# Patient Record
Sex: MALE | Race: WHITE
[De-identification: names, ages, dates, MRNs, and addresses within clinical notes are randomized per-mention and may not be internally consistent; named-entity substitution may affect disease eponyms.]

---

## 2018-03-28 NOTE — XMS
Encounter Summary
  Created on: 04/10/2018
 
 Kayce Arboleda
 External Reference #: [id number]
 : 64
 Sex: Male
 
 Demographics
 
 
+-----------------------+-----------------------+
| Address               | [address] |
|                       | [place]  [address]  |
+-----------------------+-----------------------+
| Home Phone            | [phone]       |
+-----------------------+-----------------------+
| Preferred Language    | Unknown               |
+-----------------------+-----------------------+
| Marital Status        | Single                |
+-----------------------+-----------------------+
| Caodaism Affiliation | [id number]                  |
+-----------------------+-----------------------+
| Race                  | Unknown               |
+-----------------------+-----------------------+
| Ethnic Group          | Unknown               |
+-----------------------+-----------------------+
 
 
 Author
 
 
+--------------+-----------------------+
| Author       | [facility] |
+--------------+-----------------------+
| Organization | [facility] |
+--------------+-----------------------+
| Address      | Unknown               |
+--------------+-----------------------+
| Phone        | Unavailable           |
+--------------+-----------------------+
 
 
 
 Support
 
 
+-----------------+--------------+---------------------+-----------------+
| Name            | Relationship | Address             | Phone           |
+-----------------+--------------+---------------------+-----------------+
| Tejal Champagne | ECON         | [address]              | [phone] |
|                 |              | [address], [place]   |                 |
|                 |              | [address]               |                 |
+-----------------+--------------+---------------------+-----------------+
 
 
 
 Care Team Providers
 
 
 
+-----------------------+------+-----------------+
| Care Team Member Name | Role | Phone           |
+-----------------------+------+-----------------+
| Facundo Lees  | PCP  | [phone] |
+-----------------------+------+-----------------+
 
 
 
 Encounter Details
 
 
+--------+-------------+----------------------+----------------------+-------------+
| Date   | Type        | Department           | Care Team            | Description |
+--------+-------------+----------------------+----------------------+-------------+
| / | Documentati |   [facility]          |   [facility] |             |
| 2018   | on Only     | [facility]  |                      |             |
|        |             |  [address]    |                      |             |
|        |             | [place]         |                      |             |
|        |             | [address]           |                      |             |
|        |             | [phone]         |                      |             |
+--------+-------------+----------------------+----------------------+-------------+
 
 
 
 Social History
 
 
+---------------+-------+-----------+--------+------------------+
| Tobacco Use   | Types | Packs/Day | Years  | Date             |
|               |       |           | Used   |                  |
+---------------+-------+-----------+--------+------------------+
| Former Smoker |       | 3         | 37     | Quit: 10/05/2015 |
+---------------+-------+-----------+--------+------------------+
 
 
 
+---------------------+---+---+----------+
| Smokeless Tobacco:  |   |   | Quit:    |
| Former User         |   |   | 10/05/20 |
|                     |   |   | 17       |
+---------------------+---+---+----------+
 
 
 
+-------------+-----------+---------+---------------------------+
| Alcohol Use | Drinks/We | oz/Week | Comments                  |
|             | ek        |         |                           |
+-------------+-----------+---------+---------------------------+
| No          |   0       | 0.0     | heavy drinker in past hx. |
|             | Standard  |         |                           |
|             | drinks or |         |                           |
|             |           |         |                           |
|             | equivalen |         |                           |
|             | t         |         |                           |
+-------------+-----------+---------+---------------------------+
 
 
 
 
+------------------+---------------+
| Sex Assigned at  | Date Recorded |
| Birth            |               |
+------------------+---------------+
| Not on file      |               |
+------------------+---------------+
 as of this encounter
 
 Plan of Treatment
 
 
+--------+---------+-------------+----------------------+-------------+
| Date   | Type    | Specialty   | Care Team            | Description |
+--------+---------+-------------+----------------------+-------------+
| / | Office  | Pulmonology |   Jhonatan Louie MD  |             |
| 2018   | Visit   |             |  [address]    |             |
|        |         |             | [place] [address]   |             |
|        |         |             | [phone]         |             |
|        |         |             | [phone] (Fax)   |             |
+--------+---------+-------------+----------------------+-------------+
 as of this encounter
 
 Visit Diagnoses
 Not on filein this encounter

## 2018-03-28 NOTE — XMS
Clinical Summary
  Created on: 04/10/2018
 
 Jarod Arboleda
 External Reference #: [id number]
 : 64
 Sex: Male
 
 Demographics
 
 
+-----------------------+-----------------------+
| Address               | [address] |
|                       | [place]  [address]  |
+-----------------------+-----------------------+
| Home Phone            | [phone]       |
+-----------------------+-----------------------+
| Preferred Language    | Unknown               |
+-----------------------+-----------------------+
| Marital Status        | Single                |
+-----------------------+-----------------------+
| Buddhism Affiliation | 1013                  |
+-----------------------+-----------------------+
| Race                  | Unknown               |
+-----------------------+-----------------------+
| Ethnic Group          | Unknown               |
+-----------------------+-----------------------+
 
 
 Author
 
 
+--------------+-----------------------+
| Author       | [facility] |
+--------------+-----------------------+
| Organization | [facility] |
+--------------+-----------------------+
| Address      | Unknown               |
+--------------+-----------------------+
| Phone        | Unavailable           |
+--------------+-----------------------+
 
 
 
 Support
 
 
+-----------------+--------------+---------------------+-----------------+
| Name            | Relationship | Address             | Phone           |
+-----------------+--------------+---------------------+-----------------+
| Tejal Champagne | ECON         | [address]              | [phone] |
|                 |              | [address], [place]   |                 |
|                 |              | [address]               |                 |
+-----------------+--------------+---------------------+-----------------+
 
 
 
 Care Team Providers
 
 
 
+-----------------------+------+-----------------+
| Care Team Member Name | Role | Phone           |
+-----------------------+------+-----------------+
| Facundo Lees  | PP   | [phone] |
+-----------------------+------+-----------------+
 
 
 
 Allergies
 
 
+----------------+----------------------+----------+----------+---------------------+
| Active Allergy | Reactions            | Severity | Noted    | Comments            |
|                |                      |          | Date     |                     |
+----------------+----------------------+----------+----------+---------------------+
| Lisinopril     | Other (See Comments) | Medium   | 20 |   Dry hacking cough |
|                |                      |          | 16       |                     |
+----------------+----------------------+----------+----------+---------------------+
| Trazodone      | Agitation            | Low      | 20 |                     |
|                |                      |          | 16       |                     |
+----------------+----------------------+----------+----------+---------------------+
 
 
 
 Current Medications
 
 
+----------------------+----------------------+--------+---------+------+------+-------+
| Prescription         | Sig.                 | Disp.  | Refills | Star | End  | Statu |
|                      |                      |        |         | t    | Date | s     |
|                      |                      |        |         | Date |      |       |
+----------------------+----------------------+--------+---------+------+------+-------+
|   thiamine (VITAMIN  | Take 1 tablet by     |   30   | 0       |  |      | Activ |
| B-1) 100 MG tablet   | mouth daily.         | tablet |         |  |      | e     |
|                      |                      |        |         | 15   |      |       |
+----------------------+----------------------+--------+---------+------+------+-------+
|   pantoprazole       | Take 40 mg by mouth  |        |         |      |      | Activ |
| (PROTONIX) 40 MG     | daily.               |        |         |      |      | e     |
| tablet               |                      |        |         |      |      |       |
+----------------------+----------------------+--------+---------+------+------+-------+
|   ferrous sulfate,   | Take 65 mg of iron   |        |         |      |      | Activ |
| 65 FE, 324 (65 FE)   | by mouth daily with  |        |         |      |      | e     |
| MG EC tablet         | breakfast. With 250  |        |         |      |      |       |
|                      | mg Vit C             |        |         |      |      |       |
+----------------------+----------------------+--------+---------+------+------+-------+
|   ascorbic acid      | Take 250 mg by mouth |        |         |      |      | Activ |
| (VITAMIN C) 250 MG   |  daily.              |        |         |      |      | e     |
| tablet               |                      |        |         |      |      |       |
+----------------------+----------------------+--------+---------+------+------+-------+
|   cloNIDine          | Take 0.3 mg by mouth |        |         |      |      | Activ |
| (CATAPRES) 0.3 MG    |  nightly.            |        |         |      |      | e     |
| tablet               |                      |        |         |      |      |       |
+----------------------+----------------------+--------+---------+------+------+-------+
|   tamsulosin         | Take 0.4 mg by mouth |        |         |      |      | Activ |
| (FLOMAX) 0.4 MG      |   After dinner.      |        |         |      |      | e     |
| capsule              |                      |        |         |      |      |       |
+----------------------+----------------------+--------+---------+------+------+-------+
|   nitroGLYCERIN      | Place 1 tablet under |   90   | 0       | 05/0 |      | Activ |
 
| (NITROSTAT) 0.4 MG   |  the tongue every 5  | tablet |         | 7/20 |      | e     |
| SL tablet            | (five) minutes as    |        |         | 16   |      |       |
|                      | needed for Chest     |        |         |      |      |       |
|                      | pain.                |        |         |      |      |       |
+----------------------+----------------------+--------+---------+------+------+-------+
|   Multiple           | Take  by mouth       |        |         |      |      | Activ |
| Vitamins-Minerals    | daily.               |        |         |      |      | e     |
| (RA CENTRAL-BIBI PO) |                      |        |         |      |      |       |
+----------------------+----------------------+--------+---------+------+------+-------+
|                      | Inhale 1 puff into   |        |         |      |      | Activ |
| fluticasone-salmeter | the lungs 2 (two)    |        |         |      |      | e     |
| ol (ADVAIR) 500-50   | times daily.         |        |         |      |      |       |
| MCG/DOSE diskus      |                      |        |         |      |      |       |
| inhaler              |                      |        |         |      |      |       |
+----------------------+----------------------+--------+---------+------+------+-------+
|   gabapentin         | Take 300 mg by mouth |        |         |      |      | Activ |
| (NEURONTIN) 600 MG   |  3 (three) times     |        |         |      |      | e     |
| tablet               | daily.               |        |         |      |      |       |
+----------------------+----------------------+--------+---------+------+------+-------+
|   ibuprofen (MOTRIN) | Take 600 mg by mouth |        |         |      |      | Activ |
|  600 MG tablet       |  every 6 (six) hours |        |         |      |      | e     |
|                      |  as needed for Pain. |        |         |      |      |       |
|                      |  Trying to use       |        |         |      |      |       |
|                      | sparingly            |        |         |      |      |       |
+----------------------+----------------------+--------+---------+------+------+-------+
|   montelukast        | Take 10 mg by mouth  |        |         |      |      | Activ |
| (SINGULAIR) 10 MG    | nightly.             |        |         |      |      | e     |
| tablet               |                      |        |         |      |      |       |
+----------------------+----------------------+--------+---------+------+------+-------+
|                      | Take 12.5 mg by      |        |         |      |      | Activ |
| hydrochlorothiazide  | mouth daily.         |        |         |      |      | e     |
| (HYDRODIURIL) 12.5   |                      |        |         |      |      |       |
| MG tablet            |                      |        |         |      |      |       |
+----------------------+----------------------+--------+---------+------+------+-------+
|   amLODIPine         | Take 5 mg by mouth   |        |         |      |      | Activ |
| (NORVASC) 5 MG       | daily.               |        |         |      |      | e     |
| tablet               |                      |        |         |      |      |       |
+----------------------+----------------------+--------+---------+------+------+-------+
|   amitriptyline      | Take 100 mg by mouth |        |         |      |      | Activ |
| (ELAVIL) 100 MG      |  nightly.            |        |         |      |      | e     |
| tablet               |                      |        |         |      |      |       |
+----------------------+----------------------+--------+---------+------+------+-------+
|   albuterol          | Inhale 2 puffs into  |        |         |      |      | Activ |
| (PROVENTIL           | the lungs every 4    |        |         |      |      | e     |
| HFA;VENTOLIN HFA)    | (four) hours as      |        |         |      |      |       |
| 108 (90 BASE)        | needed for Wheezing. |        |         |      |      |       |
| MCG/ACT inhaler      |                      |        |         |      |      |       |
+----------------------+----------------------+--------+---------+------+------+-------+
|   umeclidinium       | Inhale 1 puff into   |        |         |      |      | Activ |
| bromide (INCRUSE     | the lungs daily.     |        |         |      |      | e     |
| ELLIPTA) 62.5        |                      |        |         |      |      |       |
| MCG/INH inhaler      |                      |        |         |      |      |       |
+----------------------+----------------------+--------+---------+------+------+-------+
|   diclofenac         | Take 50 mg by mouth  |        |         |      |      | Activ |
| (CATAFLAM) 50 MG     | 2 (two) times daily. |        |         |      |      | e     |
| tablet               |                      |        |         |      |      |       |
+----------------------+----------------------+--------+---------+------+------+-------+
|                      | Take 3 mLs by        |        |         |      |      | Activ |
| ipratropium-albutero | nebulization as      |        |         |      |      | e     |
| l (DUO-NEB) 0.5-2.5  | needed.              |        |         |      |      |       |
 
| mg/3mL               |                      |        |         |      |      |       |
+----------------------+----------------------+--------+---------+------+------+-------+
|   atorvastatin       | Take 1 tablet by     |   30   | 11      | 01/2 |      | Activ |
| (LIPITOR) 40 MG      | mouth nightly.       | tablet |         | 9/20 |      | e     |
| tablet               |                      |        |         | 18   |      |       |
+----------------------+----------------------+--------+---------+------+------+-------+
|   metoprolol         | Take 1 tablet by     |   60   | 11      | 01/2 |      | Activ |
| (LOPRESSOR) 25 MG    | mouth 2 (two) times  | tablet |         | 9/20 |      | e     |
| tablet               | daily.               |        |         | 18   |      |       |
+----------------------+----------------------+--------+---------+------+------+-------+
|   oxyCODONE          | Take 5 mg by mouth   |        |         |      |      | Activ |
| (ROXICODONE) 5 MG    | every 4 (four) hours |        |         |      |      | e     |
| immediate release    |  as needed for Pain. |        |         |      |      |       |
| tablet               |                      |        |         |      |      |       |
+----------------------+----------------------+--------+---------+------+------+-------+
|   cyanocobalamin     | Take 1,000 mcg by    |        |         |      |      | Activ |
| (VITAMIN B-12) 1000  | mouth daily.         |        |         |      |      | e     |
| MCG tablet           |                      |        |         |      |      |       |
+----------------------+----------------------+--------+---------+------+------+-------+
 
 
 
 Active Problems
 
 
+---------------------------------------------+------------+
| Problem                                     | Noted Date |
+---------------------------------------------+------------+
| Mild persistent asthma without complication | 2017 |
+---------------------------------------------+------------+
| Obstructive sleep apnea syndrome            | 2017 |
+---------------------------------------------+------------+
| H/O syncope                                 | 2016 |
+---------------------------------------------+------------+
 
 
 
+-------------------------------------------------------------------+
|   Last Assessment & Plan:   Syncope.         55yo WM, with Hx     |
| syncope.  Our workup to date is benign, including a 32 day        |
| cardiac event monitor.  He remains moderately active, no          |
| recurrent syncope, although he does have episodes of              |
| lightheadedness dizziness, but not to a point where he is near    |
| syncopal.  He denies any chest discomfort or shortness of breath. |
|   Tolerating medications.  No changes in therapy.  Will follow    |
| clinically.Hx Pacemaker/ICD: noLast Cath: naLast Echo, 2016  |
| ([facility]): LVEF 65-70%, trace MR, trace TR.  Ascending Aorta |
|  41mm, Aortic arch 37mm.Last Stress Test, 2016: Lexiscan, no  |
| ischemia detected, LVEF 63%.32-Day Cardiac Event Monitor,         |
| 2016: sinus rhythm, , averaging 80bpm, rare ectopy, no |
|  AVB/pauses, symptoms of "chest pain, SOB, dizziness, fluttering" |
|  all associated with NSR.ECG, 2016: NSR, 63bpm.              |
+-------------------------------------------------------------------+
 
 
 
+----------------------------------------------+------------+
| COPD (chronic obstructive pulmonary disease) | 2016 |
+----------------------------------------------+------------+
 
 
 
 
+-------------------------------------------------------------------+
|   Last Assessment & Plan:   COPD, ex-smoker (2015), managed by  |
| PCP.                                                              |
+-------------------------------------------------------------------+
 
 
 
+------------------------+------------+
| Precordial pain        | 2016 |
+------------------------+------------+
| Essential hypertension | 2016 |
+------------------------+------------+
 
 
 
+-----------------------------------------------------------------+
|   Last Assessment & Plan:   Hypertension, controlled, continue  |
| current meds at current doses (amlodipine, clonidine, HCT,      |
| metoprolol).                                                    |
+-----------------------------------------------------------------+
 
 
 
+----------------------+------------+
| HLD (hyperlipidemia) | 2016 |
+----------------------+------------+
 
 
 
+-------------------------------------------------------------------+
|   Last Assessment & Plan:   Hyperlipidemia, continue current meds |
|  at current dose (atorvastatin).                                  |
+-------------------------------------------------------------------+
 
 
 
+---------------------------------------------------------------+------------+
| Alcohol withdrawal                                            | 2015 |
+---------------------------------------------------------------+------------+
| Acute respiratory failure (HCC)                               | 2015 |
+---------------------------------------------------------------+------------+
| Personal history of tobacco use, presenting hazards to health | 2015 |
+---------------------------------------------------------------+------------+
| Obstructive sleep apnea (adult) (pediatric)                   | 2015 |
+---------------------------------------------------------------+------------+
| Morbid obesity (HCC)                                          | 2015 |
+---------------------------------------------------------------+------------+
| Accelerated hypertension                                      | 2015 |
+---------------------------------------------------------------+------------+
 
 
 
 Encounters
 
 
+--------+-------------+-----------+----------------------+----------------------+
| Date   | Type        | Specialty | Care Team            | Description          |
+--------+-------------+-----------+----------------------+----------------------+
 
| / | Office      |           |   PernelljhonyJanette    | Essential            |
| 2018   | Visit       |           | DIETER Johnson           | hypertension with    |
|        |             |           |                      | goal blood pressure  |
|        |             |           |                      | less than 130/80     |
|        |             |           |                      | (Primary Dx);        |
|        |             |           |                      | Hyperlipidemia,      |
|        |             |           |                      | unspecified          |
|        |             |           |                      | hyperlipidemia type; |
|        |             |           |                      |  Thoracoabdominal    |
|        |             |           |                      | aortic aneurysm,     |
|        |             |           |                      | without rupture      |
|        |             |           |                      | (HCC); H/O syncope;  |
|        |             |           |                      | Murmur, cardiac;     |
|        |             |           |                      | Obstructive sleep    |
|        |             |           |                      | apnea syndrome;      |
|        |             |           |                      | Chronic obstructive  |
|        |             |           |                      | pulmonary disease,   |
|        |             |           |                      | unspecified COPD     |
|        |             |           |                      | type (HCC); Former   |
|        |             |           |                      | heavy tobacco        |
|        |             |           |                      | smoker; History of   |
|        |             |           |                      | anemia               |
+--------+-------------+-----------+----------------------+----------------------+
| / | Documentati |           |   [facility] Galav[facility] |                      |
| 2018   | on Only     |           |                      |                      |
+--------+-------------+-----------+----------------------+----------------------+
| / | Documentati |           |   Sherron Almanzar,  | Piter Cervantes          |
| 2018   | on Only     |           | CMA                  | BLANCA Lees)        |
+--------+-------------+-----------+----------------------+----------------------+
| / | Documentati |           |   Sherron Almanzar,  | Other ([facility]    |
|    | on Only     |           | CMA                  | Clinic, Pulmonary    |
|        |             |           |                      | Function Test)       |
+--------+-------------+-----------+----------------------+----------------------+
| / | Ancillary   |           |   Janette Woodward    | H/O syncope;         |
|    | Procedure   |           | DIETER Johnson           | Essential            |
|        |             |           |                      | hypertension with    |
|        |             |           |                      | goal blood pressure  |
|        |             |           |                      | less than 130/80;    |
|        |             |           |                      | Hyperlipidemia,      |
|        |             |           |                      | unspecified          |
|        |             |           |                      | hyperlipidemia type; |
|        |             |           |                      |  Chronic obstructive |
|        |             |           |                      |  pulmonary disease,  |
|        |             |           |                      | unspecified COPD     |
|        |             |           |                      | type ([facility]);          |
|        |             |           |                      | Thoracoabdominal     |
|        |             |           |                      | aortic aneurysm,     |
|        |             |           |                      | without rupture      |
|        |             |           |                      | (HCC); Obstructive   |
|        |             |           |                      | sleep apnea syndrome |
+--------+-------------+-----------+----------------------+----------------------+
| / | Documentati |           |   Josseline Kwok,  | Other ([facility]     |
|    | on Only     |           | MA                   | Labs)                |
+--------+-------------+-----------+----------------------+----------------------+
| / | Documentati |           |   Stomry,     | Labs Only            |
| 2018   | on Only     |           | [facility]          |                      |
+--------+-------------+-----------+----------------------+----------------------+
| / | Office      |           |   Janette Woodward    | H/O syncope (Primary |
| 2018   | Visit       |           | DIETER Johnson           |  Dx); Essential      |
|        |             |           |                      | hypertension with    |
 
|        |             |           |                      | goal blood pressure  |
|        |             |           |                      | less than 130/80;    |
|        |             |           |                      | Hyperlipidemia,      |
|        |             |           |                      | unspecified          |
|        |             |           |                      | hyperlipidemia type; |
|        |             |           |                      |  Chronic obstructive |
|        |             |           |                      |  pulmonary disease,  |
|        |             |           |                      | unspecified COPD     |
|        |             |           |                      | type (HCC);          |
|        |             |           |                      | Thoracoabdominal     |
|        |             |           |                      | aortic aneurysm,     |
|        |             |           |                      | without rupture      |
|        |             |           |                      | (HCC); Obstructive   |
|        |             |           |                      | sleep apnea          |
|        |             |           |                      | syndrome; Former     |
|        |             |           |                      | heavy tobacco        |
|        |             |           |                      | smoker; History of   |
|        |             |           |                      | anemia; Murmur,      |
|        |             |           |                      | cardiac              |
+--------+-------------+-----------+----------------------+----------------------+
| / | Telephone   |           |   Lupe Nolan,    |                      |
|    |             |           | CMA                  |                      |
+--------+-------------+-----------+----------------------+----------------------+
 from Last 3 Months
 
 Immunizations
 
 
+-----------------+------------------------+----------+
| Name            | Dates Previously Given | Next Due |
+-----------------+------------------------+----------+
| Pneumococcal    | 2015             |          |
| Polysaccharide  |                        |          |
| 23-valent       |                        |          |
+-----------------+------------------------+----------+
 
 
 
 Family History
 
 
+--------------------+----------+------+----------+
| Medical History    | Relation | Name | Comments |
+--------------------+----------+------+----------+
| Sickle cell anemia | Father   |      |          |
+--------------------+----------+------+----------+
| Alcohol abuse      | Mother   |      |          |
+--------------------+----------+------+----------+
| Diabetes type I    | Mother   |      |          |
+--------------------+----------+------+----------+
| Diabetes type II   | Mother   |      |          |
+--------------------+----------+------+----------+
 
 
 
+----------+------+----------+--------------------+
| Relation | Name | Status   | Comments           |
+----------+------+----------+--------------------+
| Father   |      |  | sickle cell anemia |
|          |      |   (Age   |                    |
 
|          |      | 70)      |                    |
+----------+------+----------+--------------------+
| Mother   |      | Alive    |                    |
+----------+------+----------+--------------------+
 
 
 
 Social History
 
 
+---------------+-------+-----------+--------+------------------+
| Tobacco Use   | Types | Packs/Day | Years  | Date             |
|               |       |           | Used   |                  |
+---------------+-------+-----------+--------+------------------+
| Former Smoker |       | 3         | 37     | Quit: 10/05/2015 |
+---------------+-------+-----------+--------+------------------+
 
 
 
+---------------------+---+---+----------+
| Smokeless Tobacco:  |   |   | Quit:    |
| Former User         |   |   | 10/05/20 |
|                     |   |   | 15       |
+---------------------+---+---+----------+
 
 
 
+--------------------------------------+
| Tobacco Cessation: Ready to Quit: No |
+--------------------------------------+
 
 
 
+-------------+-----------+---------+---------------------------+
| Alcohol Use | Drinks/We | oz/Week | Comments                  |
|             | ek        |         |                           |
+-------------+-----------+---------+---------------------------+
| No          |   0       | 0.0     | heavy drinker in past hx. |
|             | Standard  |         |                           |
|             | drinks or |         |                           |
|             |           |         |                           |
|             | equivalen |         |                           |
|             | t         |         |                           |
+-------------+-----------+---------+---------------------------+
 
 
 
+------------------+---------------+
| Sex Assigned at  | Date Recorded |
| Birth            |               |
+------------------+---------------+
| Not on file      |               |
+------------------+---------------+
 
 
 
 Last Filed Vital Signs
 
 
+-------------------+----------------------+-------------------------+
 
| Vital Sign        | Reading              | Time Taken              |
+-------------------+----------------------+-------------------------+
| Blood Pressure    | 112/60               | 2018 10:53 AM PDT |
+-------------------+----------------------+-------------------------+
| Pulse             | 67                   | 2018 10:53 AM PDT |
+-------------------+----------------------+-------------------------+
| Temperature       | 36.4   C (97.6   F)  | 2017  9:51 AM PST |
+-------------------+----------------------+-------------------------+
| Respiratory Rate  | 20                   | 2018 10:53 AM PDT |
+-------------------+----------------------+-------------------------+
| Oxygen Saturation | 98%                  | 2018 10:53 AM PDT |
+-------------------+----------------------+-------------------------+
| Inhaled Oxygen    | -                    | -                       |
| Concentration     |                      |                         |
+-------------------+----------------------+-------------------------+
| Weight            | 106.6 kg (235 lb 1.6 | 2018 10:53 AM PDT |
|                   |  oz)                 |                         |
+-------------------+----------------------+-------------------------+
| Height            | 177.8 cm (5' 10")    | 2018 10:53 AM PDT |
+-------------------+----------------------+-------------------------+
| Body Mass Index   | 33.73                | 2018 10:53 AM PDT |
+-------------------+----------------------+-------------------------+
 
 
 
 Plan of Treatment
 
 
+--------+---------+-----------+----------------------+-------------+
| Date   | Type    | Specialty | Care Team            | Description |
+--------+---------+-----------+----------------------+-------------+
| / | Office  |           |   Jhonatan Louie MD  |             |
| 2018   | Visit   |           |  [address]    |             |
|        |         |           | [place] [address]   |             |
|        |         |           | [phone]         |             |
|        |         |           | [phone] (Fax)   |             |
+--------+---------+-----------+----------------------+-------------+
 
 
 
+----------------------+-----------+--------------------------+----------+
| Health Maintenance   | Due Date  | Last Done                | Comments |
+----------------------+-----------+--------------------------+----------+
| Vaccine:             |  |                          |          |
| Dtap/Tdap/Td (1 -    | 1         |                          |          |
| Tdap)                |           |                          |          |
+----------------------+-----------+--------------------------+----------+
| Colon Cancer         |  |                          |          |
| Screening            | 2         |                          |          |
| (Colonoscopy)        |           |                          |          |
+----------------------+-----------+--------------------------+----------+
| Vaccine: Influenza   |  | 10/27/2015, 10/21/2013,  |          |
| (Season Ended)       | 8         | 2013               |          |
+----------------------+-----------+--------------------------+----------+
| Vaccine:             | Completed | 2015, 2015   |          |
| Pneumococcal 19-64   |           |                          |          |
| (PPSV23 only) Medium |           |                          |          |
|  Risk                |           |                          |          |
+----------------------+-----------+--------------------------+----------+
 
 
 
 
 Procedures
 
 
+-----------------+--------+-------------+----------------------+----------------------+
| Procedure Name  | Priori | Date/Time   | Associated Diagnosis | Comments             |
|                 | ty     |             |                      |                      |
+-----------------+--------+-------------+----------------------+----------------------+
| ECHO OUTSIDE    | Routin | 2018  |   H/O syncope        |   Results for this   |
| INTERPRETATION  | e      |  3:43 PM    | Essential            | procedure are in the |
| STANDARD        |        | PST         | hypertension with    |  results section.    |
|                 |        |             | goal blood pressure  |                      |
|                 |        |             | less than 130/80     |                      |
|                 |        |             | Hyperlipidemia,      |                      |
|                 |        |             | unspecified          |                      |
|                 |        |             | hyperlipidemia type  |                      |
|                 |        |             |  Chronic obstructive |                      |
|                 |        |             |  pulmonary disease,  |                      |
|                 |        |             | unspecified COPD     |                      |
|                 |        |             | type (HCC)           |                      |
|                 |        |             | Thoracoabdominal     |                      |
|                 |        |             | aortic aneurysm,     |                      |
|                 |        |             | without rupture      |                      |
|                 |        |             | (HCC)  Obstructive   |                      |
|                 |        |             | sleep apnea syndrome |                      |
+-----------------+--------+-------------+----------------------+----------------------+
 from Last 3 Months
 
 Results
 ECHO outside interpretation standard (2018  3:43 PM)
 
+----------+--------------------------------------------------------+
| Specimen | Performing Laboratory                                  |
+----------+--------------------------------------------------------+
|          |   [facility] [address] [place] [address] |
+----------+--------------------------------------------------------+
 
 
 
+------------------------------------------------------------------------------------------+
| Impressions                                                                              |
+------------------------------------------------------------------------------------------+
|   1. Overall left ventricular systolic function is normal with, an EF between 60 - 65 %. |
|   2. The right ventricle is normal in size and function.  3. The aortic root, ascending  |
| aorta and aortic arch are normal. 4. No significant valvular abnormalities are noted.    |
+------------------------------------------------------------------------------------------+
 
 
 
+------------------------------------------------------------------------------------------+
| Narrative                                                                                |
+------------------------------------------------------------------------------------------+
|      Patient Name:    Jarod Arboleda  YOB: 1960                       |
| Accession:    [id number]     Performing Physician:    LOR DELGADO MD                      |
| _______________________________________________________________  INDICATIONS             |
| -----------  F/U thoracic/abdominal aneurysm     CONCLUSIONS  -----------  1. Overall    |
| left ventricular systolic function is normal with, an EF between 60 - 65 %.  2. The      |
| right ventricle is normal in size and function.  3. The aortic root, ascending aorta and |
|  aortic arch are normal. 4. No significant valvular abnormalities are noted.             |
 
| FINDINGS  --------  ECG rhythm: Sinus rhythm.   ECG rhythm: Resting bradycardia          |
| (HR<60bpm).  Study: A 2-dimensional transthoracic echocardiogram with m-mode, spectral   |
| and color flow Doppler was perfomed.   Study: This was a technically adequate study.     |
| Left Ventricle: Overall left ventricular systolic function is normal with, an EF between |
|  60 - 65 %.   Left Ventricle: The left ventricle cavity size is normal.   Left           |
| Ventricle: Left ventricular wall thickness is normal.   Left Ventricle: No regional wall |
|  motion abnormalities.   Left Ventricle: The diastolic filling pattern is normal for the |
|  age of the patient.  Right Ventricle: The right ventricle is normal in size and         |
| function.  Left Atrium: The left atrium is normal in size.  Right Atrium: The right      |
| atrium is normal in size.   Aortic Valve: Aortic valve is trileaflet and is mildly       |
| thickened.   Aortic Valve: There is no evidence of aortic regurgitation.   Aortic Valve: |
|  There is no evidence of aortic stenosis.  Mitral Valve: The mitral valve is normal.     |
| Mitral Valve: There is trace mitral regurgitation.   Mitral Valve: No evidence of MVP or |
|  mitral stenosis.  Tricuspid Valve: The tricuspid valve appears structurally normal.     |
| Tricuspid Valve: Pulmonary artery systolic pressure could not be assessed due to the     |
| absence of adequate TR jet.  Pulmonic Valve: The pulmonic valve was not well visualized. |
|   Pericardium: There is no pericardial effusion.  IVC/Hepatic Veins: The IVC is small    |
| (<1.5cm) and collapses with sniff, consistent with central venous pressures of 0-5mmHg.  |
|  Aorta: The aortic root, ascending aorta and aortic arch are normal.  Mass: No mass      |
| visualized  Thrombus: No clot visualized   Thrombus: No vegetation visualized.  Septum:  |
| No ASD observed.   Septum: No VSD observed.     MEASUREMENTS  -------------  Ao asc:     |
|  3.65 cm  Ao sinus:     3.46 cm  Ao st junct:     2.91 cm  IVC:     1.27 cm              |
| EDV(Teich):     105.93 ml  IVSd:     1.06 cm  LVIDd:     4.76 cm  LVPWd:     0.90 cm     |
| LVOT Diam:     2.31 cm  %FS:     22.34 %  EF(Teich):     44.99 %  ESV(Teich):     58.27  |
| ml  LVIDs:     3.70 cm  SV(Teich):     47.66 ml  RVIDd:     3.07 cm  Ao root:     32.72  |
| mm  LVEF MOD A2C:     56.41 %  SV MOD A2C:     46.46 ml  LVEF MOD A4C:     55.05 %  SV   |
| MOD A4C:     55.55 ml  EF Biplane:     55.87 %  LVEDV MOD BP:     92.29 ml  LVESV MOD    |
| BP:     40.72 ml  LVEDV MOD A2C:     82.36 ml  LVLd A2C:     9.15 cm  LVEDV MOD A4C:     |
|  100.89 ml  LVLd A4C:     9.46 cm  LVESV MOD A2C:     35.90 ml  LVLs A2C:     6.85 cm    |
| LVESV MOD A4C:     45.34 ml  LVLs A4C:     7.06 cm  LAESV(A-L):     54.75 ml  LAESV      |
| Index (A-L):     24.66 ml/m2  LAAs A2C:     15.85 cm2  LAESV A-L A2C:     42.05 ml  LALs |
|  A2C:     5.07 cm  LAAs A4C:     20.65 cm2  LAESV A-L A4C:     68.39 ml  LALs A4C:       |
| 5.29 cm  RAAs:     16.87 cm2  RAESV A-L:     44.78 ml  RAESV MOD:     45.69 ml           |
| RALs:     5.39 cm  TAPSE:     2.19 cm  AV maxP.05 mmHg  AV meanPG:     3.73 mmHg |
|   AV Vmax:     1.32 m/s  AV Vmean:     0.90 m/s  AV VTI:     29.89 cm  CHIRAG Vmax:         |
| 3.41 cm2  CHIRAG (VTI):     3.01 cm2  AVAI Vmax:     0.00 cm2/m2  AVAI (VTI):     0.00      |
| cm2/m2  LVOT maxP.63 mmHg  LVOT meanP.94 mmHg  LVSI Dopp:     40.60      |
| ml/m2  LVSV Dopp:     90.15 ml  LVOT Vmax:     1.07 m/s  LVOT Vmean:     0.63 m/s  LVOT  |
| VTI:     21.41 cm  MV A Librado:     0.37 m/s  MV DecT:     232.01 ms  MV E Librado:     0.58    |
| m/s  MV E/A Ratio:     1.53   Septal e':     0.08 m/s  Septal E/e':     7.16   Lateral   |
| e':     0.07 m/s  Lateral E/e':     7.35      Sonographer:   Authenticated               |
| by:    LOR DELGADO MD  Report Date/Time: 2018 17:52:14                            |
+------------------------------------------------------------------------------------------+
 
 
 
+-------------------------------------------------------------------------------------------
-------------------------+
| Procedure Note                                                                            
                         |
+-------------------------------------------------------------------------------------------
-------------------------+
|   Tank, Rad Results In - 2018  6:00 PM PST  Patient Name:  Jessica Arboleda of     
                         |
| Birth:  ccession:  [id number] Physician:  LOR DELGADO MD              
                         |
| _______________________________________________________________INDICATIONS-----------F/U  
                         |
|  thoracic/abdominal aneurysmCONCLUSIONS-----------1. Overall left ventricular systolic    
                         |
 
| function is normal with, an EF between 60 - 65 %.2. The right ventricle is normal in      
                         |
| size and function.3. The aortic root, ascending aorta and aortic arch are normal. 4. No   
                         |
| significant valvular abnormalities are noted.FINDINGS--------ECG rhythm: Sinus rhythm.    
                         |
| ECG rhythm: Resting bradycardia (HR<60bpm).Study: A 2-dimensional transthoracic           
                         |
| echocardiogram with m-mode, spectral and color flow Doppler was perfomed. Study: This     
                         |
| was a technically adequate study.Left Ventricle: Overall left ventricular systolic        
                         |
| function is normal with, an EF between 60 - 65 %. Left Ventricle: The left ventricle      
                         |
| cavity size is normal. Left Ventricle: Left ventricular wall thickness is normal. Left    
                         |
| Ventricle: No regional wall motion abnormalities. Left Ventricle: The diastolic filling   
                         |
| pattern is normal for the age of the patient.Right Ventricle: The right ventricle is      
                         |
| normal in size and function.Left Atrium: The left atrium is normal in size.Right Atrium:  
                         |
|  The right atrium is normal in size. Aortic Valve: Aortic valve is trileaflet and is      
                         |
| mildly thickened. Aortic Valve: There is no evidence of aortic regurgitation. Aortic      
                         |
| Valve: There is no evidence of aortic stenosis.Mitral Valve: The mitral valve is normal.  
                         |
|  Mitral Valve: There is trace mitral regurgitation. Mitral Valve: No evidence of MVP or   
                         |
| mitral stenosis.Tricuspid Valve: The tricuspid valve appears structurally normal.         
                         |
| Tricuspid Valve: Pulmonary artery systolic pressure could not be assessed due to the      
                         |
| absence of adequate TR jet.Pulmonic Valve: The pulmonic valve was not well                
                         |
| visualized.Pericardium: There is no pericardial effusion.IVC/Hepatic Veins: The IVC is    
                         |
| small (<1.5cm) and collapses with sniff, consistent with central venous pressures of      
                         |
| 0-5mmHg.Aorta: The aortic root, ascending aorta and aortic arch are normal.Mass: No mass  
                         |
|  visualizedThrombus: No clot visualized Thrombus: No vegetation visualized.Septum: No     
                         |
| ASD observed. Septum: No VSD observed.MEASUREMENTS-------------Ao asc:   3.65 cmAo        
                         |
| sinus:   3.46 cmAo st junct:   2.91 cmIVC:   1.27 cmEDV(Teich):   105.93 mlIVSd:   1.06   
                         |
| cmLVIDd:   4.76 cmLVPWd:   0.90 cmLVOT Diam:   2.31 cm%FS:   22.34 %EF(Teich):   44.99    
                         |
| %ESV(Teich):   58.27 mlLVIDs:   3.70 cmSV(Teich):   47.66 mlRVIDd:   3.07 cmAo root:      
                         |
| 32.72 mmLVEF MOD A2C:   56.41 %SV MOD A2C:   46.46 mlLVEF MOD A4C:   55.05 %SV MOD A4C:   
                         |
|   55.55 mlEF Biplane:   55.87 %LVEDV MOD BP:   92.29 mlLVESV MOD BP:   40.72 mlLVEDV MOD  
                         |
|  A2C:   82.36 mlLVLd A2C:   9.15 cmLVEDV MOD A4C:   100.89 mlLVLd A4C:   9.46 cmLVESV     
                         |
| MOD A2C:   35.90 mlLVLs A2C:   6.85 cmLVESV MOD A4C:   45.34 mlLVLs A4C:   7.06           
                         |
 
| cmLAESV(A-L):   54.75 mlLAESV Index (A-L):   24.66 ml/m2LAAs A2C:   15.85 [id number] A-L    
                         |
| A2C:   42.05 mlLALs A2C:   5.07 cmLAAs A4C:   20.65 [id number] A-L A4C:   68.39 mlLALs      
                         |
| A4C:   5.29 cmRAAs:   16.87 [id number] A-L:   44.78 mlRAESV MOD:   45.69 mlRALs:   5.39     
                         |
| cmTAPSE:   2.19 cmAV maxP.05 mmHgAV meanPG:   3.73 mmHgAV Vmax:   1.32 m/Hill        
                         |
| Vmean:   0.90 m/Hill VTI:   29.89 cmAVA Vmax:   3.41 cm2AVA (VTI):   3.01 [id number] Vmax:    
                         |
|  0.00 cm2/m2AVAI (VTI):   0.00 cm2/m2LVOT maxP.63 mmHgLVOT meanP.94 mmHgLVSI  
                         |
|  Dopp:   40.60 ml/m2LVSV Dopp:   90.15 mlLVOT Vmax:   1.07 m/sLVOT Vmean:   0.63 m/sLVOT  
                         |
|  VTI:   21.41 cmMV A Librado:   0.37 m/sMV DecT:   232.01 msMV E Librado:   0.58 m/sMV E/A        
                         |
| Ratio:   1.53 Septal e':   0.08 m/sSeptal E/e':   7.16 Lateral e':   0.07 m/sLateral      
                         |
| E/e':   7.35 Sonographer: Authenticated by:  Saurabh HERR Date/Time: 2018  
                         |
|  17:52:14IMPRESSION:1. Overall left ventricular systolic function is normal with, an EF   
                         |
| between 60 - 65 %.2. The right ventricle is normal in size and function.3. The aortic     
                         |
| root, ascending aorta and aortic arch are normal. 4. No significant valvular              
                         |
| abnormalities are noted.                                                                  
                         |
|Septum: No VSD observed.                                                                   
                         |
|MEASUREMENTS                                                                               
                        |
|-------------                                                                              
                          |
|Ao asc:   3.65 cm                                                                          
                         |
|Ao sinus:   3.46 cm                                                                        
                         |
|Ao st junct:   2.91 cm                                                                     
                         |
|IVC:   1.27 cm                                                                             
                         |
|EDV(Teich):   105.93 ml                                                                    
                         |
|IVSd:   1.06 cm                                                                            
                         |
|LVIDd:   4.76 cm                                                                           
                         |
|LVPWd:   0.90 cm                                                                           
                         |
|LVOT Diam:   2.31 cm                                                                       
                         |
|%FS:   22.34 %                                                                             
                         |
|EF(Teich):   44.99 %                                                                       
                         |
|ESV(Teich):   58.27 ml                                                                     
                         |
 
|LVIDs:   3.70 cm                                                                           
                         |
|SV(Teich):   47.66 ml                                                                      
                         |
|RVIDd:   3.07 cm                                                                           
                         |
|Ao root:   32.72 mm                                                                        
                         |
|LVEF MOD A2C:   56.41 %                                                                    
                         |
|SV MOD A2C:   46.46 ml                                                                     
                         |
|LVEF MOD A4C:   55.05 %                                                                    
                         |
|SV MOD A4C:   55.55 ml                                                                     
                         |
|EF Biplane:   55.87 %                                                                      
                         |
|LVEDV MOD BP:   92.29 ml                                                                   
                         |
|LVESV MOD BP:   40.72 ml                                                                   
                         |
|LVEDV MOD A2C:   82.36 ml                                                                  
                         |
|LVLd A2C:   9.15 cm                                                                        
                         |
|LVEDV MOD A4C:   100.89 ml                                                                 
                         |
|LVLd A4C:   9.46 cm                                                                        
                         |
|LVESV MOD A2C:   35.90 ml                                                                  
                         |
|LVLs A2C:   6.85 cm                                                                        
                         |
|LVESV MOD A4C:   45.34 ml                                                                  
                         |
|LVLs A4C:   7.06 cm                                                                        
                         |
|LAESV(A-L):   54.75 ml                                                                     
                         |
|LAESV Index (A-L):   24.66 ml/m2                                                           
                         |
|LAAs A2C:   15.85 cm2                                                                      
                         |
|LAESV A-L A2C:   42.05 ml                                                                  
                         |
|LALs A2C:   5.07 cm                                                                        
                         |
|LAAs A4C:   20.65 cm2                                                                      
                         |
|LAESV A-L A4C:   68.39 ml                                                                  
                         |
|LALs A4C:   5.29 cm                                                                        
                         |
|RAAs:   16.87 cm2                                                                          
                         |
|RAESV A-L:   44.78 ml                                                                      
                         |
|RAESV MOD:   45.69 ml                                                                      
                         |
 
|RALs:   5.39 cm                                                                            
                         |
|TAPSE:   2.19 cm                                                                           
                         |
|AV maxP.05 mmHg                                                                      
                         |
|AV meanPG:   3.73 mmHg                                                                     
                         |
|AV Vmax:   1.32 m/s                                                                        
                         |
|AV Vmean:   0.90 m/s                                                                       
                         |
|AV VTI:   29.89 cm                                                                         
                         |
|CHIRAG Vmax:   3.41 cm2                                                                       
                         |
|CHIRAG (VTI):   3.01 cm2                                                                      
                         |
|AVAI Vmax:   0.00 cm2/m2                                                                   
                         |
|AVAI (VTI):   0.00 cm2/m2                                                                  
                         |
|LVOT maxP.63 mmHg                                                                    
                         |
|LVOT meanP.94 mmHg                                                                   
                         |
|LVSI Dopp:   40.60 ml/m2                                                                   
                         |
|LVSV Dopp:   90.15 ml                                                                      
                         |
|LVOT Vmax:   1.07 m/s                                                                      
                         |
|LVOT Vmean:   0.63 m/s                                                                     
                         |
|LVOT VTI:   21.41 cm                                                                       
                         |
|MV A Librado:   0.37 m/s                                                                       
                         |
|MV DecT:   232.01 ms                                                                       
                         |
|MV E Librado:   0.58 m/s                                                                       
                         |
|MV E/A Ratio:   1.53                                                                       
                         |
|Septal e':   0.08 m/s                                                                      
                         |
|Septal E/e':   7.16                                                                        
                         |
|Lateral e':   0.07 m/s                                                                     
                         |
|Lateral E/e':   7.35                                                                       
                         |
|Sonographer:                                                                               
                         |
|Authenticated by:  LOR DELGADO MD                                                        
                         |
|Report Date/Time: 2018 17:52:14                                                       
                         |
 
|                                                                                           
                         |
|IMPRESSION:                                                                                
                        |
|1. Overall left ventricular systolic function is normal with, an EF between 60 - 65 %.     
                         |
|2. The right ventricle is normal in size and function.                                     
                         |
|3. The aortic root, ascending aorta and aortic arch are normal. 4. No significant valvular 
abnormalities are noted. |
+-------------------------------------------------------------------------------------------
-------------------------+
 Iron panel (2018  7:42 AM)
 
+------------+-------+-----------+
| Component  | Value | Ref Range |
+------------+-------+-----------+
| IRON       | 82.66 | 37 - 160  |
+------------+-------+-----------+
| IRON % SAT | 24.1  | 20 - 55   |
+------------+-------+-----------+
| TIBC       | 343   | 245 - 400 |
+------------+-------+-----------+
 
 
 
+----------+------------------------------------------------------------------+
| Specimen | Performing Laboratory                                            |
+----------+------------------------------------------------------------------+
| Blood    |   [facility]  [address]  |
|          | [id number]                                                            |
+----------+------------------------------------------------------------------+
 Transferrin (2018  7:42 AM)
 
+-------------+--------+-----------+
| Component   | Value  | Ref Range |
+-------------+--------+-----------+
| TRANSFERRIN | 245.11 | 180 - 329 |
+-------------+--------+-----------+
 
 
 
+----------+------------------------------------------------------------------+
| Specimen | Performing Laboratory                                            |
+----------+------------------------------------------------------------------+
| Blood    |   [facility]  [address]  |
|          | [id number]                                                            |
+----------+------------------------------------------------------------------+
 Ferritin (2018  7:42 AM)
 
+-----------+-------+----------------+
| Component | Value | Ref Range      |
+-----------+-------+----------------+
| FERRITIN  | 162.8 | 30 - 400 ng/mL |
+-----------+-------+----------------+
 
 
 
+----------+------------------------------------------------------------------+
| Specimen | Performing Laboratory                                            |
 
+----------+------------------------------------------------------------------+
| Blood    |   [facility]  [address] [facility] [address]  [place], [place]  |
|          | [id number]                                                            |
+----------+------------------------------------------------------------------+
 Lipid panel (2018  7:42 AM)
 
+---------------+-------+----------------+
| Component     | Value | Ref Range      |
+---------------+-------+----------------+
| CHOLESTEROL   | 131   | 200 mg/dL      |
+---------------+-------+----------------+
| TRIGLYCERIDES | 116   | 30 - 150 mg/dL |
+---------------+-------+----------------+
| HDL CHOL      | 40.3  | 40 mg/dl       |
+---------------+-------+----------------+
| LDL CALC      | 68    | 100 mg/dL      |
+---------------+-------+----------------+
| LDl/HDL Ratio |       |                |
+---------------+-------+----------------+
| CHOL/HDL      | 3.3   | 4.97           |
+---------------+-------+----------------+
| VLDL CHOL     | 23    | 4 - 40 mg/dL   |
+---------------+-------+----------------+
| NON HDL CHOL  | 91    | 130            |
+---------------+-------+----------------+
 
 
 
+----------+------------------------------------------------------------------+
| Specimen | Performing Laboratory                                            |
+----------+------------------------------------------------------------------+
| Blood    |   [facility]  [address] [place] [address] [facility] [address]  [place], [place]  |
|          | [id number]                                                            |
+----------+------------------------------------------------------------------+
 Comprehensive metabolic panel (2018  7:42 AM)
 
+----------------+-------+-------------------+
| Component      | Value | Ref Range         |
+----------------+-------+-------------------+
| GLUCOSE        | 99    | 70 - 100 mg/dL    |
+----------------+-------+-------------------+
| BUN            | 18    | 6 - 23 mg/dL      |
+----------------+-------+-------------------+
| CREATININE     | 0.88  | 0.70 - 1.33 mg/dL |
+----------------+-------+-------------------+
| BUN/CREAT      | 20.5  | 6.0 - 28.6        |
+----------------+-------+-------------------+
| CALCIUM        | 9.7   | 8.4 - 10.2 mg/dL  |
+----------------+-------+-------------------+
| TOTAL PROTEIN  | 6.6   | 6.0 - 8.0 g/dL    |
+----------------+-------+-------------------+
| Albumin        | 4.4   | 3.5 - 5.0         |
+----------------+-------+-------------------+
| GLOBULIN       | 2.2   | 1.8 - 3.5         |
+----------------+-------+-------------------+
| A/G            | 2.0   | 1.0 - 2.4         |
+----------------+-------+-------------------+
| TBIL           | 0.5   | 0.0 - 1.2 mg/dL   |
+----------------+-------+-------------------+
| ALK PHOS       | 59    | 32 - 120          |
 
+----------------+-------+-------------------+
| ALT            | 25    | 7 - 52 U/L        |
+----------------+-------+-------------------+
| AST            | 19    | 13 - 39 U/L       |
+----------------+-------+-------------------+
| SODIUM         | 142   | 132 - 143 mmol/L  |
+----------------+-------+-------------------+
| POTASSIUM      | 4.0   | 3.6 - 5.1 mmol/L  |
+----------------+-------+-------------------+
| CHLORIDE       | 103   | 95 - 112 mmol/L   |
+----------------+-------+-------------------+
| CO2            | 28    | 19 - 31 mmol/L    |
+----------------+-------+-------------------+
| ANION GAP AGAP | 15.0  | 7 - 21 mmol/L     |
+----------------+-------+-------------------+
| EGFR           | 90    | 60 mg/dL          |
+----------------+-------+-------------------+
 
 
 
+----------+------------------------------------------------------------------+
| Specimen | Performing Laboratory                                            |
+----------+------------------------------------------------------------------+
| Blood    |   [facility]  [address] [place]  |
|          | [id number]                                                            |
+----------+------------------------------------------------------------------+
 CBC W/Auto Diff (Reflex to Manual) (2018)
 
+-----------------+-------+--------------------+
| Component       | Value | Ref Range          |
+-----------------+-------+--------------------+
| WBC             | 9.0   | 4.5 - 11.0 10^3/mL |
+-----------------+-------+--------------------+
| RBC             | 4.79  | 4.3 - 5.7 10^6/  L |
+-----------------+-------+--------------------+
| HGB             | 14.7  | 13.5 - 18.0 g/dL   |
+-----------------+-------+--------------------+
| HCT             | 43.4  | 41 - 50 %          |
+-----------------+-------+--------------------+
| MCV             | 90.6  | 81 - 99 fL         |
+-----------------+-------+--------------------+
| MCH             | 31    | 27 - 33 pg         |
+-----------------+-------+--------------------+
| MCHC            | 34    | 30 - 36 g/dL       |
+-----------------+-------+--------------------+
| PLT             | 286   | 140 - 440 K/  L    |
+-----------------+-------+--------------------+
| RDW SD          | 13.5  | 10.5 - 15.0 %      |
+-----------------+-------+--------------------+
| MPV             |       | fL                 |
+-----------------+-------+--------------------+
| DIFF TYPE       |       |                    |
+-----------------+-------+--------------------+
| NEUTROPHILS     | 64.5  | 39 - 80 %          |
+-----------------+-------+--------------------+
| LYMPHOCYTES     | 26.4  | 24 - 44 %          |
+-----------------+-------+--------------------+
| MONOCYTES       | 7.2   | 0 - 12 %           |
+-----------------+-------+--------------------+
| EOSINOPHILS     | 0.9   | 0 - 6 %            |
 
+-----------------+-------+--------------------+
| BASOPHILS       | 1.0   | 0 - 2 %            |
+-----------------+-------+--------------------+
| NEUTROPHILS ABS |       | /  L               |
+-----------------+-------+--------------------+
| LYMPHOCYTES ABS |       | /  L               |
+-----------------+-------+--------------------+
| MONOCYTES ABS   |       | /  L               |
+-----------------+-------+--------------------+
| EOSINOPHILS ABS |       | /  L               |
+-----------------+-------+--------------------+
| BASOPHILS ABS   |       | /  L               |
+-----------------+-------+--------------------+
 
 
 
+----------+------------------------------------------------------------------+
| Specimen | Performing Laboratory                                            |
+----------+------------------------------------------------------------------+
| Blood    |   [facility]  [address] [place] [address], [place]  |
|          | [id number]                                                            |
+----------+------------------------------------------------------------------+
 EKG STANDARD 12 LEAD (2018 12:27 PM)
 
+-------------------+---------------------------------------------+-----------+
| Component         | Value                                       | Ref Range |
+-------------------+---------------------------------------------+-----------+
| Ventricular Rate  | 63                                          | BPM       |
+-------------------+---------------------------------------------+-----------+
| Atrial Rate       | 63                                          | BPM       |
+-------------------+---------------------------------------------+-----------+
| P-R Interval      | 146                                         | ms        |
+-------------------+---------------------------------------------+-----------+
| QRS Duration      | 96                                          | ms        |
+-------------------+---------------------------------------------+-----------+
| Q-T Interval      | 418                                         | ms        |
+-------------------+---------------------------------------------+-----------+
| QTC Calculation   | 427                                         | ms        |
| (Bezet)           |                                             |           |
+-------------------+---------------------------------------------+-----------+
| Calculated P Axis | 42                                          | degrees   |
+-------------------+---------------------------------------------+-----------+
| Calculated R Axis | 53                                          | degrees   |
+-------------------+---------------------------------------------+-----------+
| Calculated T Axis | 60                                          | degrees   |
+-------------------+---------------------------------------------+-----------+
| Diagnosis         | Please refer to Providers office visit note |           |
|                   |  for Providers Interpretation.Confirmed by  |           |
|                   | [facility] Read Only, PAULETTE Davis ([id number]),     |           |
|                   |  Az Galaviz ([id number]) on 2018    |           |
|                   | 4:56:19 PM                                  |           |
+-------------------+---------------------------------------------+-----------+
 
 
 
+----------+-------------------------------------------------+
| Specimen | Performing Laboratory                           |
+----------+-------------------------------------------------+
|          |   [facility]  [address] [address] |
+----------+-------------------------------------------------+
 
 from Last 3 Months
 
 Insurance
 
 
+----------+--------+-------------+------+-------+-----------------+
| Payer    | Benefi | Subscriber  | Type | Phone | Address         |
|          | t Plan | ID          |      |       |                 |
|          |  /     |             |      |       |                 |
|          | Group  |             |      |       |                 |
+----------+--------+-------------+------+-------+-----------------+
| MEDICAID | [employer] | xxxxxxxx    |      |       |   [address]   |
|          | [address]      |             |      |       | [place], [place]     |
|          | [place] |             |      |       | [address]      |
|          |  [employer]   |             |      |       |                 |
+----------+--------+-------------+------+-------+-----------------+
 
 
 
+-----------------+--------+-------------+--------+-------------+----------------------+
| Guarantor Name  | [employer] | Relation to | Date   | Phone       | Billing Address      |
|                 | t Type |  Patient    | of     |             |                      |
|                 |        |             | Birth  |             |                      |
+-----------------+--------+-------------+--------+-------------+----------------------+
| JAROD ARBOLEDA | Person | Self        | / |   Home:     |   [address]  |
|                 | [address] |             | 1960   | [phone] | [phone]  [place]   |
|                 | [place]    |             |        | [phone]        | [id number]                |
+-----------------+--------+-------------+--------+-------------+----------------------+

## 2018-03-28 NOTE — XMS
Clinical Summary
  Created on: 04/10/2018
 
 Jarod Arboleda
 External Reference #: [id number]
 : 64
 Sex: Male
 
 Demographics
 
 
+-----------------------+---------------------------+
| Address               | [address] |
|                       | [place]  [address]      |
+-----------------------+---------------------------+
| Home Phone            | [phone]           |
+-----------------------+---------------------------+
| Preferred Language    | Unknown                   |
+-----------------------+---------------------------+
| Marital Status        |                   |
+-----------------------+---------------------------+
| Anabaptist Affiliation | [id number]                      |
+-----------------------+---------------------------+
| Race                  | Unknown                   |
+-----------------------+---------------------------+
| Ethnic Group          | Unknown                   |
+-----------------------+---------------------------+
 
 
 Author
 
 
+--------------+--------------------------------------------+
| Author       | [facility]  |
|              | and [place]                                |
+--------------+--------------------------------------------+
| Organization | [facility]  |
|              | and [place]                                |
+--------------+--------------------------------------------+
| Address      | Unknown                                    |
+--------------+--------------------------------------------+
| Phone        | Unavailable                                |
+--------------+--------------------------------------------+
 
 
 
 Support
 
 
+----------------+--------------+---------------------+-----------------+
| Name           | Relationship | Address             | Phone           |
+----------------+--------------+---------------------+-----------------+
| Shane Champagne | ECON         | [address]              | [phone] |
|                |              | [address], [place]   |                 |
|                |              | [address]               |                 |
+----------------+--------------+---------------------+-----------------+
 
 
 
 
 Care Team Providers
 
 
+--------------------------+------+-------------+
| Care Team Member Name    | Role | Phone       |
+--------------------------+------+-------------+
| Facundo Lees NP | PP   | Unavailable |
+--------------------------+------+-------------+
 
 
 
 Allergies
 
 
+----------------+----------------------+----------+----------+----------------------+
| Active Allergy | Reactions            | Severity | Noted    | Comments             |
|                |                      |          | Date     |                      |
+----------------+----------------------+----------+----------+----------------------+
| Aspirin        | Other (See Comments) |          | 10/26/20 |   Stomach Ulcer:     |
|                |                      |          | 15       | side affect          |
+----------------+----------------------+----------+----------+----------------------+
| Trazodone      | Other (See Comments) |          | 10/26/20 |   Aggression : side  |
|                |                      |          | 15       | effects              |
+----------------+----------------------+----------+----------+----------------------+
 
 
 
 Current Medications
 
 
+----------------------+----------------------+----------+---------+------+------+-------+
| Prescription         | Sig.                 | Disp.    | Refills | Star | End  | Statu |
|                      |                      |          |         | t    | Date | s     |
|                      |                      |          |         | Date |      |       |
+----------------------+----------------------+----------+---------+------+------+-------+
|   montelukast        | Take 10 mg by mouth  |          |         |      |      | Activ |
| (SINGULAIR) 10 mg    | nightly.             |          |         |      |      | e     |
| tablet               |                      |          |         |      |      |       |
+----------------------+----------------------+----------+---------+------+------+-------+
|   amitriptyline      | Take 100 mg by mouth |          |         |      |      | Activ |
| (ELAVIL) 100 MG      |  nightly.            |          |         |      |      | e     |
| tablet               |                      |          |         |      |      |       |
+----------------------+----------------------+----------+---------+------+------+-------+
|   venlafaxine        | Take 100 mg by mouth |          |         |      |      | Activ |
| (EFFEXOR) 100 MG     |  2 times daily. 1    |          |         |      |      | e     |
| tablet               | tablet 2 with food   |          |         |      |      |       |
|                      | twice a day.         |          |         |      |      |       |
+----------------------+----------------------+----------+---------+------+------+-------+
|   amLODIPine         | Take 5 mg by mouth   |          |         |      |      | Activ |
| (NORVASC) 5 mg       | Daily.               |          |         |      |      | e     |
| tablet               |                      |          |         |      |      |       |
+----------------------+----------------------+----------+---------+------+------+-------+
|                      | Take 12.5 mg by      |          |         |      |      | Activ |
| hydrochlorothiazide  | mouth Daily.         |          |         |      |      | e     |
| (HYDRODIURIL) 12.5   |                      |          |         |      |      |       |
| MG tablet            |                      |          |         |      |      |       |
+----------------------+----------------------+----------+---------+------+------+-------+
|   thiamine (VITAMIN  | Take 100 mg by mouth |          |         |      |      | Activ |
| B-1) 100 mg tablet   |  Daily.              |          |         |      |      | e     |
 
+----------------------+----------------------+----------+---------+------+------+-------+
|   tamsulosin         | Take 0.4 mg by mouth |          |         |      |      | Activ |
| (FLOMAX) 0.4 mg CAPS |  daily (after        |          |         |      |      | e     |
|                      | breakfast).          |          |         |      |      |       |
+----------------------+----------------------+----------+---------+------+------+-------+
|   cloNIDine          | Take 0.3 mg by mouth |          |         |      |      | Activ |
| (CATAPRES) 0.3 MG    |  2 times daily.      |          |         |      |      | e     |
| tablet               |                      |          |         |      |      |       |
+----------------------+----------------------+----------+---------+------+------+-------+
|   meloxicam (MOBIC)  | Take 15 mg by mouth  |          |         |      |      | Activ |
| 15 mg tablet         | Daily.               |          |         |      |      | e     |
+----------------------+----------------------+----------+---------+------+------+-------+
|   albuterol 90       | Inhale 2 puffs into  |          |         |      |      | Activ |
| mcg/puff inhaler     | the lungs every 6    |          |         |      |      | e     |
|                      | hours as needed for  |          |         |      |      |       |
|                      | Wheezing.            |          |         |      |      |       |
+----------------------+----------------------+----------+---------+------+------+-------+
|   levalbuterol       | Take 3 mLs by        |   270 mL | 11      | 10/2 |      | Activ |
| (XOPENEX) 1.25 mg/3  | nebulization every 6 |          |         | 7/20 |      | e     |
| mL nebulizer         |  hours as needed for |          |         | 15   |      |       |
| solutionIndications: |  Wheezing or         |          |         |      |      |       |
|  COPD (chronic       | Shortness of Breath. |          |         |      |      |       |
| obstructive          |  JAGRUTI: 12 months      |          |         |      |      |       |
| pulmonary disease)   |                      |          |         |      |      |       |
| ([facility])                |                      |          |         |      |      |       |
+----------------------+----------------------+----------+---------+------+------+-------+
|   umeclidinium       | Inhale 1 puff into   |   1      | 11      | 10/3 |      | Activ |
| (INCRUSE ELLIPTA)    | the lungs Daily.     | Inhaler  |         | 0/20 |      | e     |
| 62.5 mcg/puff        |                      |          |         | 15   |      |       |
| inhaler              |                      |          |         |      |      |       |
+----------------------+----------------------+----------+---------+------+------+-------+
|   diphenhydrAMINE    | Take 25 mg by mouth  |          |         |      |      | Activ |
| (BENADRYL) 25 mg     | nightly as needed    |          |         |      |      | e     |
| tablet               | for Itching.         |          |         |      |      |       |
+----------------------+----------------------+----------+---------+------+------+-------+
|   losartan (COZAAR)  | Take 1 tablet by     |   30     | 2       | 02/2 |      | Activ |
| 25 mg tablet         | mouth Daily.         | tablet   |         | 3/20 |      | e     |
|                      |                      |          |         | 16   |      |       |
+----------------------+----------------------+----------+---------+------+------+-------+
|   Multiple           | Take one tablet      |          | 0       | 03/0 |      | Activ |
| Vitamins-Minerals    | daily                |          |         | 1/20 |      | e     |
| ([facility])    |                      |          |         | 16   |      |       |
| TABS                 |                      |          |         |      |      |       |
+----------------------+----------------------+----------+---------+------+------+-------+
|   rOPINIrole         | Take 1 tablet by     |   60     | 2       | 03/2 |      | Activ |
| (REQUIP) 0.25 mg     | mouth nightly 30     | tablet   |         | 4/20 |      | e     |
| tablet               | minutes before       |          |         | 16   |      |       |
|                      | bedtime for 1 week,  |          |         |      |      |       |
|                      | then increase to 2   |          |         |      |      |       |
|                      | tablets by mouth     |          |         |      |      |       |
|                      | nightly 30 minutes   |          |         |      |      |       |
|                      | before bedtime if    |          |         |      |      |       |
|                      | still having         |          |         |      |      |       |
|                      | symptoms.            |          |         |      |      |       |
+----------------------+----------------------+----------+---------+------+------+-------+
|   pantoprazole       | Take 1 tablet by     |   60     | 11      | 03/2 |      | Activ |
| (PROTONIX) 40 mg     | mouth 2 times daily  | tablet   |         | 4/20 |      | e     |
| tablet               | (before meals).      |          |         | 16   |      |       |
+----------------------+----------------------+----------+---------+------+------+-------+
|   diphenhydrAMINE    | take 1 capsule by    |   30     | 3       | 04/1 |      | Activ |
 
| (BENADRYL) 25 MG     | mouth NIGHTLY        | capsule  |         | 5/20 |      | e     |
| capsule              |                      |          |         | 16   |      |       |
+----------------------+----------------------+----------+---------+------+------+-------+
|                      | Inhale 1 puff into   |   1 each | 11      | 06/0 |      | Activ |
| fluticasone-salmeter | the lungs 2 times    |          |         | 1/20 |      | e     |
| ol                   | daily. Rinse mouth   |          |         | 16   |      |       |
| (FLUTICASONE-SALMETE | out with use.        |          |         |      |      |       |
| ROL) 500-50 mcg/puff |                      |          |         |      |      |       |
|  diskus inhaler      |                      |          |         |      |      |       |
+----------------------+----------------------+----------+---------+------+------+-------+
 
 
 
 Active Problems
 
 
+--------------------------------------+------------+
| Problem                              | Noted Date |
+--------------------------------------+------------+
| Pre-syncope                          | 2016 |
+--------------------------------------+------------+
| Obstructive chronic bronchitis (HCC) | 2015 |
+--------------------------------------+------------+
| HTN (hypertension)                   |            |
+--------------------------------------+------------+
| Elevated fasting blood sugar         |            |
+--------------------------------------+------------+
| CHELY (obstructive sleep apnea)        |            |
+--------------------------------------+------------+
 
 
 
+-----------------------------------+
|   Overview:   [facility] |
+-----------------------------------+
 
 
 
+-------------------------+---+
| Drug abuse in remission |   |
+-------------------------+---+
 
 
 
+------------------------------------------------------------------+
|   Overview:   past use of meth and cocaine and marijuana. Clean  |
| since                                                        |
+------------------------------------------------------------------+
 
 
 
+----------------------------------------+---+
| GERD (gastroesophageal reflux disease) |   |
+----------------------------------------+---+
| Anxiety                                |   |
+----------------------------------------+---+
| Depression                             |   |
+----------------------------------------+---+
| Insomnia                               |   |
+----------------------------------------+---+
 
 
 
 
+---------------------------------------+
|   Overview:   due to mental condition |
+---------------------------------------+
 
 
 
+--------------------------+---+
| Alcohol dependence (HCC) |   |
+--------------------------+---+
| Obesity                  |   |
+--------------------------+---+
 
 
 
 Immunizations
 
 
+----------------------+------------------------+----------+
| Name                 | Dates Previously Given | Next Due |
+----------------------+------------------------+----------+
| INFLUENZA PF         | 10/27/2015             |          |
| QUAD(PED/ADOL/ADULT) |                        |          |
| ,PSKT or VIAL        |                        |          |
+----------------------+------------------------+----------+
| INFLUENZA,           | 10/01/2014             |          |
| UNSPECIFIED          |                        |          |
| FORMULATION          |                        |          |
+----------------------+------------------------+----------+
| PNEUMOCOCCAL         | 2015             |          |
| POLYSACCHARIDE       |                        |          |
| 23-VALENT (PPSV23)   |                        |          |
+----------------------+------------------------+----------+
 
 
 
 Family History
 
 
+---------------------+----------+------+----------+
| Medical History     | Relation | Name | Comments |
+---------------------+----------+------+----------+
| Alcohol abuse       | Brother  |      |          |
+---------------------+----------+------+----------+
| Schizophrenia       | Brother  |      |          |
+---------------------+----------+------+----------+
| Psychiatric Illness | Father   |      |          |
+---------------------+----------+------+----------+
| Diabetes            | Mother   |      |          |
+---------------------+----------+------+----------+
| Migraines           | Sister   |      |          |
+---------------------+----------+------+----------+
 
 
 
+----------+------+----------+---------------------------+
| Relation | Name | Status   | Comments                  |
+----------+------+----------+---------------------------+
 
| Brother  |      | Alive    |                           |
+----------+------+----------+---------------------------+
| Father   |      |  |                           |
+----------+------+----------+---------------------------+
| Mother   |      |  | complications of diabetes |
+----------+------+----------+---------------------------+
| Sister   |      | Alive    |                           |
+----------+------+----------+---------------------------+
| Sister   |      |  | industrial accident       |
+----------+------+----------+---------------------------+
 
 
 
 Social History
 
 
+---------------+------------+-----------+--------+------------------+
| Tobacco Use   | Types      | Packs/Day | Years  | Date             |
|               |            |           | Used   |                  |
+---------------+------------+-----------+--------+------------------+
| Former Smoker | Cigarettes | 2         | 39     | Quit: 2015 |
+---------------+------------+-----------+--------+------------------+
 
 
 
+---------------------+---+---+---+
| Smokeless Tobacco:  |   |   |   |
| Never Used          |   |   |   |
+---------------------+---+---+---+
 
 
 
+-----------------------------------------+
| Tobacco Cessation: Counseling Given: No |
+-----------------------------------------+
 
 
 
+-------------+-----------+---------+----------+
| Alcohol Use | Drinks/We | oz/Week | Comments |
|             | ek        |         |          |
+-------------+-----------+---------+----------+
| No          |   0       | 0.0     |          |
|             | Standard  |         |          |
|             | drinks or |         |          |
|             |           |         |          |
|             | equivalen |         |          |
|             | t         |         |          |
+-------------+-----------+---------+----------+
 
 
 
+------------------+---------------+
| Sex Assigned at  | Date Recorded |
| Birth            |               |
+------------------+---------------+
| Not on file      |               |
+------------------+---------------+
 
 
 
 
 Last Filed Vital Signs
 
 
+-------------------+-------------------+---------------------+
| Vital Sign        | Reading           | Time Taken          |
+-------------------+-------------------+---------------------+
| Blood Pressure    | 140/70            | 2016 1312 PDT |
+-------------------+-------------------+---------------------+
| Pulse             | 84                | 20162 PDT |
+-------------------+-------------------+---------------------+
| Temperature       | -                 | -                   |
+-------------------+-------------------+---------------------+
| Respiratory Rate  | 16                | 10/27/2015 1501 PDT |
+-------------------+-------------------+---------------------+
| Oxygen Saturation | 96%               | 2016 PDT |
+-------------------+-------------------+---------------------+
| Inhaled Oxygen    | -                 | -                   |
| Concentration     |                   |                     |
+-------------------+-------------------+---------------------+
| Weight            | 122.5 kg (270 lb) | 2016 PDT |
+-------------------+-------------------+---------------------+
| Height            | 172.7 cm (5' 8")  | 2016 PDT |
+-------------------+-------------------+---------------------+
| Body Mass Index   | 41.05             | 2016 PDT |
+-------------------+-------------------+---------------------+
 
 
 
 Plan of Treatment
 
 
+----------------------+-----------+------------------------+----------+
| Health Maintenance   | Due Date  | Last Done              | Comments |
+----------------------+-----------+------------------------+----------+
| Hepatitis C          |  |                        |          |
| Screening            | 2         |                        |          |
+----------------------+-----------+------------------------+----------+
| Vaccine:             |  |                        |          |
| Dtap/Tdap/Td (1 -    | 1         |                        |          |
| Tdap)                |           |                        |          |
+----------------------+-----------+------------------------+----------+
| COLON CANCER         |  |                        |          |
| SCREENING            | 2         |                        |          |
| (COLONOSCOPY EVERY   |           |                        |          |
| 10 YEARS 50-75)      |           |                        |          |
+----------------------+-----------+------------------------+----------+
| Vaccine: Influenza   |  | 10/27/2015, 10/01/2014 |          |
| (Season Ended)       | 8         |                        |          |
+----------------------+-----------+------------------------+----------+
| Vaccine:             | Completed | 2015, 2015 |          |
| Pneumococcal 19-64   |           |                        |          |
| (PPSV23 only) Medium |           |                        |          |
|  Risk                |           |                        |          |
+----------------------+-----------+------------------------+----------+
 
 
 
 Results
 Not on filefrom Last 3 Months
 
 
 Insurance
 
 
+-------------------+--------+-------------+--------+-------------+---------+
| Payer             | Benefi | Subscriber  | Type   | Phone       | Address |
|                   | t Plan | ID          |        |             |         |
|                   |  /     |             |        |             |         |
|                   | Group  |             |        |             |         |
+-------------------+--------+-------------+--------+-------------+---------+
| [employer]  | [employer]   | [id number]    | [employer] | [phone]         |
| MEDICAID HMO      | HEALTH |             | id     | [id number]        |         |
|                   |  MDCD  |             |        |             |         |
|                   | [place] |             |        |             |         |
+-------------------+--------+-------------+--------+-------------+---------+
 
 
 
+------------------+--------+-------------+--------+-------------+----------------------+
| Guarantor Name   | [employer] | Relation to | Date   | Phone       | Billing Address      |
|                  | t Type |  Patient    | of     |             |                      |
|                  |        |             | Birth  |             |                      |
+------------------+--------+-------------+--------+-------------+----------------------+
| JAROD ARBOLEDA | Person | Self        | / |   Home:     |   [address]  |
|                  | al/Fam |             | 1960   | [phone] [address]  [place],  |
|                  | [place]    |             |        | [id number]        | [place] [phone]             |
+------------------+--------+-------------+--------+-------------+----------------------+

## 2018-03-28 NOTE — XMS
Encounter Summary
  Created on: 04/10/2018
 
 Kayce Arboleda
 External Reference #: [id number]
 : 64
 Sex: Male
 
 Demographics
 
 
+-----------------------+-----------------------+
| Address               | [address] |
|                       | [place]  [address]  |
+-----------------------+-----------------------+
| Home Phone            | [phone]       |
+-----------------------+-----------------------+
| Preferred Language    | Unknown               |
+-----------------------+-----------------------+
| Marital Status        | Single                |
+-----------------------+-----------------------+
| Voodoo Affiliation | 1013                  |
+-----------------------+-----------------------+
| Race                  | Unknown               |
+-----------------------+-----------------------+
| Ethnic Group          | Unknown               |
+-----------------------+-----------------------+
 
 
 Author
 
 
+--------------+-----------------------+
| Author       | [facility] [employer] [facility] |
+--------------+-----------------------+
| Organization | [facility] [employer] [facility] |
+--------------+-----------------------+
| Address      | Unknown               |
+--------------+-----------------------+
| Phone        | Unavailable           |
+--------------+-----------------------+
 
 
 
 Support
 
 
+-----------------+--------------+---------------------+-----------------+
| Name            | Relationship | Address             | Phone           |
+-----------------+--------------+---------------------+-----------------+
| Tejal Champagne | ECON         | [address]              | [phone] |
|                 |              | [address], [place]   |                 |
|                 |              | [address]               |                 |
+-----------------+--------------+---------------------+-----------------+
 
 
 
 Care Team Providers
 
 
 
+-----------------------+------+-----------------+
| Care Team Member Name | Role | Phone           |
+-----------------------+------+-----------------+
| Facundo Lees  | PCP  | [phone] |
+-----------------------+------+-----------------+
 
 
 
 Reason for Visit
 
 
+--------+--------------------------------------------+
| Reason | Comments                                   |
+--------+--------------------------------------------+
| Other  | [facility], Pulmonary Function Test |
+--------+--------------------------------------------+
 
 
 
 Encounter Details
 
 
+--------+-------------+----------------------+----------------------+--------------------+
| Date   | Type        | Department           | Care Team            | Description        |
+--------+-------------+----------------------+----------------------+--------------------+
| / | Documentati |   ANDRÉS Ortizand          |   Sherron Almanzar,  | Other ([facility] [place]  |
| 2018   | on Only     | [facility] [place]  | [facility]                  | Clinic, Pulmonary  |
|        |             |  [address]    |                      | Function Test)     |
|        |             | [place]         |                      |                    |
|        |             | [address]           |                      |                    |
|        |             | [phone]         |                      |                    |
+--------+-------------+----------------------+----------------------+--------------------+
 
 
 
 Social History
 
 
+---------------+-------+-----------+--------+------------------+
| Tobacco Use   | Types | Packs/Day | Years  | Date             |
|               |       |           | Used   |                  |
+---------------+-------+-----------+--------+------------------+
| Former Smoker |       | 3         | 37     | Quit: 10/05/2015 |
+---------------+-------+-----------+--------+------------------+
 
 
 
+---------------------+---+---+----------+
| Smokeless Tobacco:  |   |   | Quit:    |
| Former User         |   |   | 10/05/20 |
|                     |   |   | 15       |
+---------------------+---+---+----------+
 
 
 
+-------------+-----------+---------+---------------------------+
| Alcohol Use | Drinks/We | oz/Week | Comments                  |
|             | ek        |         |                           |
 
+-------------+-----------+---------+---------------------------+
| No          |   0       | 0.0     | heavy drinker in past hx. |
|             | Standard  |         |                           |
|             | drinks or |         |                           |
|             |           |         |                           |
|             | equivalen |         |                           |
|             | t         |         |                           |
+-------------+-----------+---------+---------------------------+
 
 
 
+------------------+---------------+
| Sex Assigned at  | Date Recorded |
| Birth            |               |
+------------------+---------------+
| Not on file      |               |
+------------------+---------------+
 as of this encounter
 
 Plan of Treatment
 
 
+--------+---------+-------------+----------------------+-------------+
| Date   | Type    | Specialty   | Care Team            | Description |
+--------+---------+-------------+----------------------+-------------+
| / | Office  | Pulmonology |   Jhonatan Louie MD  |             |
| 2018   | Visit   |             |  [address]    |             |
|        |         |             | [place] [address]   |             |
|        |         |             | [phone]         |             |
|        |         |             | [phone] (Fax)   |             |
+--------+---------+-------------+----------------------+-------------+
 as of this encounter
 
 Visit Diagnoses
 Not on filein this encounter

## 2018-03-28 NOTE — XMS
Encounter Summary
  Created on: 04/10/2018
 
 Kayce Arboleda
 External Reference #: [id number]
 : 64
 Sex: Male
 
 Demographics
 
 
+-----------------------+-----------------------+
| Address               | [address] |
|                       | [place]  [address]  |
+-----------------------+-----------------------+
| Home Phone            | [phone]       |
+-----------------------+-----------------------+
| Preferred Language    | Unknown               |
+-----------------------+-----------------------+
| Marital Status        | Single                |
+-----------------------+-----------------------+
| Mu-ism Affiliation | 1013                  |
+-----------------------+-----------------------+
| Race                  | Unknown               |
+-----------------------+-----------------------+
| Ethnic Group          | Unknown               |
+-----------------------+-----------------------+
 
 
 Author
 
 
+--------------+-----------------------+
| Author       | [facility] [employer] [facility] |
+--------------+-----------------------+
| Organization | [facility] [employer] [facility] |
+--------------+-----------------------+
| Address      | Unknown               |
+--------------+-----------------------+
| Phone        | Unavailable           |
+--------------+-----------------------+
 
 
 
 Support
 
 
+-----------------+--------------+---------------------+-----------------+
| Name            | Relationship | Address             | Phone           |
+-----------------+--------------+---------------------+-----------------+
| Tejal Champagne | ECON         | [address]              | [phone] |
|                 |              | [address], [place]   |                 |
|                 |              | [address]               |                 |
+-----------------+--------------+---------------------+-----------------+
 
 
 
 Care Team Providers
 
 
 
+-----------------------+------+-----------------+
| Care Team Member Name | Role | Phone           |
+-----------------------+------+-----------------+
| Facundo Lees  | PCP  | [phone] |
+-----------------------+------+-----------------+
 
 
 
 Encounter Details
 
 
+--------+-----------+---------------------+--------------------+-------------+
| Date   | Type      | Department          | Care Team          | Description |
+--------+-----------+---------------------+--------------------+-------------+
| / | Telephone |   [facility]     |   Lupe Nolan,  |             |
|    |           | Pulmonology  [address]   | [address]                |             |
|        |           | [address]   |                    |             |
|        |           | [place]        |                    |             |
|        |           | [address]          |                    |             |
|        |           | [phone]        |                    |             |
+--------+-----------+---------------------+--------------------+-------------+
 
 
 
 Social History
 
 
+---------------+-------+-----------+--------+------------------+
| Tobacco Use   | Types | Packs/Day | Years  | Date             |
|               |       |           | Used   |                  |
+---------------+-------+-----------+--------+------------------+
| Former Smoker |       | 3         | 37     | Quit: 10/05/2015 |
+---------------+-------+-----------+--------+------------------+
 
 
 
+---------------------+---+---+----------+
| Smokeless Tobacco:  |   |   | Quit:    |
| Former User         |   |   | 10/05/20 |
|                     |   |   | 17       |
+---------------------+---+---+----------+
 
 
 
+-------------+-----------+---------+---------------------------+
| Alcohol Use | Drinks/We | oz/Week | Comments                  |
|             | ek        |         |                           |
+-------------+-----------+---------+---------------------------+
| No          |   0       | 0.0     | heavy drinker in past hx. |
|             | Standard  |         |                           |
|             | drinks or |         |                           |
|             |           |         |                           |
|             | equivalen |         |                           |
|             | t         |         |                           |
+-------------+-----------+---------+---------------------------+
 
 
 
 
+------------------+---------------+
| Sex Assigned at  | Date Recorded |
| Birth            |               |
+------------------+---------------+
| Not on file      |               |
+------------------+---------------+
 as of this encounter
 
 Plan of Treatment
 
 
+--------+---------+-------------+----------------------+-------------+
| Date   | Type    | Specialty   | Care Team            | Description |
+--------+---------+-------------+----------------------+-------------+
| / | Office  | Pulmonology |   Jhonatan Louie MD  |             |
| 2018   | Visit   |             |  [address]    |             |
|        |         |             | [place] [address]   |             |
|        |         |             | [phone]         |             |
|        |         |             | [phone] (Fax)   |             |
+--------+---------+-------------+----------------------+-------------+
 as of this encounter
 
 Visit Diagnoses
 Not on filein this encounter

## 2018-03-28 NOTE — XMS
Encounter Summary
  Created on: 04/10/2018
 
 Kayce Arboleda
 External Reference #: [id number]
 : 64
 Sex: Male
 
 Demographics
 
 
+-----------------------+-----------------------+
| Address               | [address] |
|                       | [place]  [address]  |
+-----------------------+-----------------------+
| Home Phone            | [phone]       |
+-----------------------+-----------------------+
| Preferred Language    | Unknown               |
+-----------------------+-----------------------+
| Marital Status        | Single                |
+-----------------------+-----------------------+
| Cheondoism Affiliation | [id number]                  |
+-----------------------+-----------------------+
| Race                  | Unknown               |
+-----------------------+-----------------------+
| Ethnic Group          | Unknown               |
+-----------------------+-----------------------+
 
 
 Author
 
 
+--------------+-----------------------+
| Author       | [facility] |
+--------------+-----------------------+
| Organization | [facility] |
+--------------+-----------------------+
| Address      | Unknown               |
+--------------+-----------------------+
| Phone        | Unavailable           |
+--------------+-----------------------+
 
 
 
 Support
 
 
+-----------------+--------------+---------------------+-----------------+
| Name            | Relationship | Address             | Phone           |
+-----------------+--------------+---------------------+-----------------+
| Tejal Champagne | ECON         | [address]              | [phone] |
|                 |              | [address], [place]   |                 |
|                 |              | [address]               |                 |
+-----------------+--------------+---------------------+-----------------+
 
 
 
 Care Team Providers
 
 
 
+-----------------------+------+-----------------+
| Care Team Member Name | Role | Phone           |
+-----------------------+------+-----------------+
| Facundo LeesP  | PCP  | [phone] |
+-----------------------+------+-----------------+
 
 
 
 Encounter Details
 
 
+--------+------------+---------------------+----------------------+----------------------+
| Date   | Type       | Department          | Care Team            | Description          |
+--------+------------+---------------------+----------------------+----------------------+
| / | Ancillary  |   [facility]  |   PernelllyndonJanette tijerina    | H/O syncope;         |
| 2018   | Procedure  | ECHO                | [address]     | Essential            |
|        |            |                     | [address]    | hypertension with    |
|        |            |                     | [place] [address]   | goal blood pressure  |
|        |            |                     | [phone]         | less than 130/80;    |
|        |            |                     | [phone] (Fax)   | Hyperlipidemia,      |
|        |            |                     |                      | unspecified          |
|        |            |                     |                      | hyperlipidemia type; |
|        |            |                     |                      |  Chronic obstructive |
|        |            |                     |                      |  pulmonary disease,  |
|        |            |                     |                      | unspecified COPD     |
|        |            |                     |                      | type (HCC);          |
|        |            |                     |                      | Thoracoabdominal     |
|        |            |                     |                      | aortic aneurysm,     |
|        |            |                     |                      | without rupture      |
|        |            |                     |                      | (HCC); Obstructive   |
|        |            |                     |                      | sleep apnea syndrome |
+--------+------------+---------------------+----------------------+----------------------+
 
 
 
 Social History
 
 
+---------------+-------+-----------+--------+------------------+
| Tobacco Use   | Types | Packs/Day | Years  | Date             |
|               |       |           | Used   |                  |
+---------------+-------+-----------+--------+------------------+
| Former Smoker |       | 3         | 35     | Quit: 10/05/2015 |
+---------------+-------+-----------+--------+------------------+
 
 
 
+---------------------+---+---+----------+
| Smokeless Tobacco:  |   |   | Quit:    |
| Former User         |   |   | 10/05/20 |
|                     |   |   | 15       |
+---------------------+---+---+----------+
 
 
 
+-------------+-----------+---------+---------------------------+
| Alcohol Use | Drinks/We | oz/Week | Comments                  |
|             | ek        |         |                           |
 
+-------------+-----------+---------+---------------------------+
| No          |   0       | 0.0     | heavy drinker in past hx. |
|             | Standard  |         |                           |
|             | drinks or |         |                           |
|             |           |         |                           |
|             | equivalen |         |                           |
|             | t         |         |                           |
+-------------+-----------+---------+---------------------------+
 
 
 
+------------------+---------------+
| Sex Assigned at  | Date Recorded |
| Birth            |               |
+------------------+---------------+
| Not on file      |               |
+------------------+---------------+
 as of this encounter
 
 Plan of Treatment
 
 
+--------+---------+-------------+----------------------+-------------+
| Date   | Type    | Specialty   | Care Team            | Description |
+--------+---------+-------------+----------------------+-------------+
| / | Office  | Pulmonology |   Jhonatan Louie MD  |             |
| 2018   | Visit   |             |  [address]    |             |
|        |         |             | [place] [address]   |             |
|        |         |             | [phone]         |             |
|        |         |             | [phone] (Fax)   |             |
+--------+---------+-------------+----------------------+-------------+
 as of this encounter
 
 Procedures
 
 
+-----------------+--------+-------------+----------------------+----------------------+
| Procedure Name  | Priori | Date/Time   | Associated Diagnosis | Comments             |
|                 | ty     |             |                      |                      |
+-----------------+--------+-------------+----------------------+----------------------+
| ECHO OUTSIDE    | Routin | 2018  |   H/O syncope        |   Results for this   |
| INTERPRETATION  | e      |  3:43 PM    | Essential            | procedure are in the |
| STANDARD        |        | PST         | hypertension with    |  results section.    |
|                 |        |             | goal blood pressure  |                      |
|                 |        |             | less than 130/80     |                      |
|                 |        |             | Hyperlipidemia,      |                      |
|                 |        |             | unspecified          |                      |
|                 |        |             | hyperlipidemia type  |                      |
|                 |        |             |  Chronic obstructive |                      |
|                 |        |             |  pulmonary disease,  |                      |
|                 |        |             | unspecified COPD     |                      |
|                 |        |             | type (HCC)           |                      |
|                 |        |             | Thoracoabdominal     |                      |
|                 |        |             | aortic aneurysm,     |                      |
|                 |        |             | without rupture      |                      |
|                 |        |             | (HCC)  Obstructive   |                      |
|                 |        |             | sleep apnea syndrome |                      |
+-----------------+--------+-------------+----------------------+----------------------+
 in this encounter
 
 
 Results
 ECHO outside interpretation standard (2018  3:43 PM)
 
+----------+--------------------------------------------------------+
| Specimen | Performing Laboratory                                  |
+----------+--------------------------------------------------------+
|          |   [address] [address] |
+----------+--------------------------------------------------------+
 
 
 
+------------------------------------------------------------------------------------------+
| Impressions                                                                              |
+------------------------------------------------------------------------------------------+
|   1. Overall left ventricular systolic function is normal with, an EF between 60 - 65 %. |
|   2. The right ventricle is normal in size and function.  3. The aortic root, ascending  |
| aorta and aortic arch are normal. 4. No significant valvular abnormalities are noted.    |
+------------------------------------------------------------------------------------------+
 
 
 
+------------------------------------------------------------------------------------------+
| Narrative                                                                                |
+------------------------------------------------------------------------------------------+
|      Patient Name:    Kayce Arboleda  YOB: 1960                       |
| Accession:    [id number]     Performing Physician:    LOR DELGADO MD                      |
| _______________________________________________________________  INDICATIONS             |
| -----------  F/U thoracic/abdominal aneurysm     CONCLUSIONS  -----------  1. Overall    |
| left ventricular systolic function is normal with, an EF between 60 - 65 %.  2. The      |
| right ventricle is normal in size and function.  3. The aortic root, ascending aorta and |
|  aortic arch are normal. 4. No significant valvular abnormalities are noted.             |
| FINDINGS  --------  ECG rhythm: Sinus rhythm.   ECG rhythm: Resting bradycardia          |
| (HR<60bpm).  Study: A 2-dimensional transthoracic echocardiogram with m-mode, spectral   |
| and color flow Doppler was perfomed.   Study: This was a technically adequate study.     |
| Left Ventricle: Overall left ventricular systolic function is normal with, an EF between |
|  60 - 65 %.   Left Ventricle: The left ventricle cavity size is normal.   Left           |
| Ventricle: Left ventricular wall thickness is normal.   Left Ventricle: No regional wall |
|  motion abnormalities.   Left Ventricle: The diastolic filling pattern is normal for the |
|  age of the patient.  Right Ventricle: The right ventricle is normal in size and         |
| function.  Left Atrium: The left atrium is normal in size.  Right Atrium: The right      |
| atrium is normal in size.   Aortic Valve: Aortic valve is trileaflet and is mildly       |
| thickened.   Aortic Valve: There is no evidence of aortic regurgitation.   Aortic Valve: |
|  There is no evidence of aortic stenosis.  Mitral Valve: The mitral valve is normal.     |
| Mitral Valve: There is trace mitral regurgitation.   Mitral Valve: No evidence of MVP or |
|  mitral stenosis.  Tricuspid Valve: The tricuspid valve appears structurally normal.     |
| Tricuspid Valve: Pulmonary artery systolic pressure could not be assessed due to the     |
| absence of adequate TR jet.  Pulmonic Valve: The pulmonic valve was not well visualized. |
|   Pericardium: There is no pericardial effusion.  IVC/Hepatic Veins: The IVC is small    |
| (<1.5cm) and collapses with sniff, consistent with central venous pressures of 0-5mmHg.  |
|  Aorta: The aortic root, ascending aorta and aortic arch are normal.  Mass: No mass      |
| visualized  Thrombus: No clot visualized   Thrombus: No vegetation visualized.  Septum:  |
| No ASD observed.   Septum: No VSD observed.     MEASUREMENTS  -------------  Ao asc:     |
|  3.65 cm  Ao sinus:     3.46 cm  Ao st junct:     2.91 cm  IVC:     1.27 cm              |
| EDV(Teich):     105.93 ml  IVSd:     1.06 cm  LVIDd:     4.76 cm  LVPWd:     0.90 cm     |
| LVOT Diam:     2.31 cm  %FS:     22.34 %  EF(Teich):     44.99 %  ESV(Teich):     58.27  |
| ml  LVIDs:     3.70 cm  SV(Teich):     47.66 ml  RVIDd:     3.07 cm  Ao root:     32.72  |
| mm  LVEF MOD A2C:     56.41 %  SV MOD A2C:     46.46 ml  LVEF MOD A4C:     55.05 %  SV   |
| MOD A4C:     55.55 ml  EF Biplane:     55.87 %  LVEDV MOD BP:     92.29 ml  LVESV MOD    |
| BP:     40.72 ml  LVEDV MOD A2C:     82.36 ml  LVLd A2C:     9.15 cm  LVEDV MOD A4C:     |
|  100.89 ml  LVLd A4C:     9.46 cm  LVESV MOD A2C:     35.90 ml  LVLs A2C:     6.85 cm    |
 
| LVESV MOD A4C:     45.34 ml  LVLs A4C:     7.06 cm  LAESV(A-L):     54.75 ml  LAESV      |
| Index (A-L):     24.66 ml/m2  LAAs A2C:     15.85 cm2  LAESV A-L A2C:     42.05 ml  LALs |
|  A2C:     5.07 cm  LAAs A4C:     20.65 cm2  LAESV A-L A4C:     68.39 ml  LALs A4C:       |
| 5.29 cm  RAAs:     16.87 cm2  RAESV A-L:     44.78 ml  RAESV MOD:     45.69 ml           |
| RALs:     5.39 cm  TAPSE:     2.19 cm  AV maxP.05 mmHg  AV meanPG:     3.73 mmHg |
|   AV Vmax:     1.32 m/s  AV Vmean:     0.90 m/s  AV VTI:     29.89 cm  CHIRAG Vmax:         |
| 3.41 cm2  CHIRAG (VTI):     3.01 cm2  AVAI Vmax:     0.00 cm2/m2  AVAI (VTI):     0.00      |
| cm2/m2  LVOT maxP.63 mmHg  LVOT meanP.94 mmHg  LVSI Dopp:     40.60      |
| ml/m2  LVSV Dopp:     90.15 ml  LVOT Vmax:     1.07 m/s  LVOT Vmean:     0.63 m/s  LVOT  |
| VTI:     21.41 cm  MV A Librado:     0.37 m/s  MV DecT:     232.01 ms  MV E Librado:     0.58    |
| m/s  MV E/A Ratio:     1.53   Septal e':     0.08 m/s  Septal E/e':     7.16   Lateral   |
| e':     0.07 m/s  Lateral E/e':     7.35      Sonographer:   Authenticated               |
| by:    LOR DELGADO MD  Report Date/Time: 2018 17:52:14                            |
+------------------------------------------------------------------------------------------+
 
 
 
+-------------------------------------------------------------------------------------------
-------------------------+
| Procedure Note                                                                            
                         |
+-------------------------------------------------------------------------------------------
-------------------------+
|   Tank, Rad Results In - 2018  6:00 PM PST  Patient Name:  Jessica Arboleda of     
                         |
| Birth:  ccession:  [id number] Physician:  LOR DELGADO MD              
                         |
| _______________________________________________________________INDICATIONS-----------F/U  
                         |
|  thoracic/abdominal aneurysmCONCLUSIONS-----------1. Overall left ventricular systolic    
                         |
| function is normal with, an EF between 60 - 65 %.2. The right ventricle is normal in      
                         |
| size and function.3. The aortic root, ascending aorta and aortic arch are normal. 4. No   
                         |
| significant valvular abnormalities are noted.FINDINGS--------ECG rhythm: Sinus rhythm.    
                         |
| ECG rhythm: Resting bradycardia (HR<60bpm).Study: A 2-dimensional transthoracic           
                         |
| echocardiogram with m-mode, spectral and color flow Doppler was perfomed. Study: This     
                         |
| was a technically adequate study.Left Ventricle: Overall left ventricular systolic        
                         |
| function is normal with, an EF between 60 - 65 %. Left Ventricle: The left ventricle      
                         |
| cavity size is normal. Left Ventricle: Left ventricular wall thickness is normal. Left    
                         |
| Ventricle: No regional wall motion abnormalities. Left Ventricle: The diastolic filling   
                         |
| pattern is normal for the age of the patient.Right Ventricle: The right ventricle is      
                         |
| normal in size and function.Left Atrium: The left atrium is normal in size.Right Atrium:  
                         |
|  The right atrium is normal in size. Aortic Valve: Aortic valve is trileaflet and is      
                         |
| mildly thickened. Aortic Valve: There is no evidence of aortic regurgitation. Aortic      
                         |
| Valve: There is no evidence of aortic stenosis.Mitral Valve: The mitral valve is normal.  
                         |
|  Mitral Valve: There is trace mitral regurgitation. Mitral Valve: No evidence of MVP or   
 
                         |
| mitral stenosis.Tricuspid Valve: The tricuspid valve appears structurally normal.         
                         |
| Tricuspid Valve: Pulmonary artery systolic pressure could not be assessed due to the      
                         |
| absence of adequate TR jet.Pulmonic Valve: The pulmonic valve was not well                
                         |
| visualized.Pericardium: There is no pericardial effusion.IVC/Hepatic Veins: The IVC is    
                         |
| small (<1.5cm) and collapses with sniff, consistent with central venous pressures of      
                         |
| 0-5mmHg.Aorta: The aortic root, ascending aorta and aortic arch are normal.Mass: No mass  
                         |
|  visualizedThrombus: No clot visualized Thrombus: No vegetation visualized.Septum: No     
                         |
| ASD observed. Septum: No VSD observed.MEASUREMENTS-------------Ao asc:   3.65 cmAo        
                         |
| sinus:   3.46 cmAo st junct:   2.91 cmIVC:   1.27 cmEDV(Teich):   105.93 mlIVSd:   1.06   
                         |
| cmLVIDd:   4.76 cmLVPWd:   0.90 cmLVOT Diam:   2.31 cm%FS:   22.34 %EF(Teich):   44.99    
                         |
| %ESV(Teich):   58.27 mlLVIDs:   3.70 cmSV(Teich):   47.66 mlRVIDd:   3.07 cmAo root:      
                         |
| 32.72 mmLVEF MOD A2C:   56.41 %SV MOD A2C:   46.46 mlLVEF MOD A4C:   55.05 %SV MOD A4C:   
                         |
|   55.55 mlEF Biplane:   55.87 %LVEDV MOD BP:   92.29 mlLVESV MOD BP:   40.72 mlLVEDV MOD  
                         |
|  A2C:   82.36 mlLVLd A2C:   9.15 cmLVEDV MOD A4C:   100.89 mlLVLd A4C:   9.46 cmLVESV     
                         |
| MOD A2C:   35.90 mlLVLs A2C:   6.85 cmLVESV MOD A4C:   45.34 mlLVLs A4C:   7.06           
                         |
| cmLAESV(A-L):   54.75 mlLAESV Index (A-L):   24.66 ml/m2LAAs A2C:   15.85 [id number] A-L    
                         |
| A2C:   42.05 mlLALs A2C:   5.07 cmLAAs A4C:   20.65 [id number] A-L A4C:   68.39 mlLALs      
                         |
| A4C:   5.29 cmRAAs:   16.87 [id number] A-L:   44.78 mlRAESV MOD:   45.69 mlRALs:   5.39     
                         |
| cmTAPSE:   2.19 cmAV maxP.05 mmHgAV meanPG:   3.73 mmHgAV Vmax:   1.32 m/Hill        
                         |
| Vmean:   0.90 m/Hill VTI:   29.89 cmAVA Vmax:   3.41 cm2AVA (VTI):   3.01 [id number] Vmax:    
                         |
|  0.00 cm2/m2AVAI (VTI):   0.00 cm2/m2LVOT maxP.63 mmHgLVOT meanP.94 mmHgLVSI  
                         |
|  Dopp:   40.60 ml/m2LVSV Dopp:   90.15 mlLVOT Vmax:   1.07 m/sLVOT Vmean:   0.63 m/sLVOT  
                         |
|  VTI:   21.41 cmMV A Librado:   0.37 m/sMV DecT:   232.01 msMV E Librado:   0.58 m/sMV E/A        
                         |
| Ratio:   1.53 Septal e':   0.08 m/sSeptal E/e':   7.16 Lateral e':   0.07 m/sLateral      
                         |
| E/e':   7.35 Sonographer: Authenticated by:  Saurabh HERR Date/Time: 2018  
                         |
|  17:52:14IMPRESSION:1. Overall left ventricular systolic function is normal with, an EF   
                         |
| between 60 - 65 %.2. The right ventricle is normal in size and function.3. The aortic     
                         |
| root, ascending aorta and aortic arch are normal. 4. No significant valvular              
                         |
| abnormalities are noted.                                                                  
                         |
|Septum: No VSD observed.                                                                   
 
                         |
|MEASUREMENTS                                                                               
                        |
|-------------                                                                              
                          |
|Ao asc:   3.65 cm                                                                          
                         |
|Ao sinus:   3.46 cm                                                                        
                         |
|Ao st junct:   2.91 cm                                                                     
                         |
|IVC:   1.27 cm                                                                             
                         |
|EDV(Teich):   105.93 ml                                                                    
                         |
|IVSd:   1.06 cm                                                                            
                         |
|LVIDd:   4.76 cm                                                                           
                         |
|LVPWd:   0.90 cm                                                                           
                         |
|LVOT Diam:   2.31 cm                                                                       
                         |
|%FS:   22.34 %                                                                             
                         |
|EF(Teich):   44.99 %                                                                       
                         |
|ESV(Teich):   58.27 ml                                                                     
                         |
|LVIDs:   3.70 cm                                                                           
                         |
|SV(Teich):   47.66 ml                                                                      
                         |
|RVIDd:   3.07 cm                                                                           
                         |
|Ao root:   32.72 mm                                                                        
                         |
|LVEF MOD A2C:   56.41 %                                                                    
                         |
|SV MOD A2C:   46.46 ml                                                                     
                         |
|LVEF MOD A4C:   55.05 %                                                                    
                         |
|SV MOD A4C:   55.55 ml                                                                     
                         |
|EF Biplane:   55.87 %                                                                      
                         |
|LVEDV MOD BP:   92.29 ml                                                                   
                         |
|LVESV MOD BP:   40.72 ml                                                                   
                         |
|LVEDV MOD A2C:   82.36 ml                                                                  
                         |
|LVLd A2C:   9.15 cm                                                                        
                         |
|LVEDV MOD A4C:   100.89 ml                                                                 
                         |
|LVLd A4C:   9.46 cm                                                                        
 
                         |
|LVESV MOD A2C:   35.90 ml                                                                  
                         |
|LVLs A2C:   6.85 cm                                                                        
                         |
|LVESV MOD A4C:   45.34 ml                                                                  
                         |
|LVLs A4C:   7.06 cm                                                                        
                         |
|LAESV(A-L):   54.75 ml                                                                     
                         |
|LAESV Index (A-L):   24.66 ml/m2                                                           
                         |
|LAAs A2C:   15.85 cm2                                                                      
                         |
|LAESV A-L A2C:   42.05 ml                                                                  
                         |
|LALs A2C:   5.07 cm                                                                        
                         |
|LAAs A4C:   20.65 cm2                                                                      
                         |
|LAESV A-L A4C:   68.39 ml                                                                  
                         |
|LALs A4C:   5.29 cm                                                                        
                         |
|RAAs:   16.87 cm2                                                                          
                         |
|RAESV A-L:   44.78 ml                                                                      
                         |
|RAESV MOD:   45.69 ml                                                                      
                         |
|RALs:   5.39 cm                                                                            
                         |
|TAPSE:   2.19 cm                                                                           
                         |
|AV maxP.05 mmHg                                                                      
                         |
|AV meanPG:   3.73 mmHg                                                                     
                         |
|AV Vmax:   1.32 m/s                                                                        
                         |
|AV Vmean:   0.90 m/s                                                                       
                         |
|AV VTI:   29.89 cm                                                                         
                         |
|CHIRAG Vmax:   3.41 cm2                                                                       
                         |
|CHIRAG (VTI):   3.01 cm2                                                                      
                         |
|AVAI Vmax:   0.00 cm2/m2                                                                   
                         |
|AVAI (VTI):   0.00 cm2/m2                                                                  
                         |
|LVOT maxP.63 mmHg                                                                    
                         |
|LVOT meanP.94 mmHg                                                                   
                         |
|LVSI Dopp:   40.60 ml/m2                                                                   
                         |
|LVSV Dopp:   90.15 ml                                                                      
 
                         |
|LVOT Vmax:   1.07 m/s                                                                      
                         |
|LVOT Vmean:   0.63 m/s                                                                     
                         |
|LVOT VTI:   21.41 cm                                                                       
                         |
|MV A Librado:   0.37 m/s                                                                       
                         |
|MV DecT:   232.01 ms                                                                       
                         |
|MV E Librado:   0.58 m/s                                                                       
                         |
|MV E/A Ratio:   1.53                                                                       
                         |
|Septal e':   0.08 m/s                                                                      
                         |
|Septal E/e':   7.16                                                                        
                         |
|Lateral e':   0.07 m/s                                                                     
                         |
|Lateral E/e':   7.35                                                                       
                         |
|Sonographer:                                                                               
                         |
|Authenticated by:  LOR DELGADO MD                                                        
                         |
|Report Date/Time: 2018 17:52:14                                                       
                         |
|IMPRESSION:                                                                                
                        |
|1. Overall left ventricular systolic function is normal with, an EF between 60 - 65 %.     
                         |
|2. The right ventricle is normal in size and function.                                     
                         |
|3. The aortic root, ascending aorta and aortic arch are normal. 4. No significant valvular 
abnormalities are noted. |
+-------------------------------------------------------------------------------------------
-------------------------+
 in this encounter
 
 Visit Diagnoses
 
 
+--------------------------------------------------------------------+
| Diagnosis                                                          |
+--------------------------------------------------------------------+
| H/O syncope                                                        |
+--------------------------------------------------------------------+
|   Personal history of other specified diseases                     |
+--------------------------------------------------------------------+
| Essential hypertension with goal blood pressure less than 130/80   |
+--------------------------------------------------------------------+
| Hyperlipidemia, unspecified hyperlipidemia type                    |
+--------------------------------------------------------------------+
| Chronic obstructive pulmonary disease, unspecified COPD type (HCC) |
 
+--------------------------------------------------------------------+
| Thoracoabdominal aortic aneurysm, without rupture (HCC)            |
+--------------------------------------------------------------------+
|   Thoracoabdominal aneurysm without mention of rupture             |
+--------------------------------------------------------------------+
| Obstructive sleep apnea syndrome                                   |
+--------------------------------------------------------------------+
|   Obstructive sleep apnea (adult) (pediatric)                      |
+--------------------------------------------------------------------+

## 2018-03-28 NOTE — XMS
Encounter Summary
  Created on: 04/10/2018
 
 Kayce Arboleda
 External Reference #: [id number]
 : 64
 Sex: Male
 
 Demographics
 
 
+-----------------------+-----------------------+
| Address               | [address] |
|                       | [place]  [address]  |
+-----------------------+-----------------------+
| Home Phone            | [phone]       |
+-----------------------+-----------------------+
| Preferred Language    | Unknown               |
+-----------------------+-----------------------+
| Marital Status        | Single                |
+-----------------------+-----------------------+
| Jehovah's witness Affiliation | 1013                  |
+-----------------------+-----------------------+
| Race                  | Unknown               |
+-----------------------+-----------------------+
| Ethnic Group          | Unknown               |
+-----------------------+-----------------------+
 
 
 Author
 
 
+--------------+-----------------------+
| Author       | [facility] |
+--------------+-----------------------+
| Organization | [facility] |
+--------------+-----------------------+
| Address      | Unknown               |
+--------------+-----------------------+
| Phone        | Unavailable           |
+--------------+-----------------------+
 
 
 
 Support
 
 
+-----------------+--------------+---------------------+-----------------+
| Name            | Relationship | Address             | Phone           |
+-----------------+--------------+---------------------+-----------------+
| Tejal Champagne | ECON         | [address]              | [phone] |
|                 |              | [address], [place]   |                 |
|                 |              | [address]               |                 |
+-----------------+--------------+---------------------+-----------------+
 
 
 
 Care Team Providers
 
 
 
+-----------------------+------+-----------------+
| Care Team Member Name | Role | Phone           |
+-----------------------+------+-----------------+
| Facundo Lees  | PCP  | [phone] |
+-----------------------+------+-----------------+
 
 
 
 Reason for Visit
 
 
+-----------+----------+
| Reason    | Comments |
+-----------+----------+
| Labs Only |          |
+-----------+----------+
 
 
 
 Encounter Details
 
 
+--------+-------------+----------------------+-------------------+-------------+
| Date   | Type        | Department           | Care Team         | Description |
+--------+-------------+----------------------+-------------------+-------------+
| / | Documentati |   [facility]          |   [facility],  | Labs Only   |
| 2018   | on Only     | [facility]  | [facility]       |             |
|        |             |  [address]    |                   |             |
|        |             | [place]         |                   |             |
|        |             | [id number]           |                   |             |
|        |             | [phone]         |                   |             |
+--------+-------------+----------------------+-------------------+-------------+
 
 
 
 Social History
 
 
+---------------+-------+-----------+--------+------------------+
| Tobacco Use   | Types | Packs/Day | Years  | Date             |
|               |       |           | Used   |                  |
+---------------+-------+-----------+--------+------------------+
| Former Smoker |       | 3         | 37     | Quit: 10/05/2015 |
+---------------+-------+-----------+--------+------------------+
 
 
 
+---------------------+---+---+----------+
| Smokeless Tobacco:  |   |   | Quit:    |
| Former User         |   |   | 10/05/20 |
|                     |   |   | 17       |
+---------------------+---+---+----------+
 
 
 
+-------------+-----------+---------+---------------------------+
| Alcohol Use | Drinks/We | oz/Week | Comments                  |
|             | ek        |         |                           |
 
+-------------+-----------+---------+---------------------------+
| No          |   0       | 0.0     | heavy drinker in past hx. |
|             | Standard  |         |                           |
|             | drinks or |         |                           |
|             |           |         |                           |
|             | equivalen |         |                           |
|             | t         |         |                           |
+-------------+-----------+---------+---------------------------+
 
 
 
+------------------+---------------+
| Sex Assigned at  | Date Recorded |
| Birth            |               |
+------------------+---------------+
| Not on file      |               |
+------------------+---------------+
 as of this encounter
 
 Plan of Treatment
 
 
+--------+---------+-------------+----------------------+-------------+
| Date   | Type    | Specialty   | Care Team            | Description |
+--------+---------+-------------+----------------------+-------------+
| / | Office  | Pulmonology |   Jhonatan Louie MD  |             |
| 2018   | Visit   |             |  [address]    |             |
|        |         |             | [place] [address]   |             |
|        |         |             | [phone]         |             |
|        |         |             | [phone] (Fax)   |             |
+--------+---------+-------------+----------------------+-------------+
 as of this encounter
 
 Visit Diagnoses
 Not on filein this encounter

## 2018-03-28 NOTE — XMS
Encounter Summary
  Created on: 04/10/2018
 
 Kayce Arboleda
 External Reference #: [id number]
 : 64
 Sex: Male
 
 Demographics
 
 
+-----------------------+-----------------------+
| Address               | [address] |
|                       | [place]  [address]  |
+-----------------------+-----------------------+
| Home Phone            | [phone]       |
+-----------------------+-----------------------+
| Preferred Language    | Unknown               |
+-----------------------+-----------------------+
| Marital Status        | Single                |
+-----------------------+-----------------------+
| Quaker Affiliation | [id number]                  |
+-----------------------+-----------------------+
| Race                  | Unknown               |
+-----------------------+-----------------------+
| Ethnic Group          | Unknown               |
+-----------------------+-----------------------+
 
 
 Author
 
 
+--------------+-----------------------+
| Author       | [employer] |
+--------------+-----------------------+
| Organization | [employer] |
+--------------+-----------------------+
| Address      | Unknown               |
+--------------+-----------------------+
| Phone        | Unavailable           |
+--------------+-----------------------+
 
 
 
 Support
 
 
+-----------------+--------------+---------------------+-----------------+
| Name            | Relationship | Address             | Phone           |
+-----------------+--------------+---------------------+-----------------+
| Tejal Champagne | ECON         | [address]              | [phone] |
|                 |              | [address], [place]   |                 |
|                 |              | [address]               |                 |
+-----------------+--------------+---------------------+-----------------+
 
 
 
 Care Team Providers
 
 
 
+-----------------------+------+-----------------+
| Care Team Member Name | Role | Phone           |
+-----------------------+------+-----------------+
| Facundo Lees FNP  | PCP  | [phone] |
+-----------------------+------+-----------------+
 
 
 
 Reason for Referral
 Consultation (Routine)
 
+------------+--------------+------------+--------------+--------------+---------------+
| Status     | Reason       | Specialty  | Diagnoses /  | Referred By  | Referred To   |
|            |              |            | Procedures   | Contact      | Contact       |
+------------+--------------+------------+--------------+--------------+---------------+
| Authorized |   Specialty  | Pulmonary  |   Diagnoses  |   Trace, |   Francisco Javier, Chi    |
|            | Services     | Disease    |  H/O syncope |  Janette Johnson  | [facility]   |
|            | Required     |            |   Essential  | ARNP  [address]   | Sleep         |
|            |              |            | hypertension | [facility]  | Disorders     |
|            |              |            |  with goal   | [facility]ie [facility]        | [facility]   |
|            |              |            | blood        | [place], [place] | HOSPITAL      |
|            |              |            | pressure     |  [address]       | [address]       |
|            |              |            | less than    | Phone:       | [address]   |
|            |              |            | 130/80       | [phone] | [place], [place] |
|            |              |            | Chronic      |   Fax:       |  [phone]        |
|            |              |            | obstructive  | [phone] | Phone:        |
|            |              |            | pulmonary    |              | [phone]  |
|            |              |            | disease,     |              |  Fax:         |
|            |              |            | unspecified  |              | [phone]  |
|            |              |            | COPD type    |              |               |
|            |              |            | ([facility])        |              |               |
|            |              |            | Obstructive  |              |               |
|            |              |            | sleep apnea  |              |               |
|            |              |            | syndrome     |              |               |
|            |              |            | Former heavy |              |               |
|            |              |            |  tobacco     |              |               |
|            |              |            | smoker       |              |               |
+------------+--------------+------------+--------------+--------------+---------------+
 
 
 
 
 Reason for Visit
 
 
+-----------+----------+
| Reason    | Comments |
+-----------+----------+
| Follow-up |          |
+-----------+----------+
 Routine Exam (Routine)
 
+------------+--------+---------------+--------------+--------------+---------------+
| Status     | Reason | Specialty     | Diagnoses /  | Referred By  | Referred To   |
|            |        |               | Procedures   | Contact      | Contact       |
+------------+--------+---------------+--------------+--------------+---------------+
| Authorized |        | Nurse         |   Diagnoses  |   [place], |   Trace,  |
|            |        | Practitioner  |  Essential   |  Janette Johnson,  | Janette Johnson,    |
 
|            |        | / Cardiology  | (primary)    | ARNP  [address]   | ARNP  [address]    |
|            |        |               | hypertension | [facility]  | [facility]   |
|            |        |               |   f/u        | [address]ie [address]        | Louie [facility]         |
|            |        |               | annual-Peter  | [place], [place] | [place]  |
|            |        |               | transfer     |  [address]       | [address]  Phone: |
|            |        |               | Procedures   | Phone:       |  [phone] |
|            |        |               | [place] OFFICE    | [phone] |   Fax:        |
|            |        |               | OUTPATIENT   |   Fax:       | [phone]  |
|            |        |               | VISIT LEVEL  | [phone] |               |
|            |        |               | 1  ND OFFICE |              |               |
|            |        |               |  OUTPATIENT  |              |               |
|            |        |               | VISIT LEVEL  |              |               |
|            |        |               | 2  ND OFFICE |              |               |
|            |        |               |  OUTPATIENT  |              |               |
|            |        |               | VISIT LEVEL  |              |               |
|            |        |               | 3  ND OFFICE |              |               |
|            |        |               |  OUTPATIENT  |              |               |
|            |        |               | VISIT LEVEL  |              |               |
|            |        |               | 4  ND OFFICE |              |               |
|            |        |               |  OUTPATIENT  |              |               |
|            |        |               | VISIT 40     |              |               |
|            |        |               | MINUTES  CRD |              |               |
|            |        |               |  ANNUAL      |              |               |
|            |        |               | FOLLOW UP    |              |               |
+------------+--------+---------------+--------------+--------------+---------------+
 
 
 
 
 Encounter Details
 
 
+--------+---------+----------------------+----------------------+----------------------+
| Date   | Type    | Department           | Care Team            | Description          |
+--------+---------+----------------------+----------------------+----------------------+
| / | Office  |   [facility]          |   Janette Woodward    | H/O syncope (Primary |
| 2018   | Visit   | Cardiology [place] | DIETER Johnson  [address]     |  Dx); Essential      |
|        |         |   [address]    | [address]    | hypertension with    |
|        |         | Way [address]        | [place] [address]   | goal blood pressure  |
|        |         | [place] [address]  | [phone]         | less than 130/80;    |
|        |         |  [phone]        | [phone] (Fax)   | Hyperlipidemia,      |
|        |         |                      |                      | unspecified          |
|        |         |                      |                      | hyperlipidemia type; |
|        |         |                      |                      |  Chronic obstructive |
|        |         |                      |                      |  pulmonary disease,  |
|        |         |                      |                      | unspecified COPD     |
|        |         |                      |                      | type (HCC);          |
|        |         |                      |                      | Thoracoabdominal     |
|        |         |                      |                      | aortic aneurysm,     |
|        |         |                      |                      | without rupture      |
|        |         |                      |                      | (HCC); Obstructive   |
|        |         |                      |                      | sleep apnea          |
|        |         |                      |                      | syndrome; Former     |
|        |         |                      |                      | heavy tobacco        |
|        |         |                      |                      | smoker; History of   |
|        |         |                      |                      | anemia; Murmur,      |
|        |         |                      |                      | cardiac              |
+--------+---------+----------------------+----------------------+----------------------+
 
 
 
 
 Social History
 
 
+---------------+-------+-----------+--------+------------------+
| Tobacco Use   | Types | Packs/Day | Years  | Date             |
|               |       |           | Used   |                  |
+---------------+-------+-----------+--------+------------------+
| Former Smoker |       | 3         | 35     | Quit: 10/05/2015 |
+---------------+-------+-----------+--------+------------------+
 
 
 
+---------------------+---+---+----------+
| Smokeless Tobacco:  |   |   | Quit:    |
| Former User         |   |   | 10/05/20 |
|                     |   |   | 15       |
+---------------------+---+---+----------+
 
 
 
+-------------+-----------+---------+---------------------------+
| Alcohol Use | Drinks/We | oz/Week | Comments                  |
|             | ek        |         |                           |
+-------------+-----------+---------+---------------------------+
| No          |   0       | 0.0     | heavy drinker in past hx. |
|             | Standard  |         |                           |
|             | drinks or |         |                           |
|             |           |         |                           |
|             | equivalen |         |                           |
|             | t         |         |                           |
+-------------+-----------+---------+---------------------------+
 
 
 
+------------------+---------------+
| Sex Assigned at  | Date Recorded |
| Birth            |               |
+------------------+---------------+
| Not on file      |               |
+------------------+---------------+
 as of this encounter
 
 Last Filed Vital Signs
 
 
+-------------------+----------------------+-------------------------+
| Vital Sign        | Reading              | Time Taken              |
+-------------------+----------------------+-------------------------+
| Blood Pressure    | 116/62               | 2018 12:18 PM PST |
+-------------------+----------------------+-------------------------+
| Pulse             | 64                   | 2018 12:18 PM PST |
+-------------------+----------------------+-------------------------+
| Temperature       | -                    | -                       |
+-------------------+----------------------+-------------------------+
| Respiratory Rate  | -                    | -                       |
+-------------------+----------------------+-------------------------+
| Oxygen Saturation | 96%                  | 2018 12:18 PM PST |
+-------------------+----------------------+-------------------------+
| Inhaled Oxygen    | -                    | -                       |
 
| Concentration     |                      |                         |
+-------------------+----------------------+-------------------------+
| Weight            | 108.1 kg (238 lb 6.4 | 2018 12:18 PM PST |
|                   |  oz)                 |                         |
+-------------------+----------------------+-------------------------+
| Height            | 177.8 cm (5' 10")    | 2018 12:18 PM PST |
+-------------------+----------------------+-------------------------+
| Body Mass Index   | 34.21                | 2018 12:18 PM PST |
+-------------------+----------------------+-------------------------+
 in this encounter
 
 Instructions
 Patient Instructions - Janette Woodward ARNP - 2018 12:30 PM PSTI have ordered y
ou fasting labs to be done at  [facility] [address]   and also ordered you an Echo to be done at [facility]
 Take Vit b12 again
 I have ordered refill on Atorvastatin and metoprolol for one year
 Consider using light compression knee high socks, look like soccer socks to help with varic
ose veins
 I have referred you to Sleep lab at [facility]  for sleep apnea evaluation 
 See me back in 2 months 
 
 in this encounter
 
 Progress Notes
 Janette Woodward ARNP - 2018 12:30 PM PSTFormatting of this note may be differen
t from the original.
 Date of visit: 2018
 Primary Care Physician: FACUNDO LEES
 
 CHIEF COMPLAINT:  
 Chief Complaint 
 Patient presents with 
   Follow-up 
 
 HISTORY OF PRESENT ILLNESS:
   Mr. Kayce ArboledaKemal, is a 57-year-old man who is here today as overdue for annual foll
ow-up.
  He is a previous patient of Dr. Torrey Peter, now retired and last seen by him in 2016.  
  He has a previous history of syncope though workup was benign including a 32 day cardiac e
vent monitor and was last seen he had no further episodes of syncope, hypertension, hyperlip
idemia, COPD which is followed by Dr. Louie.
  I reviewed Dr. Peter's last note, as well as the last note of Dr. Louie on 2017 wh
[facility] documents that his COPD has been well-controlled on current medications, his previous PF
T done in 2017 had shown mild obstructive disease with bronchodilator response, severe
 restrictive disease with parenchymal disease, and mild diffusion defect, though  diffusion 
not corrected for hemoglobin. 
  EKG done in the office today, 2018 shows normal sinus rhythm at 63 bpm and very simila
r to EKG done in May 2016
  Last labs I have from 2017 shows lipids is well-controlled and a normal CMP except f
or mild elevation of glucose to 104 and good renal function with GFR of 99 and normal liver 
enzymes and CBC also normal.
   He denies any chest pain, palpitations, dyspnea, dizziness,or syncope. He also denies any
 signs or symptoms of stroke or TIA, or any illness, surgery, or hospitalization since last 
seen. 
   He reports most of his previous problems related to his heavy alcohol use, and his been g
reatly improved since he has been sober for 2 years.
  He also reports he stopped using his CPAP about a year ago, as he was always struggling wi
th it, and he thought his sleep apnea was mostly related to his heavy alcohol use, and now t
 
hat he had stopped drinking, he did not need it anymore, and thinks he has been sleeping wel
l without it.
   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 
to perform maintenance and repairs, and reports he tolerates even heavy physical activity wi
thout any chest pain or dyspnea. 
   He has been sober from alcohol or drugs for over 2 years, but does drink up to 12 cups of
 coffee , but drinks half decaffeinated, and also mixes with hot chocolate
 
 REVIEW OF SYSTEMS:
 Negative except for pertinent items noted in HPI.
 
 Constitutional: mild  fatigue or unexplained weight loss.  Appetite is good.  Weight is sta
ble.    Denies night sweats fevers or chills
 HENT: Denies nosebleeds.   Positive for hearing loss .  Denies dysphagia
 Eyes: History of eye surgery orbit blowout ? Denies visual disturbance or double vision.  
 Respiratory/Sleep:: Positive for COPD (Dr. Louie), sleep apnea on CPAP.  Denies cough and s
hortness of breath.  Denies hemoptysis or excessive sputum production. Denies snoring, ortho
pnea, PND. 
 Cardiovascular: Denies  chest pain, palpitations and leg swelling. Denies history of rheuma
tic fever. Denies claudication . 
 Gastrointestinal:history of gastroesophageal reflux disease, on PPI  .  Denies  nausea, vom
iting, abdominal pain and blood in stool. 
 Genitourinary: Denies  hematuria.  
 Musculoskeletal: Positive for joint pain and arthralgia severe right hip pain, back , shoul
ric, neck  , Denies myalgias, 
 Skin: Denies  color change.  Denies rash or lesions
 Neurological:  Numbness to legs, and arms .   Denies history of stroke/Transient ischemic a
ttack.Denies history of seizures. Denies dizziness, syncope  
 Hematological/Oncology Does not bruise/bleed easily.  Denies history of cancer
 Endocrine: Denies diabetes or thyroid disease.  Denies excessive thirst or hunger.  
 Psychiatric/Behavioral: The patient denies any history of depression or anxiety or other ps
ychiatric illness.
 Vaccines: Current on 2017 flu vaccine.  Current on pneumonia vaccine.
 Habits/Social :  history of smoking, quit in 2015.  Smoked 3 packs a day  35 years
.  Previously used smokeless tobacco quit in 2015..  Denies EtOH use for 2 years , p
revious heavy drinker. Drinks servings of  12 cups coffee/tea daily, uses 1/2 chocolate , ha
lf decaff  .  Denies recreational or illicit drug use, previously used methamphetamine  20 
years, cocaine, marijuana, crack, mushrooms.  Exercises with active job, walking,  and ronald
ates.   of [facility] , 
 
 Outpatient Medications Prior to Visit 
 Medication Sig Dispense Refill 
   albuterol (PROVENTIL HFA;VENTOLIN HFA) 108 (90 BASE) MCG/ACT inhaler Inhale 2 puffs int
o the lungs every 4 (four) hours as needed for Wheezing.   
   amitriptyline (ELAVIL) 100 MG tablet Take 100 mg by mouth nightly.   
   amLODIPine (NORVASC) 5 MG tablet Take 5 mg by mouth daily.   
   ascorbic acid (VITAMIN C) 250 MG tablet Take 250 mg by mouth daily.   
   cloNIDine (CATAPRES) 0.3 MG tablet Take 0.3 mg by mouth nightly.   
   diclofenac (CATAFLAM) 50 MG tablet Take 50 mg by mouth 2 (two) times daily.   
   fluticasone-salmeterol (ADVAIR) 500-50 MCG/DOSE diskus inhaler Inhale 1 puff into the l
ungs 2 (two) times daily.   
   gabapentin (NEURONTIN) 600 MG tablet Take 300 mg by mouth 3 (three) times daily.   
   hydrochlorothiazide (HYDRODIURIL) 12.5 MG tablet Take 12.5 mg by mouth daily.   
   ibuprofen (MOTRIN) 600 MG tablet Take 600 mg by mouth every 6 (six) hours as needed for
 Pain. Trying to use sparingly   
   montelukast (SINGULAIR) 10 MG tablet Take 10 mg by mouth nightly.   
   Multiple Vitamins-Minerals (RA CENTRAL-BIBI PO) Take  by mouth daily.   
   nitroGLYCERIN (NITROSTAT) 0.4 MG SL tablet Place 1 tablet under the tongue every 5 (fiv
e) minutes as needed for Chest pain. 90 tablet 0 
   pantoprazole (PROTONIX) 40 MG tablet Take 40 mg by mouth daily.   
 
   tamsulosin (FLOMAX) 0.4 MG capsule Take 0.4 mg by mouth  After dinner.   
   thiamine (VITAMIN B-1) 100 MG tablet Take 1 tablet by mouth daily. 30 tablet 0 
   umeclidinium bromide (INCRUSE ELLIPTA) 62.5 MCG/INH inhaler Inhale 1 puff into the lung
s daily.   
   atorvastatin (LIPITOR) 40 MG tablet take 1 tablet by mouth NIGHTLY 90 tablet 3 
   metoprolol (LOPRESSOR) 25 MG tablet take 1 tablet by mouth twice a day 180 tablet 3 
   ferrous sulfate, 65 FE, 324 (65 FE) MG EC tablet Take 65 mg of iron by mouth daily with
 breakfast. With 250 mg Vit C   
   cyanocobalamin (VITAMIN B-12) 100 MCG tablet Take 100 mcg by mouth daily.   
   cyclobenzaprine (FLEXERIL) 5 MG tablet Take 5 mg by mouth 3 (three) times daily as need
ed for Muscle spasms.   
   levalbuterol (XOPENEX) 1.25 MG/0.5ML nebulizer solution Take 1 ampule by nebulization e
very 4 (four) hours as needed for Wheezing.   
 
 No facility-administered medications prior to visit.  
 
 PHYSICAL EXAM:
 Wt Readings from Last 3 Encounters: 
 18 108.1 kg (238 lb 6.4 oz) 
 17 109.8 kg (242 lb) 
 17 115.7 kg (255 lb) 
 
 Temp Readings from Last 3 Encounters: 
 17 97.6 F (36.4 C) (Oral) 
 17 97.2 F (36.2 C) (Oral) 
 17 98.6 F (37 C) (Temporal) 
 
 BP Readings from Last 3 Encounters: 
 18 116/62 
 17 127/79 
 17 104/66 
 
 Pulse Readings from Last 3 Encounters: 
 18 64 
 17 54 
 17 55 
 
 GENERAL:  Well developed, well nourished, in no distress.  Appears older than stated age.
 HEENT:  Normocephalic, atraumatic.  
 EYES:     PERRL, EOM normal.
 MOUTH: Oral mucosae moist, dentition adequate, no lesions noted
 NECK:    No JVD, lymphadenopathy, thyromegaly, bruits.  Carotid pulses are 2+ bilaterally
 LUNGS/CHEST:  Clear bilaterally, with no rales, rhonchi or wheezing noted, respirations unl
abored
 HEART:  Nondisplaced PMI, regular rate and rhythm, S1, S2 normal.  2/6 murmur RUSB, rubs or
 gallops noted.
 ABDOMEN:  Soft, nontender, no organomegaly, masses or bruits.  Bowel sounds are normal in a
ll 4 quadrants.  The abdominal aortic pulsation is not palpable.
 EXTREMITIES:  No edema. Radial pulses 2+ bilaterally.  Femoral pulses are 2+ bilaterally wi
thout bruits.  DP and PT pulses are 2+ bilaterally.  No clubbing.varicosities 
 SKIN:  Warm and dry, capillary refill is normal, no lesions.
 NEUROLOGIC:  Awake, alert and oriented x 3. No focal motor or sensory deficits. 
 PSYCHIATRIC:  Appropriate, affect appears normal
 
 DATA:
 Blood tests:
 Lab Results 
 Component Value Date 
  WBC 8.39 2016 
  RBC 4.82 2016 
 
  HGB 13.8 2016 
  HCT 41.7 2016 
   2016 
 
 Lab Results 
 Component Value Date 
   2016 
  K 3.9 2016 
   2016 
  CO2 30 2016 
  ANIONGAP 9 2016 
  GLUF 99 2016 
  BUN 17 2016 
  CREATININE 0.89 2016 
  BCR 19 2016 
  CA 9.5 2016 
  EGFR >60 2016 
 
 Lab Results 
 Component Value Date 
  CHOL 204 (H) 2016 
  TRIG 294 (H) 2016 
   (H) 2016 
  GLUF 99 2016 
  HGBA1C 5.7 2016 
 
 Lab Results 
 Component Value Date 
  TSH 4.48 2016 
 
 No results found for: METF, NMETFX, TFNMFX, [id number], HQCNLJ01XVS, TOTEPI
 
 IMAGING/PROCEDURES
 Last Stress Test, 2016: Lexiscan, no ischemia detected, LVEF 63%.
 
 ECHO:
 Last Echo, 2016 ([facility]): LVEF 65-70%, trace MR, trace TR.  Ascending Aorta 41mm
, Aortic arch 37mm.
 
 OTHER: 
 PFT: 3/09/2017: Minimal obstructive airway disease, severe restriction-parenchymal, mild di
ffusion defect.  FVC 2.65 (65 percent) FEV1 2.07 (56 percent), ratio 0.78.  Significant bron
chodilator response.  TLC 4.53 L (66 percent) DLCO 21.3 (68 percent), data and tracings pers
onally reviewed by me, original interpretation Dr. Pieter CAMPO, effusion not corrected for Hg
b
 
 EKG/EVENT MONITORS
 32-Day Cardiac Event Monitor, 2016: sinus rhythm, , averaging 80bpm, rare ectopy
, no AVB/pauses, symptoms of "chest pain, SOB, dizziness, fluttering" all associated with
 NSR.
 EK2016: Normal sinus rhythm.  Rate 63 bpm,  ms, QRS 94 ms,  ms, perso
yoandy reviewed by me
 EK2018: Normal sinus rhythm.  Rate 63 bpm,  ms, QRS 96 ms, QTC 427ms, EKG tr
acing personally reviewed by me
 
 Labs:
 2017: Lipids: Cholesterol 110, triglycerides 81, HDL 35.8, LDL 58, VLDL 16, ratio 3.1
, non-HDL cholesterol 74.  CMP: Sodium 141, potassium 4, chloride 103, glucose 104, BUN 20, 
creatinine 0.81, GFR 99.  AST 21, ALT 25, alk phos 58, total bilirubin 0.6, albumin 4.4.  CB
C: WBC 9.2, hemoglobin 15, hematocrit 45.3, platelets 304, ESR 4
 
 
  ASSESSMENT & PLAN:
     He was here today as overdue for annual exam and overall seems to be very stable from a
 cardiovascular point of view with stable EKG and labs from 2017 showing lipids well c
ontrolled and fairly normal CMP with good renal function and liver enzymes and normal CBC.  
Overall he is asymptomatic for any ischemic heart disease and feels that he is doing very we
ll since he quit drinking 2 years ago.
   He is overdue for an echo to evaluate his enlarged ascending aorta and aortic arch so I h
ave ordered him one to be done at [facility].  He also has not had any lab work since  so I have ordered a CMP, lipid panel, CBC, iron panel with ferritin to evaluate anem
ia , copies to be sent to his PCP, Facundo Lees. 
   He also has stopped wearing his CPAP and uncorrected sleep apnea is a large contributor t
o cardiovascular disease and arrhythmia, so I have referred him to the [place] sleep lab
 for further evaluation of his sleep apnea.  I agree with him that it is likely his sleep ap
morris was much worse when he was drinking heavily, but he would still benefit from further natalie
luation.
  I have also suggested he try taking vitamin B12 again, ordered refills on atorvastatin and
 metoprolol for one year, and  suggested he consider using a knee-high light compression soc
ks to help with varicosities.  I also reinforced teaching on minimizing use of NSAIDs due to
 increased renal and cardiovascular risk, which he is aware of
  I will see him back in 2 months and for his cardiac meds, he should continue amlodipine 5 
mg daily, Lipitor 40 mg qhs, clonidine 0.3mg qhs hydrochlorothiazide 12.5 mg daily, and  met
oprolol tartrate 25 mg bid
   
 
 1. H/O syncope  
 2. Essential hypertension with goal blood pressure less than 130/80  
 3. Hyperlipidemia, unspecified hyperlipidemia type  
 4. Chronic obstructive pulmonary disease, unspecified COPD type (HCC)  
 5. Thoracoabdominal aortic aneurysm, without rupture (HCC)  
 6. Obstructive sleep apnea syndrome  
 7. Former heavy tobacco smoker  
 8. History of anemia  
 9. Murmur, cardiac  
 
 Orders Placed This Encounter 
 Procedures 
   Comprehensive metabolic panel 
   Lipid panel 
   CBC and differential 
   Iron panel 
   Ferritin 
   Ambulatory referral to Pulmonology 
   Electrocardiogram, 12-lead 
   Echo cardiac adult complete 
 
  
 
 The following portions of the patient's history were personally reviewed by me  and updated
 as appropriate:
 EKG tracings, other  specialty provider and PCP notes,any Hospital admission and discharge 
summaries, any ER records , current and previous cardiac testing and procedure reports and d
altaf, medication bottles brought to visit today personally reviewed by me. 
 Allergies, current medications.labs
 Family history, past medical history, past social history, past surgical history.
 Problem list.
 
 Janette WoodwardDIETER  [facility] 
 2018in this encounter
 
 
 Plan of Treatment
 
 
+--------+---------+-------------+----------------------+-------------+
| Date   | Type    | Specialty   | Care Team            | Description |
+--------+---------+-------------+----------------------+-------------+
| / | Office  | Pulmonology |   Jhonatan Louie MD  |             |
|    | Visit   |             |  [address]    |             |
|        |         |             | [place] [address]   |             |
|        |         |             | [phone]         |             |
|        |         |             | [phone] (Fax)   |             |
+--------+---------+-------------+----------------------+-------------+
 
 
 
+-------------------------------+--------+----------------------+----------------------+
| Name                          | Priori | Associated Diagnoses | Order Schedule       |
|                               | ty     |                      |                      |
+-------------------------------+--------+----------------------+----------------------+
| Comprehensive metabolic panel | Routin |   H/O syncope        | Expected:            |
|                               | e      | Hyperlipidemia,      | 2018, Expires: |
|                               |        | unspecified          |  2019          |
|                               |        | hyperlipidemia type  |                      |
|                               |        |  Chronic obstructive |                      |
|                               |        |  pulmonary disease,  |                      |
|                               |        | unspecified COPD     |                      |
|                               |        | type (HCC)           |                      |
|                               |        | Obstructive sleep    |                      |
|                               |        | apnea syndrome       |                      |
|                               |        | Former heavy tobacco |                      |
|                               |        |  smoker  History of  |                      |
|                               |        | anemia               |                      |
+-------------------------------+--------+----------------------+----------------------+
| Lipid panel                   | Routin |   Essential          | Expected:            |
|                               | e      | hypertension with    | 2018, Expires: |
|                               |        | goal blood pressure  |  2019          |
|                               |        | less than 130/80     |                      |
|                               |        | Hyperlipidemia,      |                      |
|                               |        | unspecified          |                      |
|                               |        | hyperlipidemia type  |                      |
+-------------------------------+--------+----------------------+----------------------+
| CBC and differential          | Routin |   Chronic            | Expected:            |
|                               | e      | obstructive          | 2018, Expires: |
|                               |        | pulmonary disease,   |  2019          |
|                               |        | unspecified COPD     |                      |
|                               |        | type (HCC)           |                      |
|                               |        | Obstructive sleep    |                      |
|                               |        | apnea syndrome       |                      |
|                               |        | History of anemia    |                      |
+-------------------------------+--------+----------------------+----------------------+
| Iron panel                    | Routin |   Obstructive sleep  | Expected:            |
|                               | e      | apnea syndrome       | 2018, Expires: |
|                               |        | History of anemia    |  2019          |
+-------------------------------+--------+----------------------+----------------------+
| Ferritin                      | Routin |   Obstructive sleep  | Expected:            |
|                               | e      | apnea syndrome       | 2018, Expires: |
|                               |        | History of anemia    |  2019          |
+-------------------------------+--------+----------------------+----------------------+
 
 
 
 
+-------------------------+--------+----------------------+---------------------+
| Name                    | Priori | Associated Diagnoses | Order Schedule      |
|                         | ty     |                      |                     |
+-------------------------+--------+----------------------+---------------------+
| Ambulatory referral to  | Routin |   H/O syncope        | Ordered: 2018 |
| Pulmonology             | e      | Essential            |                     |
|                         |        | hypertension with    |                     |
|                         |        | goal blood pressure  |                     |
|                         |        | less than 130/80     |                     |
|                         |        | Chronic obstructive  |                     |
|                         |        | pulmonary disease,   |                     |
|                         |        | unspecified COPD     |                     |
|                         |        | type (HCC)           |                     |
|                         |        | Obstructive sleep    |                     |
|                         |        | apnea syndrome       |                     |
|                         |        | Former heavy tobacco |                     |
|                         |        |  smoker              |                     |
+-------------------------+--------+----------------------+---------------------+
 as of this encounter
 
 Results
 ECHO outside interpretation standard (2018  3:43 PM)
 
+----------+--------------------------------------------------------+
| Specimen | Performing Laboratory                                  |
+----------+--------------------------------------------------------+
|          |   [address] [place] [address] |
+----------+--------------------------------------------------------+
 
 
 
+------------------------------------------------------------------------------------------+
| Impressions                                                                              |
+------------------------------------------------------------------------------------------+
|   1. Overall left ventricular systolic function is normal with, an EF between 60 - 65 %. |
|   2. The right ventricle is normal in size and function.  3. The aortic root, ascending  |
| aorta and aortic arch are normal. 4. No significant valvular abnormalities are noted.    |
+------------------------------------------------------------------------------------------+
 
 
 
+------------------------------------------------------------------------------------------+
| Narrative                                                                                |
+------------------------------------------------------------------------------------------+
|      Patient Name:    Kayce Arboleda  YOB: 1960                       |
| Accession:    [id number]     Performing Physician:    LOR DELGADO MD                      |
| _______________________________________________________________  INDICATIONS             |
| -----------  F/U thoracic/abdominal aneurysm     CONCLUSIONS  -----------  1. Overall    |
| left ventricular systolic function is normal with, an EF between 60 - 65 %.  2. The      |
| right ventricle is normal in size and function.  3. The aortic root, ascending aorta and |
|  aortic arch are normal. 4. No significant valvular abnormalities are noted.             |
| FINDINGS  --------  ECG rhythm: Sinus rhythm.   ECG rhythm: Resting bradycardia          |
| (HR<60bpm).  Study: A 2-dimensional transthoracic echocardiogram with m-mode, spectral   |
| and color flow Doppler was perfomed.   Study: This was a technically adequate study.     |
| Left Ventricle: Overall left ventricular systolic function is normal with, an EF between |
|  60 - 65 %.   Left Ventricle: The left ventricle cavity size is normal.   Left           |
| Ventricle: Left ventricular wall thickness is normal.   Left Ventricle: No regional wall |
|  motion abnormalities.   Left Ventricle: The diastolic filling pattern is normal for the |
|  age of the patient.  Right Ventricle: The right ventricle is normal in size and         |
 
| function.  Left Atrium: The left atrium is normal in size.  Right Atrium: The right      |
| atrium is normal in size.   Aortic Valve: Aortic valve is trileaflet and is mildly       |
| thickened.   Aortic Valve: There is no evidence of aortic regurgitation.   Aortic Valve: |
|  There is no evidence of aortic stenosis.  Mitral Valve: The mitral valve is normal.     |
| Mitral Valve: There is trace mitral regurgitation.   Mitral Valve: No evidence of MVP or |
|  mitral stenosis.  Tricuspid Valve: The tricuspid valve appears structurally normal.     |
| Tricuspid Valve: Pulmonary artery systolic pressure could not be assessed due to the     |
| absence of adequate TR jet.  Pulmonic Valve: The pulmonic valve was not well visualized. |
|   Pericardium: There is no pericardial effusion.  IVC/Hepatic Veins: The IVC is small    |
| (<1.5cm) and collapses with sniff, consistent with central venous pressures of 0-5mmHg.  |
|  Aorta: The aortic root, ascending aorta and aortic arch are normal.  Mass: No mass      |
| visualized  Thrombus: No clot visualized   Thrombus: No vegetation visualized.  Septum:  |
| No ASD observed.   Septum: No VSD observed.     MEASUREMENTS  -------------  Ao asc:     |
|  3.65 cm  Ao sinus:     3.46 cm  Ao st junct:     2.91 cm  IVC:     1.27 cm              |
| EDV(Teich):     105.93 ml  IVSd:     1.06 cm  LVIDd:     4.76 cm  LVPWd:     0.90 cm     |
| LVOT Diam:     2.31 cm  %FS:     22.34 %  EF(Teich):     44.99 %  ESV(Teich):     58.27  |
| ml  LVIDs:     3.70 cm  SV(Teich):     47.66 ml  RVIDd:     3.07 cm  Ao root:     32.72  |
| mm  LVEF MOD A2C:     56.41 %  SV MOD A2C:     46.46 ml  LVEF MOD A4C:     55.05 %  SV   |
| MOD A4C:     55.55 ml  EF Biplane:     55.87 %  LVEDV MOD BP:     92.29 ml  LVESV MOD    |
| BP:     40.72 ml  LVEDV MOD A2C:     82.36 ml  LVLd A2C:     9.15 cm  LVEDV MOD A4C:     |
|  100.89 ml  LVLd A4C:     9.46 cm  LVESV MOD A2C:     35.90 ml  LVLs A2C:     6.85 cm    |
| LVESV MOD A4C:     45.34 ml  LVLs A4C:     7.06 cm  LAESV(A-L):     54.75 ml  LAESV      |
| Index (A-L):     24.66 ml/m2  LAAs A2C:     15.85 cm2  LAESV A-L A2C:     42.05 ml  LALs |
|  A2C:     5.07 cm  LAAs A4C:     20.65 cm2  LAESV A-L A4C:     68.39 ml  LALs A4C:       |
| 5.29 cm  RAAs:     16.87 cm2  RAESV A-L:     44.78 ml  RAESV MOD:     45.69 ml           |
| RALs:     5.39 cm  TAPSE:     2.19 cm  AV maxP.05 mmHg  AV meanPG:     3.73 mmHg |
|   AV Vmax:     1.32 m/s  AV Vmean:     0.90 m/s  AV VTI:     29.89 cm  CHIRAG Vmax:         |
| 3.41 cm2  CHIRAG (VTI):     3.01 cm2  AVAI Vmax:     0.00 cm2/m2  AVAI (VTI):     0.00      |
| cm2/m2  LVOT maxP.63 mmHg  LVOT meanP.94 mmHg  LVSI Dopp:     40.60      |
| ml/m2  LVSV Dopp:     90.15 ml  LVOT Vmax:     1.07 m/s  LVOT Vmean:     0.63 m/s  LVOT  |
| VTI:     21.41 cm  MV A Librado:     0.37 m/s  MV DecT:     232.01 ms  MV E Librado:     0.58    |
| m/s  MV E/A Ratio:     1.53   Septal e':     0.08 m/s  Septal E/e':     7.16   Lateral   |
| e':     0.07 m/s  Lateral E/e':     7.35      Sonographer:   Authenticated               |
| by:    LOR DELGADO MD  Report Date/Time: 2018 17:52:14                            |
+------------------------------------------------------------------------------------------+
 
 
 
+-------------------------------------------------------------------------------------------
-------------------------+
| Procedure Note                                                                            
                         |
+-------------------------------------------------------------------------------------------
-------------------------+
|   Tank, Rad Results In - 2018  6:00 PM PST  Patient Name:  Jessica Arboleda of     
                         |
| Birth:  ccession:  [id number] Physician:  LOR DELGADO MD              
                         |
| _______________________________________________________________INDICATIONS-----------F/U  
                         |
|  thoracic/abdominal aneurysmCONCLUSIONS-----------1. Overall left ventricular systolic    
                         |
| function is normal with, an EF between 60 - 65 %.2. The right ventricle is normal in      
                         |
| size and function.3. The aortic root, ascending aorta and aortic arch are normal. 4. No   
                         |
| significant valvular abnormalities are noted.FINDINGS--------ECG rhythm: Sinus rhythm.    
                         |
| ECG rhythm: Resting bradycardia (HR<60bpm).Study: A 2-dimensional transthoracic           
                         |
 
| echocardiogram with m-mode, spectral and color flow Doppler was perfomed. Study: This     
                         |
| was a technically adequate study.Left Ventricle: Overall left ventricular systolic        
                         |
| function is normal with, an EF between 60 - 65 %. Left Ventricle: The left ventricle      
                         |
| cavity size is normal. Left Ventricle: Left ventricular wall thickness is normal. Left    
                         |
| Ventricle: No regional wall motion abnormalities. Left Ventricle: The diastolic filling   
                         |
| pattern is normal for the age of the patient.Right Ventricle: The right ventricle is      
                         |
| normal in size and function.Left Atrium: The left atrium is normal in size.Right Atrium:  
                         |
|  The right atrium is normal in size. Aortic Valve: Aortic valve is trileaflet and is      
                         |
| mildly thickened. Aortic Valve: There is no evidence of aortic regurgitation. Aortic      
                         |
| Valve: There is no evidence of aortic stenosis.Mitral Valve: The mitral valve is normal.  
                         |
|  Mitral Valve: There is trace mitral regurgitation. Mitral Valve: No evidence of MVP or   
                         |
| mitral stenosis.Tricuspid Valve: The tricuspid valve appears structurally normal.         
                         |
| Tricuspid Valve: Pulmonary artery systolic pressure could not be assessed due to the      
                         |
| absence of adequate TR jet.Pulmonic Valve: The pulmonic valve was not well                
                         |
| visualized.Pericardium: There is no pericardial effusion.IVC/Hepatic Veins: The IVC is    
                         |
| small (<1.5cm) and collapses with sniff, consistent with central venous pressures of      
                         |
| 0-5mmHg.Aorta: The aortic root, ascending aorta and aortic arch are normal.Mass: No mass  
                         |
|  visualizedThrombus: No clot visualized Thrombus: No vegetation visualized.Septum: No     
                         |
| ASD observed. Septum: No VSD observed.MEASUREMENTS-------------Ao asc:   3.65 cmAo        
                         |
| sinus:   3.46 cmAo st junct:   2.91 cmIVC:   1.27 cmEDV(Teich):   105.93 mlIVSd:   1.06   
                         |
| cmLVIDd:   4.76 cmLVPWd:   0.90 cmLVOT Diam:   2.31 cm%FS:   22.34 %EF(Teich):   44.99    
                         |
| %ESV(Teich):   58.27 mlLVIDs:   3.70 cmSV(Teich):   47.66 mlRVIDd:   3.07 cmAo root:      
                         |
| 32.72 mmLVEF MOD A2C:   56.41 %SV MOD A2C:   46.46 mlLVEF MOD A4C:   55.05 %SV MOD A4C:   
                         |
|   55.55 mlEF Biplane:   55.87 %LVEDV MOD BP:   92.29 mlLVESV MOD BP:   40.72 mlLVEDV MOD  
                         |
|  A2C:   82.36 mlLVLd A2C:   9.15 cmLVEDV MOD A4C:   100.89 mlLVLd A4C:   9.46 cmLVESV     
                         |
| MOD A2C:   35.90 mlLVLs A2C:   6.85 cmLVESV MOD A4C:   45.34 mlLVLs A4C:   7.06           
                         |
| cmLAESV(A-L):   54.75 mlLAESV Index (A-L):   24.66 ml/m2LAAs A2C:   15.85 [id number] A-L    
                         |
| A2C:   42.05 mlLALs A2C:   5.07 cmLAAs A4C:   20.65 [id number] A-L A4C:   68.39 mlLALs      
                         |
| A4C:   5.29 cmRAAs:   16.87 [id number] A-L:   44.78 mlRAESV MOD:   45.69 mlRALs:   5.39     
                         |
| cmTAPSE:   2.19 cmAV maxP.05 mmHgAV meanPG:   3.73 mmHgAV Vmax:   1.32 m/Hill        
                         |
 
| Vmean:   0.90 m/Hill VTI:   29.89 cmAVA Vmax:   3.41 cm2AVA (VTI):   3.01 [id number] Vmax:    
                         |
|  0.00 cm2/m2AVAI (VTI):   0.00 cm2/m2LVOT maxP.63 mmHgLVOT meanP.94 mmHgLVSI  
                         |
|  Dopp:   40.60 ml/m2LVSV Dopp:   90.15 mlLVOT Vmax:   1.07 m/sLVOT Vmean:   0.63 m/sLVOT  
                         |
|  VTI:   21.41 cmMV A Librado:   0.37 m/sMV DecT:   232.01 msMV E Librado:   0.58 m/sMV E/A        
                         |
| Ratio:   1.53 Septal e':   0.08 m/sSeptal E/e':   7.16 Lateral e':   0.07 m/sLateral      
                         |
| E/e':   7.35 Sonographer: Authenticated by:  Saurabh HERR Date/Time: 2018  
                         |
|  17:52:14IMPRESSION:1. Overall left ventricular systolic function is normal with, an EF   
                         |
| between 60 - 65 %.2. The right ventricle is normal in size and function.3. The aortic     
                         |
| root, ascending aorta and aortic arch are normal. 4. No significant valvular              
                         |
| abnormalities are noted.                                                                  
                         |
|Septum: No VSD observed.                                                                   
                         |
|MEASUREMENTS                                                                               
                        |
|-------------                                                                              
                          |
|Ao asc:   3.65 cm                                                                          
                         |
|Ao sinus:   3.46 cm                                                                        
                         |
|Ao st junct:   2.91 cm                                                                     
                         |
|IVC:   1.27 cm                                                                             
                         |
|EDV(Teich):   105.93 ml                                                                    
                         |
|IVSd:   1.06 cm                                                                            
                         |
|LVIDd:   4.76 cm                                                                           
                         |
|LVPWd:   0.90 cm                                                                           
                         |
|LVOT Diam:   2.31 cm                                                                       
                         |
|%FS:   22.34 %                                                                             
                         |
|EF(Teich):   44.99 %                                                                       
                         |
|ESV(Teich):   58.27 ml                                                                     
                         |
|LVIDs:   3.70 cm                                                                           
                         |
|SV(Teich):   47.66 ml                                                                      
                         |
|RVIDd:   3.07 cm                                                                           
                         |
|Ao root:   32.72 mm                                                                        
                         |
 
|LVEF MOD A2C:   56.41 %                                                                    
                         |
|SV MOD A2C:   46.46 ml                                                                     
                         |
|LVEF MOD A4C:   55.05 %                                                                    
                         |
|SV MOD A4C:   55.55 ml                                                                     
                         |
|EF Biplane:   55.87 %                                                                      
                         |
|LVEDV MOD BP:   92.29 ml                                                                   
                         |
|LVESV MOD BP:   40.72 ml                                                                   
                         |
|LVEDV MOD A2C:   82.36 ml                                                                  
                         |
|LVLd A2C:   9.15 cm                                                                        
                         |
|LVEDV MOD A4C:   100.89 ml                                                                 
                         |
|LVLd A4C:   9.46 cm                                                                        
                         |
|LVESV MOD A2C:   35.90 ml                                                                  
                         |
|LVLs A2C:   6.85 cm                                                                        
                         |
|LVESV MOD A4C:   45.34 ml                                                                  
                         |
|LVLs A4C:   7.06 cm                                                                        
                         |
|LAESV(A-L):   54.75 ml                                                                     
                         |
|LAESV Index (A-L):   24.66 ml/m2                                                           
                         |
|LAAs A2C:   15.85 cm2                                                                      
                         |
|LAESV A-L A2C:   42.05 ml                                                                  
                         |
|LALs A2C:   5.07 cm                                                                        
                         |
|LAAs A4C:   20.65 cm2                                                                      
                         |
|LAESV A-L A4C:   68.39 ml                                                                  
                         |
|LALs A4C:   5.29 cm                                                                        
                         |
|RAAs:   16.87 cm2                                                                          
                         |
|RAESV A-L:   44.78 ml                                                                      
                         |
|RAESV MOD:   45.69 ml                                                                      
                         |
|RALs:   5.39 cm                                                                            
                         |
|TAPSE:   2.19 cm                                                                           
                         |
|AV maxP.05 mmHg                                                                      
                         |
|AV meanPG:   3.73 mmHg                                                                     
                         |
 
|AV Vmax:   1.32 m/s                                                                        
                         |
|AV Vmean:   0.90 m/s                                                                       
                         |
|AV VTI:   29.89 cm                                                                         
                         |
|CHIRAG Vmax:   3.41 cm2                                                                       
                         |
|CHIRAG (VTI):   3.01 cm2                                                                      
                         |
|AVAI Vmax:   0.00 cm2/m2                                                                   
                         |
|AVAI (VTI):   0.00 cm2/m2                                                                  
                         |
|LVOT maxP.63 mmHg                                                                    
                         |
|LVOT meanP.94 mmHg                                                                   
                         |
|LVSI Dopp:   40.60 ml/m2                                                                   
                         |
|LVSV Dopp:   90.15 ml                                                                      
                         |
|LVOT Vmax:   1.07 m/s                                                                      
                         |
|LVOT Vmean:   0.63 m/s                                                                     
                         |
|LVOT VTI:   21.41 cm                                                                       
                         |
|MV A Librado:   0.37 m/s                                                                       
                         |
|MV DecT:   232.01 ms                                                                       
                         |
|MV E Librado:   0.58 m/s                                                                       
                         |
|MV E/A Ratio:   1.53                                                                       
                         |
|Septal e':   0.08 m/s                                                                      
                         |
|Septal E/e':   7.16                                                                        
                         |
|Lateral e':   0.07 m/s                                                                     
                         |
|Lateral E/e':   7.35                                                                       
                         |
|Sonographer:                                                                               
                         |
|Authenticated by:  LOR DELGADO MD                                                        
                         |
|Report Date/Time: 2018 17:52:14                                                       
                         |
|IMPRESSION:                                                                                
                        |
|1. Overall left ventricular systolic function is normal with, an EF between 60 - 65 %.     
                         |
|2. The right ventricle is normal in size and function.                                     
                         |
 
|3. The aortic root, ascending aorta and aortic arch are normal. 4. No significant valvular 
abnormalities are noted. |
+-------------------------------------------------------------------------------------------
-------------------------+
 EKG STANDARD 12 LEAD (2018 12:27 PM)
 
+-------------------+---------------------------------------------+-----------+
| Component         | Value                                       | Ref Range |
+-------------------+---------------------------------------------+-----------+
| Ventricular Rate  | 63                                          | BPM       |
+-------------------+---------------------------------------------+-----------+
| Atrial Rate       | 63                                          | BPM       |
+-------------------+---------------------------------------------+-----------+
| P-R Interval      | 146                                         | ms        |
+-------------------+---------------------------------------------+-----------+
| QRS Duration      | 96                                          | ms        |
+-------------------+---------------------------------------------+-----------+
| Q-T Interval      | 418                                         | ms        |
+-------------------+---------------------------------------------+-----------+
| QTC Calculation   | 427                                         | ms        |
| (Bezet)           |                                             |           |
+-------------------+---------------------------------------------+-----------+
| Calculated P Axis | 42                                          | degrees   |
+-------------------+---------------------------------------------+-----------+
| Calculated R Axis | 53                                          | degrees   |
+-------------------+---------------------------------------------+-----------+
| Calculated T Axis | 60                                          | degrees   |
+-------------------+---------------------------------------------+-----------+
| Diagnosis         | Please refer to Providers office visit note |           |
|                   |  for Providers Interpretation.Confirmed by  |           |
|                   | [facility] Read Only, PAULETTE Davis ([id number]),     |           |
|                   |  [address] ([id number]) on 2018    |           |
|                   | 4:56:19 PM                                  |           |
+-------------------+---------------------------------------------+-----------+
 
 
 
+----------+-------------------------------------------------+
| Specimen | Performing Laboratory                           |
+----------+-------------------------------------------------+
|          |   [facility]  [address] [place] [id number] |
+----------+-------------------------------------------------+
 in this encounter
 
 Visit Diagnoses
 
 
+--------------------------------------------------------------------+
| Diagnosis                                                          |
+--------------------------------------------------------------------+
| H/O syncope - Primary                                              |
+--------------------------------------------------------------------+
|   Personal history of other specified diseases                     |
+--------------------------------------------------------------------+
| Essential hypertension with goal blood pressure less than 130/80   |
+--------------------------------------------------------------------+
| Hyperlipidemia, unspecified hyperlipidemia type                    |
+--------------------------------------------------------------------+
| Chronic obstructive pulmonary disease, unspecified COPD type (HCC) |
+--------------------------------------------------------------------+
 
| Thoracoabdominal aortic aneurysm, without rupture (HCC)            |
+--------------------------------------------------------------------+
|   Thoracoabdominal aneurysm without mention of rupture             |
+--------------------------------------------------------------------+
| Obstructive sleep apnea syndrome                                   |
+--------------------------------------------------------------------+
|   Obstructive sleep apnea (adult) (pediatric)                      |
+--------------------------------------------------------------------+
| Former heavy tobacco smoker                                        |
+--------------------------------------------------------------------+
| History of anemia                                                  |
+--------------------------------------------------------------------+
|   Personal history of diseases of blood and blood-forming organs   |
+--------------------------------------------------------------------+
| Murmur, cardiac                                                    |
+--------------------------------------------------------------------+
|   Undiagnosed cardiac murmurs                                      |
+--------------------------------------------------------------------+

## 2018-03-28 NOTE — XMS
Encounter Summary
  Created on: 04/10/2018
 
 Kayce Arboleda
 External Reference #: [id number]
 : 64
 Sex: Male
 
 Demographics
 
 
+-----------------------+-----------------------+
| Address               | [address] |
|                       | [place]  [address]  |
+-----------------------+-----------------------+
| Home Phone            | [phone]       |
+-----------------------+-----------------------+
| Preferred Language    | Unknown               |
+-----------------------+-----------------------+
| Marital Status        | Single                |
+-----------------------+-----------------------+
| Congregation Affiliation | 1013                  |
+-----------------------+-----------------------+
| Race                  | Unknown               |
+-----------------------+-----------------------+
| Ethnic Group          | Unknown               |
+-----------------------+-----------------------+
 
 
 Author
 
 
+--------------+-----------------------+
| Author       | [facility] |
+--------------+-----------------------+
| Organization | [facility] |
+--------------+-----------------------+
| Address      | Unknown               |
+--------------+-----------------------+
| Phone        | Unavailable           |
+--------------+-----------------------+
 
 
 
 Support
 
 
+-----------------+--------------+---------------------+-----------------+
| Name            | Relationship | Address             | Phone           |
+-----------------+--------------+---------------------+-----------------+
| Tejal Champagne | ECON         | [address]              | [phone] |
|                 |              | [address], [place]   |                 |
|                 |              | [address]               |                 |
+-----------------+--------------+---------------------+-----------------+
 
 
 
 Care Team Providers
 
 
 
+-----------------------+------+-----------------+
| Care Team Member Name | Role | Phone           |
+-----------------------+------+-----------------+
| aFcundo Lees  | PCP  | [phone] |
+-----------------------+------+-----------------+
 
 
 
 Reason for Visit
 
 
+--------+----------------+
| Reason | Comments       |
+--------+----------------+
| Other  | [facility] |
+--------+----------------+
 
 
 
 Encounter Details
 
 
+--------+-------------+----------------------+----------------------+-------------------+
| Date   | Type        | Department           | Care Team            | Description       |
+--------+-------------+----------------------+----------------------+-------------------+
| / | Documentati |   ANDRÉS Florian          |   Josseline Kwok,  | Other ([facility]  |
| 2018   | on Only     | [facility]  | MA                   | Labs)             |
|        |             |  [address]    |                      |                   |
|        |             | [place]         |                      |                   |
|        |             | [address]           |                      |                   |
|        |             | [phone]         |                      |                   |
+--------+-------------+----------------------+----------------------+-------------------+
 
 
 
 Social History
 
 
+---------------+-------+-----------+--------+------------------+
| Tobacco Use   | Types | Packs/Day | Years  | Date             |
|               |       |           | Used   |                  |
+---------------+-------+-----------+--------+------------------+
| Former Smoker |       | 3         | 37     | Quit: 10/05/2015 |
+---------------+-------+-----------+--------+------------------+
 
 
 
+---------------------+---+---+----------+
| Smokeless Tobacco:  |   |   | Quit:    |
| Former User         |   |   | 10/05/20 |
|                     |   |   | 17       |
+---------------------+---+---+----------+
 
 
 
+-------------+-----------+---------+---------------------------+
| Alcohol Use | Drinks/We | oz/Week | Comments                  |
|             | ek        |         |                           |
 
+-------------+-----------+---------+---------------------------+
| No          |   0       | 0.0     | heavy drinker in past hx. |
|             | Standard  |         |                           |
|             | drinks or |         |                           |
|             |           |         |                           |
|             | equivalen |         |                           |
|             | t         |         |                           |
+-------------+-----------+---------+---------------------------+
 
 
 
+------------------+---------------+
| Sex Assigned at  | Date Recorded |
| Birth            |               |
+------------------+---------------+
| Not on file      |               |
+------------------+---------------+
 as of this encounter
 
 Plan of Treatment
 
 
+--------+---------+-------------+----------------------+-------------+
| Date   | Type    | Specialty   | Care Team            | Description |
+--------+---------+-------------+----------------------+-------------+
| / | Office  | Pulmonology |   Jhonatan Louie MD  |             |
| 2018   | Visit   |             |  [address]    |             |
|        |         |             | [place] [address]   |             |
|        |         |             | [phone]         |             |
|        |         |             | [phone] (Fax)   |             |
+--------+---------+-------------+----------------------+-------------+
 as of this encounter
 
 Results
 Lipid panel (2018  7:42 AM)
 
+---------------+-------+----------------+
| Component     | Value | Ref Range      |
+---------------+-------+----------------+
| CHOLESTEROL   | 131   | 200 mg/dL      |
+---------------+-------+----------------+
| TRIGLYCERIDES | 116   | 30 - 150 mg/dL |
+---------------+-------+----------------+
| HDL CHOL      | 40.3  | 40 mg/dl       |
+---------------+-------+----------------+
| LDL CALC      | 68    | 100 mg/dL      |
+---------------+-------+----------------+
| LDl/HDL Ratio |       |                |
+---------------+-------+----------------+
| CHOL/HDL      | 3.3   | 4.97           |
+---------------+-------+----------------+
| VLDL CHOL     | 23    | 4 - 40 mg/dL   |
+---------------+-------+----------------+
| NON HDL CHOL  | 91    | 130            |
+---------------+-------+----------------+
 
 
 
+----------+------------------------------------------------------------------+
| Specimen | Performing Laboratory                                            |
 
+----------+------------------------------------------------------------------+
| Blood    |   [facility]  [place]  |
|          | [address]                                                            |
+----------+------------------------------------------------------------------+
 Comprehensive metabolic panel (2018  7:42 AM)
 
+----------------+-------+-------------------+
| Component      | Value | Ref Range         |
+----------------+-------+-------------------+
| GLUCOSE        | 99    | 70 - 100 mg/dL    |
+----------------+-------+-------------------+
| BUN            | 18    | 6 - 23 mg/dL      |
+----------------+-------+-------------------+
| CREATININE     | 0.88  | 0.70 - 1.33 mg/dL |
+----------------+-------+-------------------+
| BUN/CREAT      | 20.5  | 6.0 - 28.6        |
+----------------+-------+-------------------+
| CALCIUM        | 9.7   | 8.4 - 10.2 mg/dL  |
+----------------+-------+-------------------+
| TOTAL PROTEIN  | 6.6   | 6.0 - 8.0 g/dL    |
+----------------+-------+-------------------+
| Albumin        | 4.4   | 3.5 - 5.0         |
+----------------+-------+-------------------+
| GLOBULIN       | 2.2   | 1.8 - 3.5         |
+----------------+-------+-------------------+
| A/G            | 2.0   | 1.0 - 2.4         |
+----------------+-------+-------------------+
| TBIL           | 0.5   | 0.0 - 1.2 mg/dL   |
+----------------+-------+-------------------+
| ALK PHOS       | 59    | 32 - 120          |
+----------------+-------+-------------------+
| ALT            | 25    | 7 - 52 U/L        |
+----------------+-------+-------------------+
| AST            | 19    | 13 - 39 U/L       |
+----------------+-------+-------------------+
| SODIUM         | 142   | 132 - 143 mmol/L  |
+----------------+-------+-------------------+
| POTASSIUM      | 4.0   | 3.6 - 5.1 mmol/L  |
+----------------+-------+-------------------+
| CHLORIDE       | 103   | 95 - 112 mmol/L   |
+----------------+-------+-------------------+
| CO2            | 28    | 19 - 31 mmol/L    |
+----------------+-------+-------------------+
| ANION GAP AGAP | 15.0  | 7 - 21 mmol/L     |
+----------------+-------+-------------------+
| EGFR           | 90    | 60 mg/dL          |
+----------------+-------+-------------------+
 
 
 
+----------+------------------------------------------------------------------+
| Specimen | Performing Laboratory                                            |
+----------+------------------------------------------------------------------+
| Blood    |   [facility]  [address] [place] [address], [facility] [address]  [place], [place]  |
|          | [id number]                                                            |
+----------+------------------------------------------------------------------+
 Transferrin (2018  7:42 AM)
 
+-------------+--------+-----------+
| Component   | Value  | Ref Range |
 
+-------------+--------+-----------+
| TRANSFERRIN | 245.11 | 180 - 329 |
+-------------+--------+-----------+
 
 
 
+----------+------------------------------------------------------------------+
| Specimen | Performing Laboratory                                            |
+----------+------------------------------------------------------------------+
| Blood    |   [facility]  [address] [place] [address] [place]  |
|          | [address]                                                            |
+----------+------------------------------------------------------------------+
 Ferritin (2018  7:42 AM)
 
+-----------+-------+----------------+
| Component | Value | Ref Range      |
+-----------+-------+----------------+
| FERRITIN  | 162.8 | 30 - 400 ng/mL |
+-----------+-------+----------------+
 
 
 
+----------+------------------------------------------------------------------+
| Specimen | Performing Laboratory                                            |
+----------+------------------------------------------------------------------+
| Blood    |   [facility]  [address]  |
|          | [id number]                                                            |
+----------+------------------------------------------------------------------+
 Iron panel (2018  7:42 AM)
 
+------------+-------+-----------+
| Component  | Value | Ref Range |
+------------+-------+-----------+
| IRON       | 82.66 | 37 - 160  |
+------------+-------+-----------+
| IRON % SAT | 24.1  | 20 - 55   |
+------------+-------+-----------+
| TIBC       | 343   | 245 - 400 |
+------------+-------+-----------+
 
 
 
+----------+------------------------------------------------------------------+
| Specimen | Performing Laboratory                                            |
+----------+------------------------------------------------------------------+
| Blood    |   [facility]  [address] [facility] [address]  [place], [place]  |
|          | [address]                                                            |
+----------+------------------------------------------------------------------+
 CBC W/Auto Diff (Reflex to Manual) (2018)
 
+-----------------+-------+--------------------+
| Component       | Value | Ref Range          |
+-----------------+-------+--------------------+
| WBC             | 9.0   | 4.5 - 11.0 10^3/mL |
+-----------------+-------+--------------------+
| RBC             | 4.79  | 4.3 - 5.7 10^6/  L |
+-----------------+-------+--------------------+
| HGB             | 14.7  | 13.5 - 18.0 g/dL   |
+-----------------+-------+--------------------+
| HCT             | 43.4  | 41 - 50 %          |
 
+-----------------+-------+--------------------+
| MCV             | 90.6  | 81 - 99 fL         |
+-----------------+-------+--------------------+
| MCH             | 31    | 27 - 33 pg         |
+-----------------+-------+--------------------+
| MCHC            | 34    | 30 - 36 g/dL       |
+-----------------+-------+--------------------+
| PLT             | 286   | 140 - 440 K/  L    |
+-----------------+-------+--------------------+
| RDW SD          | 13.5  | 10.5 - 15.0 %      |
+-----------------+-------+--------------------+
| MPV             |       | fL                 |
+-----------------+-------+--------------------+
| DIFF TYPE       |       |                    |
+-----------------+-------+--------------------+
| NEUTROPHILS     | 64.5  | 39 - 80 %          |
+-----------------+-------+--------------------+
| LYMPHOCYTES     | 26.4  | 24 - 44 %          |
+-----------------+-------+--------------------+
| MONOCYTES       | 7.2   | 0 - 12 %           |
+-----------------+-------+--------------------+
| EOSINOPHILS     | 0.9   | 0 - 6 %            |
+-----------------+-------+--------------------+
| BASOPHILS       | 1.0   | 0 - 2 %            |
+-----------------+-------+--------------------+
| NEUTROPHILS ABS |       | /  L               |
+-----------------+-------+--------------------+
| LYMPHOCYTES ABS |       | /  L               |
+-----------------+-------+--------------------+
| MONOCYTES ABS   |       | /  L               |
+-----------------+-------+--------------------+
| EOSINOPHILS ABS |       | /  L               |
+-----------------+-------+--------------------+
| BASOPHILS ABS   |       | /  L               |
+-----------------+-------+--------------------+
 
 
 
+----------+------------------------------------------------------------------+
| Specimen | Performing Laboratory                                            |
+----------+------------------------------------------------------------------+
| Blood    |   [facility]  [address] [place]  |
|          | [address]                                                            |
+----------+------------------------------------------------------------------+
 in this encounter
 
 Visit Diagnoses
 Not on filein this encounter

## 2018-03-28 NOTE — XMS
Encounter Summary
  Created on: 04/10/2018
 
 Kayce Arboleda
 External Reference #: [id number]
 : 64
 Sex: Male
 
 Demographics
 
 
+-----------------------+-----------------------+
| Address               | [address] |
|                       | [place]  [address]  |
+-----------------------+-----------------------+
| Home Phone            | [phone]       |
+-----------------------+-----------------------+
| Preferred Language    | Unknown               |
+-----------------------+-----------------------+
| Marital Status        | Single                |
+-----------------------+-----------------------+
| [employer] Affiliation | [id number]                  |
+-----------------------+-----------------------+
| Race                  | Unknown               |
+-----------------------+-----------------------+
| Ethnic Group          | Unknown               |
+-----------------------+-----------------------+
 
 
 Author
 
 
+--------------+-----------------------+
| Author       | [facility] [facility] |
+--------------+-----------------------+
| Organization | [facility] [employer] [facility] |
+--------------+-----------------------+
| Address      | Unknown               |
+--------------+-----------------------+
| Phone        | Unavailable           |
+--------------+-----------------------+
 
 
 
 Support
 
 
+-----------------+--------------+---------------------+-----------------+
| Name            | Relationship | Address             | Phone           |
+-----------------+--------------+---------------------+-----------------+
| Tejal Champagne | ECON         | [address]              | [phone] |
|                 |              | [address], [place]   |                 |
|                 |              | [address]               |                 |
+-----------------+--------------+---------------------+-----------------+
 
 
 
 Care Team Providers
 
 
 
+-----------------------+------+-----------------+
| Care Team Member Name | Role | Phone           |
+-----------------------+------+-----------------+
| Facundo Rodriguez FNP  | PCP  | [phone] |
+-----------------------+------+-----------------+
 
 
 
 Reason for Referral
 Echo (Routine)
 
+----------+--------+-----------+--------------+--------------+--------------+
| Status   | Reason | Specialty | Diagnoses /  | Referred By  | Referred To  |
|          |        |           | Procedures   | Contact      | Contact      |
+----------+--------+-----------+--------------+--------------+--------------+
| Pending  |        |           |   Diagnoses  |   Trace, |              |
| Review   |        |           |  Essential   |  Janette Johnson  |              |
|          |        |           | hypertension | ARNP  [address]   |              |
|          |        |           |  with goal   | [address]  |              |
|          |        |           | blood        | Louie F        |              |
|          |        |           | pressure     | [place] |              |
|          |        |           | less than    |  [address]       |              |
|          |        |           | 130/80       | Phone:       |              |
|          |        |           | Hyperlipidem | [phone] |              |
|          |        |           | ia,          |   Fax:       |              |
|          |        |           | unspecified  | [phone] |              |
|          |        |           | hyperlipidem |              |              |
|          |        |           | ia type      |              |              |
|          |        |           | Thoracoabdom |              |              |
|          |        |           | inal aortic  |              |              |
|          |        |           | aneurysm,    |              |              |
|          |        |           | without      |              |              |
|          |        |           | rupture      |              |              |
|          |        |           | (HCC)        |              |              |
|          |        |           | Murmur,      |              |              |
|          |        |           | cardiac      |              |              |
|          |        |           | Procedures   |              |              |
|          |        |           | Echo cardiac |              |              |
|          |        |           |  adult       |              |              |
|          |        |           | complete     |              |              |
+----------+--------+-----------+--------------+--------------+--------------+
 
 
 
 
 Reason for Visit
 
 
+-----------+-----------------------------+
| Reason    | Comments                    |
+-----------+-----------------------------+
| Follow-up | 2 month - echo STA 18 |
+-----------+-----------------------------+
 
 
 
 Encounter Details
 
 
 
+--------+---------+----------------------+----------------------+----------------------+
| Date   | Type    | Department           | Care Team            | Description          |
+--------+---------+----------------------+----------------------+----------------------+
| / | Office  |   [facility]          |   Janette Woodward    | Essential            |
| 2018   | Visit   | [facility] | DIETER Johnson  [address]     | hypertension with    |
|        |         |   [address]    | [address]    | goal blood pressure  |
|        |         | [address]        | [place] [address]   | less than 130/80     |
|        |         | [place] [address]  | [phone]         | (Primary Dx);        |
|        |         |  [phone]        | [phone] (Fax)   | Hyperlipidemia,      |
|        |         |                      |                      | unspecified          |
|        |         |                      |                      | hyperlipidemia type; |
|        |         |                      |                      |  Thoracoabdominal    |
|        |         |                      |                      | aortic aneurysm,     |
|        |         |                      |                      | without rupture      |
|        |         |                      |                      | (HCC); H/O syncope;  |
|        |         |                      |                      | Murmur, cardiac;     |
|        |         |                      |                      | Obstructive sleep    |
|        |         |                      |                      | apnea syndrome;      |
|        |         |                      |                      | Chronic obstructive  |
|        |         |                      |                      | pulmonary disease,   |
|        |         |                      |                      | unspecified COPD     |
|        |         |                      |                      | type (HCC); Former   |
|        |         |                      |                      | heavy tobacco        |
|        |         |                      |                      | smoker; History of   |
|        |         |                      |                      | anemia               |
+--------+---------+----------------------+----------------------+----------------------+
 
 
 
 Social History
 
 
+---------------+-------+-----------+--------+------------------+
| Tobacco Use   | Types | Packs/Day | Years  | Date             |
|               |       |           | Used   |                  |
+---------------+-------+-----------+--------+------------------+
| Former Smoker |       | 3         | 37     | Quit: 10/05/2015 |
+---------------+-------+-----------+--------+------------------+
 
 
 
+---------------------+---+---+----------+
| Smokeless Tobacco:  |   |   | Quit:    |
| Former User         |   |   | 10/05/20 |
|                     |   |   | 15       |
+---------------------+---+---+----------+
 
 
 
+-------------+-----------+---------+---------------------------+
| Alcohol Use | Drinks/We | oz/Week | Comments                  |
|             | ek        |         |                           |
+-------------+-----------+---------+---------------------------+
| No          |   0       | 0.0     | heavy drinker in past hx. |
|             | Standard  |         |                           |
|             | drinks or |         |                           |
|             |           |         |                           |
|             | equivalen |         |                           |
|             | t         |         |                           |
 
+-------------+-----------+---------+---------------------------+
 
 
 
+------------------+---------------+
| Sex Assigned at  | Date Recorded |
| Birth            |               |
+------------------+---------------+
| Not on file      |               |
+------------------+---------------+
 as of this encounter
 
 Last Filed Vital Signs
 
 
+-------------------+----------------------+-------------------------+
| Vital Sign        | Reading              | Time Taken              |
+-------------------+----------------------+-------------------------+
| Blood Pressure    | 112/60               | 2018 10:53 AM PDT |
+-------------------+----------------------+-------------------------+
| Pulse             | 67                   | 2018 10:53 AM PDT |
+-------------------+----------------------+-------------------------+
| Temperature       | -                    | -                       |
+-------------------+----------------------+-------------------------+
| Respiratory Rate  | 20                   | 2018 10:53 AM PDT |
+-------------------+----------------------+-------------------------+
| Oxygen Saturation | 98%                  | 2018 10:53 AM PDT |
+-------------------+----------------------+-------------------------+
| Inhaled Oxygen    | -                    | -                       |
| Concentration     |                      |                         |
+-------------------+----------------------+-------------------------+
| Weight            | 106.6 kg (235 lb 1.6 | 2018 10:53 AM PDT |
|                   |  oz)                 |                         |
+-------------------+----------------------+-------------------------+
| Height            | 177.8 cm (5' 10")    | 2018 10:53 AM PDT |
+-------------------+----------------------+-------------------------+
| Body Mass Index   | 33.73                | 2018 10:53 AM PDT |
+-------------------+----------------------+-------------------------+
 in this encounter
 
 Instructions
 Patient Instructions - Pernelljhony YesyDIETER Johnson - 2018 11:00 AM PDTYour labs looked
 good and not anemic , and cholesterol is well controlled
 Your Echo also looked good 
 
 I have ordered you an Echo to be done at [facility] in one year, see me back in one year
 in this encounter
 
 Progress Notes
 Pernelljhony Janette JohnsonDIETER - 2018 11:00 AM PDTFormatting of this note may be differen
t from the original.
 Date of visit: 3/12/2018
 Primary Care Physician: FACUNDO RODRIGUEZ
 
 CHIEF COMPLAINT:  
 Chief Complaint 
 Patient presents with 
   Follow-up 
   2 month - echo STA 18 
 
 
 HISTORY OF PRESENT ILLNESS:
   Mr. Kayce Arboleda, Bud, is a 57-year-old man who is here today for follow-up on his labs 
and his Echo.
    He has a previous history of syncope though workup was benign including a 32 day cardiac
 event monitor and  no further episodes of syncope, hypertension, hyperlipidemia, and COPD w
paolo is followed by Dr. Louie.
  I saw him last on 2018 when I had ordered him an echo, referred him for assess
ment of his uncorrected sleep apnea and ordered a CMP, lipid panel, CBC, iron panel with a f
erritin level .
   His labs  performed 18 showed normal current panel with ferritin of 162 though  iron
  low-end of normal at 82 and low sat of 24.1%, his lipid panel shows lipids well controlled
 with LDL of 68, triglycerides of 116, and total cholesterol of 131, and he had a normal CMP
 showing normal liver enzymes and normal renal function, and CBC also showed normal hemoglob
in and hematocrit and platelet count.
    His Echo performed at [facility] on 2018 reported a normal EF of 60-65 p
ercent, right ventricle normal in size and function, and the aortic root, ascending aorta, a
nd aortic arch were all normal dimensions, and there was no significant valvular abnormaliti
es.
  He was admitted to the emergency room 2018 for increasing lower back pain radiati
ng to his right buttocks and legs.  I reviewed ER  documentation today   Blood pressure mild
ly elevated with pain but improved when they treated pain ,and he was treated with Decadron 
and given a prescription for prednisone and oxycodone and sent home.
  His COPD continues to be well controlled with no recent exacerbation, but still suffers fr
om dyspnea with exertion, and activities of daily living have been difficult with increased 
hip pain.
   Today, he  denies any chest pain, palpitations, dyspnea, dizziness,or syncope. He also de
nies any signs or symptoms of stroke or TIA.
   He does have an appointment to be seen by Dr. Landry at the [facility] sleep lab in May
 to get evaluation and treatment of his sleep apnea    
 REVIEW OF SYSTEMS:
 Negative except for pertinent items noted in HPI.
 
 Constitutional: mild  fatigue or unexplained weight loss.  Appetite is good.  Weight is sta
ble.    Denies night sweats fevers or chills
 HENT: Denies nosebleeds.   Positive for hearing loss .  Denies dysphagia
 Eyes: History of eye surgery orbit blowout ? Denies visual disturbance or double vision.  
 Respiratory/Sleep:: Positive for COPD (Dr. Louie), sleep apnea ,not wearing CPAP.  Positive
 for dyspnea with more extreme exertion, reports occasional cough  Denies hemoptysis or exce
ssive sputum production. Denies  orthopnea, PND. 
 Cardiovascular: Denies  chest pain, palpitations and leg swelling. Denies history of rheuma
tic fever. Denies claudication . 
 Gastrointestinal:history of GERD , on PPI  .  Denies  nausea, vomiting, abdominal pain and 
blood in stool. 
 Genitourinary: Denies  hematuria.  
 Musculoskeletal: Positive for joint pain and arthralgia severe right hip pain, back , shoul
ric, neck  , Denies myalgias, 
 Skin: Denies  color change.  Denies rash or lesions
 Neurological:  Positive for Numbness to legs, and arms .   Denies history of stroke/Transie
nt ischemic attack.Denies history of seizures. Denies dizziness, syncope  
 Hematological/Oncology Does not bruise/bleed easily.  Denies history of cancer
 Endocrine: Denies diabetes or thyroid disease.  Denies excessive thirst or hunger.  
 Psychiatric/Behavioral: The patient denies any history of depression or anxiety or other ps
ychiatric illness.
 Vaccines: Current on 2017 flu vaccine.  Current on pneumonia vaccine.
 Habits/Social :  history of smoking, quit in 2015.  Smoked 3 packs a day  35 years
.  Previously used smokeless tobacco quit in 2015..  Denies EtOH use for 2 years , p
revious heavy drinker. Drinks servings of  12 cups coffee/tea daily, uses 1/2 chocolate , ha
lf dec aff  .  Denies recreational or illicit drug use, previously used methamphetamine  20
 years, cocaine, marijuana, crack, mushrooms.  Exercises with tasiks in  [employer], walking,  a
nd tolerates.  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 
 
 
 Outpatient Medications Prior to Visit 
 Medication Sig Dispense Refill 
   albuterol (PROVENTIL HFA;VENTOLIN HFA) 108 (90 BASE) MCG/ACT inhaler Inhale 2 puffs int
o the lungs every 4 (four) hours as needed for Wheezing.   
   amitriptyline (ELAVIL) 100 MG tablet Take 100 mg by mouth nightly.   
   amLODIPine (NORVASC) 5 MG tablet Take 5 mg by mouth daily.   
   ascorbic acid (VITAMIN C) 250 MG tablet Take 250 mg by mouth daily.   
   atorvastatin (LIPITOR) 40 MG tablet Take 1 tablet by mouth nightly. 30 tablet 11 
   cloNIDine (CATAPRES) 0.3 MG tablet Take 0.3 mg by mouth nightly.   
   diclofenac (CATAFLAM) 50 MG tablet Take 50 mg by mouth 2 (two) times daily.   
   ferrous sulfate, 65 FE, 324 (65 FE) MG EC tablet Take 65 mg of iron by mouth daily with
 breakfast. With 250 mg Vit C   
   fluticasone-salmeterol (ADVAIR) 500-50 MCG/DOSE diskus inhaler Inhale 1 puff into the l
ungs 2 (two) times daily.   
   gabapentin (NEURONTIN) 600 MG tablet Take 300 mg by mouth 3 (three) times daily.   
   hydrochlorothiazide (HYDRODIURIL) 12.5 MG tablet Take 12.5 mg by mouth daily.   
   ibuprofen (MOTRIN) 600 MG tablet Take 600 mg by mouth every 6 (six) hours as needed for
 Pain. Trying to use sparingly   
   ipratropium-albuterol (DUO-NEB) 0.5-2.5 mg/3mL Take 3 mLs by nebulization as needed.   
   metoprolol (LOPRESSOR) 25 MG tablet Take 1 tablet by mouth 2 (two) times daily. 60 tabl
et 11 
   montelukast (SINGULAIR) 10 MG tablet Take 10 mg by mouth nightly.   
   Multiple Vitamins-Minerals (RA CENTRAL-BIBI PO) Take  by mouth daily.   
   nitroGLYCERIN (NITROSTAT) 0.4 MG SL tablet Place 1 tablet under the tongue every 5 (fiv
e) minutes as needed for Chest pain. 90 tablet 0 
   pantoprazole (PROTONIX) 40 MG tablet Take 40 mg by mouth daily.   
   tamsulosin (FLOMAX) 0.4 MG capsule Take 0.4 mg by mouth  After dinner.   
   thiamine (VITAMIN B-1) 100 MG tablet Take 1 tablet by mouth daily. 30 tablet 0 
   umeclidinium bromide (INCRUSE ELLIPTA) 62.5 MCG/INH inhaler Inhale 1 puff into the lung
s daily.   
 
 No facility-administered medications prior to visit.  
 
 PHYSICAL EXAM:
 Wt Readings from Last 3 Encounters: 
 18 106.6 kg (235 lb 1.6 oz) 
 18 108.1 kg (238 lb 6.4 oz) 
 17 109.8 kg (242 lb) 
 
 Temp Readings from Last 3 Encounters: 
 17 97.6 F (36.4 C) (Oral) 
 17 97.2 F (36.2 C) (Oral) 
 17 98.6 F (37 C) (Temporal) 
 
 BP Readings from Last 3 Encounters: 
 18 112/60 
 18 116/62 
 17 127/79 
 
 Pulse Readings from Last 3 Encounters: 
 18 67 
 18 64 
 17 54 
 
 GENERAL:  Well developed, well nourished, in no distress.  Appears older than stated age.
 HEENT:  Normocephalic, atraumatic.  
 EYES:     PERRL, EOM normal.
 MOUTH: Oral mucosae moist, dentition adequate, no lesions noted
 NECK:    No JVD, lymphadenopathy, thyromegaly, bruits.  Carotid pulses are 2+ bilaterally
 
 LUNGS/CHEST:  Clear bilaterally, with no rales, rhonchi or wheezing noted, respirations unl
abored
 HEART:  Nondisplaced PMI, regular rate and rhythm, S1, S2 normal.  2/6 murmur RUSB, rubs or
 gallops noted.
 ABDOMEN:  Soft, nontender, no organomegaly, masses or bruits.  Bowel sounds are normal in a
ll 4 quadrants.  The abdominal aortic pulsation is not palpable.
 EXTREMITIES:  No edema. Radial pulses 2+ bilaterally.  Femoral pulses are 2+ bilaterally wi
thout bruits.  DP and PT pulses are 2+ bilaterally.  No clubbing.varicosities 
 SKIN:  Warm and dry, capillary refill is normal, no lesions.
 NEUROLOGIC:  Awake, alert and oriented x 3. No focal motor or sensory deficits. 
 PSYCHIATRIC:  Appropriate, affect appears normal
 
 DATA:
 Blood tests:
 Lab Results 
 Component Value Date 
  WBC 9.0 2018 
  RBC 4.79 2018 
  HGB 14.7 2018 
  HCT 43.4 2018 
   2018 
 
 Lab Results 
 Component Value Date 
   2018 
  K 4.0 2018 
   2018 
  CO2 28 2018 
  ANIONGAP 15.0 2018 
  GLUF 99 2018 
  BUN 18 2018 
  CREATININE 0.88 2018 
  BCR 20.5 2018 
  CA 9.7 2018 
  EGFR 90 2018 
 
 Lab Results 
 Component Value Date 
  CHOL 131 2018 
  TRIG 116 2018 
  LDL 68 2018 
  GLUF 99 2018 
  HGBA1C 5.7 2016 
 
 Lab Results 
 Component Value Date 
  TSH 4.48 2016 
 
 No results found for: METF, NMETFX, TFNMFX, DJYZYJG76CHM, IGCGIU78OVG, TOTEPI
 
 IMAGING/PROCEDURES
 Last Stress Test, 2016: Lexiscan, no ischemia detected, LVEF 63%.
 
 ECHO:
 Last Echo : 2018:  ([facility]): TAS. EF 60-65 percent.  LV normal size and wall th
ickness.  No regional wall motion abnormalities.  Diastolic pattern normal for patient age. 
 RV normal in size and function.  Normal size atrium.  Aortic valve trileaflet, mildly thick
ened, no aortic regurgitation or stenosis.  Mitral valve normal, trace MR, no mitral valve p
rolapse or stenosis.  Tricuspid valve normal.  TAS B not assessed due to absence of TR jet. 
 No pericardial effusion.  IVC <1.5 cm.  CVP estimate 0-5 mmHg.  Aortic root, ascending aort
 
a, and aortic arch are normal
  Echo, 2016 ([facility]): LVEF 65-70%, trace MR, trace TR.  Ascending Aorta 41 mm, A
ortic arch 37 mm.
 
 OTHER: 
 PFT: 3/09/2017: Minimal obstructive airway disease, severe restriction-parenchymal, mild di
ffusion defect.  FVC 2.65  percent) FEV1 2.07 (56 percent), ratio 0.78.  Significant broncho
dilator response.  TLC 4.53 CL (66 percent) DLCO 21.3 (68 percent), data and tracings person
ally reviewed by me, original interpretation Dr. Conklin for  TOCP, effusion not corrected for Hg
b
 
 EKG/EVENT MONITORS
 32-Day Cardiac Event Monitor, 2016: sinus rhythm, , averaging 80 bpm, rare ectop
y, no AB/pauses, symptoms of "chest pain, SOB, dizziness, fluttering" all associated with
 NSR.
 EK2016: Normal sinus rhythm.  Rate 63 bpm,  ms, QRS 94 ms,  ms, perso
yoandy reviewed by me
 EK2018: Normal sinus rhythm.  Rate 63 bpm,  ms, QRS 96 ms,  ms, EKG t
racing personally reviewed by me
 
 Labs:
 2017: Lipids: Cholesterol 110, triglycerides 81, HDL 35.8, LDL 58, VLDL 16, ratio 3.1
, non-HDL cholesterol 74.  CMP: Sodium 141, potassium 4, chloride 103, glucose 104, BUN 20, 
creatinine 0.81, GFR 99.  AST 21, ALT 25, alk phos 58, total bilirubin 0.6, albumin 4.4.  CB
C: WBC 9.2, hemoglobin 15, hematocrit 45.3, platelets 304, ESR 4
 Labs: : Lipids: Cholesterol 131, triglycerides 116, HDL 40.3, LDL 68, VLDL 23, ra
camelia 3.3, non-HDL cholesterol 91.(  Lipitor 40 mg )  CMP: Sodium 142, potassium 4, chloride 1
03, glucose 99, BUN 18, creatinine 0.88, AST 19, ALT 25, alk phos 59, total bilirubin 0.5, G
FR 90, albumin 4.4.  CBC: WBC 9, hemoglobin 14.7, hematocrit 43.4, platelets 286.  Iron pane
l: Iron 82.66, TIBC 2 343, percent sat 24, ferritin 162.8, U TIBC 260, transferrin 245
 
  ASSESSMENT & PLAN:
     He was here today to follow-up on his Echo and  labs which I  ordered when I saw him [facility]. 
   His labs  performed 18 showed normal current panel with ferritin of 162 though  iron
  low-end of normal at 82 and low sat of 24.1%, his lipid panel shows lipids well controlled
 with LDL of 68, triglycerides of 116, and total cholesterol of 131, and he had a normal CMP
 showing normal liver enzymes and normal renal function, and CBC also showed normal hemoglob
in and hematocrit and platelet count.
    His Echo performed at [facility] on 2018 reported a normal EF of 60-65 p
ercent, right ventricle normal in size and function, and the aortic root, ascending aorta, a
nd aortic arch were all normal dimensions, and there was no significant valvular abnormaliti
es.
   I reviewed the results with him in detail and answered his questions and any concerns
  He was pleased with the results, and feels overall he is doing well but troubled by extrem
e hip pain and dyspnea from COPD which affects his mobility.  He is asymptomatic for any isc
hemic heart disease symptoms today, and blood pressure and heart rate are well controlled on
 current medication.
  He will be following up with the [facility] sleep lab in May, and hopefully will get his 
sleep apnea corrected given the large contribution of sleep apnea to cardiovascular disease 
and arrhythmia,
   I agree with him that it is likely his sleep apnea was much worse when he was drinking he
avily, but he would still benefit from further evaluation, initially given his underlying pu
lmonary disease.
   I made no changes to medication today, and for his cardiac medications. he should continu
e amlodipine 5 mg daily, Lipitor 40 mg qhs, clonidine 0.3mg qhs ,hydrochlorothiazide 12.5 mg
 daily, and  metoprolol tartrate 25 mg bid and to do his best to use as little NSAIDs as pos
sible, which is difficult given his severe joint pain.
  He reports he is looking into getting knee-high light compression socks to help with his v
aricosities.
 
  I  will see him back in one year, but sooner if needed, and have ordered him an echo to be
 done prior to seeing me back, and I will order a repeat lipid panel if not already done whe
n I see him back
   
 
 1. Essential hypertension with goal blood pressure less than 130/80  
 2. Hyperlipidemia, unspecified hyperlipidemia type  
 3. Thoracoabdominal aortic aneurysm, without rupture (HCC)  
 4. H/O syncope  
 5. Murmur, cardiac  
 6. Obstructive sleep apnea syndrome  
 7. Chronic obstructive pulmonary disease, unspecified COPD type (HCC)  
 8. Former heavy tobacco smoker  
 9. History of anemia  
 
 Orders Placed This Encounter 
 Procedures 
   Echo cardiac adult complete 
 
  
 
 The following portions of the patient's history were personally reviewed by me  and updated
 as appropriate:
 EKG tracings, other  specialty provider and PCP notes,any Hospital admission and discharge 
summaries, any ER records , current and previous cardiac testing and procedure reports and d
altaf, medication bottles brought to visit today personally reviewed by me. 
 Allergies, current medications.labs
 Family history, past medical history, past social history, past surgical history.
 Problem list.
 
 DI[facility] 
 3/12/2018in this encounter
 
 Plan of Treatment
 
 
+--------+---------+-------------+----------------------+-------------+
| Date   | Type    | Specialty   | Care Team            | Description |
+--------+---------+-------------+----------------------+-------------+
| / | Office  | Pulmonology |   Jhonatan Louie MD  |             |
| 2018   | Visit   |             |  [address]    |             |
|        |         |             | [place] [address]   |             |
|        |         |             | [phone]         |             |
|        |         |             | [phone] (Fax)   |             |
+--------+---------+-------------+----------------------+-------------+
 
 
 
+-----------------------------+--------+----------------------+----------------------+
| Name                        | Priori | Associated Diagnoses | Order Schedule       |
|                             | ty     |                      |                      |
+-----------------------------+--------+----------------------+----------------------+
| Echo cardiac adult complete | Routin |   Essential          | Expected:            |
|                             | e      | hypertension with    | 2018, Expires: |
|                             |        | goal blood pressure  |  2019          |
|                             |        | less than 130/80     |                      |
|                             |        | Hyperlipidemia,      |                      |
|                             |        | unspecified          |                      |
|                             |        | hyperlipidemia type  |                      |
|                             |        |  Thoracoabdominal    |                      |
 
|                             |        | aortic aneurysm,     |                      |
|                             |        | without rupture      |                      |
|                             |        | (HCC)  Murmur,       |                      |
|                             |        | cardiac              |                      |
+-----------------------------+--------+----------------------+----------------------+
 as of this encounter
 
 Visit Diagnoses
 
 
+----------------------------------------------------------------------------+
| Diagnosis                                                                  |
+----------------------------------------------------------------------------+
| Essential hypertension with goal blood pressure less than 130/80 - Primary |
+----------------------------------------------------------------------------+
| Hyperlipidemia, unspecified hyperlipidemia type                            |
+----------------------------------------------------------------------------+
| Thoracoabdominal aortic aneurysm, without rupture (HCC)                    |
+----------------------------------------------------------------------------+
|   Thoracoabdominal aneurysm without mention of rupture                     |
+----------------------------------------------------------------------------+
| H/O syncope                                                                |
+----------------------------------------------------------------------------+
|   Personal history of other specified diseases                             |
+----------------------------------------------------------------------------+
| Murmur, cardiac                                                            |
+----------------------------------------------------------------------------+
|   Undiagnosed cardiac murmurs                                              |
+----------------------------------------------------------------------------+
| Obstructive sleep apnea syndrome                                           |
+----------------------------------------------------------------------------+
|   Obstructive sleep apnea (adult) (pediatric)                              |
+----------------------------------------------------------------------------+
| Chronic obstructive pulmonary disease, unspecified COPD type (HCC)         |
+----------------------------------------------------------------------------+
| Former heavy tobacco smoker                                                |
+----------------------------------------------------------------------------+
| History of anemia                                                          |
+----------------------------------------------------------------------------+
|   Personal history of diseases of blood and blood-forming organs           |
+----------------------------------------------------------------------------+

## 2018-04-03 NOTE — NUR
*****PREADMIT PT CARE NOTE*******
THIS IS A 57 YEAR OLD MALE THAT IS SCHEDULED TO HAVE A RIGHT TOTAL HIP
ARTHROPLASTY ON 4/10/18 BY DR RUFINO ZHU.  PT STATES THAT HE LIVES ALONE IN
A TRAVEL TRAILER WITH 3 STEPS INTO IT.  STATES HE HAS A VERY SMALL TUB/SHOWER
COMBINATION BUT IS GOING TO BE USING THE SHOWER PROVIDED BY THE  PARK, IT IS
A WALK IN SHOWER.  STATES HE DOES NEED A FRONT WHEELED WALKER AND WOULD LIKE
IT FROM IN HOME MED.  STATES HE IS SUPPOSED TO WEAR A CPAP BUT JUST CAN'T
SLEEP WITH IT ON, MAKES HIM WORSE TO WEAR IT.  SO HE HASN'T WORN IT IN A VERY
LONG TIME AND IS SCHEDULED FOR ANOTHER SLEEP STUDY IN MAY.  PT SEES [facility] FOR HIS PCP AND GOES TO [facility] FOR HIS MEDS.  COPY SENT TO DR VERAS OFFICE.

## 2018-04-10 NOTE — NUR
PATIENT SITTING UP IN BED VISITING WITH A VISITOR. FRESH ICE WATER. PATIENT
STATES PAIN LEVEL IS A 3 OUT OF 10. PATIENT STATES HE STILL DOESN'T HAVE A
NEED TO GO TO THE BATHROOM YET. CALL LIGHT WITHIN REACH. NO OTHER NEEDS AT
THIS TIME.

## 2018-04-10 NOTE — NUR
ANTIBIOTIC AND TORADOL GIVEN AT THIS TIME. PT RESTING IN BED AND DENIES PAIN.
PT IN GOOD SPIRITS AND DENIES FURTHER NEEDS. DENIES FURTHER NEEDS. CALL LIGHT
WITHIN REACH.

## 2018-04-10 NOTE — NUR
PATIENT SITTING UP IN BED AND EATING. RN IN ROOM. PATIENTS FRIEND IN ROOM
VISITING. CALL LIGHT WITHIN REACH. NO OTHER NEEDS AT THIS TIME.

## 2018-04-10 NOTE — NUR
PT ARRIVED TO ROOM 108 FROM PACU, BEDSIDE REPORT FROM PASCUAL RN PACU. PT
REPORTS NO NAUSEA OR PAIN AT THIS TIME PT ON ROOM, HE HAS INTERMITTEN
DE-SATURATIONS DOWN TO 85%, PT PLACED ON 2L FOR NOW WILL MONITOR.

## 2018-04-10 NOTE — NUR
THIS CNA ASSISTED PATIENT FROM THE CHAIR TO THE BED. 1 PERSON ASSIST WITH FWW.
PATIENT IS NOW SITTING UP IN BED RESTING AND WATCHING TV. FRESH ICE WATER. ICE
BAGS. PATIENT STATES PAIN IS 8 OUT OF 10 AFTER TRANSFER. RN NOTIFIED. CALL
LIGHT WITHIN REACH. NO OTHER NEEDS AT THIS TIME.

## 2018-04-10 NOTE — NUR
PT HAS BEEN UP WITH ONE PERSON ASSIST FWW. HE HAS REPORTED 2/10 PAIN WITH
ACTIVITY AND THAT IS TOLERABLE PER PT REPORT. HE HAS MOORE PLACED. HAS WORKED
WITH PHYSICAL THERAPY THIS AFTERNOON, AMBULATED IN HALLS AND WORKED WITH
RICHA IN PHYSICAL THERAPY ROOM. HE HAS HAD NO NAUSEA TOLERATING REGULAR
CARDIAC DIET WELL. DRESSING IS CDI. PT ON ROOM AIR AT THIS TIME OXYGEN
SATURATION 98%. PT WAS GIVEN NUBAIN FOR ITCHING ONCE THAT WAS EFFECTIVE.

## 2018-04-10 NOTE — NUR
[facility]    [address]    [facility] [address]    Phone:  [phone]       Thank You for choosing us for your health care visit. We are glad to serve you and happy to provide you with this summary of your visit. Please help us to ensure we have accurate records. If you find anything that needs to be changed, please let our staff know as soon as possible.          Your Demographic Information     Patient Name Sex     Chichi Faust Female 1964       Ethnic Group Patient Race    Not of  or  Origin Black/      Your Visit Details     Date & Time Provider Department    3/30/2017 4:30 PM Dante Vagn MD [facility]      Your Upcoming Appointment*(Max 10)     2017  9:00 AM CDT   MAMM SCREENING with [place] [facility] MAMMO 3 SCRN   [facility] ([facility])    [address]  [place] [id number] [phone]              Your To Do List     Future Orders Please Complete On or Around Expires    ELECTROCARDIOGRAM 12-LEAD  Mar 30, 2017 Mar 30, 2018    FERRITIN  Mar 30, 2017 2017    FOLATE LEVEL  Mar 30, 2017 2017    IRON AND TIBC  Mar 30, 2017 2017    THYROID STIMULATING HORMONE  Mar 30, 2017 2017    VITAMIN B12 LEVEL  Mar 30, 2017 2017    Follow-Up    Return in about 3 months (around 2017).      We Ordered or Performed the Following     ELECTROCARDIOGRAM 12-LEAD     OPEN ACCESS COLONOSCOPY       Conditions Discussed Today or Order-Related Diagnoses        Comments    PE (physical exam), routine    -  Primary     Anemia, unspecified type         Weight gain         Constipation, unspecified constipation type         Screening for colon cancer           Your Vitals Were     BP Pulse Temp Height Weight BMI    124/72 ([facility], Patient Position: Sitting, Cuff Size: Regular) 76 97.8 °F (36.6  PT UP TO BATHROOM WITH PHYSICAL THERAPY AND THIS RN. PT UNABLE TO VOID. PT
AMBULATING IN HALLS WITH PHYSICAL THERAPY AND THEN WILL TRY TO URINATE AGAIN,
IF UNABLE TO, WILL BLADDER SCAN AND IF NEEDED PLACE MOORE °C) (Oral) 5' 2\" (1.575 m) 145 lb (65.8 kg) 26.52 kg/m2    Smoking Status                   Never Smoker           Medications Prescribed or Re-Ordered Today     None      Your Current Medications Are        Disp Refills Start End    Multiple Vitamins-Calcium (ONE-A-DAY WOMENS PO)        Sig - Route: Take by mouth daily. - Oral    Class: Historical Med    ibuprofen (MOTRIN) 200 MG tablet        Sig - Route: Take 800 mg by mouth every 6 hours as needed. - Oral    Class: Historical Med      Allergies     No Known Allergies      Immunizations History as of 3/30/2017     Name Date    Influenza 10/26/2016, 11/18/2012, 11/17/2011, 11/2/2007, 12/28/2006, 12/10/2005    Td:Adult type tetanus/diphtheria 1/1/1998      Problem List as of 3/30/2017     Toenail fungus    Cataract    Constipation    Weight gain    Anemia      Results of Testing Done Today     ELECTROCARDIOGRAM 12-LEAD                   Patient Instructions     None

## 2018-04-10 NOTE — NUR
04/10/18 1204 Ibeth Rodriguez
1155 PATIENT ARRIVES TO PACU AWAKE, BUT DROWSY. RESP EVEN AND
UNLABORED, MASK AT 6 LITERS. ENCOURAGED TO DEEP BREATHE AND COUGH.
 
1200 PATIENT MORE AWAKE, ASKING QUESTIONS APPROPRIATELY. CONTINUES TO
WEAR MASK AT 6 LITERS. CONTINUE TO ENCOURAGE TO DEEP BREATHE AND
COUGH.

## 2018-04-10 NOTE — NUR
PT IS AWAKE IN BED, EVENING MEDICATIONS WERE GIVEN. PT REPORTS PAIN IS
TOLERABLE AT THIS TIME. DRESSING IS CDI AND ICE PACKS ARE IN PLACE. SCD'S AND
HEEL PROTECTORS ARE IN PLACE. PT IS ON RA AND DENIES NAUSEA. PT IS READY FOR
BED AND CALL LIGHT IS IN REACH.

## 2018-04-10 NOTE — NUR
IN ROOM FOR REPORT. PT IS AWAKE IN BED AND HAS PAIN, DAY SHIFT RN WILL GET PT
IV PAIN MEDS, PT DENIES FURTHER NEEDS AT THIS TIME. CALL LIGHT IS WITHIN
REACH.

## 2018-04-10 NOTE — NUR
IN ROOM GIVING MEDICATIONS, PT STATES PAIN IS A 4/10 AT THIS TIME AND IS
COMFORTABLE. PT DENIES FURTHER NEEDS AT THIS TIME.

## 2018-04-11 NOTE — NUR
PT SITTING UP IN RECLINER REPORTS PAIN AT REST 5-6/10. 4MG PO DILAUDID
ADMINISTERED AT THIS TIME, ICE TO SURGICAL SITE. PT ON ROOM AIR, GOOD
APPETITE, RIGHT HIP DRESSING CDI

## 2018-04-11 NOTE — NUR
PT SITTING IN CHAIR-ALERT, ORIENTED AND VISITING WITH A FRIEND. PT SEEMED TO
BE IN GOOD SPIRITS, SAID HE PLANS TO LEAP TALL BLDGS SOON! EXTENDED A BLESSING
WILL FOLLOW AS NEEDED

## 2018-04-11 NOTE — NUR
PATIENT GIVEN MEDICATIONS PER ORDER. ASSESSMENT COMPLETED. PATIENT RESTING IN
BED WATCHING TV. PATIENT DENIES ANY NEEDS. CALL LIGHT WITHIN REACH. PAIN
MEDICATION GIVEN SCHEDULED PAIN MEDICATION PER ORDER. PATIENT RATES PAIN 4/10
IN RIGHT HIP. ICE PACKS ON RIGHT HIP. HEEL PROTECTOR, WEDGE, AND SCDS IN
PLACE.

## 2018-04-11 NOTE — NUR
THIS CNA AND ERMA GARCIA ASSISTED PATIENT TO THE RESTROOM. 2 PERSON WITH FWW.
PATIENT VOIDED 500ML. PATIENT IS NOW SITTING UP IN BED WATCHING TV. FRESH ICE
WATER. FRESH ICE PACKS. CALL LIGHT WITHIN REACH. HIP WEDGE IN PLACE. NO OTHER
NEEDS AT THIS TIME.

## 2018-04-11 NOTE — NUR
PATIENT GOT BACK TO THE ROOM AFTER WALKING THE HALLS WITH PT AND A WALKER.
PATIENT VOIDED APPROXIMATELY 150 MLS IN THE BATHROOM. VERY CONCENTRATED URINE.
PATIENT BACK TO RECLINER AND GIVEN 5MG PO DILAUDID FOR 8/10 RT HIP PAIN.

## 2018-04-11 NOTE — NUR
WOKE PT TO GIVE MEDS, HE REPORTS PAIN IS TOLERABLE AT THIS TIME. REPOSITIONED
PT IN BED. CALL LIGHT IS WITHIN REACH.

## 2018-04-11 NOTE — NUR
ENTERED ROOM TO REASSES PATIENTS PAIN. IT WAS NOTED ON PULSE OX THAT PATIENTS
OXYGEN SATURATION WAS BELOW NORMAL LIMITS. PATIENT PLACED ON 1L VIA NC. CALL
LIGHT WITHIN REACH.

## 2018-04-11 NOTE — NUR
PATIENT SITTING UP IN CHAIR. PATIENT STATES THAT HE HAS NOT BEEN ABLE TO VOID
SINCE MOORE REMOVAL THIS MORNING. PATIENT SEEMED VERY DROWSY WHILE TAKING
VITALS. THE PATIENT SEEMED LIKE HE HAD A HARD TIME KEEPING HIS EYES OPEN. RN
NOTIFIED ABOUT NOT VOIDING. CALL LIGHT WITHIN REACH. NO OTHER NEEDS AT THIS
TIME.

## 2018-04-11 NOTE — NUR
TALKED TO PT AGAIN ABOUT A WALKER AND HE STATES HE IS GOING TO USE 2 CANES IN
HIS RV TRAILER AND WILL BE USING THE WALKER EVERY WHERE ELSE.  FAXED CHART
NOTES TO [facility]. THESE INCLUDED FACESHEET, ORDER, H AND P, PROG NOTES, PT
EVAL AND NOTES.  RECIEVED FAX CONFIRMATION.  ALSO CALLED AND SPOKE WITH RADHA
FROM [facility].

## 2018-04-11 NOTE — NUR
PATIENT JUST VOIDED 500MLS IN THE COMMODE.AMBULATED FROM RECLINER TO BATHROOM
TO BED WITH 1PSBA AND WALKER. PAIN IN THE RT HIP IS 4/10 AND GIVEN 8MG PO
DILUADID.

## 2018-04-11 NOTE — NUR
REPORT RECIEVED FROM DAY SHIFT RN. PATIENT RESTING IN BED WATCHING TV. PATIENT
DECLINES ANY NEEDS AT THIS TIME. CALL LIGHT WITHIN REACH.

## 2018-04-11 NOTE — NUR
PATIENT % OF LUNCH. PATIENT CALLED AND HAD SPILT COFFEE ALL OVER HIS
GOWN. PATIENT CLEANED UP AND NEW GOWN IN PLACE.

## 2018-04-11 NOTE — NUR
PATIENT SITTING UP IN CHAIR AND APPEARS TO BE SLEEPING. PATIENT WOKE UP WHEN
THIS CNA CAME IN. PATIENT STATES THAT HE HASN'T SLEPT WELL AT ALL LAST NIGHT
SO THAT IS WHY HE HAS BEEN SLEEPY TODAY. FRESH ICE PACK. CALL LIGHT WITHIN
REACH. NO OTHER NEEDS AT THIS TIME.

## 2018-04-11 NOTE — NUR
PATIENT 2100 MEDICATIONS GIVEN PER ORDER. PATIENT CONTINUES TO RATE PAIN 4/10.
PATIENT GIVEN PRN PAIN MEDICATION PER REQUEST. NO FURTHER NEEDS NOTED. CALL
LIGHT WITHIN REACH.

## 2018-04-11 NOTE — NUR
PATIENT WAS HAVING WAS UNABLE TO VOID THIS AM AFTER MOORE DC'D. I TOOK PATIENT
OVER FROM ERMA GARNER ABOUT 3PM. SHORTLY AFTER PATIENT VOIDED APPROXIMATELY
150MLS. AROUND 1730 PATIENT VOIDED 500MLS IN THE COMMODE. PATIENT'S PAIN IS A
7-8/10 WHEN AMBULATING AND 3-4/10 WHEN SITTING OR LAYING DOWN. PATIENT GIVEN 8
MG OF DILUADID PO AT ABOUT 1745. PATIENT'S PAIN IS AT 3/10 CURRENTLY WHICH IS
HIS PAIN GOAL. IV SL. PATIENT EATING AND TAKING FLUIDS WELL.

## 2018-04-11 NOTE — NUR
PATIENT OXYGEN SATURATION IS NOW WNL. PATIENT TITRATED TO .5L VIA NC. PATIENT
APPEARS RESTING, EYE MASK IN PLACE. PATIENT RR 16. CALL LIGHT WITHIN REACH. Nonverbal

## 2018-04-11 NOTE — NUR
PT DID WELL THROUGH THE NIGHT WITH SCHEDULED TORADOL AND TYLENOL. HE HAS PRN
DILAUDID IF NEEDED, WHICH HAS HAS NOT ASKED FOR THROUGH THE NIGHT. HE DOES
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 INCISION SITE IS CDI
WITH SOME BRUISING NOTED, HE HAS ALTERNATED ICE PACKS AND IS WEARING SCD'S,
HEEL PROTECTORS, ABDUCTOR PILLOW, AND PULSE OX. URINE OUTPUT IS QS, PT HAS NOT
HAD A BM YET BUT IS PASSING GAS.

## 2018-04-11 NOTE — NUR
PATIENT SITTING UP IN CHAIR RELAXING. PATIENTS EYES ARE CLOSED AND APPEARS TO
BE SLEEPING. CALL LIGHT WITHIN REACH. NO OTHER NEEDS AT THIS TIME.

## 2018-04-11 NOTE — NUR
PT BLADDER SCANNED AT THIS TIME FOR 368ML AT THIS TIME. PT GIVEN A CUP OF
COFFEE AND ENCOURAGED TO DRINK MORE WATER.

## 2018-04-12 NOTE — NUR
DR ZHU WAS IN ROOM TO SEE AND EVALUATE PATIENT. PLAN TO DISCHARGE PATIENT
HOME TODAY. PATIENT RESTING IN THE CHAIR. PATIENT WAS MEDICATED PRIOR TO
PHYSICAL THERAPY. REPORTS MINIMAL PAIN. CALL LIGHT IN REACH.

## 2018-04-12 NOTE — NUR
PATIENT ASSISTED TO RESTROOM BY SBA WITH FWW. PATIENT RELAXING IN RECLINER
WATCHING TV. PRN PAIN MEDICATION GIVEN PER PATIENT REQUEST. PATIENT STATED
PAIN 4/10. ICE PACK IN PLACE. PATIENT DENIES ANY OTHER NEEDS AT THIS TIME.
CALL LIGHT WITHIN REACH. PATIENT GIVEN PRUNE JUICE AND COFFEE PER REQUEST.

## 2018-04-12 NOTE — NUR
PATIENT RESTING IN THE CHAIR. DENIES NAUSEA AND SOB AT THIS TIME. SHIFT
ASSESSMENT DONE EARLIER AND CHARTED. WAS UP WALKING WITH PT. CALL LIGHT IN
REACH. FAMILY IN ROOM AT THIS TIME.

## 2018-04-12 NOTE — NUR
PATIENT APPEARS RESTING WITH EYE MASK IN PLACE. PATIENTS OXYGEN SATURATION 94
ON .5L VIA NC WHILE RESTING. CALL LIGHT WITHIN REACH.

## 2018-04-12 NOTE — NUR
PATIENT ASSISTED TO RESTROOM WITH STANDBY ASSIST AND FWW. PATIENT REQUESTED
PRN PAIN MEDICATION WITH A PAIN LEVEL OF 8/10 AFTER AMBULATION. PAIN
MEDICATION GIVEN PER PATIENT REQUEST. PATIENT 0200 MEDICATIONS GIVEN PER
ORDER. PATIENT BACK IN BED WITH WEDGE, HEEL PROTECTORS, AND SCDS IN PLACE.
ICE PACKS PLACED ON RIGHT HIP. PATIENT DENIES ANY OTHER NEEDS AT THIS TIME.
CALL LIGHT WITHIN REACH.

## 2018-04-12 NOTE — NUR
PT WALKING IN PACKER WITH WIFE, AND WALKED WITH HIM TO HIS RM. THIS WEEK HAS
TURNED HIS WORLD UPSIDE DOWN, AND HE IS STILL REELING FROM THE EFFECTS. NEW
ONSET DIABETES HAS BEEN DIAGNOSED, AND PT IS ON INFO OVERLOAD. WE HAD A VERY
POSITIVE TIME OF DEBRIEFING, AND WORKED TO ENCOURAGE HIM. HE SEEMS VERY
THANKFUL THAT HE NOW KNOWS WHAT IS THE PROBLEM, AND EXPRESSED HIS COMMITMENT
TO GET HEALTHY. PT REQUESTED PRAYER, WILL FOLLOW AS NEEDED

## 2018-04-12 NOTE — NUR
PT SITTING IN CHAIR-KNIFE IN ONE HAND, AND FORK IN THE OTHER WITH A BIG SMILE
ON HIS FACE. HE WAS FINISHING UP HIS LUNCH, WAITING FOR DC. HE SAID HE
FEELSCONFIDENT ABOUT WHAT NEE NEEDS TO DO ONCE HE IS DC'D. EXTENDED PT A
BLESSING, WILL FOLLOW AS NEEDED

## 2018-04-12 NOTE — NUR
BEDSIDE REPORT RECEIVED FROM ALDEN. PATIENT AWAKE SITTING IN THE CHAIR, A&O.
NO APPARENT DISTRESS NOTED. PATIENT REPORTS MINIMAL PAIN ON THE RIGHT HIP.
CALL LIGHT IN REACH. WILL CONTINUE TO MONITOR.

## 2018-04-12 NOTE — NUR
FAXED CHART NOTES TO [facility].  RECIEVED FAX CONFIRMATION.  CHART INCLUDED
FACESHEET, H AND P, PROG NOTE, PT EVAL AND NOTES SENT.

## 2018-04-12 NOTE — NUR
PATIENT RESTED WELL THROUGHOUT THE NIGHT. PATIENT AMBULATES WITH A SBA AND
FWW. PATIENTS IV IS SL. PATIENT GIVEN PRN PAIN MEDICATION THROUGH NIGHT FOR
RIGHT HIP PAIN WHEN AMBULATING. PATIENTS OXYGEN SATURATIONS DROPPED BELOW 92
DURING THE NIGHT, .5L OF OXYGEN VIA NC WAS ADMINISTERED PER ORDER. PATIENT
DRESSING ON RIGHT HIP IS C/D/I. ICE PACKS WERE PLACED. WEDGE, HEEL PROTECTORS,
AND SCDS IN PLACE.

## 2018-04-12 NOTE — NUR
PATIENT UP WALKING IN THE HALLWAY WITH PHYSICAL THERAPIST. TOLERATED. PATIENT
WAS MEDICATED PRIOR TO SUGERY.

## 2018-04-12 NOTE — OR
[facility]
                                    [address], [place]  [address]
_________________________________________________________________________________________
                                                                 Signed   
 
 
DATE OF OPERATION:
04/10/2018
 
SURGEON:
Trevor Luciano MD
 
PREOPERATIVE DIAGNOSIS:
End-stage osteoarthritis, right hip.
 
POSTOPERATIVE DIAGNOSIS:
End-stage osteoarthritis, right hip.
 
PROCEDURE:
Right total hip arthroplasty.
 
ANESTHESIA:
Spinal with sedation.
 
SPECIMENS AND COMPLICATIONS:
There were no specimens or complications.
 
BLOOD LOSS:
About 250 mL.
 
IMPLANTS:
A 52 mm [place] [employer] cup with 220 mm screws, a neutral 36 mm liner.  There was a size
7 high offset, a [place] stem with a +5 36 head. 
 
WHAT WAS DONE:
The patient was taken to the operating room and placed on the table in supine position.
After anesthesia was induced and the patient placed in the left lateral decubitus
position, he was prepped and draped in a routine sterile fashion.  A straight lateral
approach was made to the hip through the skin and subcutaneous tissue.  Hemostasis was
achieved with electrocautery and self-retaining retractors were placed.  The deep fascia
was incised in line with the skin incision.  The self-retaining retractors were then
deepened.  The anterior 3rd of the gluteus was elevated off the anterior aspect of the
greater trochanter and retracted medially.  An anterior capsulectomy was then performed
and the hip was dislocated.  The femoral neck was osteotomized with an oscillating saw
after aligning the cut with the cutting jig for the [place] stem.  The head and neck
fragments were then removed.  A self-retaining retractor was placed and the remaining
portion of the labrum was excised with electrocautery.  We then excavated the contents
of the pulvinar.  The femoral head measured about 54 mm.  We then began sequential
 
    Electronically Signed By: TREVOR LUCIANO MD  04/12/18 0714
_________________________________________________________________________________________
PATIENT NAME:     JAROD MEDINA                      
MEDICAL RECORD #: [id number]            OPERATIVE REPORT              
          ACCT #: [id number]  
DATE OF BIRTH:   [birth year]            REPORT #: [id number]      
PHYSICIAN:        TREVOR LUCIANO MD      
PCP:              DOT RODRIGUEZ           
REPORT IS CONFIDENTIAL AND NOT TO BE RELEASED WITHOUT AUTHORIZATION
 
 
                                  [facility]
                                    [address], [place]  [address]
_________________________________________________________________________________________
                                                                 Signed   
 
 
reaming with a 47 mm hemispherical reamer and reamed up to a size 51, at which point, we
actually had a good interference fit.  We therefore trialed the 51 trial.  We were happy
with the alignment, position, and fit, and therefore impacted the 52 mm [place] [employer]
cup.  Fixation was then augmented with two additional screws.  A neutral 36 liner was
then placed.  The proximal femur was then rotated up into the wound and prepared with a
box chisel via canal finder with the lateralizer and a series of reamers followed by
broaches.  We had a rather secure fit with a size 7 broach.  We trialed the hip with a
high offset +1.5 head and we were happy with alignment and position.  However, the leg
appeared to be just slightly short on an AP x-ray taken intraoperatively.  We therefore
dislocated the hip, removed the trials, and impacted the real size 7 high offset stem
and then placed a +5 mm head on the Davila taper and relocated the hip.  This gave us
excellent alignment, position, coverage, and instability.  The wound was copiously
irrigated and closed in a standard fashion.  A sterile dressing was applied.  He was
awakened and taken to recovery room where he arrived in stable condition.  Counts were
correct and antibiotic protocols were followed. 
 
 
 
            ________________________________________
            MD BOBBI Bazan/MODL
Job #:  [id number]
DD:  04/10/2018 12:04:22
DT:  04/10/2018 14:01:23
 
 
Copies:                                
~
 
 
 
 
 
 
 
 
 
 
 
 
 
    Electronically Signed By: TREVOR LUCIANO MD  04/12/18 0714
_________________________________________________________________________________________
PATIENT NAME:     JAROD MEDINA AL                      
MEDICAL RECORD #: [id number]            OPERATIVE REPORT              
          ACCT #: [id number]  
DATE OF BIRTH:   [birth year]            REPORT #: [id number]      
PHYSICIAN:        TREVOR LUCIANO MD      
PCP:              DOT RODRIGUEZ           
REPORT IS CONFIDENTIAL AND NOT TO BE RELEASED WITHOUT AUTHORIZATION

## 2018-04-14 NOTE — XMS
Encounter Summary
  Created on: 2018
 
 Kayce Arboleda
 External Reference #: [id number]
 : 64
 Sex: Male
 
 Demographics
 
 
+-----------------------+-----------------------+
| Address               | [address] |
|                       | [place]  [address]  |
+-----------------------+-----------------------+
| Home Phone            | [phone]       |
+-----------------------+-----------------------+
| Preferred Language    | Unknown               |
+-----------------------+-----------------------+
| Marital Status        | Single                |
+-----------------------+-----------------------+
| Faith Affiliation | [id number]                  |
+-----------------------+-----------------------+
| Race                  | Unknown               |
+-----------------------+-----------------------+
| Ethnic Group          | Unknown               |
+-----------------------+-----------------------+
 
 
 Author
 
 
+--------------+-----------------------+
| Author       | [facility] [employer] |
+--------------+-----------------------+
| Organization | [employer] |
+--------------+-----------------------+
| Address      | Unknown               |
+--------------+-----------------------+
| Phone        | Unavailable           |
+--------------+-----------------------+
 
 
 
 Support
 
 
+-----------------+--------------+---------------------+-----------------+
| Name            | Relationship | Address             | Phone           |
+-----------------+--------------+---------------------+-----------------+
| Tejal Champagne | ECON         | [address]              | [phone] |
|                 |              | [address], [place]   |                 |
|                 |              | [address]               |                 |
+-----------------+--------------+---------------------+-----------------+
 
 
 
 Care Team Providers
 
 
 
+-----------------------+------+-----------------+
| Care Team Member Name | Role | Phone           |
+-----------------------+------+-----------------+
| Facundo Rodriguez FNP  | PCP  | [phone] |
+-----------------------+------+-----------------+
 
 
 
 Reason for Referral
 Echo (Routine)
 
+----------+--------+-----------+--------------+--------------+--------------+
| Status   | Reason | Specialty | Diagnoses /  | Referred By  | Referred To  |
|          |        |           | Procedures   | Contact      | Contact      |
+----------+--------+-----------+--------------+--------------+--------------+
| Pending  |        |           |   Diagnoses  |   Trace, |              |
| Review   |        |           |  Essential   |  Janette Johnson  |              |
|          |        |           | hypertension | ARNP  [address]   |              |
|          |        |           |  with goal   | [address]  |              |
|          |        |           | blood        | Louie F        |              |
|          |        |           | pressure     | [place] |              |
|          |        |           | less than    |  [address]       |              |
|          |        |           | 130/80       | Phone:       |              |
|          |        |           | Hyperlipidem | [phone] |              |
|          |        |           | ia,          |   Fax:       |              |
|          |        |           | unspecified  | [phone] |              |
|          |        |           | hyperlipidem |              |              |
|          |        |           | ia type      |              |              |
|          |        |           | Thoracoabdom |              |              |
|          |        |           | inal aortic  |              |              |
|          |        |           | aneurysm,    |              |              |
|          |        |           | without      |              |              |
|          |        |           | rupture      |              |              |
|          |        |           | (HCC)        |              |              |
|          |        |           | Murmur,      |              |              |
|          |        |           | cardiac      |              |              |
|          |        |           | Procedures   |              |              |
|          |        |           | Echo cardiac |              |              |
|          |        |           |  adult       |              |              |
|          |        |           | complete     |              |              |
+----------+--------+-----------+--------------+--------------+--------------+
 
 
 
 
 Reason for Visit
 
 
+-----------+-----------------------------+
| Reason    | Comments                    |
+-----------+-----------------------------+
| Follow-up | 2 month - echo STA 18 |
+-----------+-----------------------------+
 
 
 
 Encounter Details
 
 
 
+--------+---------+----------------------+----------------------+----------------------+
| Date   | Type    | Department           | Care Team            | Description          |
+--------+---------+----------------------+----------------------+----------------------+
| / | Office  |   [facility]          |   Janette Woodward    | Essential            |
| 2018   | Visit   | [facility] | DIETER Johnson  [address]     | hypertension with    |
|        |         |   [address]    | [address]    | goal blood pressure  |
|        |         | [address]        | [place] [address]   | less than 130/80     |
|        |         | [place] [address]  | [phone]         | (Primary Dx);        |
|        |         |  [phone]        | [phone] (Fax)   | Hyperlipidemia,      |
|        |         |                      |                      | unspecified          |
|        |         |                      |                      | hyperlipidemia type; |
|        |         |                      |                      |  Thoracoabdominal    |
|        |         |                      |                      | aortic aneurysm,     |
|        |         |                      |                      | without rupture      |
|        |         |                      |                      | (HCC); H/O syncope;  |
|        |         |                      |                      | Murmur, cardiac;     |
|        |         |                      |                      | Obstructive sleep    |
|        |         |                      |                      | apnea syndrome;      |
|        |         |                      |                      | Chronic obstructive  |
|        |         |                      |                      | pulmonary disease,   |
|        |         |                      |                      | unspecified COPD     |
|        |         |                      |                      | type (HCC); Former   |
|        |         |                      |                      | heavy tobacco        |
|        |         |                      |                      | smoker; History of   |
|        |         |                      |                      | anemia               |
+--------+---------+----------------------+----------------------+----------------------+
 
 
 
 Social History
 
 
+---------------+-------+-----------+--------+------------------+
| Tobacco Use   | Types | Packs/Day | Years  | Date             |
|               |       |           | Used   |                  |
+---------------+-------+-----------+--------+------------------+
| Former Smoker |       | 3         | 37     | Quit: 10/05/2015 |
+---------------+-------+-----------+--------+------------------+
 
 
 
+---------------------+---+---+----------+
| Smokeless Tobacco:  |   |   | Quit:    |
| Former User         |   |   | 10/05/20 |
|                     |   |   | 15       |
+---------------------+---+---+----------+
 
 
 
+-------------+-----------+---------+---------------------------+
| Alcohol Use | Drinks/We | oz/Week | Comments                  |
|             | ek        |         |                           |
+-------------+-----------+---------+---------------------------+
| No          |   0       | 0.0     | heavy drinker in past hx. |
|             | Standard  |         |                           |
|             | drinks or |         |                           |
|             |           |         |                           |
|             | equivalen |         |                           |
|             | t         |         |                           |
 
+-------------+-----------+---------+---------------------------+
 
 
 
+------------------+---------------+
| Sex Assigned at  | Date Recorded |
| Birth            |               |
+------------------+---------------+
| Not on file      |               |
+------------------+---------------+
 as of this encounter
 
 Last Filed Vital Signs
 
 
+-------------------+----------------------+-------------------------+
| Vital Sign        | Reading              | Time Taken              |
+-------------------+----------------------+-------------------------+
| Blood Pressure    | 112/60               | 2018 10:53 AM PDT |
+-------------------+----------------------+-------------------------+
| Pulse             | 67                   | 2018 10:53 AM PDT |
+-------------------+----------------------+-------------------------+
| Temperature       | -                    | -                       |
+-------------------+----------------------+-------------------------+
| Respiratory Rate  | 20                   | 2018 10:53 AM PDT |
+-------------------+----------------------+-------------------------+
| Oxygen Saturation | 98%                  | 2018 10:53 AM PDT |
+-------------------+----------------------+-------------------------+
| Inhaled Oxygen    | -                    | -                       |
| Concentration     |                      |                         |
+-------------------+----------------------+-------------------------+
| Weight            | 106.6 kg (235 lb 1.6 | 2018 10:53 AM PDT |
|                   |  oz)                 |                         |
+-------------------+----------------------+-------------------------+
| Height            | 177.8 cm (5' 10")    | 2018 10:53 AM PDT |
+-------------------+----------------------+-------------------------+
| Body Mass Index   | 33.73                | 2018 10:53 AM PDT |
+-------------------+----------------------+-------------------------+
 in this encounter
 
 Instructions
 Patient Instructions - Pernelljhony YesyDIETER Johnson - 2018 11:00 AM PDTYour labs looked
 good and not anemic , and cholesterol is well controlled
 Your Echo also looked good 
 
 I have ordered you an Echo to be done at [facility] in one year, see me back in one year
 in this encounter
 
 Progress Notes
 Pernelljhony Janette JohnsonDIETER - 2018 11:00 AM PDTFormatting of this note may be differen
t from the original.
 Date of visit: 3/12/2018
 Primary Care Physician: FACUNDO RODRIGUEZ
 
 CHIEF COMPLAINT:  
 Chief Complaint 
 Patient presents with 
   Follow-up 
   2 month - echo STA 18 
 
 
 HISTORY OF PRESENT ILLNESS:
   Mr. Kayce Arboleda, Bud, is a 57-year-old man who is here today for follow-up on his labs 
and his Echo.
    He has a previous history of syncope though workup was benign including a 32 day cardiac
 event monitor and  no further episodes of syncope, hypertension, hyperlipidemia, and COPD w
paolo is followed by Dr. Louie.
  I saw him last on 2018 when I had ordered him an echo, referred him for assess
ment of his uncorrected sleep apnea and ordered a CMP, lipid panel, CBC, iron panel with a f
erritin level .
   His labs  performed 18 showed normal current panel with ferritin of 162 though  iron
  low-end of normal at 82 and low sat of 24.1%, his lipid panel shows lipids well controlled
 with LDL of 68, triglycerides of 116, and total cholesterol of 131, and he had a normal CMP
 showing normal liver enzymes and normal renal function, and CBC also showed normal hemoglob
in and hematocrit and platelet count.
    His Echo performed at [facility] on 2018 reported a normal EF of 60-65 p
ercent, right ventricle normal in size and function, and the aortic root, ascending aorta, a
nd aortic arch were all normal dimensions, and there was no significant valvular abnormaliti
es.
  He was admitted to the emergency room 2018 for increasing lower back pain radiati
ng to his right buttocks and legs.  I reviewed ER  documentation today   Blood pressure mild
ly elevated with pain but improved when they treated pain ,and he was treated with Decadron 
and given a prescription for prednisone and oxycodone and sent home.
  His COPD continues to be well controlled with no recent exacerbation, but still suffers fr
om dyspnea with exertion, and activities of daily living have been difficult with increased 
hip pain.
   Today, he  denies any chest pain, palpitations, dyspnea, dizziness,or syncope. He also de
nies any signs or symptoms of stroke or TIA.
   He does have an appointment to be seen by Dr. Landry at the [facility] sleep lab in May
 to get evaluation and treatment of his sleep apnea    
 REVIEW OF SYSTEMS:
 Negative except for pertinent items noted in HPI.
 
 Constitutional: mild  fatigue or unexplained weight loss.  Appetite is good.  Weight is sta
ble.    Denies night sweats fevers or chills
 HENT: Denies nosebleeds.   Positive for hearing loss .  Denies dysphagia
 Eyes: History of eye surgery orbit blowout ? Denies visual disturbance or double vision.  
 Respiratory/Sleep:: Positive for COPD (Dr. Louie), sleep apnea ,not wearing CPAP.  Positive
 for dyspnea with more extreme exertion, reports occasional cough  Denies hemoptysis or exce
ssive sputum production. Denies  orthopnea, PND. 
 Cardiovascular: Denies  chest pain, palpitations and leg swelling. Denies history of rheuma
tic fever. Denies claudication . 
 Gastrointestinal:history of GERD , on PPI  .  Denies  nausea, vomiting, abdominal pain and 
blood in stool. 
 Genitourinary: Denies  hematuria.  
 Musculoskeletal: Positive for joint pain and arthralgia severe right hip pain, back , shoul
ric, neck  , Denies myalgias, 
 Skin: Denies  color change.  Denies rash or lesions
 Neurological:  Positive for Numbness to legs, and arms .   Denies history of stroke/Transie
nt ischemic attack.Denies history of seizures. Denies dizziness, syncope  
 Hematological/Oncology Does not bruise/bleed easily.  Denies history of cancer
 Endocrine: Denies diabetes or thyroid disease.  Denies excessive thirst or hunger.  
 Psychiatric/Behavioral: The patient denies any history of depression or anxiety or other ps
ychiatric illness.
 Vaccines: Current on 2017 flu vaccine.  Current on pneumonia vaccine.
 Habits/Social :  history of smoking, quit in 2015.  Smoked 3 packs a day  35 years
.  Previously used smokeless tobacco quit in 2015..  Denies EtOH use for 2 years , p
revious heavy drinker. Drinks servings of  12 cups coffee/tea daily, uses 1/2 chocolate , ha
lf dec aff  .  Denies recreational or illicit drug use, previously used methamphetamine  20
 years, cocaine, marijuana, crack, mushrooms.  Exercises with tasiks in  [employer], walking,  a
nd tolerates.  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 
 
 
 Outpatient Medications Prior to Visit 
 Medication Sig Dispense Refill 
   albuterol (PROVENTIL HFA;VENTOLIN HFA) 108 (90 BASE) MCG/ACT inhaler Inhale 2 puffs int
o the lungs every 4 (four) hours as needed for Wheezing.   
   amitriptyline (ELAVIL) 100 MG tablet Take 100 mg by mouth nightly.   
   amLODIPine (NORVASC) 5 MG tablet Take 5 mg by mouth daily.   
   ascorbic acid (VITAMIN C) 250 MG tablet Take 250 mg by mouth daily.   
   atorvastatin (LIPITOR) 40 MG tablet Take 1 tablet by mouth nightly. 30 tablet 11 
   cloNIDine (CATAPRES) 0.3 MG tablet Take 0.3 mg by mouth nightly.   
   diclofenac (CATAFLAM) 50 MG tablet Take 50 mg by mouth 2 (two) times daily.   
   ferrous sulfate, 65 FE, 324 (65 FE) MG EC tablet Take 65 mg of iron by mouth daily with
 breakfast. With 250 mg Vit C   
   fluticasone-salmeterol (ADVAIR) 500-50 MCG/DOSE diskus inhaler Inhale 1 puff into the l
ungs 2 (two) times daily.   
   gabapentin (NEURONTIN) 600 MG tablet Take 300 mg by mouth 3 (three) times daily.   
   hydrochlorothiazide (HYDRODIURIL) 12.5 MG tablet Take 12.5 mg by mouth daily.   
   ibuprofen (MOTRIN) 600 MG tablet Take 600 mg by mouth every 6 (six) hours as needed for
 Pain. Trying to use sparingly   
   ipratropium-albuterol (DUO-NEB) 0.5-2.5 mg/3mL Take 3 mLs by nebulization as needed.   
   metoprolol (LOPRESSOR) 25 MG tablet Take 1 tablet by mouth 2 (two) times daily. 60 tabl
et 11 
   montelukast (SINGULAIR) 10 MG tablet Take 10 mg by mouth nightly.   
   Multiple Vitamins-Minerals (RA CENTRAL-BIBI PO) Take  by mouth daily.   
   nitroGLYCERIN (NITROSTAT) 0.4 MG SL tablet Place 1 tablet under the tongue every 5 (fiv
e) minutes as needed for Chest pain. 90 tablet 0 
   pantoprazole (PROTONIX) 40 MG tablet Take 40 mg by mouth daily.   
   tamsulosin (FLOMAX) 0.4 MG capsule Take 0.4 mg by mouth  After dinner.   
   thiamine (VITAMIN B-1) 100 MG tablet Take 1 tablet by mouth daily. 30 tablet 0 
   umeclidinium bromide (INCRUSE ELLIPTA) 62.5 MCG/INH inhaler Inhale 1 puff into the lung
s daily.   
 
 No facility-administered medications prior to visit.  
 
 PHYSICAL EXAM:
 Wt Readings from Last 3 Encounters: 
 18 106.6 kg (235 lb 1.6 oz) 
 18 108.1 kg (238 lb 6.4 oz) 
 17 109.8 kg (242 lb) 
 
 Temp Readings from Last 3 Encounters: 
 17 97.6 F (36.4 C) (Oral) 
 17 97.2 F (36.2 C) (Oral) 
 17 98.6 F (37 C) (Temporal) 
 
 BP Readings from Last 3 Encounters: 
 18 112/60 
 18 116/62 
 17 127/79 
 
 Pulse Readings from Last 3 Encounters: 
 18 67 
 18 64 
 17 54 
 
 GENERAL:  Well developed, well nourished, in no distress.  Appears older than stated age.
 HEENT:  Normocephalic, atraumatic.  
 EYES:     PERRL, EOM normal.
 MOUTH: Oral mucosae moist, dentition adequate, no lesions noted
 NECK:    No JVD, lymphadenopathy, thyromegaly, bruits.  Carotid pulses are 2+ bilaterally
 
 LUNGS/CHEST:  Clear bilaterally, with no rales, rhonchi or wheezing noted, respirations unl
abored
 HEART:  Nondisplaced PMI, regular rate and rhythm, S1, S2 normal.  2/6 murmur RUSB, rubs or
 gallops noted.
 ABDOMEN:  Soft, nontender, no organomegaly, masses or bruits.  Bowel sounds are normal in a
ll 4 quadrants.  The abdominal aortic pulsation is not palpable.
 EXTREMITIES:  No edema. Radial pulses 2+ bilaterally.  Femoral pulses are 2+ bilaterally wi
thout bruits.  DP and PT pulses are 2+ bilaterally.  No clubbing.varicosities 
 SKIN:  Warm and dry, capillary refill is normal, no lesions.
 NEUROLOGIC:  Awake, alert and oriented x 3. No focal motor or sensory deficits. 
 PSYCHIATRIC:  Appropriate, affect appears normal
 
 DATA:
 Blood tests:
 Lab Results 
 Component Value Date 
  WBC 9.0 2018 
  RBC 4.79 2018 
  HGB 14.7 2018 
  HCT 43.4 2018 
   2018 
 
 Lab Results 
 Component Value Date 
   2018 
  K 4.0 2018 
   2018 
  CO2 28 2018 
  ANIONGAP 15.0 2018 
  GLUF 99 2018 
  BUN 18 2018 
  CREATININE 0.88 2018 
  BCR 20.5 2018 
  CA 9.7 2018 
  EGFR 90 2018 
 
 Lab Results 
 Component Value Date 
  CHOL 131 2018 
  TRIG 116 2018 
  LDL 68 2018 
  GLUF 99 2018 
  HGBA1C 5.7 2016 
 
 Lab Results 
 Component Value Date 
  TSH 4.48 2016 
 
 No results found for: METF, NMETFX, TFNMFX, KXDGTGQ90NKI, FETOCX88ZIF, TOTEPI
 
 IMAGING/PROCEDURES
 Last Stress Test, 2016: Lexiscan, no ischemia detected, LVEF 63%.
 
 ECHO:
 Last Echo : 2018:  ([facility]): TAS. EF 60-65 percent.  LV normal size and wall th
ickness.  No regional wall motion abnormalities.  Diastolic pattern normal for patient age. 
 RV normal in size and function.  Normal size atrium.  Aortic valve trileaflet, mildly thick
ened, no aortic regurgitation or stenosis.  Mitral valve normal, trace MR, no mitral valve p
rolapse or stenosis.  Tricuspid valve normal.  TAS B not assessed due to absence of TR jet. 
 No pericardial effusion.  IVC <1.5 cm.  CVP estimate 0-5 mmHg.  Aortic root, ascending aort
 
a, and aortic arch are normal
  Echo, 2016 ([facility]): LVEF 65-70%, trace MR, trace TR.  Ascending Aorta 41 mm, A
ortic arch 37 mm.
 
 OTHER: 
 PFT: 3/09/2017: Minimal obstructive airway disease, severe restriction-parenchymal, mild di
ffusion defect.  FVC 2.65  percent) FEV1 2.07 (56 percent), ratio 0.78.  Significant broncho
dilator response.  TLC 4.53 CL (66 percent) DLCO 21.3 (68 percent), data and tracings person
ally reviewed by me, original interpretation Dr. Conklin for  TOCP, effusion not corrected for Hg
b
 
 EKG/EVENT MONITORS
 32-Day Cardiac Event Monitor, 2016: sinus rhythm, , averaging 80 bpm, rare ectop
y, no AB/pauses, symptoms of "chest pain, SOB, dizziness, fluttering" all associated with
 NSR.
 EK2016: Normal sinus rhythm.  Rate 63 bpm,  ms, QRS 94 ms,  ms, perso
yoandy reviewed by me
 EK2018: Normal sinus rhythm.  Rate 63 bpm,  ms, QRS 96 ms,  ms, EKG t
racing personally reviewed by me
 
 Labs:
 2017: Lipids: Cholesterol 110, triglycerides 81, HDL 35.8, LDL 58, VLDL 16, ratio 3.1
, non-HDL cholesterol 74.  CMP: Sodium 141, potassium 4, chloride 103, glucose 104, BUN 20, 
creatinine 0.81, GFR 99.  AST 21, ALT 25, alk phos 58, total bilirubin 0.6, albumin 4.4.  CB
C: WBC 9.2, hemoglobin 15, hematocrit 45.3, platelets 304, ESR 4
 Labs: : Lipids: Cholesterol 131, triglycerides 116, HDL 40.3, LDL 68, VLDL 23, ra
camelia 3.3, non-HDL cholesterol 91.(  Lipitor 40 mg )  CMP: Sodium 142, potassium 4, chloride 1
03, glucose 99, BUN 18, creatinine 0.88, AST 19, ALT 25, alk phos 59, total bilirubin 0.5, G
FR 90, albumin 4.4.  CBC: WBC 9, hemoglobin 14.7, hematocrit 43.4, platelets 286.  Iron pane
l: Iron 82.66, TIBC 2 343, percent sat 24, ferritin 162.8, U TIBC 260, transferrin 245
 
  ASSESSMENT & PLAN:
     He was here today to follow-up on his Echo and  labs which I  ordered when I saw him [facility]. 
   His labs  performed 18 showed normal current panel with ferritin of 162 though  iron
  low-end of normal at 82 and low sat of 24.1%, his lipid panel shows lipids well controlled
 with LDL of 68, triglycerides of 116, and total cholesterol of 131, and he had a normal CMP
 showing normal liver enzymes and normal renal function, and CBC also showed normal hemoglob
in and hematocrit and platelet count.
    His Echo performed at [facility] on 2018 reported a normal EF of 60-65 p
ercent, right ventricle normal in size and function, and the aortic root, ascending aorta, a
nd aortic arch were all normal dimensions, and there was no significant valvular abnormaliti
es.
   I reviewed the results with him in detail and answered his questions and any concerns
  He was pleased with the results, and feels overall he is doing well but troubled by extrem
e hip pain and dyspnea from COPD which affects his mobility.  He is asymptomatic for any isc
hemic heart disease symptoms today, and blood pressure and heart rate are well controlled on
 current medication.
  He will be following up with the [place] sleep lab in May, and hopefully will get his 
sleep apnea corrected given the large contribution of sleep apnea to cardiovascular disease 
and arrhythmia,
   I agree with him that it is likely his sleep apnea was much worse when he was drinking he
avily, but he would still benefit from further evaluation, initially given his underlying pu
lmonary disease.
   I made no changes to medication today, and for his cardiac medications. he should continu
e amlodipine 5 mg daily, Lipitor 40 mg qhs, clonidine 0.3mg qhs ,hydrochlorothiazide 12.5 mg
 daily, and  metoprolol tartrate 25 mg bid and to do his best to use as little NSAIDs as pos
sible, which is difficult given his severe joint pain.
  He reports he is looking into getting knee-high light compression socks to help with his v
aricosities.
 
  I  will see him back in one year, but sooner if needed, and have ordered him an echo to be
 done prior to seeing me back, and I will order a repeat lipid panel if not already done whe
n I see him back
   
 
 1. Essential hypertension with goal blood pressure less than 130/80  
 2. Hyperlipidemia, unspecified hyperlipidemia type  
 3. Thoracoabdominal aortic aneurysm, without rupture (HCC)  
 4. H/O syncope  
 5. Murmur, cardiac  
 6. Obstructive sleep apnea syndrome  
 7. Chronic obstructive pulmonary disease, unspecified COPD type (HCC)  
 8. Former heavy tobacco smoker  
 9. History of anemia  
 
 Orders Placed This Encounter 
 Procedures 
   Echo cardiac adult complete 
 
  
 
 The following portions of the patient's history were personally reviewed by me  and updated
 as appropriate:
 EKG tracings, other  specialty provider and PCP notes,any Hospital admission and discharge 
summaries, any ER records , current and previous cardiac testing and procedure reports and d
altaf, medication bottles brought to visit today personally reviewed by me. 
 Allergies, current medications.labs
 Family history, past medical history, past social history, past surgical history.
 Problem list.
 
 DIETER [facility]  [facility] 
 3/12/2018in this encounter
 
 Plan of Treatment
 
 
+--------+---------+-------------+----------------------+-------------+
| Date   | Type    | Specialty   | Care Team            | Description |
+--------+---------+-------------+----------------------+-------------+
| / | Office  | Pulmonology |   Jhonatan Louie MD  |             |
| 2018   | Visit   |             |  [address]    |             |
|        |         |             | [place] [address]   |             |
|        |         |             | [phone]         |             |
|        |         |             | [phone] (Fax)   |             |
+--------+---------+-------------+----------------------+-------------+
 
 
 
+-----------------------------+--------+----------------------+----------------------+
| Name                        | Priori | Associated Diagnoses | Order Schedule       |
|                             | ty     |                      |                      |
+-----------------------------+--------+----------------------+----------------------+
| Echo cardiac adult complete | Routin |   Essential          | Expected:            |
|                             | e      | hypertension with    | 2018, Expires: |
|                             |        | goal blood pressure  |  2019          |
|                             |        | less than 130/80     |                      |
|                             |        | Hyperlipidemia,      |                      |
|                             |        | unspecified          |                      |
|                             |        | hyperlipidemia type  |                      |
|                             |        |  Thoracoabdominal    |                      |
 
|                             |        | aortic aneurysm,     |                      |
|                             |        | without rupture      |                      |
|                             |        | (HCC)  Murmur,       |                      |
|                             |        | cardiac              |                      |
+-----------------------------+--------+----------------------+----------------------+
 as of this encounter
 
 Visit Diagnoses
 
 
+----------------------------------------------------------------------------+
| Diagnosis                                                                  |
+----------------------------------------------------------------------------+
| Essential hypertension with goal blood pressure less than 130/80 - Primary |
+----------------------------------------------------------------------------+
| Hyperlipidemia, unspecified hyperlipidemia type                            |
+----------------------------------------------------------------------------+
| Thoracoabdominal aortic aneurysm, without rupture (HCC)                    |
+----------------------------------------------------------------------------+
|   Thoracoabdominal aneurysm without mention of rupture                     |
+----------------------------------------------------------------------------+
| H/O syncope                                                                |
+----------------------------------------------------------------------------+
|   Personal history of other specified diseases                             |
+----------------------------------------------------------------------------+
| Murmur, cardiac                                                            |
+----------------------------------------------------------------------------+
|   Undiagnosed cardiac murmurs                                              |
+----------------------------------------------------------------------------+
| Obstructive sleep apnea syndrome                                           |
+----------------------------------------------------------------------------+
|   Obstructive sleep apnea (adult) (pediatric)                              |
+----------------------------------------------------------------------------+
| Chronic obstructive pulmonary disease, unspecified COPD type (HCC)         |
+----------------------------------------------------------------------------+
| Former heavy tobacco smoker                                                |
+----------------------------------------------------------------------------+
| History of anemia                                                          |
+----------------------------------------------------------------------------+
|   Personal history of diseases of blood and blood-forming organs           |
+----------------------------------------------------------------------------+

## 2018-04-14 NOTE — XMS
Encounter Summary
  Created on: 2018
 
 Kayce Arboleda
 External Reference #: [id number]
 : 64
 Sex: Male
 
 Demographics
 
 
+-----------------------+-----------------------+
| Address               | [address] |
|                       | [place]  [address]  |
+-----------------------+-----------------------+
| Home Phone            | [phone]       |
+-----------------------+-----------------------+
| Preferred Language    | Unknown               |
+-----------------------+-----------------------+
| Marital Status        | Single                |
+-----------------------+-----------------------+
| Yarsanism Affiliation | [id number]                  |
+-----------------------+-----------------------+
| Race                  | Unknown               |
+-----------------------+-----------------------+
| Ethnic Group          | Unknown               |
+-----------------------+-----------------------+
 
 
 Author
 
 
+--------------+-----------------------+
| Author       | [facility] |
+--------------+-----------------------+
| Organization | [facility] |
+--------------+-----------------------+
| Address      | Unknown               |
+--------------+-----------------------+
| Phone        | Unavailable           |
+--------------+-----------------------+
 
 
 
 Support
 
 
+-----------------+--------------+---------------------+-----------------+
| Name            | Relationship | Address             | Phone           |
+-----------------+--------------+---------------------+-----------------+
| Tejal Champagne | ECON         | [address]              | [phone] |
|                 |              | [address], [place]   |                 |
|                 |              | [address]               |                 |
+-----------------+--------------+---------------------+-----------------+
 
 
 
 Care Team Providers
 
 
 
+-----------------------+------+-----------------+
| Care Team Member Name | Role | Phone           |
+-----------------------+------+-----------------+
| Facundo LeesP  | PCP  | [phone] |
+-----------------------+------+-----------------+
 
 
 
 Encounter Details
 
 
+--------+------------+---------------------+----------------------+----------------------+
| Date   | Type       | Department          | Care Team            | Description          |
+--------+------------+---------------------+----------------------+----------------------+
| / | Ancillary  |   [facility]  |   PernelllyndonJanette tijerina    | H/O syncope;         |
| 2018   | Procedure  | ECHO                | [address]     | Essential            |
|        |            |                     | [address]    | hypertension with    |
|        |            |                     | [place] [address]   | goal blood pressure  |
|        |            |                     | [phone]         | less than 130/80;    |
|        |            |                     | [phone] (Fax)   | Hyperlipidemia,      |
|        |            |                     |                      | unspecified          |
|        |            |                     |                      | hyperlipidemia type; |
|        |            |                     |                      |  Chronic obstructive |
|        |            |                     |                      |  pulmonary disease,  |
|        |            |                     |                      | unspecified COPD     |
|        |            |                     |                      | type (HCC);          |
|        |            |                     |                      | Thoracoabdominal     |
|        |            |                     |                      | aortic aneurysm,     |
|        |            |                     |                      | without rupture      |
|        |            |                     |                      | (HCC); Obstructive   |
|        |            |                     |                      | sleep apnea syndrome |
+--------+------------+---------------------+----------------------+----------------------+
 
 
 
 Social History
 
 
+---------------+-------+-----------+--------+------------------+
| Tobacco Use   | Types | Packs/Day | Years  | Date             |
|               |       |           | Used   |                  |
+---------------+-------+-----------+--------+------------------+
| Former Smoker |       | 3         | 35     | Quit: 10/05/2015 |
+---------------+-------+-----------+--------+------------------+
 
 
 
+---------------------+---+---+----------+
| Smokeless Tobacco:  |   |   | Quit:    |
| Former User         |   |   | 10/05/20 |
|                     |   |   | 15       |
+---------------------+---+---+----------+
 
 
 
+-------------+-----------+---------+---------------------------+
| Alcohol Use | Drinks/We | oz/Week | Comments                  |
|             | ek        |         |                           |
 
+-------------+-----------+---------+---------------------------+
| No          |   0       | 0.0     | heavy drinker in past hx. |
|             | Standard  |         |                           |
|             | drinks or |         |                           |
|             |           |         |                           |
|             | equivalen |         |                           |
|             | t         |         |                           |
+-------------+-----------+---------+---------------------------+
 
 
 
+------------------+---------------+
| Sex Assigned at  | Date Recorded |
| Birth            |               |
+------------------+---------------+
| Not on file      |               |
+------------------+---------------+
 as of this encounter
 
 Plan of Treatment
 
 
+--------+---------+-------------+----------------------+-------------+
| Date   | Type    | Specialty   | Care Team            | Description |
+--------+---------+-------------+----------------------+-------------+
| / | Office  | Pulmonology |   Jhonatan Louie MD  |             |
| 2018   | Visit   |             |  [address]    |             |
|        |         |             | [place] [address]   |             |
|        |         |             | [phone]         |             |
|        |         |             | [phone] (Fax)   |             |
+--------+---------+-------------+----------------------+-------------+
 as of this encounter
 
 Procedures
 
 
+-----------------+--------+-------------+----------------------+----------------------+
| Procedure Name  | Priori | Date/Time   | Associated Diagnosis | Comments             |
|                 | ty     |             |                      |                      |
+-----------------+--------+-------------+----------------------+----------------------+
| ECHO OUTSIDE    | Routin | 2018  |   H/O syncope        |   Results for this   |
| INTERPRETATION  | e      |  3:43 PM    | Essential            | procedure are in the |
| STANDARD        |        | PST         | hypertension with    |  results section.    |
|                 |        |             | goal blood pressure  |                      |
|                 |        |             | less than 130/80     |                      |
|                 |        |             | Hyperlipidemia,      |                      |
|                 |        |             | unspecified          |                      |
|                 |        |             | hyperlipidemia type  |                      |
|                 |        |             |  Chronic obstructive |                      |
|                 |        |             |  pulmonary disease,  |                      |
|                 |        |             | unspecified COPD     |                      |
|                 |        |             | type (HCC)           |                      |
|                 |        |             | Thoracoabdominal     |                      |
|                 |        |             | aortic aneurysm,     |                      |
|                 |        |             | without rupture      |                      |
|                 |        |             | (HCC)  Obstructive   |                      |
|                 |        |             | sleep apnea syndrome |                      |
+-----------------+--------+-------------+----------------------+----------------------+
 in this encounter
 
 
 Results
 ECHO outside interpretation standard (2018  3:43 PM)
 
+----------+--------------------------------------------------------+
| Specimen | Performing Laboratory                                  |
+----------+--------------------------------------------------------+
|          |   [address] [address] |
+----------+--------------------------------------------------------+
 
 
 
+------------------------------------------------------------------------------------------+
| Impressions                                                                              |
+------------------------------------------------------------------------------------------+
|   1. Overall left ventricular systolic function is normal with, an EF between 60 - 65 %. |
|   2. The right ventricle is normal in size and function.  3. The aortic root, ascending  |
| aorta and aortic arch are normal. 4. No significant valvular abnormalities are noted.    |
+------------------------------------------------------------------------------------------+
 
 
 
+------------------------------------------------------------------------------------------+
| Narrative                                                                                |
+------------------------------------------------------------------------------------------+
|      Patient Name:    Kayce Arboleda  YOB: 1960                       |
| Accession:    [id number]     Performing Physician:    LOR DELGADO MD                      |
| _______________________________________________________________  INDICATIONS             |
| -----------  F/U thoracic/abdominal aneurysm     CONCLUSIONS  -----------  1. Overall    |
| left ventricular systolic function is normal with, an EF between 60 - 65 %.  2. The      |
| right ventricle is normal in size and function.  3. The aortic root, ascending aorta and |
|  aortic arch are normal. 4. No significant valvular abnormalities are noted.             |
| FINDINGS  --------  ECG rhythm: Sinus rhythm.   ECG rhythm: Resting bradycardia          |
| (HR<60bpm).  Study: A 2-dimensional transthoracic echocardiogram with m-mode, spectral   |
| and color flow Doppler was perfomed.   Study: This was a technically adequate study.     |
| Left Ventricle: Overall left ventricular systolic function is normal with, an EF between |
|  60 - 65 %.   Left Ventricle: The left ventricle cavity size is normal.   Left           |
| Ventricle: Left ventricular wall thickness is normal.   Left Ventricle: No regional wall |
|  motion abnormalities.   Left Ventricle: The diastolic filling pattern is normal for the |
|  age of the patient.  Right Ventricle: The right ventricle is normal in size and         |
| function.  Left Atrium: The left atrium is normal in size.  Right Atrium: The right      |
| atrium is normal in size.   Aortic Valve: Aortic valve is trileaflet and is mildly       |
| thickened.   Aortic Valve: There is no evidence of aortic regurgitation.   Aortic Valve: |
|  There is no evidence of aortic stenosis.  Mitral Valve: The mitral valve is normal.     |
| Mitral Valve: There is trace mitral regurgitation.   Mitral Valve: No evidence of MVP or |
|  mitral stenosis.  Tricuspid Valve: The tricuspid valve appears structurally normal.     |
| Tricuspid Valve: Pulmonary artery systolic pressure could not be assessed due to the     |
| absence of adequate TR jet.  Pulmonic Valve: The pulmonic valve was not well visualized. |
|   Pericardium: There is no pericardial effusion.  IVC/Hepatic Veins: The IVC is small    |
| (<1.5cm) and collapses with sniff, consistent with central venous pressures of 0-5mmHg.  |
|  Aorta: The aortic root, ascending aorta and aortic arch are normal.  Mass: No mass      |
| visualized  Thrombus: No clot visualized   Thrombus: No vegetation visualized.  Septum:  |
| No ASD observed.   Septum: No VSD observed.     MEASUREMENTS  -------------  Ao asc:     |
|  3.65 cm  Ao sinus:     3.46 cm  Ao st junct:     2.91 cm  IVC:     1.27 cm              |
| EDV(Teich):     105.93 ml  IVSd:     1.06 cm  LVIDd:     4.76 cm  LVPWd:     0.90 cm     |
| LVOT Diam:     2.31 cm  %FS:     22.34 %  EF(Teich):     44.99 %  ESV(Teich):     58.27  |
| ml  LVIDs:     3.70 cm  SV(Teich):     47.66 ml  RVIDd:     3.07 cm  Ao root:     32.72  |
| mm  LVEF MOD A2C:     56.41 %  SV MOD A2C:     46.46 ml  LVEF MOD A4C:     55.05 %  SV   |
| MOD A4C:     55.55 ml  EF Biplane:     55.87 %  LVEDV MOD BP:     92.29 ml  LVESV MOD    |
| BP:     40.72 ml  LVEDV MOD A2C:     82.36 ml  LVLd A2C:     9.15 cm  LVEDV MOD A4C:     |
|  100.89 ml  LVLd A4C:     9.46 cm  LVESV MOD A2C:     35.90 ml  LVLs A2C:     6.85 cm    |
 
| LVESV MOD A4C:     45.34 ml  LVLs A4C:     7.06 cm  LAESV(A-L):     54.75 ml  LAESV      |
| Index (A-L):     24.66 ml/m2  LAAs A2C:     15.85 cm2  LAESV A-L A2C:     42.05 ml  LALs |
|  A2C:     5.07 cm  LAAs A4C:     20.65 cm2  LAESV A-L A4C:     68.39 ml  LALs A4C:       |
| 5.29 cm  RAAs:     16.87 cm2  RAESV A-L:     44.78 ml  RAESV MOD:     45.69 ml           |
| RALs:     5.39 cm  TAPSE:     2.19 cm  AV maxP.05 mmHg  AV meanPG:     3.73 mmHg |
|   AV Vmax:     1.32 m/s  AV Vmean:     0.90 m/s  AV VTI:     29.89 cm  CHIRAG Vmax:         |
| 3.41 cm2  CHIRAG (VTI):     3.01 cm2  AVAI Vmax:     0.00 cm2/m2  AVAI (VTI):     0.00      |
| cm2/m2  LVOT maxP.63 mmHg  LVOT meanP.94 mmHg  LVSI Dopp:     40.60      |
| ml/m2  LVSV Dopp:     90.15 ml  LVOT Vmax:     1.07 m/s  LVOT Vmean:     0.63 m/s  LVOT  |
| VTI:     21.41 cm  MV A Librado:     0.37 m/s  MV DecT:     232.01 ms  MV E Librado:     0.58    |
| m/s  MV E/A Ratio:     1.53   Septal e':     0.08 m/s  Septal E/e':     7.16   Lateral   |
| e':     0.07 m/s  Lateral E/e':     7.35      Sonographer:   Authenticated               |
| by:    LOR DELGADO MD  Report Date/Time: 2018 17:52:14                            |
+------------------------------------------------------------------------------------------+
 
 
 
+-------------------------------------------------------------------------------------------
-------------------------+
| Procedure Note                                                                            
                         |
+-------------------------------------------------------------------------------------------
-------------------------+
|   Tank, Rad Results In - 2018  6:00 PM PST  Patient Name:  Jessica Arboleda of     
                         |
| Birth:  ccession:  [id number] Physician:  LOR DELGADO MD              
                         |
| _______________________________________________________________INDICATIONS-----------F/U  
                         |
|  thoracic/abdominal aneurysmCONCLUSIONS-----------1. Overall left ventricular systolic    
                         |
| function is normal with, an EF between 60 - 65 %.2. The right ventricle is normal in      
                         |
| size and function.3. The aortic root, ascending aorta and aortic arch are normal. 4. No   
                         |
| significant valvular abnormalities are noted.FINDINGS--------ECG rhythm: Sinus rhythm.    
                         |
| ECG rhythm: Resting bradycardia (HR<60bpm).Study: A 2-dimensional transthoracic           
                         |
| echocardiogram with m-mode, spectral and color flow Doppler was perfomed. Study: This     
                         |
| was a technically adequate study.Left Ventricle: Overall left ventricular systolic        
                         |
| function is normal with, an EF between 60 - 65 %. Left Ventricle: The left ventricle      
                         |
| cavity size is normal. Left Ventricle: Left ventricular wall thickness is normal. Left    
                         |
| Ventricle: No regional wall motion abnormalities. Left Ventricle: The diastolic filling   
                         |
| pattern is normal for the age of the patient.Right Ventricle: The right ventricle is      
                         |
| normal in size and function.Left Atrium: The left atrium is normal in size.Right Atrium:  
                         |
|  The right atrium is normal in size. Aortic Valve: Aortic valve is trileaflet and is      
                         |
| mildly thickened. Aortic Valve: There is no evidence of aortic regurgitation. Aortic      
                         |
| Valve: There is no evidence of aortic stenosis.Mitral Valve: The mitral valve is normal.  
                         |
|  Mitral Valve: There is trace mitral regurgitation. Mitral Valve: No evidence of MVP or   
 
                         |
| mitral stenosis.Tricuspid Valve: The tricuspid valve appears structurally normal.         
                         |
| Tricuspid Valve: Pulmonary artery systolic pressure could not be assessed due to the      
                         |
| absence of adequate TR jet.Pulmonic Valve: The pulmonic valve was not well                
                         |
| visualized.Pericardium: There is no pericardial effusion.IVC/Hepatic Veins: The IVC is    
                         |
| small (<1.5cm) and collapses with sniff, consistent with central venous pressures of      
                         |
| 0-5mmHg.Aorta: The aortic root, ascending aorta and aortic arch are normal.Mass: No mass  
                         |
|  visualizedThrombus: No clot visualized Thrombus: No vegetation visualized.Septum: No     
                         |
| ASD observed. Septum: No VSD observed.MEASUREMENTS-------------Ao asc:   3.65 cmAo        
                         |
| sinus:   3.46 cmAo st junct:   2.91 cmIVC:   1.27 cmEDV(Teich):   105.93 mlIVSd:   1.06   
                         |
| cmLVIDd:   4.76 cmLVPWd:   0.90 cmLVOT Diam:   2.31 cm%FS:   22.34 %EF(Teich):   44.99    
                         |
| %ESV(Teich):   58.27 mlLVIDs:   3.70 cmSV(Teich):   47.66 mlRVIDd:   3.07 cmAo root:      
                         |
| 32.72 mmLVEF MOD A2C:   56.41 %SV MOD A2C:   46.46 mlLVEF MOD A4C:   55.05 %SV MOD A4C:   
                         |
|   55.55 mlEF Biplane:   55.87 %LVEDV MOD BP:   92.29 mlLVESV MOD BP:   40.72 mlLVEDV MOD  
                         |
|  A2C:   82.36 mlLVLd A2C:   9.15 cmLVEDV MOD A4C:   100.89 mlLVLd A4C:   9.46 cmLVESV     
                         |
| MOD A2C:   35.90 mlLVLs A2C:   6.85 cmLVESV MOD A4C:   45.34 mlLVLs A4C:   7.06           
                         |
| cmLAESV(A-L):   54.75 mlLAESV Index (A-L):   24.66 ml/m2LAAs A2C:   15.85 [id number] A-L    
                         |
| A2C:   42.05 mlLALs A2C:   5.07 cmLAAs A4C:   20.65 [id number] A-L A4C:   68.39 mlLALs      
                         |
| A4C:   5.29 cmRAAs:   16.87 [id number] A-L:   44.78 mlRAESV MOD:   45.69 mlRALs:   5.39     
                         |
| cmTAPSE:   2.19 cmAV maxP.05 mmHgAV meanPG:   3.73 mmHgAV Vmax:   1.32 m/Hill        
                         |
| Vmean:   0.90 m/Hill VTI:   29.89 cmAVA Vmax:   3.41 cm2AVA (VTI):   3.01 [id number] Vmax:    
                         |
|  0.00 cm2/m2AVAI (VTI):   0.00 cm2/m2LVOT maxP.63 mmHgLVOT meanP.94 mmHgLVSI  
                         |
|  Dopp:   40.60 ml/m2LVSV Dopp:   90.15 mlLVOT Vmax:   1.07 m/sLVOT Vmean:   0.63 m/sLVOT  
                         |
|  VTI:   21.41 cmMV A Librado:   0.37 m/sMV DecT:   232.01 msMV E Librado:   0.58 m/sMV E/A        
                         |
| Ratio:   1.53 Septal e':   0.08 m/sSeptal E/e':   7.16 Lateral e':   0.07 m/sLateral      
                         |
| E/e':   7.35 Sonographer: Authenticated by:  Saurabh HERR Date/Time: 2018  
                         |
|  17:52:14IMPRESSION:1. Overall left ventricular systolic function is normal with, an EF   
                         |
| between 60 - 65 %.2. The right ventricle is normal in size and function.3. The aortic     
                         |
| root, ascending aorta and aortic arch are normal. 4. No significant valvular              
                         |
| abnormalities are noted.                                                                  
                         |
|Septum: No VSD observed.                                                                   
 
                         |
|MEASUREMENTS                                                                               
                        |
|-------------                                                                              
                          |
|Ao asc:   3.65 cm                                                                          
                         |
|Ao sinus:   3.46 cm                                                                        
                         |
|Ao st junct:   2.91 cm                                                                     
                         |
|IVC:   1.27 cm                                                                             
                         |
|EDV(Teich):   105.93 ml                                                                    
                         |
|IVSd:   1.06 cm                                                                            
                         |
|LVIDd:   4.76 cm                                                                           
                         |
|LVPWd:   0.90 cm                                                                           
                         |
|LVOT Diam:   2.31 cm                                                                       
                         |
|%FS:   22.34 %                                                                             
                         |
|EF(Teich):   44.99 %                                                                       
                         |
|ESV(Teich):   58.27 ml                                                                     
                         |
|LVIDs:   3.70 cm                                                                           
                         |
|SV(Teich):   47.66 ml                                                                      
                         |
|RVIDd:   3.07 cm                                                                           
                         |
|Ao root:   32.72 mm                                                                        
                         |
|LVEF MOD A2C:   56.41 %                                                                    
                         |
|SV MOD A2C:   46.46 ml                                                                     
                         |
|LVEF MOD A4C:   55.05 %                                                                    
                         |
|SV MOD A4C:   55.55 ml                                                                     
                         |
|EF Biplane:   55.87 %                                                                      
                         |
|LVEDV MOD BP:   92.29 ml                                                                   
                         |
|LVESV MOD BP:   40.72 ml                                                                   
                         |
|LVEDV MOD A2C:   82.36 ml                                                                  
                         |
|LVLd A2C:   9.15 cm                                                                        
                         |
|LVEDV MOD A4C:   100.89 ml                                                                 
                         |
|LVLd A4C:   9.46 cm                                                                        
 
                         |
|LVESV MOD A2C:   35.90 ml                                                                  
                         |
|LVLs A2C:   6.85 cm                                                                        
                         |
|LVESV MOD A4C:   45.34 ml                                                                  
                         |
|LVLs A4C:   7.06 cm                                                                        
                         |
|LAESV(A-L):   54.75 ml                                                                     
                         |
|LAESV Index (A-L):   24.66 ml/m2                                                           
                         |
|LAAs A2C:   15.85 cm2                                                                      
                         |
|LAESV A-L A2C:   42.05 ml                                                                  
                         |
|LALs A2C:   5.07 cm                                                                        
                         |
|LAAs A4C:   20.65 cm2                                                                      
                         |
|LAESV A-L A4C:   68.39 ml                                                                  
                         |
|LALs A4C:   5.29 cm                                                                        
                         |
|RAAs:   16.87 cm2                                                                          
                         |
|RAESV A-L:   44.78 ml                                                                      
                         |
|RAESV MOD:   45.69 ml                                                                      
                         |
|RALs:   5.39 cm                                                                            
                         |
|TAPSE:   2.19 cm                                                                           
                         |
|AV maxP.05 mmHg                                                                      
                         |
|AV meanPG:   3.73 mmHg                                                                     
                         |
|AV Vmax:   1.32 m/s                                                                        
                         |
|AV Vmean:   0.90 m/s                                                                       
                         |
|AV VTI:   29.89 cm                                                                         
                         |
|CHIRAG Vmax:   3.41 cm2                                                                       
                         |
|CHIRAG (VTI):   3.01 cm2                                                                      
                         |
|AVAI Vmax:   0.00 cm2/m2                                                                   
                         |
|AVAI (VTI):   0.00 cm2/m2                                                                  
                         |
|LVOT maxP.63 mmHg                                                                    
                         |
|LVOT meanP.94 mmHg                                                                   
                         |
|LVSI Dopp:   40.60 ml/m2                                                                   
                         |
|LVSV Dopp:   90.15 ml                                                                      
 
                         |
|LVOT Vmax:   1.07 m/s                                                                      
                         |
|LVOT Vmean:   0.63 m/s                                                                     
                         |
|LVOT VTI:   21.41 cm                                                                       
                         |
|MV A Librado:   0.37 m/s                                                                       
                         |
|MV DecT:   232.01 ms                                                                       
                         |
|MV E Librado:   0.58 m/s                                                                       
                         |
|MV E/A Ratio:   1.53                                                                       
                         |
|Septal e':   0.08 m/s                                                                      
                         |
|Septal E/e':   7.16                                                                        
                         |
|Lateral e':   0.07 m/s                                                                     
                         |
|Lateral E/e':   7.35                                                                       
                         |
|Sonographer:                                                                               
                         |
|Authenticated by:  LOR DELGADO MD                                                        
                         |
|Report Date/Time: 2018 17:52:14                                                       
                         |
|IMPRESSION:                                                                                
                        |
|1. Overall left ventricular systolic function is normal with, an EF between 60 - 65 %.     
                         |
|2. The right ventricle is normal in size and function.                                     
                         |
|3. The aortic root, ascending aorta and aortic arch are normal. 4. No significant valvular 
abnormalities are noted. |
+-------------------------------------------------------------------------------------------
-------------------------+
 in this encounter
 
 Visit Diagnoses
 
 
+--------------------------------------------------------------------+
| Diagnosis                                                          |
+--------------------------------------------------------------------+
| H/O syncope                                                        |
+--------------------------------------------------------------------+
|   Personal history of other specified diseases                     |
+--------------------------------------------------------------------+
| Essential hypertension with goal blood pressure less than 130/80   |
+--------------------------------------------------------------------+
| Hyperlipidemia, unspecified hyperlipidemia type                    |
+--------------------------------------------------------------------+
| Chronic obstructive pulmonary disease, unspecified COPD type (HCC) |
 
+--------------------------------------------------------------------+
| Thoracoabdominal aortic aneurysm, without rupture (HCC)            |
+--------------------------------------------------------------------+
|   Thoracoabdominal aneurysm without mention of rupture             |
+--------------------------------------------------------------------+
| Obstructive sleep apnea syndrome                                   |
+--------------------------------------------------------------------+
|   Obstructive sleep apnea (adult) (pediatric)                      |
+--------------------------------------------------------------------+

## 2018-04-14 NOTE — XMS
Encounter Summary
  Created on: 2018
 
 Kayce Arboleda
 External Reference #: [id number]
 : 64
 Sex: Male
 
 Demographics
 
 
+-----------------------+-----------------------+
| Address               | [address] |
|                       | [place]  [address]  |
+-----------------------+-----------------------+
| Home Phone            | [phone]       |
+-----------------------+-----------------------+
| Preferred Language    | Unknown               |
+-----------------------+-----------------------+
| Marital Status        | Single                |
+-----------------------+-----------------------+
| Bahai Affiliation | [id number]                  |
+-----------------------+-----------------------+
| Race                  | Unknown               |
+-----------------------+-----------------------+
| Ethnic Group          | Unknown               |
+-----------------------+-----------------------+
 
 
 Author
 
 
+--------------+-----------------------+
| Author       | [facility] |
+--------------+-----------------------+
| Organization | [facility] |
+--------------+-----------------------+
| Address      | Unknown               |
+--------------+-----------------------+
| Phone        | Unavailable           |
+--------------+-----------------------+
 
 
 
 Support
 
 
+-----------------+--------------+---------------------+-----------------+
| Name            | Relationship | Address             | Phone           |
+-----------------+--------------+---------------------+-----------------+
| Tejal Champagne | ECON         | [address]              | [phone] |
|                 |              | [address], [place]   |                 |
|                 |              | [address]               |                 |
+-----------------+--------------+---------------------+-----------------+
 
 
 
 Care Team Providers
 
 
 
+-----------------------+------+-----------------+
| Care Team Member Name | Role | Phone           |
+-----------------------+------+-----------------+
| Facundo LeesP  | PCP  | [phone] |
+-----------------------+------+-----------------+
 
 
 
 Encounter Details
 
 
+--------+------------+---------------------+----------------------+----------------------+
| Date   | Type       | Department          | Care Team            | Description          |
+--------+------------+---------------------+----------------------+----------------------+
| / | Ancillary  |   [facility]  |   PernelllyndonJanette tijerina    | H/O syncope;         |
| 2018   | Procedure  | ECHO                | [address]     | Essential            |
|        |            |                     | [address]    | hypertension with    |
|        |            |                     | [place] [address]   | goal blood pressure  |
|        |            |                     | [phone]         | less than 130/80;    |
|        |            |                     | [phone] (Fax)   | Hyperlipidemia,      |
|        |            |                     |                      | unspecified          |
|        |            |                     |                      | hyperlipidemia type; |
|        |            |                     |                      |  Chronic obstructive |
|        |            |                     |                      |  pulmonary disease,  |
|        |            |                     |                      | unspecified COPD     |
|        |            |                     |                      | type (HCC);          |
|        |            |                     |                      | Thoracoabdominal     |
|        |            |                     |                      | aortic aneurysm,     |
|        |            |                     |                      | without rupture      |
|        |            |                     |                      | (HCC); Obstructive   |
|        |            |                     |                      | sleep apnea syndrome |
+--------+------------+---------------------+----------------------+----------------------+
 
 
 
 Social History
 
 
+---------------+-------+-----------+--------+------------------+
| Tobacco Use   | Types | Packs/Day | Years  | Date             |
|               |       |           | Used   |                  |
+---------------+-------+-----------+--------+------------------+
| Former Smoker |       | 3         | 37     | Quit: 10/05/2015 |
+---------------+-------+-----------+--------+------------------+
 
 
 
+---------------------+---+---+----------+
| Smokeless Tobacco:  |   |   | Quit:    |
| Former User         |   |   | 10/05/20 |
|                     |   |   | 15       |
+---------------------+---+---+----------+
 
 
 
+-------------+-----------+---------+---------------------------+
| Alcohol Use | Drinks/We | oz/Week | Comments                  |
|             | ek        |         |                           |
 
+-------------+-----------+---------+---------------------------+
| No          |   0       | 0.0     | heavy drinker in past hx. |
|             | Standard  |         |                           |
|             | drinks or |         |                           |
|             |           |         |                           |
|             | equivalen |         |                           |
|             | t         |         |                           |
+-------------+-----------+---------+---------------------------+
 
 
 
+------------------+---------------+
| Sex Assigned at  | Date Recorded |
| Birth            |               |
+------------------+---------------+
| Not on file      |               |
+------------------+---------------+
 as of this encounter
 
 Plan of Treatment
 
 
+--------+---------+-------------+----------------------+-------------+
| Date   | Type    | Specialty   | Care Team            | Description |
+--------+---------+-------------+----------------------+-------------+
| / | Office  | Pulmonology |   Jhonatan Louie MD  |             |
| 2018   | Visit   |             |  [address]    |             |
|        |         |             | [place] [address]   |             |
|        |         |             | [phone]         |             |
|        |         |             | [phone] (Fax)   |             |
+--------+---------+-------------+----------------------+-------------+
 as of this encounter
 
 Procedures
 
 
+-----------------+--------+-------------+----------------------+----------------------+
| Procedure Name  | Priori | Date/Time   | Associated Diagnosis | Comments             |
|                 | ty     |             |                      |                      |
+-----------------+--------+-------------+----------------------+----------------------+
| ECHO OUTSIDE    | Routin | 2018  |   H/O syncope        |   Results for this   |
| INTERPRETATION  | e      |  3:43 PM    | Essential            | procedure are in the |
| STANDARD        |        | PST         | hypertension with    |  results section.    |
|                 |        |             | goal blood pressure  |                      |
|                 |        |             | less than 130/80     |                      |
|                 |        |             | Hyperlipidemia,      |                      |
|                 |        |             | unspecified          |                      |
|                 |        |             | hyperlipidemia type  |                      |
|                 |        |             |  Chronic obstructive |                      |
|                 |        |             |  pulmonary disease,  |                      |
|                 |        |             | unspecified COPD     |                      |
|                 |        |             | type (HCC)           |                      |
|                 |        |             | Thoracoabdominal     |                      |
|                 |        |             | aortic aneurysm,     |                      |
|                 |        |             | without rupture      |                      |
|                 |        |             | (HCC)  Obstructive   |                      |
|                 |        |             | sleep apnea syndrome |                      |
+-----------------+--------+-------------+----------------------+----------------------+
 in this encounter
 
 
 Results
 ECHO outside interpretation standard (2018  3:43 PM)
 
+----------+--------------------------------------------------------+
| Specimen | Performing Laboratory                                  |
+----------+--------------------------------------------------------+
|          |   [address] [address] |
+----------+--------------------------------------------------------+
 
 
 
+------------------------------------------------------------------------------------------+
| Impressions                                                                              |
+------------------------------------------------------------------------------------------+
|   1. Overall left ventricular systolic function is normal with, an EF between 60 - 65 %. |
|   2. The right ventricle is normal in size and function.  3. The aortic root, ascending  |
| aorta and aortic arch are normal. 4. No significant valvular abnormalities are noted.    |
+------------------------------------------------------------------------------------------+
 
 
 
+------------------------------------------------------------------------------------------+
| Narrative                                                                                |
+------------------------------------------------------------------------------------------+
|      Patient Name:    Kayce Arboleda  YOB: 1960                       |
| Accession:    [id number]     Performing Physician:    LOR DELGADO MD                      |
| _______________________________________________________________  INDICATIONS             |
| -----------  F/U thoracic/abdominal aneurysm     CONCLUSIONS  -----------  1. Overall    |
| left ventricular systolic function is normal with, an EF between 60 - 65 %.  2. The      |
| right ventricle is normal in size and function.  3. The aortic root, ascending aorta and |
|  aortic arch are normal. 4. No significant valvular abnormalities are noted.             |
| FINDINGS  --------  ECG rhythm: Sinus rhythm.   ECG rhythm: Resting bradycardia          |
| (HR<60bpm).  Study: A 2-dimensional transthoracic echocardiogram with m-mode, spectral   |
| and color flow Doppler was perfomed.   Study: This was a technically adequate study.     |
| Left Ventricle: Overall left ventricular systolic function is normal with, an EF between |
|  60 - 65 %.   Left Ventricle: The left ventricle cavity size is normal.   Left           |
| Ventricle: Left ventricular wall thickness is normal.   Left Ventricle: No regional wall |
|  motion abnormalities.   Left Ventricle: The diastolic filling pattern is normal for the |
|  age of the patient.  Right Ventricle: The right ventricle is normal in size and         |
| function.  Left Atrium: The left atrium is normal in size.  Right Atrium: The right      |
| atrium is normal in size.   Aortic Valve: Aortic valve is trileaflet and is mildly       |
| thickened.   Aortic Valve: There is no evidence of aortic regurgitation.   Aortic Valve: |
|  There is no evidence of aortic stenosis.  Mitral Valve: The mitral valve is normal.     |
| Mitral Valve: There is trace mitral regurgitation.   Mitral Valve: No evidence of MVP or |
|  mitral stenosis.  Tricuspid Valve: The tricuspid valve appears structurally normal.     |
| Tricuspid Valve: Pulmonary artery systolic pressure could not be assessed due to the     |
| absence of adequate TR jet.  Pulmonic Valve: The pulmonic valve was not well visualized. |
|   Pericardium: There is no pericardial effusion.  IVC/Hepatic Veins: The IVC is small    |
| (<1.5cm) and collapses with sniff, consistent with central venous pressures of 0-5mmHg.  |
|  Aorta: The aortic root, ascending aorta and aortic arch are normal.  Mass: No mass      |
| visualized  Thrombus: No clot visualized   Thrombus: No vegetation visualized.  Septum:  |
| No ASD observed.   Septum: No VSD observed.     MEASUREMENTS  -------------  Ao asc:     |
|  3.65 cm  Ao sinus:     3.46 cm  Ao st junct:     2.91 cm  IVC:     1.27 cm              |
| EDV(Teich):     105.93 ml  IVSd:     1.06 cm  LVIDd:     4.76 cm  LVPWd:     0.90 cm     |
| LVOT Diam:     2.31 cm  %FS:     22.34 %  EF(Teich):     44.99 %  ESV(Teich):     58.27  |
| ml  LVIDs:     3.70 cm  SV(Teich):     47.66 ml  RVIDd:     3.07 cm  Ao root:     32.72  |
| mm  LVEF MOD A2C:     56.41 %  SV MOD A2C:     46.46 ml  LVEF MOD A4C:     55.05 %  SV   |
| MOD A4C:     55.55 ml  EF Biplane:     55.87 %  LVEDV MOD BP:     92.29 ml  LVESV MOD    |
| BP:     40.72 ml  LVEDV MOD A2C:     82.36 ml  LVLd A2C:     9.15 cm  LVEDV MOD A4C:     |
|  100.89 ml  LVLd A4C:     9.46 cm  LVESV MOD A2C:     35.90 ml  LVLs A2C:     6.85 cm    |
 
| LVESV MOD A4C:     45.34 ml  LVLs A4C:     7.06 cm  LAESV(A-L):     54.75 ml  LAESV      |
| Index (A-L):     24.66 ml/m2  LAAs A2C:     15.85 cm2  LAESV A-L A2C:     42.05 ml  LALs |
|  A2C:     5.07 cm  LAAs A4C:     20.65 cm2  LAESV A-L A4C:     68.39 ml  LALs A4C:       |
| 5.29 cm  RAAs:     16.87 cm2  RAESV A-L:     44.78 ml  RAESV MOD:     45.69 ml           |
| RALs:     5.39 cm  TAPSE:     2.19 cm  AV maxP.05 mmHg  AV meanPG:     3.73 mmHg |
|   AV Vmax:     1.32 m/s  AV Vmean:     0.90 m/s  AV VTI:     29.89 cm  CHIRAG Vmax:         |
| 3.41 cm2  CHIRAG (VTI):     3.01 cm2  AVAI Vmax:     0.00 cm2/m2  AVAI (VTI):     0.00      |
| cm2/m2  LVOT maxP.63 mmHg  LVOT meanP.94 mmHg  LVSI Dopp:     40.60      |
| ml/m2  LVSV Dopp:     90.15 ml  LVOT Vmax:     1.07 m/s  LVOT Vmean:     0.63 m/s  LVOT  |
| VTI:     21.41 cm  MV A Librado:     0.37 m/s  MV DecT:     232.01 ms  MV E Librado:     0.58    |
| m/s  MV E/A Ratio:     1.53   Septal e':     0.08 m/s  Septal E/e':     7.16   Lateral   |
| e':     0.07 m/s  Lateral E/e':     7.35      Sonographer:   Authenticated               |
| by:    LOR DELGADO MD  Report Date/Time: 2018 17:52:14                            |
+------------------------------------------------------------------------------------------+
 
 
 
+-------------------------------------------------------------------------------------------
-------------------------+
| Procedure Note                                                                            
                         |
+-------------------------------------------------------------------------------------------
-------------------------+
|   Tank, Rad Results In - 2018  6:00 PM PST  Patient Name:  Jessica Arboleda of     
                         |
| Birth:  ccession:  [id number] Physician:  LOR DELGADO MD              
                         |
| _______________________________________________________________INDICATIONS-----------F/U  
                         |
|  thoracic/abdominal aneurysmCONCLUSIONS-----------1. Overall left ventricular systolic    
                         |
| function is normal with, an EF between 60 - 65 %.2. The right ventricle is normal in      
                         |
| size and function.3. The aortic root, ascending aorta and aortic arch are normal. 4. No   
                         |
| significant valvular abnormalities are noted.FINDINGS--------ECG rhythm: Sinus rhythm.    
                         |
| ECG rhythm: Resting bradycardia (HR<60bpm).Study: A 2-dimensional transthoracic           
                         |
| echocardiogram with m-mode, spectral and color flow Doppler was perfomed. Study: This     
                         |
| was a technically adequate study.Left Ventricle: Overall left ventricular systolic        
                         |
| function is normal with, an EF between 60 - 65 %. Left Ventricle: The left ventricle      
                         |
| cavity size is normal. Left Ventricle: Left ventricular wall thickness is normal. Left    
                         |
| Ventricle: No regional wall motion abnormalities. Left Ventricle: The diastolic filling   
                         |
| pattern is normal for the age of the patient.Right Ventricle: The right ventricle is      
                         |
| normal in size and function.Left Atrium: The left atrium is normal in size.Right Atrium:  
                         |
|  The right atrium is normal in size. Aortic Valve: Aortic valve is trileaflet and is      
                         |
| mildly thickened. Aortic Valve: There is no evidence of aortic regurgitation. Aortic      
                         |
| Valve: There is no evidence of aortic stenosis.Mitral Valve: The mitral valve is normal.  
                         |
|  Mitral Valve: There is trace mitral regurgitation. Mitral Valve: No evidence of MVP or   
 
                         |
| mitral stenosis.Tricuspid Valve: The tricuspid valve appears structurally normal.         
                         |
| Tricuspid Valve: Pulmonary artery systolic pressure could not be assessed due to the      
                         |
| absence of adequate TR jet.Pulmonic Valve: The pulmonic valve was not well                
                         |
| visualized.Pericardium: There is no pericardial effusion.IVC/Hepatic Veins: The IVC is    
                         |
| small (<1.5cm) and collapses with sniff, consistent with central venous pressures of      
                         |
| 0-5mmHg.Aorta: The aortic root, ascending aorta and aortic arch are normal.Mass: No mass  
                         |
|  visualizedThrombus: No clot visualized Thrombus: No vegetation visualized.Septum: No     
                         |
| ASD observed. Septum: No VSD observed.MEASUREMENTS-------------Ao asc:   3.65 cmAo        
                         |
| sinus:   3.46 cmAo st junct:   2.91 cmIVC:   1.27 cmEDV(Teich):   105.93 mlIVSd:   1.06   
                         |
| cmLVIDd:   4.76 cmLVPWd:   0.90 cmLVOT Diam:   2.31 cm%FS:   22.34 %EF(Teich):   44.99    
                         |
| %ESV(Teich):   58.27 mlLVIDs:   3.70 cmSV(Teich):   47.66 mlRVIDd:   3.07 cmAo root:      
                         |
| 32.72 mmLVEF MOD A2C:   56.41 %SV MOD A2C:   46.46 mlLVEF MOD A4C:   55.05 %SV MOD A4C:   
                         |
|   55.55 mlEF Biplane:   55.87 %LVEDV MOD BP:   92.29 mlLVESV MOD BP:   40.72 mlLVEDV MOD  
                         |
|  A2C:   82.36 mlLVLd A2C:   9.15 cmLVEDV MOD A4C:   100.89 mlLVLd A4C:   9.46 cmLVESV     
                         |
| MOD A2C:   35.90 mlLVLs A2C:   6.85 cmLVESV MOD A4C:   45.34 mlLVLs A4C:   7.06           
                         |
| cmLAESV(A-L):   54.75 mlLAESV Index (A-L):   24.66 ml/m2LAAs A2C:   15.85 [id number] A-L    
                         |
| A2C:   42.05 mlLALs A2C:   5.07 cmLAAs A4C:   20.65 [id number] A-L A4C:   68.39 mlLALs      
                         |
| A4C:   5.29 cmRAAs:   16.87 [id number] A-L:   44.78 mlRAESV MOD:   45.69 mlRALs:   5.39     
                         |
| cmTAPSE:   2.19 cmAV maxP.05 mmHgAV meanPG:   3.73 mmHgAV Vmax:   1.32 m/Hill        
                         |
| Vmean:   0.90 m/Hill VTI:   29.89 cmAVA Vmax:   3.41 cm2AVA (VTI):   3.01 [id number] Vmax:    
                         |
|  0.00 cm2/m2AVAI (VTI):   0.00 cm2/m2LVOT maxP.63 mmHgLVOT meanP.94 mmHgLVSI  
                         |
|  Dopp:   40.60 ml/m2LVSV Dopp:   90.15 mlLVOT Vmax:   1.07 m/sLVOT Vmean:   0.63 m/sLVOT  
                         |
|  VTI:   21.41 cmMV A Librado:   0.37 m/sMV DecT:   232.01 msMV E Librado:   0.58 m/sMV E/A        
                         |
| Ratio:   1.53 Septal e':   0.08 m/sSeptal E/e':   7.16 Lateral e':   0.07 m/sLateral      
                         |
| E/e':   7.35 Sonographer: Authenticated by:  Saurabh HERR Date/Time: 2018  
                         |
|  17:52:14IMPRESSION:1. Overall left ventricular systolic function is normal with, an EF   
                         |
| between 60 - 65 %.2. The right ventricle is normal in size and function.3. The aortic     
                         |
| root, ascending aorta and aortic arch are normal. 4. No significant valvular              
                         |
| abnormalities are noted.                                                                  
                         |
|Septum: No VSD observed.                                                                   
 
                         |
|MEASUREMENTS                                                                               
                        |
|-------------                                                                              
                          |
|Ao asc:   3.65 cm                                                                          
                         |
|Ao sinus:   3.46 cm                                                                        
                         |
|Ao st junct:   2.91 cm                                                                     
                         |
|IVC:   1.27 cm                                                                             
                         |
|EDV(Teich):   105.93 ml                                                                    
                         |
|IVSd:   1.06 cm                                                                            
                         |
|LVIDd:   4.76 cm                                                                           
                         |
|LVPWd:   0.90 cm                                                                           
                         |
|LVOT Diam:   2.31 cm                                                                       
                         |
|%FS:   22.34 %                                                                             
                         |
|EF(Teich):   44.99 %                                                                       
                         |
|ESV(Teich):   58.27 ml                                                                     
                         |
|LVIDs:   3.70 cm                                                                           
                         |
|SV(Teich):   47.66 ml                                                                      
                         |
|RVIDd:   3.07 cm                                                                           
                         |
|Ao root:   32.72 mm                                                                        
                         |
|LVEF MOD A2C:   56.41 %                                                                    
                         |
|SV MOD A2C:   46.46 ml                                                                     
                         |
|LVEF MOD A4C:   55.05 %                                                                    
                         |
|SV MOD A4C:   55.55 ml                                                                     
                         |
|EF Biplane:   55.87 %                                                                      
                         |
|LVEDV MOD BP:   92.29 ml                                                                   
                         |
|LVESV MOD BP:   40.72 ml                                                                   
                         |
|LVEDV MOD A2C:   82.36 ml                                                                  
                         |
|LVLd A2C:   9.15 cm                                                                        
                         |
|LVEDV MOD A4C:   100.89 ml                                                                 
                         |
|LVLd A4C:   9.46 cm                                                                        
 
                         |
|LVESV MOD A2C:   35.90 ml                                                                  
                         |
|LVLs A2C:   6.85 cm                                                                        
                         |
|LVESV MOD A4C:   45.34 ml                                                                  
                         |
|LVLs A4C:   7.06 cm                                                                        
                         |
|LAESV(A-L):   54.75 ml                                                                     
                         |
|LAESV Index (A-L):   24.66 ml/m2                                                           
                         |
|LAAs A2C:   15.85 cm2                                                                      
                         |
|LAESV A-L A2C:   42.05 ml                                                                  
                         |
|LALs A2C:   5.07 cm                                                                        
                         |
|LAAs A4C:   20.65 cm2                                                                      
                         |
|LAESV A-L A4C:   68.39 ml                                                                  
                         |
|LALs A4C:   5.29 cm                                                                        
                         |
|RAAs:   16.87 cm2                                                                          
                         |
|RAESV A-L:   44.78 ml                                                                      
                         |
|RAESV MOD:   45.69 ml                                                                      
                         |
|RALs:   5.39 cm                                                                            
                         |
|TAPSE:   2.19 cm                                                                           
                         |
|AV maxP.05 mmHg                                                                      
                         |
|AV meanPG:   3.73 mmHg                                                                     
                         |
|AV Vmax:   1.32 m/s                                                                        
                         |
|AV Vmean:   0.90 m/s                                                                       
                         |
|AV VTI:   29.89 cm                                                                         
                         |
|CHIRAG Vmax:   3.41 cm2                                                                       
                         |
|CHIRAG (VTI):   3.01 cm2                                                                      
                         |
|AVAI Vmax:   0.00 cm2/m2                                                                   
                         |
|AVAI (VTI):   0.00 cm2/m2                                                                  
                         |
|LVOT maxP.63 mmHg                                                                    
                         |
|LVOT meanP.94 mmHg                                                                   
                         |
|LVSI Dopp:   40.60 ml/m2                                                                   
                         |
|LVSV Dopp:   90.15 ml                                                                      
 
                         |
|LVOT Vmax:   1.07 m/s                                                                      
                         |
|LVOT Vmean:   0.63 m/s                                                                     
                         |
|LVOT VTI:   21.41 cm                                                                       
                         |
|MV A Librado:   0.37 m/s                                                                       
                         |
|MV DecT:   232.01 ms                                                                       
                         |
|MV E Librado:   0.58 m/s                                                                       
                         |
|MV E/A Ratio:   1.53                                                                       
                         |
|Septal e':   0.08 m/s                                                                      
                         |
|Septal E/e':   7.16                                                                        
                         |
|Lateral e':   0.07 m/s                                                                     
                         |
|Lateral E/e':   7.35                                                                       
                         |
|Sonographer:                                                                               
                         |
|Authenticated by:  LOR DELGADO MD                                                        
                         |
|Report Date/Time: 2018 17:52:14                                                       
                         |
|IMPRESSION:                                                                                
                        |
|1. Overall left ventricular systolic function is normal with, an EF between 60 - 65 %.     
                         |
|2. The right ventricle is normal in size and function.                                     
                         |
|3. The aortic root, ascending aorta and aortic arch are normal. 4. No significant valvular 
abnormalities are noted. |
+-------------------------------------------------------------------------------------------
-------------------------+
 in this encounter
 
 Visit Diagnoses
 
 
+--------------------------------------------------------------------+
| Diagnosis                                                          |
+--------------------------------------------------------------------+
| H/O syncope                                                        |
+--------------------------------------------------------------------+
|   Personal history of other specified diseases                     |
+--------------------------------------------------------------------+
| Essential hypertension with goal blood pressure less than 130/80   |
+--------------------------------------------------------------------+
| Hyperlipidemia, unspecified hyperlipidemia type                    |
+--------------------------------------------------------------------+
| Chronic obstructive pulmonary disease, unspecified COPD type (HCC) |
 
+--------------------------------------------------------------------+
| Thoracoabdominal aortic aneurysm, without rupture (HCC)            |
+--------------------------------------------------------------------+
|   Thoracoabdominal aneurysm without mention of rupture             |
+--------------------------------------------------------------------+
| Obstructive sleep apnea syndrome                                   |
+--------------------------------------------------------------------+
|   Obstructive sleep apnea (adult) (pediatric)                      |
+--------------------------------------------------------------------+

## 2018-04-14 NOTE — XMS
Encounter Summary
  Created on: 2018
 
 Kayce Arboleda
 External Reference #: [id number]
 : 64
 Sex: Male
 
 Demographics
 
 
+-----------------------+-----------------------+
| Address               | [address] |
|                       | [place]  [address]  |
+-----------------------+-----------------------+
| Home Phone            | [phone]       |
+-----------------------+-----------------------+
| Preferred Language    | Unknown               |
+-----------------------+-----------------------+
| Marital Status        | Single                |
+-----------------------+-----------------------+
| [employer] Affiliation | [id number]                  |
+-----------------------+-----------------------+
| Race                  | Unknown               |
+-----------------------+-----------------------+
| Ethnic Group          | Unknown               |
+-----------------------+-----------------------+
 
 
 Author
 
 
+--------------+-----------------------+
| Author       | [facility] [facility] |
+--------------+-----------------------+
| Organization | [facility] [facility] |
+--------------+-----------------------+
| Address      | Unknown               |
+--------------+-----------------------+
| Phone        | Unavailable           |
+--------------+-----------------------+
 
 
 
 Support
 
 
+-----------------+--------------+---------------------+-----------------+
| Name            | Relationship | Address             | Phone           |
+-----------------+--------------+---------------------+-----------------+
| Tejal Champagne | ECON         | [address]              | [phone] |
|                 |              | [address], [place]   |                 |
|                 |              | [address]               |                 |
+-----------------+--------------+---------------------+-----------------+
 
 
 
 Care Team Providers
 
 
 
+-----------------------+------+-----------------+
| Care Team Member Name | Role | Phone           |
+-----------------------+------+-----------------+
| Facundo Rodriguez FNP  | PCP  | [phone] |
+-----------------------+------+-----------------+
 
 
 
 Reason for Referral
 Echo (Routine)
 
+----------+--------+-----------+--------------+--------------+--------------+
| Status   | Reason | Specialty | Diagnoses /  | Referred By  | Referred To  |
|          |        |           | Procedures   | Contact      | Contact      |
+----------+--------+-----------+--------------+--------------+--------------+
| Pending  |        |           |   Diagnoses  |   Trace, |              |
| Review   |        |           |  Essential   |  Janette Johnson  |              |
|          |        |           | hypertension | ARNP  [address]   |              |
|          |        |           |  with goal   | [address]  |              |
|          |        |           | blood        | Louie F        |              |
|          |        |           | pressure     | [place] |              |
|          |        |           | less than    |  [address]       |              |
|          |        |           | 130/80       | Phone:       |              |
|          |        |           | Hyperlipidem | [phone] |              |
|          |        |           | ia,          |   Fax:       |              |
|          |        |           | unspecified  | [phone] |              |
|          |        |           | hyperlipidem |              |              |
|          |        |           | ia type      |              |              |
|          |        |           | Thoracoabdom |              |              |
|          |        |           | inal aortic  |              |              |
|          |        |           | aneurysm,    |              |              |
|          |        |           | without      |              |              |
|          |        |           | rupture      |              |              |
|          |        |           | (HCC)        |              |              |
|          |        |           | Murmur,      |              |              |
|          |        |           | cardiac      |              |              |
|          |        |           | Procedures   |              |              |
|          |        |           | Echo cardiac |              |              |
|          |        |           |  adult       |              |              |
|          |        |           | complete     |              |              |
+----------+--------+-----------+--------------+--------------+--------------+
 
 
 
 
 Reason for Visit
 
 
+-----------+-----------------------------+
| Reason    | Comments                    |
+-----------+-----------------------------+
| Follow-up | 2 month - echo STA 18 |
+-----------+-----------------------------+
 
 
 
 Encounter Details
 
 
 
+--------+---------+----------------------+----------------------+----------------------+
| Date   | Type    | Department           | Care Team            | Description          |
+--------+---------+----------------------+----------------------+----------------------+
| / | Office  |   [facility]          |   Janette Woodward    | Essential            |
| 2018   | Visit   | [facility] | DIETER Johnson  [address]     | hypertension with    |
|        |         |   [address]    | [address]    | goal blood pressure  |
|        |         | [address]        | [place] [address]   | less than 130/80     |
|        |         | [place] [address]  | [phone]         | (Primary Dx);        |
|        |         |  [phone]        | [phone] (Fax)   | Hyperlipidemia,      |
|        |         |                      |                      | unspecified          |
|        |         |                      |                      | hyperlipidemia type; |
|        |         |                      |                      |  Thoracoabdominal    |
|        |         |                      |                      | aortic aneurysm,     |
|        |         |                      |                      | without rupture      |
|        |         |                      |                      | (HCC); H/O syncope;  |
|        |         |                      |                      | Murmur, cardiac;     |
|        |         |                      |                      | Obstructive sleep    |
|        |         |                      |                      | apnea syndrome;      |
|        |         |                      |                      | Chronic obstructive  |
|        |         |                      |                      | pulmonary disease,   |
|        |         |                      |                      | unspecified COPD     |
|        |         |                      |                      | type (HCC); Former   |
|        |         |                      |                      | heavy tobacco        |
|        |         |                      |                      | smoker; History of   |
|        |         |                      |                      | anemia               |
+--------+---------+----------------------+----------------------+----------------------+
 
 
 
 Social History
 
 
+---------------+-------+-----------+--------+------------------+
| Tobacco Use   | Types | Packs/Day | Years  | Date             |
|               |       |           | Used   |                  |
+---------------+-------+-----------+--------+------------------+
| Former Smoker |       | 3         | 37     | Quit: 10/05/2015 |
+---------------+-------+-----------+--------+------------------+
 
 
 
+---------------------+---+---+----------+
| Smokeless Tobacco:  |   |   | Quit:    |
| Former User         |   |   | 10/05/20 |
|                     |   |   | 15       |
+---------------------+---+---+----------+
 
 
 
+-------------+-----------+---------+---------------------------+
| Alcohol Use | Drinks/We | oz/Week | Comments                  |
|             | ek        |         |                           |
+-------------+-----------+---------+---------------------------+
| No          |   0       | 0.0     | heavy drinker in past hx. |
|             | Standard  |         |                           |
|             | drinks or |         |                           |
|             |           |         |                           |
|             | equivalen |         |                           |
|             | t         |         |                           |
 
+-------------+-----------+---------+---------------------------+
 
 
 
+------------------+---------------+
| Sex Assigned at  | Date Recorded |
| Birth            |               |
+------------------+---------------+
| Not on file      |               |
+------------------+---------------+
 as of this encounter
 
 Last Filed Vital Signs
 
 
+-------------------+----------------------+-------------------------+
| Vital Sign        | Reading              | Time Taken              |
+-------------------+----------------------+-------------------------+
| Blood Pressure    | 112/60               | 2018 10:53 AM PDT |
+-------------------+----------------------+-------------------------+
| Pulse             | 67                   | 2018 10:53 AM PDT |
+-------------------+----------------------+-------------------------+
| Temperature       | -                    | -                       |
+-------------------+----------------------+-------------------------+
| Respiratory Rate  | 20                   | 2018 10:53 AM PDT |
+-------------------+----------------------+-------------------------+
| Oxygen Saturation | 98%                  | 2018 10:53 AM PDT |
+-------------------+----------------------+-------------------------+
| Inhaled Oxygen    | -                    | -                       |
| Concentration     |                      |                         |
+-------------------+----------------------+-------------------------+
| Weight            | 106.6 kg (235 lb 1.6 | 2018 10:53 AM PDT |
|                   |  oz)                 |                         |
+-------------------+----------------------+-------------------------+
| Height            | 177.8 cm (5' 10")    | 2018 10:53 AM PDT |
+-------------------+----------------------+-------------------------+
| Body Mass Index   | 33.73                | 2018 10:53 AM PDT |
+-------------------+----------------------+-------------------------+
 in this encounter
 
 Instructions
 Patient Instructions - Pernelljhony YesyDIETER Johnson - 2018 11:00 AM PDTYour labs looked
 good and not anemic , and cholesterol is well controlled
 Your Echo also looked good 
 
 I have ordered you an Echo to be done at [facility] in one year, see me back in one year
 in this encounter
 
 Progress Notes
 Pernelljhony Janette JohnsonDIETER - 2018 11:00 AM PDTFormatting of this note may be differen
t from the original.
 Date of visit: 3/12/2018
 Primary Care Physician: FACUNDO RODRIGUEZ
 
 CHIEF COMPLAINT:  
 Chief Complaint 
 Patient presents with 
   Follow-up 
   2 month - echo STA 18 
 
 
 HISTORY OF PRESENT ILLNESS:
   Mr. Kayce Arboleda, Bud, is a 57-year-old man who is here today for follow-up on his labs 
and his Echo.
    He has a previous history of syncope though workup was benign including a 32 day cardiac
 event monitor and  no further episodes of syncope, hypertension, hyperlipidemia, and COPD w
paolo is followed by Dr. Louie.
  I saw him last on 2018 when I had ordered him an echo, referred him for assess
ment of his uncorrected sleep apnea and ordered a CMP, lipid panel, CBC, iron panel with a f
erritin level .
   His labs  performed 18 showed normal current panel with ferritin of 162 though  iron
  low-end of normal at 82 and low sat of 24.1%, his lipid panel shows lipids well controlled
 with LDL of 68, triglycerides of 116, and total cholesterol of 131, and he had a normal CMP
 showing normal liver enzymes and normal renal function, and CBC also showed normal hemoglob
in and hematocrit and platelet count.
    His Echo performed at [facility] on 2018 reported a normal EF of 60-65 p
ercent, right ventricle normal in size and function, and the aortic root, ascending aorta, a
nd aortic arch were all normal dimensions, and there was no significant valvular abnormaliti
es.
  He was admitted to the emergency room 2018 for increasing lower back pain radiati
ng to his right buttocks and legs.  I reviewed ER  documentation today   Blood pressure mild
ly elevated with pain but improved when they treated pain ,and he was treated with Decadron 
and given a prescription for prednisone and oxycodone and sent home.
  His COPD continues to be well controlled with no recent exacerbation, but still suffers fr
om dyspnea with exertion, and activities of daily living have been difficult with increased 
hip pain.
   Today, he  denies any chest pain, palpitations, dyspnea, dizziness,or syncope. He also de
nies any signs or symptoms of stroke or TIA.
   He does have an appointment to be seen by Dr. Landry at the [facility] sleep lab in May
 to get evaluation and treatment of his sleep apnea    
 REVIEW OF SYSTEMS:
 Negative except for pertinent items noted in HPI.
 
 Constitutional: mild  fatigue or unexplained weight loss.  Appetite is good.  Weight is sta
ble.    Denies night sweats fevers or chills
 HENT: Denies nosebleeds.   Positive for hearing loss .  Denies dysphagia
 Eyes: History of eye surgery orbit blowout ? Denies visual disturbance or double vision.  
 Respiratory/Sleep:: Positive for COPD (Dr. Louie), sleep apnea ,not wearing CPAP.  Positive
 for dyspnea with more extreme exertion, reports occasional cough  Denies hemoptysis or exce
ssive sputum production. Denies  orthopnea, PND. 
 Cardiovascular: Denies  chest pain, palpitations and leg swelling. Denies history of rheuma
tic fever. Denies claudication . 
 Gastrointestinal:history of GERD , on PPI  .  Denies  nausea, vomiting, abdominal pain and 
blood in stool. 
 Genitourinary: Denies  hematuria.  
 Musculoskeletal: Positive for joint pain and arthralgia severe right hip pain, back , shoul
ric, neck  , Denies myalgias, 
 Skin: Denies  color change.  Denies rash or lesions
 Neurological:  Positive for Numbness to legs, and arms .   Denies history of stroke/Transie
nt ischemic attack.Denies history of seizures. Denies dizziness, syncope  
 Hematological/Oncology Does not bruise/bleed easily.  Denies history of cancer
 Endocrine: Denies diabetes or thyroid disease.  Denies excessive thirst or hunger.  
 Psychiatric/Behavioral: The patient denies any history of depression or anxiety or other ps
ychiatric illness.
 Vaccines: Current on 2017 flu vaccine.  Current on pneumonia vaccine.
 Habits/Social :  history of smoking, quit in 2015.  Smoked 3 packs a day  35 years
.  Previously used smokeless tobacco quit in 2015..  Denies EtOH use for 2 years , p
revious heavy drinker. Drinks servings of  12 cups coffee/tea daily, uses 1/2 chocolate , ha
lf dec aff  .  Denies recreational or illicit drug use, previously used methamphetamine  20
 years, cocaine, marijuana, crack, mushrooms.  Exercises with tasiks in  [employer], walking,  a
nd tolerates.  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 
 
 
 Outpatient Medications Prior to Visit 
 Medication Sig Dispense Refill 
   albuterol (PROVENTIL HFA;VENTOLIN HFA) 108 (90 BASE) MCG/ACT inhaler Inhale 2 puffs int
o the lungs every 4 (four) hours as needed for Wheezing.   
   amitriptyline (ELAVIL) 100 MG tablet Take 100 mg by mouth nightly.   
   amLODIPine (NORVASC) 5 MG tablet Take 5 mg by mouth daily.   
   ascorbic acid (VITAMIN C) 250 MG tablet Take 250 mg by mouth daily.   
   atorvastatin (LIPITOR) 40 MG tablet Take 1 tablet by mouth nightly. 30 tablet 11 
   cloNIDine (CATAPRES) 0.3 MG tablet Take 0.3 mg by mouth nightly.   
   diclofenac (CATAFLAM) 50 MG tablet Take 50 mg by mouth 2 (two) times daily.   
   ferrous sulfate, 65 FE, 324 (65 FE) MG EC tablet Take 65 mg of iron by mouth daily with
 breakfast. With 250 mg Vit C   
   fluticasone-salmeterol (ADVAIR) 500-50 MCG/DOSE diskus inhaler Inhale 1 puff into the l
ungs 2 (two) times daily.   
   gabapentin (NEURONTIN) 600 MG tablet Take 300 mg by mouth 3 (three) times daily.   
   hydrochlorothiazide (HYDRODIURIL) 12.5 MG tablet Take 12.5 mg by mouth daily.   
   ibuprofen (MOTRIN) 600 MG tablet Take 600 mg by mouth every 6 (six) hours as needed for
 Pain. Trying to use sparingly   
   ipratropium-albuterol (DUO-NEB) 0.5-2.5 mg/3mL Take 3 mLs by nebulization as needed.   
   metoprolol (LOPRESSOR) 25 MG tablet Take 1 tablet by mouth 2 (two) times daily. 60 tabl
et 11 
   montelukast (SINGULAIR) 10 MG tablet Take 10 mg by mouth nightly.   
   Multiple Vitamins-Minerals (RA CENTRAL-BIBI PO) Take  by mouth daily.   
   nitroGLYCERIN (NITROSTAT) 0.4 MG SL tablet Place 1 tablet under the tongue every 5 (fiv
e) minutes as needed for Chest pain. 90 tablet 0 
   pantoprazole (PROTONIX) 40 MG tablet Take 40 mg by mouth daily.   
   tamsulosin (FLOMAX) 0.4 MG capsule Take 0.4 mg by mouth  After dinner.   
   thiamine (VITAMIN B-1) 100 MG tablet Take 1 tablet by mouth daily. 30 tablet 0 
   umeclidinium bromide (INCRUSE ELLIPTA) 62.5 MCG/INH inhaler Inhale 1 puff into the lung
s daily.   
 
 No facility-administered medications prior to visit.  
 
 PHYSICAL EXAM:
 Wt Readings from Last 3 Encounters: 
 18 106.6 kg (235 lb 1.6 oz) 
 18 108.1 kg (238 lb 6.4 oz) 
 17 109.8 kg (242 lb) 
 
 Temp Readings from Last 3 Encounters: 
 17 97.6 F (36.4 C) (Oral) 
 17 97.2 F (36.2 C) (Oral) 
 17 98.6 F (37 C) (Temporal) 
 
 BP Readings from Last 3 Encounters: 
 18 112/60 
 18 116/62 
 17 127/79 
 
 Pulse Readings from Last 3 Encounters: 
 18 67 
 18 64 
 17 54 
 
 GENERAL:  Well developed, well nourished, in no distress.  Appears older than stated age.
 HEENT:  Normocephalic, atraumatic.  
 EYES:     PERRL, EOM normal.
 MOUTH: Oral mucosae moist, dentition adequate, no lesions noted
 NECK:    No JVD, lymphadenopathy, thyromegaly, bruits.  Carotid pulses are 2+ bilaterally
 
 LUNGS/CHEST:  Clear bilaterally, with no rales, rhonchi or wheezing noted, respirations unl
abored
 HEART:  Nondisplaced PMI, regular rate and rhythm, S1, S2 normal.  2/6 murmur RUSB, rubs or
 gallops noted.
 ABDOMEN:  Soft, nontender, no organomegaly, masses or bruits.  Bowel sounds are normal in a
ll 4 quadrants.  The abdominal aortic pulsation is not palpable.
 EXTREMITIES:  No edema. Radial pulses 2+ bilaterally.  Femoral pulses are 2+ bilaterally wi
thout bruits.  DP and PT pulses are 2+ bilaterally.  No clubbing.varicosities 
 SKIN:  Warm and dry, capillary refill is normal, no lesions.
 NEUROLOGIC:  Awake, alert and oriented x 3. No focal motor or sensory deficits. 
 PSYCHIATRIC:  Appropriate, affect appears normal
 
 DATA:
 Blood tests:
 Lab Results 
 Component Value Date 
  WBC 9.0 2018 
  RBC 4.79 2018 
  HGB 14.7 2018 
  HCT 43.4 2018 
   2018 
 
 Lab Results 
 Component Value Date 
   2018 
  K 4.0 2018 
   2018 
  CO2 28 2018 
  ANIONGAP 15.0 2018 
  GLUF 99 2018 
  BUN 18 2018 
  CREATININE 0.88 2018 
  BCR 20.5 2018 
  CA 9.7 2018 
  EGFR 90 2018 
 
 Lab Results 
 Component Value Date 
  CHOL 131 2018 
  TRIG 116 2018 
  LDL 68 2018 
  GLUF 99 2018 
  HGBA1C 5.7 2016 
 
 Lab Results 
 Component Value Date 
  TSH 4.48 2016 
 
 No results found for: METF, NMETFX, TFNMFX, PJSEUAR23IHC, EYEMGA73TBQ, TOTEPI
 
 IMAGING/PROCEDURES
 Last Stress Test, 2016: Lexiscan, no ischemia detected, LVEF 63%.
 
 ECHO:
 Last Echo : 2018:  ([facility]): TAS. EF 60-65 percent.  LV normal size and wall th
ickness.  No regional wall motion abnormalities.  Diastolic pattern normal for patient age. 
 RV normal in size and function.  Normal size atrium.  Aortic valve trileaflet, mildly thick
ened, no aortic regurgitation or stenosis.  Mitral valve normal, trace MR, no mitral valve p
rolapse or stenosis.  Tricuspid valve normal.  TAS B not assessed due to absence of TR jet. 
 No pericardial effusion.  IVC <1.5 cm.  CVP estimate 0-5 mmHg.  Aortic root, ascending aort
 
a, and aortic arch are normal
  Echo, 2016 ([facility]): LVEF 65-70%, trace MR, trace TR.  Ascending Aorta 41 mm, A
ortic arch 37 mm.
 
 OTHER: 
 PFT: 3/09/2017: Minimal obstructive airway disease, severe restriction-parenchymal, mild di
ffusion defect.  FVC 2.65  percent) FEV1 2.07 (56 percent), ratio 0.78.  Significant broncho
dilator response.  TLC 4.53 CL (66 percent) DLCO 21.3 (68 percent), data and tracings person
ally reviewed by me, original interpretation Dr. Conklin for  TOCP, effusion not corrected for Hg
b
 
 EKG/EVENT MONITORS
 32-Day Cardiac Event Monitor, 2016: sinus rhythm, , averaging 80 bpm, rare ectop
y, no AB/pauses, symptoms of "chest pain, SOB, dizziness, fluttering" all associated with
 NSR.
 EK2016: Normal sinus rhythm.  Rate 63 bpm,  ms, QRS 94 ms,  ms, perso
yoandy reviewed by me
 EK2018: Normal sinus rhythm.  Rate 63 bpm,  ms, QRS 96 ms,  ms, EKG t
racing personally reviewed by me
 
 Labs:
 2017: Lipids: Cholesterol 110, triglycerides 81, HDL 35.8, LDL 58, VLDL 16, ratio 3.1
, non-HDL cholesterol 74.  CMP: Sodium 141, potassium 4, chloride 103, glucose 104, BUN 20, 
creatinine 0.81, GFR 99.  AST 21, ALT 25, alk phos 58, total bilirubin 0.6, albumin 4.4.  CB
C: WBC 9.2, hemoglobin 15, hematocrit 45.3, platelets 304, ESR 4
 Labs: : Lipids: Cholesterol 131, triglycerides 116, HDL 40.3, LDL 68, VLDL 23, ra
camelia 3.3, non-HDL cholesterol 91.(  Lipitor 40 mg )  CMP: Sodium 142, potassium 4, chloride 1
03, glucose 99, BUN 18, creatinine 0.88, AST 19, ALT 25, alk phos 59, total bilirubin 0.5, G
FR 90, albumin 4.4.  CBC: WBC 9, hemoglobin 14.7, hematocrit 43.4, platelets 286.  Iron pane
l: Iron 82.66, TIBC 2 343, percent sat 24, ferritin 162.8, U TIBC 260, transferrin 245
 
  ASSESSMENT & PLAN:
     He was here today to follow-up on his Echo and  labs which I  ordered when I saw him [facility]. 
   His labs  performed 18 showed normal current panel with ferritin of 162 though  iron
  low-end of normal at 82 and low sat of 24.1%, his lipid panel shows lipids well controlled
 with LDL of 68, triglycerides of 116, and total cholesterol of 131, and he had a normal CMP
 showing normal liver enzymes and normal renal function, and CBC also showed normal hemoglob
in and hematocrit and platelet count.
    His Echo performed at [facility] on 2018 reported a normal EF of 60-65 p
ercent, right ventricle normal in size and function, and the aortic root, ascending aorta, a
nd aortic arch were all normal dimensions, and there was no significant valvular abnormaliti
es.
   I reviewed the results with him in detail and answered his questions and any concerns
  He was pleased with the results, and feels overall he is doing well but troubled by extrem
e hip pain and dyspnea from COPD which affects his mobility.  He is asymptomatic for any isc
hemic heart disease symptoms today, and blood pressure and heart rate are well controlled on
 current medication.
  He will be following up with the [place] sleep lab in May, and hopefully will get his 
sleep apnea corrected given the large contribution of sleep apnea to cardiovascular disease 
and arrhythmia,
   I agree with him that it is likely his sleep apnea was much worse when he was drinking he
avily, but he would still benefit from further evaluation, initially given his underlying pu
lmonary disease.
   I made no changes to medication today, and for his cardiac medications. he should continu
e amlodipine 5 mg daily, Lipitor 40 mg qhs, clonidine 0.3mg qhs ,hydrochlorothiazide 12.5 mg
 daily, and  metoprolol tartrate 25 mg bid and to do his best to use as little NSAIDs as pos
sible, which is difficult given his severe joint pain.
  He reports he is looking into getting knee-high light compression socks to help with his v
aricosities.
 
  I  will see him back in one year, but sooner if needed, and have ordered him an echo to be
 done prior to seeing me back, and I will order a repeat lipid panel if not already done whe
n I see him back
   
 
 1. Essential hypertension with goal blood pressure less than 130/80  
 2. Hyperlipidemia, unspecified hyperlipidemia type  
 3. Thoracoabdominal aortic aneurysm, without rupture (HCC)  
 4. H/O syncope  
 5. Murmur, cardiac  
 6. Obstructive sleep apnea syndrome  
 7. Chronic obstructive pulmonary disease, unspecified COPD type (HCC)  
 8. Former heavy tobacco smoker  
 9. History of anemia  
 
 Orders Placed This Encounter 
 Procedures 
   Echo cardiac adult complete 
 
  
 
 The following portions of the patient's history were personally reviewed by me  and updated
 as appropriate:
 EKG tracings, other  specialty provider and PCP notes,any Hospital admission and discharge 
summaries, any ER records , current and previous cardiac testing and procedure reports and d
altaf, medication bottles brought to visit today personally reviewed by me. 
 Allergies, current medications.labs
 Family history, past medical history, past social history, past surgical history.
 Problem list.
 
 DI[facility]  [facility] 
 3/12/2018in this encounter
 
 Plan of Treatment
 
 
+--------+---------+-------------+----------------------+-------------+
| Date   | Type    | Specialty   | Care Team            | Description |
+--------+---------+-------------+----------------------+-------------+
| / | Office  | Pulmonology |   Jhoantan Louie MD  |             |
| 2018   | Visit   |             |  [address]    |             |
|        |         |             | [place] [address]   |             |
|        |         |             | [phone]         |             |
|        |         |             | [phone] (Fax)   |             |
+--------+---------+-------------+----------------------+-------------+
 
 
 
+-----------------------------+--------+----------------------+----------------------+
| Name                        | Priori | Associated Diagnoses | Order Schedule       |
|                             | ty     |                      |                      |
+-----------------------------+--------+----------------------+----------------------+
| Echo cardiac adult complete | Routin |   Essential          | Expected:            |
|                             | e      | hypertension with    | 2018, Expires: |
|                             |        | goal blood pressure  |  2019          |
|                             |        | less than 130/80     |                      |
|                             |        | Hyperlipidemia,      |                      |
|                             |        | unspecified          |                      |
|                             |        | hyperlipidemia type  |                      |
|                             |        |  Thoracoabdominal    |                      |
 
|                             |        | aortic aneurysm,     |                      |
|                             |        | without rupture      |                      |
|                             |        | (HCC)  Murmur,       |                      |
|                             |        | cardiac              |                      |
+-----------------------------+--------+----------------------+----------------------+
 as of this encounter
 
 Visit Diagnoses
 
 
+----------------------------------------------------------------------------+
| Diagnosis                                                                  |
+----------------------------------------------------------------------------+
| Essential hypertension with goal blood pressure less than 130/80 - Primary |
+----------------------------------------------------------------------------+
| Hyperlipidemia, unspecified hyperlipidemia type                            |
+----------------------------------------------------------------------------+
| Thoracoabdominal aortic aneurysm, without rupture (HCC)                    |
+----------------------------------------------------------------------------+
|   Thoracoabdominal aneurysm without mention of rupture                     |
+----------------------------------------------------------------------------+
| H/O syncope                                                                |
+----------------------------------------------------------------------------+
|   Personal history of other specified diseases                             |
+----------------------------------------------------------------------------+
| Murmur, cardiac                                                            |
+----------------------------------------------------------------------------+
|   Undiagnosed cardiac murmurs                                              |
+----------------------------------------------------------------------------+
| Obstructive sleep apnea syndrome                                           |
+----------------------------------------------------------------------------+
|   Obstructive sleep apnea (adult) (pediatric)                              |
+----------------------------------------------------------------------------+
| Chronic obstructive pulmonary disease, unspecified COPD type (HCC)         |
+----------------------------------------------------------------------------+
| Former heavy tobacco smoker                                                |
+----------------------------------------------------------------------------+
| History of anemia                                                          |
+----------------------------------------------------------------------------+
|   Personal history of diseases of blood and blood-forming organs           |
+----------------------------------------------------------------------------+

## 2018-04-14 NOTE — XMS
Encounter Summary
  Created on: 2018
 
 Kayce Arboleda
 External Reference #: [id number]
 : 64
 Sex: Male
 
 Demographics
 
 
+-----------------------+-----------------------+
| Address               | [address] |
|                       | [place]  [address]  |
+-----------------------+-----------------------+
| Home Phone            | [phone]       |
+-----------------------+-----------------------+
| Preferred Language    | Unknown               |
+-----------------------+-----------------------+
| Marital Status        | Single                |
+-----------------------+-----------------------+
| Christianity Affiliation | 1013                  |
+-----------------------+-----------------------+
| Race                  | Unknown               |
+-----------------------+-----------------------+
| Ethnic Group          | Unknown               |
+-----------------------+-----------------------+
 
 
 Author
 
 
+--------------+-----------------------+
| Author       | [facility] |
+--------------+-----------------------+
| Organization | [facility] |
+--------------+-----------------------+
| Address      | Unknown               |
+--------------+-----------------------+
| Phone        | Unavailable           |
+--------------+-----------------------+
 
 
 
 Support
 
 
+-----------------+--------------+---------------------+-----------------+
| Name            | Relationship | Address             | Phone           |
+-----------------+--------------+---------------------+-----------------+
| Tejal Champagne | ECON         | [address]              | [phone] |
|                 |              | [address], [place]   |                 |
|                 |              | [address]               |                 |
+-----------------+--------------+---------------------+-----------------+
 
 
 
 Care Team Providers
 
 
 
+-----------------------+------+-----------------+
| Care Team Member Name | Role | Phone           |
+-----------------------+------+-----------------+
| Facundo Lees  | PCP  | [phone] |
+-----------------------+------+-----------------+
 
 
 
 Encounter Details
 
 
+--------+-------------+----------------------+----------------------+-------------+
| Date   | Type        | Department           | Care Team            | Description |
+--------+-------------+----------------------+----------------------+-------------+
| / | Documentati |   [facility]          |   [facility] |             |
| 2018   | on Only     | Cardiology [facility]  |                      |             |
|        |             |  [address]    |                      |             |
|        |             | [place]         |                      |             |
|        |             | [address]           |                      |             |
|        |             | [phone]         |                      |             |
+--------+-------------+----------------------+----------------------+-------------+
 
 
 
 Social History
 
 
+---------------+-------+-----------+--------+------------------+
| Tobacco Use   | Types | Packs/Day | Years  | Date             |
|               |       |           | Used   |                  |
+---------------+-------+-----------+--------+------------------+
| Former Smoker |       | 3         | 37     | Quit: 10/05/2015 |
+---------------+-------+-----------+--------+------------------+
 
 
 
+---------------------+---+---+----------+
| Smokeless Tobacco:  |   |   | Quit:    |
| Former User         |   |   | 10/05/20 |
|                     |   |   | 15       |
+---------------------+---+---+----------+
 
 
 
+-------------+-----------+---------+---------------------------+
| Alcohol Use | Drinks/We | oz/Week | Comments                  |
|             | ek        |         |                           |
+-------------+-----------+---------+---------------------------+
| No          |   0       | 0.0     | heavy drinker in past hx. |
|             | Standard  |         |                           |
|             | drinks or |         |                           |
|             |           |         |                           |
|             | equivalen |         |                           |
|             | t         |         |                           |
+-------------+-----------+---------+---------------------------+
 
 
 
 
+------------------+---------------+
| Sex Assigned at  | Date Recorded |
| Birth            |               |
+------------------+---------------+
| Not on file      |               |
+------------------+---------------+
 as of this encounter
 
 Plan of Treatment
 
 
+--------+---------+-------------+----------------------+-------------+
| Date   | Type    | Specialty   | Care Team            | Description |
+--------+---------+-------------+----------------------+-------------+
| / | Office  | Pulmonology |   Jhonatan Louie MD  |             |
| 2018   | Visit   |             |  [address]    |             |
|        |         |             | [place] [address]   |             |
|        |         |             | [phone]         |             |
|        |         |             | [phone] (Fax)   |             |
+--------+---------+-------------+----------------------+-------------+
 as of this encounter
 
 Visit Diagnoses
 Not on filein this encounter

## 2018-04-14 NOTE — XMS
Encounter Summary
  Created on: 2018
 
 Kayce Arboleda
 External Reference #: [id number]
 : 64
 Sex: Male
 
 Demographics
 
 
+-----------------------+-----------------------+
| Address               | [address] |
|                       | [place]  [address]  |
+-----------------------+-----------------------+
| Home Phone            | [phone]       |
+-----------------------+-----------------------+
| Preferred Language    | Unknown               |
+-----------------------+-----------------------+
| Marital Status        | Single                |
+-----------------------+-----------------------+
| Episcopal Affiliation | 1013                  |
+-----------------------+-----------------------+
| Race                  | Unknown               |
+-----------------------+-----------------------+
| Ethnic Group          | Unknown               |
+-----------------------+-----------------------+
 
 
 Author
 
 
+--------------+-----------------------+
| Author       | [facility] |
+--------------+-----------------------+
| Organization | [facility] |
+--------------+-----------------------+
| Address      | Unknown               |
+--------------+-----------------------+
| Phone        | Unavailable           |
+--------------+-----------------------+
 
 
 
 Support
 
 
+-----------------+--------------+---------------------+-----------------+
| Name            | Relationship | Address             | Phone           |
+-----------------+--------------+---------------------+-----------------+
| Tejal Champagne | ECON         | [address]              | [phone] |
|                 |              | [address], [place]   |                 |
|                 |              | [address]               |                 |
+-----------------+--------------+---------------------+-----------------+
 
 
 
 Care Team Providers
 
 
 
+-----------------------+------+-----------------+
| Care Team Member Name | Role | Phone           |
+-----------------------+------+-----------------+
| Facundo Lees  | PCP  | [phone] |
+-----------------------+------+-----------------+
 
 
 
 Reason for Visit
 
 
+--------+--------------------------------------------+
| Reason | Comments                                   |
+--------+--------------------------------------------+
| Other  | [facility], Pulmonary Function Test |
+--------+--------------------------------------------+
 
 
 
 Encounter Details
 
 
+--------+-------------+----------------------+----------------------+--------------------+
| Date   | Type        | Department           | Care Team            | Description        |
+--------+-------------+----------------------+----------------------+--------------------+
| / | Documentati |   [facility]          |   Sherron Almanzar,  | Other ([facility]  |
| 2018   | on Only     | Card[facility] [place]  | [facility]                  | Clinic, Pulmonary  |
|        |             |  [address]    |                      | Function Test)     |
|        |             | [place]         |                      |                    |
|        |             | [address]           |                      |                    |
|        |             | [phone]         |                      |                    |
+--------+-------------+----------------------+----------------------+--------------------+
 
 
 
 Social History
 
 
+---------------+-------+-----------+--------+------------------+
| Tobacco Use   | Types | Packs/Day | Years  | Date             |
|               |       |           | Used   |                  |
+---------------+-------+-----------+--------+------------------+
| Former Smoker |       | 3         | 37     | Quit: 10/05/2015 |
+---------------+-------+-----------+--------+------------------+
 
 
 
+---------------------+---+---+----------+
| Smokeless Tobacco:  |   |   | Quit:    |
| Former User         |   |   | 10/05/20 |
|                     |   |   | 15       |
+---------------------+---+---+----------+
 
 
 
+-------------+-----------+---------+---------------------------+
| Alcohol Use | Drinks/We | oz/Week | Comments                  |
|             | ek        |         |                           |
 
+-------------+-----------+---------+---------------------------+
| No          |   0       | 0.0     | heavy drinker in past hx. |
|             | Standard  |         |                           |
|             | drinks or |         |                           |
|             |           |         |                           |
|             | equivalen |         |                           |
|             | t         |         |                           |
+-------------+-----------+---------+---------------------------+
 
 
 
+------------------+---------------+
| Sex Assigned at  | Date Recorded |
| Birth            |               |
+------------------+---------------+
| Not on file      |               |
+------------------+---------------+
 as of this encounter
 
 Plan of Treatment
 
 
+--------+---------+-------------+----------------------+-------------+
| Date   | Type    | Specialty   | Care Team            | Description |
+--------+---------+-------------+----------------------+-------------+
| / | Office  | Pulmonology |   Jhonatan Louie MD  |             |
| 2018   | Visit   |             |  [address]    |             |
|        |         |             | [place] [address]   |             |
|        |         |             | [phone]         |             |
|        |         |             | [phone] (Fax)   |             |
+--------+---------+-------------+----------------------+-------------+
 as of this encounter
 
 Visit Diagnoses
 Not on filein this encounter

## 2018-04-14 NOTE — XMS
Encounter Summary
  Created on: 2018
 
 Kayce Arboleda
 External Reference #: [id number]
 : 64
 Sex: Male
 
 Demographics
 
 
+-----------------------+-----------------------+
| Address               | [address] |
|                       | [place]  [address]  |
+-----------------------+-----------------------+
| Home Phone            | [phone]       |
+-----------------------+-----------------------+
| Preferred Language    | Unknown               |
+-----------------------+-----------------------+
| Marital Status        | Single                |
+-----------------------+-----------------------+
| Jain Affiliation | 1013                  |
+-----------------------+-----------------------+
| Race                  | Unknown               |
+-----------------------+-----------------------+
| Ethnic Group          | Unknown               |
+-----------------------+-----------------------+
 
 
 Author
 
 
+--------------+-----------------------+
| Author       | [facility] |
+--------------+-----------------------+
| Organization | [facility] |
+--------------+-----------------------+
| Address      | Unknown               |
+--------------+-----------------------+
| Phone        | Unavailable           |
+--------------+-----------------------+
 
 
 
 Support
 
 
+-----------------+--------------+---------------------+-----------------+
| Name            | Relationship | Address             | Phone           |
+-----------------+--------------+---------------------+-----------------+
| Tejal Champagne | ECON         | [address]              | [phone] |
|                 |              | [address], [place]   |                 |
|                 |              | [address]               |                 |
+-----------------+--------------+---------------------+-----------------+
 
 
 
 Care Team Providers
 
 
 
+-----------------------+------+-----------------+
| Care Team Member Name | Role | Phone           |
+-----------------------+------+-----------------+
| Facundo Lees  | PCP  | [phone] |
+-----------------------+------+-----------------+
 
 
 
 Encounter Details
 
 
+--------+-----------+---------------------+--------------------+-------------+
| Date   | Type      | Department          | Care Team          | Description |
+--------+-----------+---------------------+--------------------+-------------+
| / | Telephone |   [facility]     |   Lupe Nolan,  |             |
|    |           | Pulmonology  [address]   | [address]                |             |
|        |           | [address]   |                    |             |
|        |           | [place]        |                    |             |
|        |           | [address]          |                    |             |
|        |           | [phone]        |                    |             |
+--------+-----------+---------------------+--------------------+-------------+
 
 
 
 Social History
 
 
+---------------+-------+-----------+--------+------------------+
| Tobacco Use   | Types | Packs/Day | Years  | Date             |
|               |       |           | Used   |                  |
+---------------+-------+-----------+--------+------------------+
| Former Smoker |       | 3         | 37     | Quit: 10/05/2015 |
+---------------+-------+-----------+--------+------------------+
 
 
 
+---------------------+---+---+----------+
| Smokeless Tobacco:  |   |   | Quit:    |
| Former User         |   |   | 10/05/20 |
|                     |   |   | 15       |
+---------------------+---+---+----------+
 
 
 
+-------------+-----------+---------+---------------------------+
| Alcohol Use | Drinks/We | oz/Week | Comments                  |
|             | ek        |         |                           |
+-------------+-----------+---------+---------------------------+
| No          |   0       | 0.0     | heavy drinker in past hx. |
|             | Standard  |         |                           |
|             | drinks or |         |                           |
|             |           |         |                           |
|             | equivalen |         |                           |
|             | t         |         |                           |
+-------------+-----------+---------+---------------------------+
 
 
 
 
+------------------+---------------+
| Sex Assigned at  | Date Recorded |
| Birth            |               |
+------------------+---------------+
| Not on file      |               |
+------------------+---------------+
 as of this encounter
 
 Plan of Treatment
 
 
+--------+---------+-------------+----------------------+-------------+
| Date   | Type    | Specialty   | Care Team            | Description |
+--------+---------+-------------+----------------------+-------------+
| / | Office  | Pulmonology |   Jhonatan Louie MD  |             |
| 2018   | Visit   |             |  [address]    |             |
|        |         |             | [place] [address]   |             |
|        |         |             | [phone]         |             |
|        |         |             | [phone] (Fax)   |             |
+--------+---------+-------------+----------------------+-------------+
 as of this encounter
 
 Visit Diagnoses
 Not on filein this encounter

## 2018-04-14 NOTE — XMS
Encounter Summary
  Created on: 2018
 
 Kayce Arboleda
 External Reference #: [id number]
 : 64
 Sex: Male
 
 Demographics
 
 
+-----------------------+-----------------------+
| Address               | [address] |
|                       | [place]  [address]  |
+-----------------------+-----------------------+
| Home Phone            | [phone]       |
+-----------------------+-----------------------+
| Preferred Language    | Unknown               |
+-----------------------+-----------------------+
| Marital Status        | Single                |
+-----------------------+-----------------------+
| Congregation Affiliation | [id number]                  |
+-----------------------+-----------------------+
| Race                  | Unknown               |
+-----------------------+-----------------------+
| Ethnic Group          | Unknown               |
+-----------------------+-----------------------+
 
 
 Author
 
 
+--------------+-----------------------+
| Author       | [employer] |
+--------------+-----------------------+
| Organization | [employer] |
+--------------+-----------------------+
| Address      | Unknown               |
+--------------+-----------------------+
| Phone        | Unavailable           |
+--------------+-----------------------+
 
 
 
 Support
 
 
+-----------------+--------------+---------------------+-----------------+
| Name            | Relationship | Address             | Phone           |
+-----------------+--------------+---------------------+-----------------+
| Tejal Champagne | ECON         | [address]              | [phone] |
|                 |              | [address], [place]   |                 |
|                 |              | [address]               |                 |
+-----------------+--------------+---------------------+-----------------+
 
 
 
 Care Team Providers
 
 
 
+-----------------------+------+-----------------+
| Care Team Member Name | Role | Phone           |
+-----------------------+------+-----------------+
| Facundo Lees FNP  | PCP  | [phone] |
+-----------------------+------+-----------------+
 
 
 
 Reason for Referral
 Consultation (Routine)
 
+------------+--------------+------------+--------------+--------------+---------------+
| Status     | Reason       | Specialty  | Diagnoses /  | Referred By  | Referred To   |
|            |              |            | Procedures   | Contact      | Contact       |
+------------+--------------+------------+--------------+--------------+---------------+
| Authorized |   Specialty  | Pulmonary  |   Diagnoses  |   Trace, |   [facility], [facility]    |
|            | Services     | Disease    |  H/O syncope |  Janette [facility]  | [facility]   |
|            | Required     |            |   Essential  | ARNP  [address]   | Sleep         |
|            |              |            | hypertension | [facility]  | Disorders     |
|            |              |            |  with goal   | Louie [facility]        | [facility]   |
|            |              |            | blood        | [place], [place] | HOSPITAL      |
|            |              |            | pressure     |  [address]       | [address]       |
|            |              |            | less than    | Phone:       | [address]   |
|            |              |            | 130/80       | [phone] | [place], [place] |
|            |              |            | Chronic      |   Fax:       |  [phone]        |
|            |              |            | obstructive  | [phone] | Phone:        |
|            |              |            | pulmonary    |              | [phone]  |
|            |              |            | disease,     |              |  Fax:         |
|            |              |            | unspecified  |              | [phone]  |
|            |              |            | COPD type    |              |               |
|            |              |            | ([facility])        |              |               |
|            |              |            | Obstructive  |              |               |
|            |              |            | sleep apnea  |              |               |
|            |              |            | syndrome     |              |               |
|            |              |            | Former heavy |              |               |
|            |              |            |  tobacco     |              |               |
|            |              |            | smoker       |              |               |
+------------+--------------+------------+--------------+--------------+---------------+
 
 
 
 
 Reason for Visit
 
 
+-----------+----------+
| Reason    | Comments |
+-----------+----------+
| Follow-up |          |
+-----------+----------+
 Routine Exam (Routine)
 
+------------+--------+---------------+--------------+--------------+---------------+
| Status     | Reason | Specialty     | Diagnoses /  | Referred By  | Referred To   |
|            |        |               | Procedures   | Contact      | Contact       |
+------------+--------+---------------+--------------+--------------+---------------+
| Authorized |        | Nurse         |   Diagnoses  |   [place], |   Trace,  |
|            |        | Practitioner  |  Essential   |  Janette Johnson,  | Janette Johnson,    |
 
|            |        | / Cardiology  | (primary)    | ARNP  [address]   | ARNP  [address]    |
|            |        |               | hypertension | [facility]  | [facility]   |
|            |        |               |   f/u        | [address]ie [address]        | Louie [facility]         |
|            |        |               | annual-Peter  | [place], [place] | [place]  |
|            |        |               | transfer     |  [address]       | [address]  Phone: |
|            |        |               | Procedures   | Phone:       |  [phone] |
|            |        |               | [place] OFFICE    | [phone] |   Fax:        |
|            |        |               | OUTPATIENT   |   Fax:       | [phone]  |
|            |        |               | VISIT LEVEL  | [phone] |               |
|            |        |               | 1  [place] OFFICE |              |               |
|            |        |               |  OUTPATIENT  |              |               |
|            |        |               | VISIT LEVEL  |              |               |
|            |        |               | 2  [facility] OFFICE |              |               |
|            |        |               |  OUTPATIENT  |              |               |
|            |        |               | VISIT LEVEL  |              |               |
|            |        |               | 3  [place] OFFICE |              |               |
|            |        |               |  OUTPATIENT  |              |               |
|            |        |               | VISIT LEVEL  |              |               |
|            |        |               | 4  [facility] OFFICE |              |               |
|            |        |               |  OUTPATIENT  |              |               |
|            |        |               | VISIT 40     |              |               |
|            |        |               | MINUTES  CRD |              |               |
|            |        |               |  ANNUAL      |              |               |
|            |        |               | FOLLOW UP    |              |               |
+------------+--------+---------------+--------------+--------------+---------------+
 
 
 
 
 Encounter Details
 
 
+--------+---------+----------------------+----------------------+----------------------+
| Date   | Type    | Department           | Care Team            | Description          |
+--------+---------+----------------------+----------------------+----------------------+
| / | Office  |   [facility]          |   Janette Woodward    | H/O syncope (Primary |
| 2018   | Visit   | [facility] | [facility]  [address]     |  Dx); Essential      |
|        |         |   [address]    | [address]    | hypertension with    |
|        |         | Way [address]        | [place] [address]   | goal blood pressure  |
|        |         | [place] [address]  | [phone]         | less than 130/80;    |
|        |         |  [phone]        | [phone] (Fax)   | Hyperlipidemia,      |
|        |         |                      |                      | unspecified          |
|        |         |                      |                      | hyperlipidemia type; |
|        |         |                      |                      |  Chronic obstructive |
|        |         |                      |                      |  pulmonary disease,  |
|        |         |                      |                      | unspecified COPD     |
|        |         |                      |                      | type (HCC);          |
|        |         |                      |                      | Thoracoabdominal     |
|        |         |                      |                      | aortic aneurysm,     |
|        |         |                      |                      | without rupture      |
|        |         |                      |                      | (HCC); Obstructive   |
|        |         |                      |                      | sleep apnea          |
|        |         |                      |                      | syndrome; Former     |
|        |         |                      |                      | heavy tobacco        |
|        |         |                      |                      | smoker; History of   |
|        |         |                      |                      | anemia; Murmur,      |
|        |         |                      |                      | cardiac              |
+--------+---------+----------------------+----------------------+----------------------+
 
 
 
 
 Social History
 
 
+---------------+-------+-----------+--------+------------------+
| Tobacco Use   | Types | Packs/Day | Years  | Date             |
|               |       |           | Used   |                  |
+---------------+-------+-----------+--------+------------------+
| Former Smoker |       | 3         | 35     | Quit: 10/05/2015 |
+---------------+-------+-----------+--------+------------------+
 
 
 
+---------------------+---+---+----------+
| Smokeless Tobacco:  |   |   | Quit:    |
| Former User         |   |   | 10/05/20 |
|                     |   |   | 15       |
+---------------------+---+---+----------+
 
 
 
+-------------+-----------+---------+---------------------------+
| Alcohol Use | Drinks/We | oz/Week | Comments                  |
|             | ek        |         |                           |
+-------------+-----------+---------+---------------------------+
| No          |   0       | 0.0     | heavy drinker in past hx. |
|             | Standard  |         |                           |
|             | drinks or |         |                           |
|             |           |         |                           |
|             | equivalen |         |                           |
|             | t         |         |                           |
+-------------+-----------+---------+---------------------------+
 
 
 
+------------------+---------------+
| Sex Assigned at  | Date Recorded |
| Birth            |               |
+------------------+---------------+
| Not on file      |               |
+------------------+---------------+
 as of this encounter
 
 Last Filed Vital Signs
 
 
+-------------------+----------------------+-------------------------+
| Vital Sign        | Reading              | Time Taken              |
+-------------------+----------------------+-------------------------+
| Blood Pressure    | 116/62               | 2018 12:18 PM PST |
+-------------------+----------------------+-------------------------+
| Pulse             | 64                   | 2018 12:18 PM PST |
+-------------------+----------------------+-------------------------+
| Temperature       | -                    | -                       |
+-------------------+----------------------+-------------------------+
| Respiratory Rate  | -                    | -                       |
+-------------------+----------------------+-------------------------+
| Oxygen Saturation | 96%                  | 2018 12:18 PM PST |
+-------------------+----------------------+-------------------------+
| Inhaled Oxygen    | -                    | -                       |
 
| Concentration     |                      |                         |
+-------------------+----------------------+-------------------------+
| Weight            | 108.1 kg (238 lb 6.4 | 2018 12:18 PM PST |
|                   |  oz)                 |                         |
+-------------------+----------------------+-------------------------+
| Height            | 177.8 cm (5' 10")    | 2018 12:18 PM PST |
+-------------------+----------------------+-------------------------+
| Body Mass Index   | 34.21                | 2018 12:18 PM PST |
+-------------------+----------------------+-------------------------+
 in this encounter
 
 Instructions
 Patient Instructions - Janette Woodward ARNP - 2018 12:30 PM PSTI have ordered y
ou fasting labs to be done at  [facility]   and also ordered you an Echo to be done at [facility]
 Take Vit b12 again
 I have ordered refill on Atorvastatin and metoprolol for one year
 Consider using light compression knee high socks, look like soccer socks to help with varic
ose veins
 I have referred you to Sleep lab at [facility]  for sleep apnea evaluation 
 See me back in 2 months 
 
 in this encounter
 
 Progress Notes
 Janette Woodward ARNP - 2018 12:30 PM PSTFormatting of this note may be differen
t from the original.
 Date of visit: 2018
 Primary Care Physician: FACUNDO LEES
 
 CHIEF COMPLAINT:  
 Chief Complaint 
 Patient presents with 
   Follow-up 
 
 HISTORY OF PRESENT ILLNESS:
   Mr. Kayce ArboledaKemal, is a 57-year-old man who is here today as overdue for annual foll
ow-up.
  He is a previous patient of Dr. Torrey Peter, now retired and last seen by him in 2016.  
  He has a previous history of syncope though workup was benign including a 32 day cardiac e
vent monitor and was last seen he had no further episodes of syncope, hypertension, hyperlip
idemia, COPD which is followed by Dr. Louie.
  I reviewed Dr. Peter's last note, as well as the last note of Dr. Louie on 2017 wh
[facility] documents that his COPD has been well-controlled on current medications, his previous PF
T done in 2017 had shown mild obstructive disease with bronchodilator response, severe
 restrictive disease with parenchymal disease, and mild diffusion defect, though  diffusion 
not corrected for hemoglobin. 
  EKG done in the office today, 2018 shows normal sinus rhythm at 63 bpm and very simila
r to EKG done in May 2016
  Last labs I have from 2017 shows lipids is well-controlled and a normal CMP except f
or mild elevation of glucose to 104 and good renal function with GFR of 99 and normal liver 
enzymes and CBC also normal.
   He denies any chest pain, palpitations, dyspnea, dizziness,or syncope. He also denies any
 signs or symptoms of stroke or TIA, or any illness, surgery, or hospitalization since last 
seen. 
   He reports most of his previous problems related to his heavy alcohol use, and his been g
reatly improved since he has been sober for 2 years.
  He also reports he stopped using his CPAP about a year ago, as he was always struggling wi
th it, and he thought his sleep apnea was mostly related to his heavy alcohol use, and now t
 
hat he had stopped drinking, he did not need it anymore, and thinks he has been sleeping wel
l without it.
   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 
to perform maintenance and repairs, and reports he tolerates even heavy physical activity wi
thout any chest pain or dyspnea. 
   He has been sober from alcohol or drugs for over 2 years, but does drink up to 12 cups of
 coffee , but drinks half decaffeinated, and also mixes with hot chocolate
 
 REVIEW OF SYSTEMS:
 Negative except for pertinent items noted in HPI.
 
 Constitutional: mild  fatigue or unexplained weight loss.  Appetite is good.  Weight is sta
ble.    Denies night sweats fevers or chills
 HENT: Denies nosebleeds.   Positive for hearing loss .  Denies dysphagia
 Eyes: History of eye surgery orbit blowout ? Denies visual disturbance or double vision.  
 Respiratory/Sleep:: Positive for COPD (Dr. Louie), sleep apnea on CPAP.  Denies cough and s
hortness of breath.  Denies hemoptysis or excessive sputum production. Denies snoring, ortho
pnea, PND. 
 Cardiovascular: Denies  chest pain, palpitations and leg swelling. Denies history of rheuma
tic fever. Denies claudication . 
 Gastrointestinal:history of gastroesophageal reflux disease, on PPI  .  Denies  nausea, vom
iting, abdominal pain and blood in stool. 
 Genitourinary: Denies  hematuria.  
 Musculoskeletal: Positive for joint pain and arthralgia severe right hip pain, back , shoul
ric, neck  , Denies myalgias, 
 Skin: Denies  color change.  Denies rash or lesions
 Neurological:  Numbness to legs, and arms .   Denies history of stroke/Transient ischemic a
ttack.Denies history of seizures. Denies dizziness, syncope  
 Hematological/Oncology Does not bruise/bleed easily.  Denies history of cancer
 Endocrine: Denies diabetes or thyroid disease.  Denies excessive thirst or hunger.  
 Psychiatric/Behavioral: The patient denies any history of depression or anxiety or other ps
ychiatric illness.
 Vaccines: Current on 2017 flu vaccine.  Current on pneumonia vaccine.
 Habits/Social :  history of smoking, quit in 2015.  Smoked 3 packs a day  35 years
.  Previously used smokeless tobacco quit in 2015..  Denies EtOH use for 2 years , p
revious heavy drinker. Drinks servings of  12 cups coffee/tea daily, uses 1/2 chocolate , ha
lf decaff  .  Denies recreational or illicit drug use, previously used methamphetamine  20 
years, cocaine, marijuana, crack, mushrooms.  Exercises with active job, walking,  and ronald
ates.   of [facility] , 
 
 Outpatient Medications Prior to Visit 
 Medication Sig Dispense Refill 
   albuterol (PROVENTIL HFA;VENTOLIN HFA) 108 (90 BASE) MCG/ACT inhaler Inhale 2 puffs int
o the lungs every 4 (four) hours as needed for Wheezing.   
   amitriptyline (ELAVIL) 100 MG tablet Take 100 mg by mouth nightly.   
   amLODIPine (NORVASC) 5 MG tablet Take 5 mg by mouth daily.   
   ascorbic acid (VITAMIN C) 250 MG tablet Take 250 mg by mouth daily.   
   cloNIDine (CATAPRES) 0.3 MG tablet Take 0.3 mg by mouth nightly.   
   diclofenac (CATAFLAM) 50 MG tablet Take 50 mg by mouth 2 (two) times daily.   
   fluticasone-salmeterol (ADVAIR) 500-50 MCG/DOSE diskus inhaler Inhale 1 puff into the l
ungs 2 (two) times daily.   
   gabapentin (NEURONTIN) 600 MG tablet Take 300 mg by mouth 3 (three) times daily.   
   hydrochlorothiazide (HYDRODIURIL) 12.5 MG tablet Take 12.5 mg by mouth daily.   
   ibuprofen (MOTRIN) 600 MG tablet Take 600 mg by mouth every 6 (six) hours as needed for
 Pain. Trying to use sparingly   
   montelukast (SINGULAIR) 10 MG tablet Take 10 mg by mouth nightly.   
   Multiple Vitamins-Minerals (RA CENTRAL-BIBI PO) Take  by mouth daily.   
   nitroGLYCERIN (NITROSTAT) 0.4 MG SL tablet Place 1 tablet under the tongue every 5 (fiv
e) minutes as needed for Chest pain. 90 tablet 0 
   pantoprazole (PROTONIX) 40 MG tablet Take 40 mg by mouth daily.   
 
   tamsulosin (FLOMAX) 0.4 MG capsule Take 0.4 mg by mouth  After dinner.   
   thiamine (VITAMIN B-1) 100 MG tablet Take 1 tablet by mouth daily. 30 tablet 0 
   umeclidinium bromide (INCRUSE ELLIPTA) 62.5 MCG/INH inhaler Inhale 1 puff into the lung
s daily.   
   atorvastatin (LIPITOR) 40 MG tablet take 1 tablet by mouth NIGHTLY 90 tablet 3 
   metoprolol (LOPRESSOR) 25 MG tablet take 1 tablet by mouth twice a day 180 tablet 3 
   ferrous sulfate, 65 FE, 324 (65 FE) MG EC tablet Take 65 mg of iron by mouth daily with
 breakfast. With 250 mg Vit C   
   cyanocobalamin (VITAMIN B-12) 100 MCG tablet Take 100 mcg by mouth daily.   
   cyclobenzaprine (FLEXERIL) 5 MG tablet Take 5 mg by mouth 3 (three) times daily as need
ed for Muscle spasms.   
   levalbuterol (XOPENEX) 1.25 MG/0.5ML nebulizer solution Take 1 ampule by nebulization e
very 4 (four) hours as needed for Wheezing.   
 
 No facility-administered medications prior to visit.  
 
 PHYSICAL EXAM:
 Wt Readings from Last 3 Encounters: 
 18 108.1 kg (238 lb 6.4 oz) 
 17 109.8 kg (242 lb) 
 17 115.7 kg (255 lb) 
 
 Temp Readings from Last 3 Encounters: 
 17 97.6 F (36.4 C) (Oral) 
 17 97.2 F (36.2 C) (Oral) 
 17 98.6 F (37 C) (Temporal) 
 
 BP Readings from Last 3 Encounters: 
 18 116/62 
 17 127/79 
 17 104/66 
 
 Pulse Readings from Last 3 Encounters: 
 18 64 
 17 54 
 17 55 
 
 GENERAL:  Well developed, well nourished, in no distress.  Appears older than stated age.
 HEENT:  Normocephalic, atraumatic.  
 EYES:     PERRL, EOM normal.
 MOUTH: Oral mucosae moist, dentition adequate, no lesions noted
 NECK:    No JVD, lymphadenopathy, thyromegaly, bruits.  Carotid pulses are 2+ bilaterally
 LUNGS/CHEST:  Clear bilaterally, with no rales, rhonchi or wheezing noted, respirations unl
abored
 HEART:  Nondisplaced PMI, regular rate and rhythm, S1, S2 normal.  2/6 murmur RUSB, rubs or
 gallops noted.
 ABDOMEN:  Soft, nontender, no organomegaly, masses or bruits.  Bowel sounds are normal in a
ll 4 quadrants.  The abdominal aortic pulsation is not palpable.
 EXTREMITIES:  No edema. Radial pulses 2+ bilaterally.  Femoral pulses are 2+ bilaterally wi
thout bruits.  DP and PT pulses are 2+ bilaterally.  No clubbing.varicosities 
 SKIN:  Warm and dry, capillary refill is normal, no lesions.
 NEUROLOGIC:  Awake, alert and oriented x 3. No focal motor or sensory deficits. 
 PSYCHIATRIC:  Appropriate, affect appears normal
 
 DATA:
 Blood tests:
 Lab Results 
 Component Value Date 
  WBC 8.39 2016 
  RBC 4.82 2016 
 
  HGB 13.8 2016 
  HCT 41.7 2016 
   2016 
 
 Lab Results 
 Component Value Date 
   2016 
  K 3.9 2016 
   2016 
  CO2 30 2016 
  ANIONGAP 9 2016 
  GLUF 99 2016 
  BUN 17 2016 
  CREATININE 0.89 2016 
  BCR 19 2016 
  CA 9.5 2016 
  EGFR >60 2016 
 
 Lab Results 
 Component Value Date 
  CHOL 204 (H) 2016 
  TRIG 294 (H) 2016 
   (H) 2016 
  GLUF 99 2016 
  HGBA1C 5.7 2016 
 
 Lab Results 
 Component Value Date 
  TSH 4.48 2016 
 
 No results found for: METF, NMETFX, TFNMFX, AZHIDOO19PXO, VJTAPO36OMO, TOTEPI
 
 IMAGING/PROCEDURES
 Last Stress Test, 2016: Lexiscan, no ischemia detected, LVEF 63%.
 
 ECHO:
 Last Echo, 2016 ([facility]): LVEF 65-70%, trace MR, trace TR.  Ascending Aorta 41mm
, Aortic arch 37mm.
 
 OTHER: 
 PFT: 3/09/2017: Minimal obstructive airway disease, severe restriction-parenchymal, mild di
ffusion defect.  FVC 2.65 (65 percent) FEV1 2.07 (56 percent), ratio 0.78.  Significant bron
chodilator response.  TLC 4.53 L (66 percent) DLCO 21.3 (68 percent), data and tracings pers
onally reviewed by me, original interpretation Dr. Pieter CAMPO, effusion not corrected for Hg
b
 
 EKG/EVENT MONITORS
 32-Day Cardiac Event Monitor, 2016: sinus rhythm, , averaging 80bpm, rare ectopy
, no AVB/pauses, symptoms of "chest pain, SOB, dizziness, fluttering" all associated with
 NSR.
 EK2016: Normal sinus rhythm.  Rate 63 bpm,  ms, QRS 94 ms,  ms, perso
yoandy reviewed by me
 EK2018: Normal sinus rhythm.  Rate 63 bpm,  ms, QRS 96 ms, QTC 427ms, EKG tr
acing personally reviewed by me
 
 Labs:
 2017: Lipids: Cholesterol 110, triglycerides 81, HDL 35.8, LDL 58, VLDL 16, ratio 3.1
, non-HDL cholesterol 74.  CMP: Sodium 141, potassium 4, chloride 103, glucose 104, BUN 20, 
creatinine 0.81, GFR 99.  AST 21, ALT 25, alk phos 58, total bilirubin 0.6, albumin 4.4.  CB
C: WBC 9.2, hemoglobin 15, hematocrit 45.3, platelets 304, ESR 4
 
 
  ASSESSMENT & PLAN:
     He was here today as overdue for annual exam and overall seems to be very stable from a
 cardiovascular point of view with stable EKG and labs from 2017 showing lipids well c
ontrolled and fairly normal CMP with good renal function and liver enzymes and normal CBC.  
Overall he is asymptomatic for any ischemic heart disease and feels that he is doing very we
ll since he quit drinking 2 years ago.
   He is overdue for an echo to evaluate his enlarged ascending aorta and aortic arch so I h
ave ordered him one to be done at [facility].  He also has not had any lab work since  so I have ordered a CMP, lipid panel, CBC, iron panel with ferritin to evaluate anem
ia , copies to be sent to his PCP, Facundo Lees. 
   He also has stopped wearing his CPAP and uncorrected sleep apnea is a large contributor t
o cardiovascular disease and arrhythmia, so I have referred him to the [place] sleep lab
 for further evaluation of his sleep apnea.  I agree with him that it is likely his sleep ap
morris was much worse when he was drinking heavily, but he would still benefit from further natalie
luation.
  I have also suggested he try taking vitamin B12 again, ordered refills on atorvastatin and
 metoprolol for one year, and  suggested he consider using a knee-high light compression soc
ks to help with varicosities.  I also reinforced teaching on minimizing use of NSAIDs due to
 increased renal and cardiovascular risk, which he is aware of
  I will see him back in 2 months and for his cardiac meds, he should continue amlodipine 5 
mg daily, Lipitor 40 mg qhs, clonidine 0.3mg qhs hydrochlorothiazide 12.5 mg daily, and  met
oprolol tartrate 25 mg bid
   
 
 1. H/O syncope  
 2. Essential hypertension with goal blood pressure less than 130/80  
 3. Hyperlipidemia, unspecified hyperlipidemia type  
 4. Chronic obstructive pulmonary disease, unspecified COPD type (HCC)  
 5. Thoracoabdominal aortic aneurysm, without rupture (HCC)  
 6. Obstructive sleep apnea syndrome  
 7. Former heavy tobacco smoker  
 8. History of anemia  
 9. Murmur, cardiac  
 
 Orders Placed This Encounter 
 Procedures 
   Comprehensive metabolic panel 
   Lipid panel 
   CBC and differential 
   Iron panel 
   Ferritin 
   Ambulatory referral to Pulmonology 
   Electrocardiogram, 12-lead 
   Echo cardiac adult complete 
 
  
 
 The following portions of the patient's history were personally reviewed by me  and updated
 as appropriate:
 EKG tracings, other  specialty provider and PCP notes,any Hospital admission and discharge 
summaries, any ER records , current and previous cardiac testing and procedure reports and d
altaf, medication bottles brought to visit today personally reviewed by me. 
 Allergies, current medications.labs
 Family history, past medical history, past social history, past surgical history.
 Problem list.
 
 Janette WoodwardDIETER  [facility] 
 2018in this encounter
 
 
 Plan of Treatment
 
 
+--------+---------+-------------+----------------------+-------------+
| Date   | Type    | Specialty   | Care Team            | Description |
+--------+---------+-------------+----------------------+-------------+
| / | Office  | Pulmonology |   Jhonatan Louie MD  |             |
|    | Visit   |             |  [address]    |             |
|        |         |             | [place] [address]   |             |
|        |         |             | [phone]         |             |
|        |         |             | [phone] (Fax)   |             |
+--------+---------+-------------+----------------------+-------------+
 
 
 
+-------------------------------+--------+----------------------+----------------------+
| Name                          | Priori | Associated Diagnoses | Order Schedule       |
|                               | ty     |                      |                      |
+-------------------------------+--------+----------------------+----------------------+
| Comprehensive metabolic panel | Routin |   H/O syncope        | Expected:            |
|                               | e      | Hyperlipidemia,      | 2018, Expires: |
|                               |        | unspecified          |  2019          |
|                               |        | hyperlipidemia type  |                      |
|                               |        |  Chronic obstructive |                      |
|                               |        |  pulmonary disease,  |                      |
|                               |        | unspecified COPD     |                      |
|                               |        | type (HCC)           |                      |
|                               |        | Obstructive sleep    |                      |
|                               |        | apnea syndrome       |                      |
|                               |        | Former heavy tobacco |                      |
|                               |        |  smoker  History of  |                      |
|                               |        | anemia               |                      |
+-------------------------------+--------+----------------------+----------------------+
| Lipid panel                   | Routin |   Essential          | Expected:            |
|                               | e      | hypertension with    | 2018, Expires: |
|                               |        | goal blood pressure  |  2019          |
|                               |        | less than 130/80     |                      |
|                               |        | Hyperlipidemia,      |                      |
|                               |        | unspecified          |                      |
|                               |        | hyperlipidemia type  |                      |
+-------------------------------+--------+----------------------+----------------------+
| CBC and differential          | Routin |   Chronic            | Expected:            |
|                               | e      | obstructive          | 2018, Expires: |
|                               |        | pulmonary disease,   |  2019          |
|                               |        | unspecified COPD     |                      |
|                               |        | type (HCC)           |                      |
|                               |        | Obstructive sleep    |                      |
|                               |        | apnea syndrome       |                      |
|                               |        | History of anemia    |                      |
+-------------------------------+--------+----------------------+----------------------+
| Iron panel                    | Routin |   Obstructive sleep  | Expected:            |
|                               | e      | apnea syndrome       | 2018, Expires: |
|                               |        | History of anemia    |  2019          |
+-------------------------------+--------+----------------------+----------------------+
| Ferritin                      | Routin |   Obstructive sleep  | Expected:            |
|                               | e      | apnea syndrome       | 2018, Expires: |
|                               |        | History of anemia    |  2019          |
+-------------------------------+--------+----------------------+----------------------+
 
 
 
 
+-------------------------+--------+----------------------+---------------------+
| Name                    | Priori | Associated Diagnoses | Order Schedule      |
|                         | ty     |                      |                     |
+-------------------------+--------+----------------------+---------------------+
| Ambulatory referral to  | Routin |   H/O syncope        | Ordered: 2018 |
| Pulmonology             | e      | Essential            |                     |
|                         |        | hypertension with    |                     |
|                         |        | goal blood pressure  |                     |
|                         |        | less than 130/80     |                     |
|                         |        | Chronic obstructive  |                     |
|                         |        | pulmonary disease,   |                     |
|                         |        | unspecified COPD     |                     |
|                         |        | type (HCC)           |                     |
|                         |        | Obstructive sleep    |                     |
|                         |        | apnea syndrome       |                     |
|                         |        | Former heavy tobacco |                     |
|                         |        |  smoker              |                     |
+-------------------------+--------+----------------------+---------------------+
 as of this encounter
 
 Results
 ECHO outside interpretation standard (2018  3:43 PM)
 
+----------+--------------------------------------------------------+
| Specimen | Performing Laboratory                                  |
+----------+--------------------------------------------------------+
|          |   [address] [place] [address] |
+----------+--------------------------------------------------------+
 
 
 
+------------------------------------------------------------------------------------------+
| Impressions                                                                              |
+------------------------------------------------------------------------------------------+
|   1. Overall left ventricular systolic function is normal with, an EF between 60 - 65 %. |
|   2. The right ventricle is normal in size and function.  3. The aortic root, ascending  |
| aorta and aortic arch are normal. 4. No significant valvular abnormalities are noted.    |
+------------------------------------------------------------------------------------------+
 
 
 
+------------------------------------------------------------------------------------------+
| Narrative                                                                                |
+------------------------------------------------------------------------------------------+
|      Patient Name:    Kayce Arboleda  YOB: 1960                       |
| Accession:    [id number]     Performing Physician:    LOR DELGADO MD                      |
| _______________________________________________________________  INDICATIONS             |
| -----------  F/U thoracic/abdominal aneurysm     CONCLUSIONS  -----------  1. Overall    |
| left ventricular systolic function is normal with, an EF between 60 - 65 %.  2. The      |
| right ventricle is normal in size and function.  3. The aortic root, ascending aorta and |
|  aortic arch are normal. 4. No significant valvular abnormalities are noted.             |
| FINDINGS  --------  ECG rhythm: Sinus rhythm.   ECG rhythm: Resting bradycardia          |
| (HR<60bpm).  Study: A 2-dimensional transthoracic echocardiogram with m-mode, spectral   |
| and color flow Doppler was perfomed.   Study: This was a technically adequate study.     |
| Left Ventricle: Overall left ventricular systolic function is normal with, an EF between |
|  60 - 65 %.   Left Ventricle: The left ventricle cavity size is normal.   Left           |
| Ventricle: Left ventricular wall thickness is normal.   Left Ventricle: No regional wall |
|  motion abnormalities.   Left Ventricle: The diastolic filling pattern is normal for the |
|  age of the patient.  Right Ventricle: The right ventricle is normal in size and         |
 
| function.  Left Atrium: The left atrium is normal in size.  Right Atrium: The right      |
| atrium is normal in size.   Aortic Valve: Aortic valve is trileaflet and is mildly       |
| thickened.   Aortic Valve: There is no evidence of aortic regurgitation.   Aortic Valve: |
|  There is no evidence of aortic stenosis.  Mitral Valve: The mitral valve is normal.     |
| Mitral Valve: There is trace mitral regurgitation.   Mitral Valve: No evidence of MVP or |
|  mitral stenosis.  Tricuspid Valve: The tricuspid valve appears structurally normal.     |
| Tricuspid Valve: Pulmonary artery systolic pressure could not be assessed due to the     |
| absence of adequate TR jet.  Pulmonic Valve: The pulmonic valve was not well visualized. |
|   Pericardium: There is no pericardial effusion.  IVC/Hepatic Veins: The IVC is small    |
| (<1.5cm) and collapses with sniff, consistent with central venous pressures of 0-5mmHg.  |
|  Aorta: The aortic root, ascending aorta and aortic arch are normal.  Mass: No mass      |
| visualized  Thrombus: No clot visualized   Thrombus: No vegetation visualized.  Septum:  |
| No ASD observed.   Septum: No VSD observed.     MEASUREMENTS  -------------  Ao asc:     |
|  3.65 cm  Ao sinus:     3.46 cm  Ao st junct:     2.91 cm  IVC:     1.27 cm              |
| EDV(Teich):     105.93 ml  IVSd:     1.06 cm  LVIDd:     4.76 cm  LVPWd:     0.90 cm     |
| LVOT Diam:     2.31 cm  %FS:     22.34 %  EF(Teich):     44.99 %  ESV(Teich):     58.27  |
| ml  LVIDs:     3.70 cm  SV(Teich):     47.66 ml  RVIDd:     3.07 cm  Ao root:     32.72  |
| mm  LVEF MOD A2C:     56.41 %  SV MOD A2C:     46.46 ml  LVEF MOD A4C:     55.05 %  SV   |
| MOD A4C:     55.55 ml  EF Biplane:     55.87 %  LVEDV MOD BP:     92.29 ml  LVESV MOD    |
| BP:     40.72 ml  LVEDV MOD A2C:     82.36 ml  LVLd A2C:     9.15 cm  LVEDV MOD A4C:     |
|  100.89 ml  LVLd A4C:     9.46 cm  LVESV MOD A2C:     35.90 ml  LVLs A2C:     6.85 cm    |
| LVESV MOD A4C:     45.34 ml  LVLs A4C:     7.06 cm  LAESV(A-L):     54.75 ml  LAESV      |
| Index (A-L):     24.66 ml/m2  LAAs A2C:     15.85 cm2  LAESV A-L A2C:     42.05 ml  LALs |
|  A2C:     5.07 cm  LAAs A4C:     20.65 cm2  LAESV A-L A4C:     68.39 ml  LALs A4C:       |
| 5.29 cm  RAAs:     16.87 cm2  RAESV A-L:     44.78 ml  RAESV MOD:     45.69 ml           |
| RALs:     5.39 cm  TAPSE:     2.19 cm  AV maxP.05 mmHg  AV meanPG:     3.73 mmHg |
|   AV Vmax:     1.32 m/s  AV Vmean:     0.90 m/s  AV VTI:     29.89 cm  CHIRAG Vmax:         |
| 3.41 cm2  CHIRAG (VTI):     3.01 cm2  AVAI Vmax:     0.00 cm2/m2  AVAI (VTI):     0.00      |
| cm2/m2  LVOT maxP.63 mmHg  LVOT meanP.94 mmHg  LVSI Dopp:     40.60      |
| ml/m2  LVSV Dopp:     90.15 ml  LVOT Vmax:     1.07 m/s  LVOT Vmean:     0.63 m/s  LVOT  |
| VTI:     21.41 cm  MV A Librado:     0.37 m/s  MV DecT:     232.01 ms  MV E Librado:     0.58    |
| m/s  MV E/A Ratio:     1.53   Septal e':     0.08 m/s  Septal E/e':     7.16   Lateral   |
| e':     0.07 m/s  Lateral E/e':     7.35      Sonographer:   Authenticated               |
| by:    LOR DELGADO MD  Report Date/Time: 2018 17:52:14                            |
+------------------------------------------------------------------------------------------+
 
 
 
+-------------------------------------------------------------------------------------------
-------------------------+
| Procedure Note                                                                            
                         |
+-------------------------------------------------------------------------------------------
-------------------------+
|   Tank, Rad Results In - 2018  6:00 PM PST  Patient Name:  Jessica Arboleda of     
                         |
| Birth:  ccession:  [id number] Physician:  LOR DELGADO MD              
                         |
| _______________________________________________________________INDICATIONS-----------F/U  
                         |
|  thoracic/abdominal aneurysmCONCLUSIONS-----------1. Overall left ventricular systolic    
                         |
| function is normal with, an EF between 60 - 65 %.2. The right ventricle is normal in      
                         |
| size and function.3. The aortic root, ascending aorta and aortic arch are normal. 4. No   
                         |
| significant valvular abnormalities are noted.FINDINGS--------ECG rhythm: Sinus rhythm.    
                         |
| ECG rhythm: Resting bradycardia (HR<60bpm).Study: A 2-dimensional transthoracic           
                         |
 
| echocardiogram with m-mode, spectral and color flow Doppler was perfomed. Study: This     
                         |
| was a technically adequate study.Left Ventricle: Overall left ventricular systolic        
                         |
| function is normal with, an EF between 60 - 65 %. Left Ventricle: The left ventricle      
                         |
| cavity size is normal. Left Ventricle: Left ventricular wall thickness is normal. Left    
                         |
| Ventricle: No regional wall motion abnormalities. Left Ventricle: The diastolic filling   
                         |
| pattern is normal for the age of the patient.Right Ventricle: The right ventricle is      
                         |
| normal in size and function.Left Atrium: The left atrium is normal in size.Right Atrium:  
                         |
|  The right atrium is normal in size. Aortic Valve: Aortic valve is trileaflet and is      
                         |
| mildly thickened. Aortic Valve: There is no evidence of aortic regurgitation. Aortic      
                         |
| Valve: There is no evidence of aortic stenosis.Mitral Valve: The mitral valve is normal.  
                         |
|  Mitral Valve: There is trace mitral regurgitation. Mitral Valve: No evidence of MVP or   
                         |
| mitral stenosis.Tricuspid Valve: The tricuspid valve appears structurally normal.         
                         |
| Tricuspid Valve: Pulmonary artery systolic pressure could not be assessed due to the      
                         |
| absence of adequate TR jet.Pulmonic Valve: The pulmonic valve was not well                
                         |
| visualized.Pericardium: There is no pericardial effusion.IVC/Hepatic Veins: The IVC is    
                         |
| small (<1.5cm) and collapses with sniff, consistent with central venous pressures of      
                         |
| 0-5mmHg.Aorta: The aortic root, ascending aorta and aortic arch are normal.Mass: No mass  
                         |
|  visualizedThrombus: No clot visualized Thrombus: No vegetation visualized.Septum: No     
                         |
| ASD observed. Septum: No VSD observed.MEASUREMENTS-------------Ao asc:   3.65 cmAo        
                         |
| sinus:   3.46 cmAo st junct:   2.91 cmIVC:   1.27 cmEDV(Teich):   105.93 mlIVSd:   1.06   
                         |
| cmLVIDd:   4.76 cmLVPWd:   0.90 cmLVOT Diam:   2.31 cm%FS:   22.34 %EF(Teich):   44.99    
                         |
| %ESV(Teich):   58.27 mlLVIDs:   3.70 cmSV(Teich):   47.66 mlRVIDd:   3.07 cmAo root:      
                         |
| 32.72 mmLVEF MOD A2C:   56.41 %SV MOD A2C:   46.46 mlLVEF MOD A4C:   55.05 %SV MOD A4C:   
                         |
|   55.55 mlEF Biplane:   55.87 %LVEDV MOD BP:   92.29 mlLVESV MOD BP:   40.72 mlLVEDV MOD  
                         |
|  A2C:   82.36 mlLVLd A2C:   9.15 cmLVEDV MOD A4C:   100.89 mlLVLd A4C:   9.46 cmLVESV     
                         |
| MOD A2C:   35.90 mlLVLs A2C:   6.85 cmLVESV MOD A4C:   45.34 mlLVLs A4C:   7.06           
                         |
| cmLAESV(A-L):   54.75 mlLAESV Index (A-L):   24.66 ml/m2LAAs A2C:   15.85 [id number] A-L    
                         |
| A2C:   42.05 mlLALs A2C:   5.07 cmLAAs A4C:   20.65 [id number] A-L A4C:   68.39 mlLALs      
                         |
| A4C:   5.29 cmRAAs:   16.87 [id number] A-L:   44.78 mlRAESV MOD:   45.69 mlRALs:   5.39     
                         |
| cmTAPSE:   2.19 cmAV maxP.05 mmHgAV meanPG:   3.73 mmHgAV Vmax:   1.32 m/Hill        
                         |
 
| Vmean:   0.90 m/Hill VTI:   29.89 cmAVA Vmax:   3.41 cm2AVA (VTI):   3.01 [id number] Vmax:    
                         |
|  0.00 cm2/m2AVAI (VTI):   0.00 cm2/m2LVOT maxP.63 mmHgLVOT meanP.94 mmHgLVSI  
                         |
|  Dopp:   40.60 ml/m2LVSV Dopp:   90.15 mlLVOT Vmax:   1.07 m/sLVOT Vmean:   0.63 m/sLVOT  
                         |
|  VTI:   21.41 cmMV A Librado:   0.37 m/sMV DecT:   232.01 msMV E Librado:   0.58 m/sMV E/A        
                         |
| Ratio:   1.53 Septal e':   0.08 m/sSeptal E/e':   7.16 Lateral e':   0.07 m/sLateral      
                         |
| E/e':   7.35 Sonographer: Authenticated by:  Saurabh HERR Date/Time: 2018  
                         |
|  17:52:14IMPRESSION:1. Overall left ventricular systolic function is normal with, an EF   
                         |
| between 60 - 65 %.2. The right ventricle is normal in size and function.3. The aortic     
                         |
| root, ascending aorta and aortic arch are normal. 4. No significant valvular              
                         |
| abnormalities are noted.                                                                  
                         |
|Septum: No VSD observed.                                                                   
                         |
|MEASUREMENTS                                                                               
                        |
|-------------                                                                              
                          |
|Ao asc:   3.65 cm                                                                          
                         |
|Ao sinus:   3.46 cm                                                                        
                         |
|Ao st junct:   2.91 cm                                                                     
                         |
|IVC:   1.27 cm                                                                             
                         |
|EDV(Teich):   105.93 ml                                                                    
                         |
|IVSd:   1.06 cm                                                                            
                         |
|LVIDd:   4.76 cm                                                                           
                         |
|LVPWd:   0.90 cm                                                                           
                         |
|LVOT Diam:   2.31 cm                                                                       
                         |
|%FS:   22.34 %                                                                             
                         |
|EF(Teich):   44.99 %                                                                       
                         |
|ESV(Teich):   58.27 ml                                                                     
                         |
|LVIDs:   3.70 cm                                                                           
                         |
|SV(Teich):   47.66 ml                                                                      
                         |
|RVIDd:   3.07 cm                                                                           
                         |
|Ao root:   32.72 mm                                                                        
                         |
 
|LVEF MOD A2C:   56.41 %                                                                    
                         |
|SV MOD A2C:   46.46 ml                                                                     
                         |
|LVEF MOD A4C:   55.05 %                                                                    
                         |
|SV MOD A4C:   55.55 ml                                                                     
                         |
|EF Biplane:   55.87 %                                                                      
                         |
|LVEDV MOD BP:   92.29 ml                                                                   
                         |
|LVESV MOD BP:   40.72 ml                                                                   
                         |
|LVEDV MOD A2C:   82.36 ml                                                                  
                         |
|LVLd A2C:   9.15 cm                                                                        
                         |
|LVEDV MOD A4C:   100.89 ml                                                                 
                         |
|LVLd A4C:   9.46 cm                                                                        
                         |
|LVESV MOD A2C:   35.90 ml                                                                  
                         |
|LVLs A2C:   6.85 cm                                                                        
                         |
|LVESV MOD A4C:   45.34 ml                                                                  
                         |
|LVLs A4C:   7.06 cm                                                                        
                         |
|LAESV(A-L):   54.75 ml                                                                     
                         |
|LAESV Index (A-L):   24.66 ml/m2                                                           
                         |
|LAAs A2C:   15.85 cm2                                                                      
                         |
|LAESV A-L A2C:   42.05 ml                                                                  
                         |
|LALs A2C:   5.07 cm                                                                        
                         |
|LAAs A4C:   20.65 cm2                                                                      
                         |
|LAESV A-L A4C:   68.39 ml                                                                  
                         |
|LALs A4C:   5.29 cm                                                                        
                         |
|RAAs:   16.87 cm2                                                                          
                         |
|RAESV A-L:   44.78 ml                                                                      
                         |
|RAESV MOD:   45.69 ml                                                                      
                         |
|RALs:   5.39 cm                                                                            
                         |
|TAPSE:   2.19 cm                                                                           
                         |
|AV maxP.05 mmHg                                                                      
                         |
|AV meanPG:   3.73 mmHg                                                                     
                         |
 
|AV Vmax:   1.32 m/s                                                                        
                         |
|AV Vmean:   0.90 m/s                                                                       
                         |
|AV VTI:   29.89 cm                                                                         
                         |
|CHIRAG Vmax:   3.41 cm2                                                                       
                         |
|CHIRAG (VTI):   3.01 cm2                                                                      
                         |
|AVAI Vmax:   0.00 cm2/m2                                                                   
                         |
|AVAI (VTI):   0.00 cm2/m2                                                                  
                         |
|LVOT maxP.63 mmHg                                                                    
                         |
|LVOT meanP.94 mmHg                                                                   
                         |
|LVSI Dopp:   40.60 ml/m2                                                                   
                         |
|LVSV Dopp:   90.15 ml                                                                      
                         |
|LVOT Vmax:   1.07 m/s                                                                      
                         |
|LVOT Vmean:   0.63 m/s                                                                     
                         |
|LVOT VTI:   21.41 cm                                                                       
                         |
|MV A Librado:   0.37 m/s                                                                       
                         |
|MV DecT:   232.01 ms                                                                       
                         |
|MV E Librado:   0.58 m/s                                                                       
                         |
|MV E/A Ratio:   1.53                                                                       
                         |
|Septal e':   0.08 m/s                                                                      
                         |
|Septal E/e':   7.16                                                                        
                         |
|Lateral e':   0.07 m/s                                                                     
                         |
|Lateral E/e':   7.35                                                                       
                         |
|Sonographer:                                                                               
                         |
|Authenticated by:  LOR DELGADO MD                                                        
                         |
|Report Date/Time: 2018 17:52:14                                                       
                         |
|IMPRESSION:                                                                                
                        |
|1. Overall left ventricular systolic function is normal with, an EF between 60 - 65 %.     
                         |
|2. The right ventricle is normal in size and function.                                     
                         |
 
|3. The aortic root, ascending aorta and aortic arch are normal. 4. No significant valvular 
abnormalities are noted. |
+-------------------------------------------------------------------------------------------
-------------------------+
 EKG STANDARD 12 LEAD (2018 12:27 PM)
 
+-------------------+---------------------------------------------+-----------+
| Component         | Value                                       | Ref Range |
+-------------------+---------------------------------------------+-----------+
| Ventricular Rate  | 63                                          | BPM       |
+-------------------+---------------------------------------------+-----------+
| Atrial Rate       | 63                                          | BPM       |
+-------------------+---------------------------------------------+-----------+
| P-R Interval      | 146                                         | ms        |
+-------------------+---------------------------------------------+-----------+
| QRS Duration      | 96                                          | ms        |
+-------------------+---------------------------------------------+-----------+
| Q-T Interval      | 418                                         | ms        |
+-------------------+---------------------------------------------+-----------+
| QTC Calculation   | 427                                         | ms        |
| (Bezet)           |                                             |           |
+-------------------+---------------------------------------------+-----------+
| Calculated P Axis | 42                                          | degrees   |
+-------------------+---------------------------------------------+-----------+
| Calculated R Axis | 53                                          | degrees   |
+-------------------+---------------------------------------------+-----------+
| Calculated T Axis | 60                                          | degrees   |
+-------------------+---------------------------------------------+-----------+
| Diagnosis         | Please refer to Providers office visit note |           |
|                   |  for Providers Interpretation.Confirmed by  |           |
|                   | [facility] Read Only, PAULETTE Davis ([id number]),     |           |
|                   |  [address]iz ([id number]) on 2018    |           |
|                   | 4:56:19 PM                                  |           |
+-------------------+---------------------------------------------+-----------+
 
 
 
+----------+-------------------------------------------------+
| Specimen | Performing Laboratory                           |
+----------+-------------------------------------------------+
|          |   [facility]  [address] [place] [id number] |
+----------+-------------------------------------------------+
 in this encounter
 
 Visit Diagnoses
 
 
+--------------------------------------------------------------------+
| Diagnosis                                                          |
+--------------------------------------------------------------------+
| H/O syncope - Primary                                              |
+--------------------------------------------------------------------+
|   Personal history of other specified diseases                     |
+--------------------------------------------------------------------+
| Essential hypertension with goal blood pressure less than 130/80   |
+--------------------------------------------------------------------+
| Hyperlipidemia, unspecified hyperlipidemia type                    |
+--------------------------------------------------------------------+
| Chronic obstructive pulmonary disease, unspecified COPD type (HCC) |
+--------------------------------------------------------------------+
 
| Thoracoabdominal aortic aneurysm, without rupture (HCC)            |
+--------------------------------------------------------------------+
|   Thoracoabdominal aneurysm without mention of rupture             |
+--------------------------------------------------------------------+
| Obstructive sleep apnea syndrome                                   |
+--------------------------------------------------------------------+
|   Obstructive sleep apnea (adult) (pediatric)                      |
+--------------------------------------------------------------------+
| Former heavy tobacco smoker                                        |
+--------------------------------------------------------------------+
| History of anemia                                                  |
+--------------------------------------------------------------------+
|   Personal history of diseases of blood and blood-forming organs   |
+--------------------------------------------------------------------+
| Murmur, cardiac                                                    |
+--------------------------------------------------------------------+
|   Undiagnosed cardiac murmurs                                      |
+--------------------------------------------------------------------+

## 2018-04-14 NOTE — XMS
Clinical Summary
  Created on: 2018
 
 Jarod Arboleda
 External Reference #: [id number]
 : 64
 Sex: Male
 
 Demographics
 
 
+-----------------------+-----------------------+
| Address               | [address] |
|                       | [place]  [address]  |
+-----------------------+-----------------------+
| Home Phone            | [phone]       |
+-----------------------+-----------------------+
| Preferred Language    | Unknown               |
+-----------------------+-----------------------+
| Marital Status        | Single                |
+-----------------------+-----------------------+
| Restoration Affiliation | [id number]                  |
+-----------------------+-----------------------+
| Race                  | Unknown               |
+-----------------------+-----------------------+
| Ethnic Group          | Unknown               |
+-----------------------+-----------------------+
 
 
 Author
 
 
+--------------+-----------------------+
| Author       | [facility] |
+--------------+-----------------------+
| Organization | [facility] |
+--------------+-----------------------+
| Address      | Unknown               |
+--------------+-----------------------+
| Phone        | Unavailable           |
+--------------+-----------------------+
 
 
 
 Support
 
 
+-----------------+--------------+---------------------+-----------------+
| Name            | Relationship | Address             | Phone           |
+-----------------+--------------+---------------------+-----------------+
| Tejal Champagne | ECON         | [address]              | [phone] |
|                 |              | [address], [place]   |                 |
|                 |              | [address]               |                 |
+-----------------+--------------+---------------------+-----------------+
 
 
 
 Care Team Providers
 
 
 
+-----------------------+------+-----------------+
| Care Team Member Name | Role | Phone           |
+-----------------------+------+-----------------+
| Facundo Lees  | PP   | [phone] |
+-----------------------+------+-----------------+
 
 
 
 Allergies
 
 
+----------------+----------------------+----------+----------+---------------------+
| Active Allergy | Reactions            | Severity | Noted    | Comments            |
|                |                      |          | Date     |                     |
+----------------+----------------------+----------+----------+---------------------+
| Lisinopril     | Other (See Comments) | Medium   | 20 |   Dry hacking cough |
|                |                      |          | 16       |                     |
+----------------+----------------------+----------+----------+---------------------+
| Trazodone      | Agitation            | Low      | 20 |                     |
|                |                      |          | 16       |                     |
+----------------+----------------------+----------+----------+---------------------+
 
 
 
 Current Medications
 
 
+----------------------+----------------------+--------+---------+------+------+-------+
| Prescription         | Sig.                 | Disp.  | Refills | Star | End  | Statu |
|                      |                      |        |         | t    | Date | s     |
|                      |                      |        |         | Date |      |       |
+----------------------+----------------------+--------+---------+------+------+-------+
|   thiamine (VITAMIN  | Take 1 tablet by     |   30   | 0       |  |      | Activ |
| B-1) 100 MG tablet   | mouth daily.         | tablet |         |  |      | e     |
|                      |                      |        |         | 15   |      |       |
+----------------------+----------------------+--------+---------+------+------+-------+
|   pantoprazole       | Take 40 mg by mouth  |        |         |      |      | Activ |
| (PROTONIX) 40 MG     | daily.               |        |         |      |      | e     |
| tablet               |                      |        |         |      |      |       |
+----------------------+----------------------+--------+---------+------+------+-------+
|   ferrous sulfate,   | Take 65 mg of iron   |        |         |      |      | Activ |
| 65 FE, 324 (65 FE)   | by mouth daily with  |        |         |      |      | e     |
| MG EC tablet         | breakfast. With 250  |        |         |      |      |       |
|                      | mg Vit C             |        |         |      |      |       |
+----------------------+----------------------+--------+---------+------+------+-------+
|   ascorbic acid      | Take 250 mg by mouth |        |         |      |      | Activ |
| (VITAMIN C) 250 MG   |  daily.              |        |         |      |      | e     |
| tablet               |                      |        |         |      |      |       |
+----------------------+----------------------+--------+---------+------+------+-------+
|   cloNIDine          | Take 0.3 mg by mouth |        |         |      |      | Activ |
| (CATAPRES) 0.3 MG    |  nightly.            |        |         |      |      | e     |
| tablet               |                      |        |         |      |      |       |
+----------------------+----------------------+--------+---------+------+------+-------+
|   tamsulosin         | Take 0.4 mg by mouth |        |         |      |      | Activ |
| (FLOMAX) 0.4 MG      |   After dinner.      |        |         |      |      | e     |
| capsule              |                      |        |         |      |      |       |
+----------------------+----------------------+--------+---------+------+------+-------+
|   nitroGLYCERIN      | Place 1 tablet under |   90   | 0       | 05/0 |      | Activ |
 
| (NITROSTAT) 0.4 MG   |  the tongue every 5  | tablet |         | 7/20 |      | e     |
| SL tablet            | (five) minutes as    |        |         | 16   |      |       |
|                      | needed for Chest     |        |         |      |      |       |
|                      | pain.                |        |         |      |      |       |
+----------------------+----------------------+--------+---------+------+------+-------+
|   Multiple           | Take  by mouth       |        |         |      |      | Activ |
| Vitamins-Minerals    | daily.               |        |         |      |      | e     |
| (RA CENTRAL-BIBI PO) |                      |        |         |      |      |       |
+----------------------+----------------------+--------+---------+------+------+-------+
|                      | Inhale 1 puff into   |        |         |      |      | Activ |
| fluticasone-salmeter | the lungs 2 (two)    |        |         |      |      | e     |
| ol (ADVAIR) 500-50   | times daily.         |        |         |      |      |       |
| MCG/DOSE diskus      |                      |        |         |      |      |       |
| inhaler              |                      |        |         |      |      |       |
+----------------------+----------------------+--------+---------+------+------+-------+
|   gabapentin         | Take 300 mg by mouth |        |         |      |      | Activ |
| (NEURONTIN) 600 MG   |  3 (three) times     |        |         |      |      | e     |
| tablet               | daily.               |        |         |      |      |       |
+----------------------+----------------------+--------+---------+------+------+-------+
|   ibuprofen (MOTRIN) | Take 600 mg by mouth |        |         |      |      | Activ |
|  600 MG tablet       |  every 6 (six) hours |        |         |      |      | e     |
|                      |  as needed for Pain. |        |         |      |      |       |
|                      |  Trying to use       |        |         |      |      |       |
|                      | sparingly            |        |         |      |      |       |
+----------------------+----------------------+--------+---------+------+------+-------+
|   montelukast        | Take 10 mg by mouth  |        |         |      |      | Activ |
| (SINGULAIR) 10 MG    | nightly.             |        |         |      |      | e     |
| tablet               |                      |        |         |      |      |       |
+----------------------+----------------------+--------+---------+------+------+-------+
|                      | Take 12.5 mg by      |        |         |      |      | Activ |
| hydrochlorothiazide  | mouth daily.         |        |         |      |      | e     |
| (HYDRODIURIL) 12.5   |                      |        |         |      |      |       |
| MG tablet            |                      |        |         |      |      |       |
+----------------------+----------------------+--------+---------+------+------+-------+
|   amLODIPine         | Take 5 mg by mouth   |        |         |      |      | Activ |
| (NORVASC) 5 MG       | daily.               |        |         |      |      | e     |
| tablet               |                      |        |         |      |      |       |
+----------------------+----------------------+--------+---------+------+------+-------+
|   amitriptyline      | Take 100 mg by mouth |        |         |      |      | Activ |
| (ELAVIL) 100 MG      |  nightly.            |        |         |      |      | e     |
| tablet               |                      |        |         |      |      |       |
+----------------------+----------------------+--------+---------+------+------+-------+
|   albuterol          | Inhale 2 puffs into  |        |         |      |      | Activ |
| (PROVENTIL           | the lungs every 4    |        |         |      |      | e     |
| HFA;VENTOLIN HFA)    | (four) hours as      |        |         |      |      |       |
| 108 (90 BASE)        | needed for Wheezing. |        |         |      |      |       |
| MCG/ACT inhaler      |                      |        |         |      |      |       |
+----------------------+----------------------+--------+---------+------+------+-------+
|   umeclidinium       | Inhale 1 puff into   |        |         |      |      | Activ |
| bromide (INCRUSE     | the lungs daily.     |        |         |      |      | e     |
| ELLIPTA) 62.5        |                      |        |         |      |      |       |
| MCG/INH inhaler      |                      |        |         |      |      |       |
+----------------------+----------------------+--------+---------+------+------+-------+
|   diclofenac         | Take 50 mg by mouth  |        |         |      |      | Activ |
| (CATAFLAM) 50 MG     | 2 (two) times daily. |        |         |      |      | e     |
| tablet               |                      |        |         |      |      |       |
+----------------------+----------------------+--------+---------+------+------+-------+
|                      | Take 3 mLs by        |        |         |      |      | Activ |
| ipratropium-albutero | nebulization as      |        |         |      |      | e     |
| l (DUO-NEB) 0.5-2.5  | needed.              |        |         |      |      |       |
 
| mg/3mL               |                      |        |         |      |      |       |
+----------------------+----------------------+--------+---------+------+------+-------+
|   atorvastatin       | Take 1 tablet by     |   30   | 11      | 01/2 |      | Activ |
| (LIPITOR) 40 MG      | mouth nightly.       | tablet |         | 9/20 |      | e     |
| tablet               |                      |        |         | 18   |      |       |
+----------------------+----------------------+--------+---------+------+------+-------+
|   metoprolol         | Take 1 tablet by     |   60   | 11      | 01/2 |      | Activ |
| (LOPRESSOR) 25 MG    | mouth 2 (two) times  | tablet |         | 9/20 |      | e     |
| tablet               | daily.               |        |         | 18   |      |       |
+----------------------+----------------------+--------+---------+------+------+-------+
|   oxyCODONE          | Take 5 mg by mouth   |        |         |      |      | Activ |
| (ROXICODONE) 5 MG    | every 4 (four) hours |        |         |      |      | e     |
| immediate release    |  as needed for Pain. |        |         |      |      |       |
| tablet               |                      |        |         |      |      |       |
+----------------------+----------------------+--------+---------+------+------+-------+
|   cyanocobalamin     | Take 1,000 mcg by    |        |         |      |      | Activ |
| (VITAMIN B-12) 1000  | mouth daily.         |        |         |      |      | e     |
| MCG tablet           |                      |        |         |      |      |       |
+----------------------+----------------------+--------+---------+------+------+-------+
 
 
 
 Active Problems
 
 
+---------------------------------------------+------------+
| Problem                                     | Noted Date |
+---------------------------------------------+------------+
| Mild persistent asthma without complication | 2017 |
+---------------------------------------------+------------+
| Obstructive sleep apnea syndrome            | 2017 |
+---------------------------------------------+------------+
| H/O syncope                                 | 2016 |
+---------------------------------------------+------------+
 
 
 
+-------------------------------------------------------------------+
|   Last Assessment & Plan:   Syncope.         55yo WM, with Hx     |
| syncope.  Our workup to date is benign, including a 32 day        |
| cardiac event monitor.  He remains moderately active, no          |
| recurrent syncope, although he does have episodes of              |
| lightheadedness dizziness, but not to a point where he is near    |
| syncopal.  He denies any chest discomfort or shortness of breath. |
|   Tolerating medications.  No changes in therapy.  Will follow    |
| clinically.Hx Pacemaker/ICD: noLast Cath: naLast Echo, 2016  |
| ([facility]): LVEF 65-70%, trace MR, trace TR.  Ascending Aorta |
|  41mm, Aortic arch 37mm.Last Stress Test, 2016: Lexiscan, no  |
| ischemia detected, LVEF 63%.32-Day Cardiac Event Monitor,         |
| 2016: sinus rhythm, , averaging 80bpm, rare ectopy, no |
|  AVB/pauses, symptoms of "chest pain, SOB, dizziness, fluttering" |
|  all associated with NSR.ECG, 2016: NSR, 63bpm.              |
+-------------------------------------------------------------------+
 
 
 
+----------------------------------------------+------------+
| COPD (chronic obstructive pulmonary disease) | 2016 |
+----------------------------------------------+------------+
 
 
 
 
+-------------------------------------------------------------------+
|   Last Assessment & Plan:   COPD, ex-smoker (2015), managed by  |
| PCP.                                                              |
+-------------------------------------------------------------------+
 
 
 
+------------------------+------------+
| Precordial pain        | 2016 |
+------------------------+------------+
| Essential hypertension | 2016 |
+------------------------+------------+
 
 
 
+-----------------------------------------------------------------+
|   Last Assessment & Plan:   Hypertension, controlled, continue  |
| current meds at current doses (amlodipine, clonidine, HCT,      |
| metoprolol).                                                    |
+-----------------------------------------------------------------+
 
 
 
+----------------------+------------+
| HLD (hyperlipidemia) | 2016 |
+----------------------+------------+
 
 
 
+-------------------------------------------------------------------+
|   Last Assessment & Plan:   Hyperlipidemia, continue current meds |
|  at current dose (atorvastatin).                                  |
+-------------------------------------------------------------------+
 
 
 
+---------------------------------------------------------------+------------+
| Alcohol withdrawal                                            | 2015 |
+---------------------------------------------------------------+------------+
| Acute respiratory failure (HCC)                               | 2015 |
+---------------------------------------------------------------+------------+
| Personal history of tobacco use, presenting hazards to health | 2015 |
+---------------------------------------------------------------+------------+
| Obstructive sleep apnea (adult) (pediatric)                   | 2015 |
+---------------------------------------------------------------+------------+
| Morbid obesity (HCC)                                          | 2015 |
+---------------------------------------------------------------+------------+
| Accelerated hypertension                                      | 2015 |
+---------------------------------------------------------------+------------+
 
 
 
 Encounters
 
 
+--------+-------------+-----------+----------------------+----------------------+
| Date   | Type        | Specialty | Care Team            | Description          |
+--------+-------------+-----------+----------------------+----------------------+
 
| / | Office      |           |   PernelljhonyJanette    | Essential            |
| 2018   | Visit       |           | DIETER Johnson           | hypertension with    |
|        |             |           |                      | goal blood pressure  |
|        |             |           |                      | less than 130/80     |
|        |             |           |                      | (Primary Dx);        |
|        |             |           |                      | Hyperlipidemia,      |
|        |             |           |                      | unspecified          |
|        |             |           |                      | hyperlipidemia type; |
|        |             |           |                      |  Thoracoabdominal    |
|        |             |           |                      | aortic aneurysm,     |
|        |             |           |                      | without rupture      |
|        |             |           |                      | (HCC); H/O syncope;  |
|        |             |           |                      | Murmur, cardiac;     |
|        |             |           |                      | Obstructive sleep    |
|        |             |           |                      | apnea syndrome;      |
|        |             |           |                      | Chronic obstructive  |
|        |             |           |                      | pulmonary disease,   |
|        |             |           |                      | unspecified COPD     |
|        |             |           |                      | type (HCC); Former   |
|        |             |           |                      | heavy tobacco        |
|        |             |           |                      | smoker; History of   |
|        |             |           |                      | anemia               |
+--------+-------------+-----------+----------------------+----------------------+
| / | Documentati |           |   [facility] Galav[facility] MA |                      |
| 2018   | on Only     |           |                      |                      |
+--------+-------------+-----------+----------------------+----------------------+
| / | Documentati |           |   Sherron Almanzar,  | Piter Cervantes          |
| 2018   | on Only     |           | CMA                  | BLANCA Lees)        |
+--------+-------------+-----------+----------------------+----------------------+
| / | Documentati |           |   Sherron Almanzar,  | Other ([facility]    |
|    | on Only     |           | CMA                  | Clinic, Pulmonary    |
|        |             |           |                      | Function Test)       |
+--------+-------------+-----------+----------------------+----------------------+
| / | Ancillary   |           |   Janette Woodward    | H/O syncope;         |
|    | Procedure   |           | DIETER Johnson           | Essential            |
|        |             |           |                      | hypertension with    |
|        |             |           |                      | goal blood pressure  |
|        |             |           |                      | less than 130/80;    |
|        |             |           |                      | Hyperlipidemia,      |
|        |             |           |                      | unspecified          |
|        |             |           |                      | hyperlipidemia type; |
|        |             |           |                      |  Chronic obstructive |
|        |             |           |                      |  pulmonary disease,  |
|        |             |           |                      | unspecified COPD     |
|        |             |           |                      | type ([facility]);          |
|        |             |           |                      | Thoracoabdominal     |
|        |             |           |                      | aortic aneurysm,     |
|        |             |           |                      | without rupture      |
|        |             |           |                      | (HCC); Obstructive   |
|        |             |           |                      | sleep apnea syndrome |
+--------+-------------+-----------+----------------------+----------------------+
| / | Documentati |           |   Josseline Kwok,  | Other ([facility]     |
|    | on Only     |           | MA                   | Labs)                |
+--------+-------------+-----------+----------------------+----------------------+
| / | Documentati |           |   Stormy,     | Labs Only            |
| 2018   | on Only     |           | [facility]          |                      |
+--------+-------------+-----------+----------------------+----------------------+
| / | Office      |           |   Janette Woodward    | H/O syncope (Primary |
| 2018   | Visit       |           | DIETER Johnson           |  Dx); Essential      |
|        |             |           |                      | hypertension with    |
 
|        |             |           |                      | goal blood pressure  |
|        |             |           |                      | less than 130/80;    |
|        |             |           |                      | Hyperlipidemia,      |
|        |             |           |                      | unspecified          |
|        |             |           |                      | hyperlipidemia type; |
|        |             |           |                      |  Chronic obstructive |
|        |             |           |                      |  pulmonary disease,  |
|        |             |           |                      | unspecified COPD     |
|        |             |           |                      | type (HCC);          |
|        |             |           |                      | Thoracoabdominal     |
|        |             |           |                      | aortic aneurysm,     |
|        |             |           |                      | without rupture      |
|        |             |           |                      | (HCC); Obstructive   |
|        |             |           |                      | sleep apnea          |
|        |             |           |                      | syndrome; Former     |
|        |             |           |                      | heavy tobacco        |
|        |             |           |                      | smoker; History of   |
|        |             |           |                      | anemia; Murmur,      |
|        |             |           |                      | cardiac              |
+--------+-------------+-----------+----------------------+----------------------+
| / | Telephone   |           |   Lupe Nolan,    |                      |
|    |             |           | CMA                  |                      |
+--------+-------------+-----------+----------------------+----------------------+
 from Last 3 Months
 
 Immunizations
 
 
+-----------------+------------------------+----------+
| Name            | Dates Previously Given | Next Due |
+-----------------+------------------------+----------+
| Pneumococcal    | 2015             |          |
| Polysaccharide  |                        |          |
| 23-valent       |                        |          |
+-----------------+------------------------+----------+
 
 
 
 Family History
 
 
+--------------------+----------+------+----------+
| Medical History    | Relation | Name | Comments |
+--------------------+----------+------+----------+
| Sickle cell anemia | Father   |      |          |
+--------------------+----------+------+----------+
| Alcohol abuse      | Mother   |      |          |
+--------------------+----------+------+----------+
| Diabetes type I    | Mother   |      |          |
+--------------------+----------+------+----------+
| Diabetes type II   | Mother   |      |          |
+--------------------+----------+------+----------+
 
 
 
+----------+------+----------+--------------------+
| Relation | Name | Status   | Comments           |
+----------+------+----------+--------------------+
| Father   |      |  | sickle cell anemia |
|          |      |   (Age   |                    |
 
|          |      | 70)      |                    |
+----------+------+----------+--------------------+
| Mother   |      | Alive    |                    |
+----------+------+----------+--------------------+
 
 
 
 Social History
 
 
+---------------+-------+-----------+--------+------------------+
| Tobacco Use   | Types | Packs/Day | Years  | Date             |
|               |       |           | Used   |                  |
+---------------+-------+-----------+--------+------------------+
| Former Smoker |       | 3         | 37     | Quit: 10/05/2015 |
+---------------+-------+-----------+--------+------------------+
 
 
 
+---------------------+---+---+----------+
| Smokeless Tobacco:  |   |   | Quit:    |
| Former User         |   |   | 10/05/20 |
|                     |   |   | 15       |
+---------------------+---+---+----------+
 
 
 
+--------------------------------------+
| Tobacco Cessation: Ready to Quit: No |
+--------------------------------------+
 
 
 
+-------------+-----------+---------+---------------------------+
| Alcohol Use | Drinks/We | oz/Week | Comments                  |
|             | ek        |         |                           |
+-------------+-----------+---------+---------------------------+
| No          |   0       | 0.0     | heavy drinker in past hx. |
|             | Standard  |         |                           |
|             | drinks or |         |                           |
|             |           |         |                           |
|             | equivalen |         |                           |
|             | t         |         |                           |
+-------------+-----------+---------+---------------------------+
 
 
 
+------------------+---------------+
| Sex Assigned at  | Date Recorded |
| Birth            |               |
+------------------+---------------+
| Not on file      |               |
+------------------+---------------+
 
 
 
 Last Filed Vital Signs
 
 
+-------------------+----------------------+-------------------------+
 
| Vital Sign        | Reading              | Time Taken              |
+-------------------+----------------------+-------------------------+
| Blood Pressure    | 112/60               | 2018 10:53 AM PDT |
+-------------------+----------------------+-------------------------+
| Pulse             | 67                   | 2018 10:53 AM PDT |
+-------------------+----------------------+-------------------------+
| Temperature       | 36.4   C (97.6   F)  | 2017  9:51 AM PST |
+-------------------+----------------------+-------------------------+
| Respiratory Rate  | 20                   | 2018 10:53 AM PDT |
+-------------------+----------------------+-------------------------+
| Oxygen Saturation | 98%                  | 2018 10:53 AM PDT |
+-------------------+----------------------+-------------------------+
| Inhaled Oxygen    | -                    | -                       |
| Concentration     |                      |                         |
+-------------------+----------------------+-------------------------+
| Weight            | 106.6 kg (235 lb 1.6 | 2018 10:53 AM PDT |
|                   |  oz)                 |                         |
+-------------------+----------------------+-------------------------+
| Height            | 177.8 cm (5' 10")    | 2018 10:53 AM PDT |
+-------------------+----------------------+-------------------------+
| Body Mass Index   | 33.73                | 2018 10:53 AM PDT |
+-------------------+----------------------+-------------------------+
 
 
 
 Plan of Treatment
 
 
+--------+---------+-----------+----------------------+-------------+
| Date   | Type    | Specialty | Care Team            | Description |
+--------+---------+-----------+----------------------+-------------+
| / | Office  |           |   Jhonatan Louie MD  |             |
| 2018   | Visit   |           |  [address]    |             |
|        |         |           | [place] [address]   |             |
|        |         |           | [phone]         |             |
|        |         |           | [phone] (Fax)   |             |
+--------+---------+-----------+----------------------+-------------+
 
 
 
+----------------------+-----------+--------------------------+----------+
| Health Maintenance   | Due Date  | Last Done                | Comments |
+----------------------+-----------+--------------------------+----------+
| Vaccine:             |  |                          |          |
| Dtap/Tdap/Td (1 -    | 1         |                          |          |
| Tdap)                |           |                          |          |
+----------------------+-----------+--------------------------+----------+
| Colon Cancer         |  |                          |          |
| Screening            | 2         |                          |          |
| (Colonoscopy)        |           |                          |          |
+----------------------+-----------+--------------------------+----------+
| Vaccine: Influenza   |  | 10/27/2015, 10/21/2013,  |          |
| (Season Ended)       | 8         | 2013               |          |
+----------------------+-----------+--------------------------+----------+
| Vaccine:             | Completed | 2015, 2015   |          |
| Pneumococcal 19-64   |           |                          |          |
| (PPSV23 only) Medium |           |                          |          |
|  Risk                |           |                          |          |
+----------------------+-----------+--------------------------+----------+
 
 
 
 
 Procedures
 
 
+-----------------+--------+-------------+----------------------+----------------------+
| Procedure Name  | Priori | Date/Time   | Associated Diagnosis | Comments             |
|                 | ty     |             |                      |                      |
+-----------------+--------+-------------+----------------------+----------------------+
| ECHO OUTSIDE    | Routin | 2018  |   H/O syncope        |   Results for this   |
| INTERPRETATION  | e      |  3:43 PM    | Essential            | procedure are in the |
| STANDARD        |        | PST         | hypertension with    |  results section.    |
|                 |        |             | goal blood pressure  |                      |
|                 |        |             | less than 130/80     |                      |
|                 |        |             | Hyperlipidemia,      |                      |
|                 |        |             | unspecified          |                      |
|                 |        |             | hyperlipidemia type  |                      |
|                 |        |             |  Chronic obstructive |                      |
|                 |        |             |  pulmonary disease,  |                      |
|                 |        |             | unspecified COPD     |                      |
|                 |        |             | type (HCC)           |                      |
|                 |        |             | Thoracoabdominal     |                      |
|                 |        |             | aortic aneurysm,     |                      |
|                 |        |             | without rupture      |                      |
|                 |        |             | (HCC)  Obstructive   |                      |
|                 |        |             | sleep apnea syndrome |                      |
+-----------------+--------+-------------+----------------------+----------------------+
 from Last 3 Months
 
 Results
 ECHO outside interpretation standard (2018  3:43 PM)
 
+----------+--------------------------------------------------------+
| Specimen | Performing Laboratory                                  |
+----------+--------------------------------------------------------+
|          |   [facility] [id number] [place] [address] |
+----------+--------------------------------------------------------+
 
 
 
+------------------------------------------------------------------------------------------+
| Impressions                                                                              |
+------------------------------------------------------------------------------------------+
|   1. Overall left ventricular systolic function is normal with, an EF between 60 - 65 %. |
|   2. The right ventricle is normal in size and function.  3. The aortic root, ascending  |
| aorta and aortic arch are normal. 4. No significant valvular abnormalities are noted.    |
+------------------------------------------------------------------------------------------+
 
 
 
+------------------------------------------------------------------------------------------+
| Narrative                                                                                |
+------------------------------------------------------------------------------------------+
|      Patient Name:    Jarod Arboleda  YOB: 1960                       |
| Accession:    [id number]     Performing Physician:    LOR DELGADO MD                      |
| _______________________________________________________________  INDICATIONS             |
| -----------  F/U thoracic/abdominal aneurysm     CONCLUSIONS  -----------  1. Overall    |
| left ventricular systolic function is normal with, an EF between 60 - 65 %.  2. The      |
| right ventricle is normal in size and function.  3. The aortic root, ascending aorta and |
|  aortic arch are normal. 4. No significant valvular abnormalities are noted.             |
 
| FINDINGS  --------  ECG rhythm: Sinus rhythm.   ECG rhythm: Resting bradycardia          |
| (HR<60bpm).  Study: A 2-dimensional transthoracic echocardiogram with m-mode, spectral   |
| and color flow Doppler was perfomed.   Study: This was a technically adequate study.     |
| Left Ventricle: Overall left ventricular systolic function is normal with, an EF between |
|  60 - 65 %.   Left Ventricle: The left ventricle cavity size is normal.   Left           |
| Ventricle: Left ventricular wall thickness is normal.   Left Ventricle: No regional wall |
|  motion abnormalities.   Left Ventricle: The diastolic filling pattern is normal for the |
|  age of the patient.  Right Ventricle: The right ventricle is normal in size and         |
| function.  Left Atrium: The left atrium is normal in size.  Right Atrium: The right      |
| atrium is normal in size.   Aortic Valve: Aortic valve is trileaflet and is mildly       |
| thickened.   Aortic Valve: There is no evidence of aortic regurgitation.   Aortic Valve: |
|  There is no evidence of aortic stenosis.  Mitral Valve: The mitral valve is normal.     |
| Mitral Valve: There is trace mitral regurgitation.   Mitral Valve: No evidence of MVP or |
|  mitral stenosis.  Tricuspid Valve: The tricuspid valve appears structurally normal.     |
| Tricuspid Valve: Pulmonary artery systolic pressure could not be assessed due to the     |
| absence of adequate TR jet.  Pulmonic Valve: The pulmonic valve was not well visualized. |
|   Pericardium: There is no pericardial effusion.  IVC/Hepatic Veins: The IVC is small    |
| (<1.5cm) and collapses with sniff, consistent with central venous pressures of 0-5mmHg.  |
|  Aorta: The aortic root, ascending aorta and aortic arch are normal.  Mass: No mass      |
| visualized  Thrombus: No clot visualized   Thrombus: No vegetation visualized.  Septum:  |
| No ASD observed.   Septum: No VSD observed.     MEASUREMENTS  -------------  Ao asc:     |
|  3.65 cm  Ao sinus:     3.46 cm  Ao st junct:     2.91 cm  IVC:     1.27 cm              |
| EDV(Teich):     105.93 ml  IVSd:     1.06 cm  LVIDd:     4.76 cm  LVPWd:     0.90 cm     |
| LVOT Diam:     2.31 cm  %FS:     22.34 %  EF(Teich):     44.99 %  ESV(Teich):     58.27  |
| ml  LVIDs:     3.70 cm  SV(Teich):     47.66 ml  RVIDd:     3.07 cm  Ao root:     32.72  |
| mm  LVEF MOD A2C:     56.41 %  SV MOD A2C:     46.46 ml  LVEF MOD A4C:     55.05 %  SV   |
| MOD A4C:     55.55 ml  EF Biplane:     55.87 %  LVEDV MOD BP:     92.29 ml  LVESV MOD    |
| BP:     40.72 ml  LVEDV MOD A2C:     82.36 ml  LVLd A2C:     9.15 cm  LVEDV MOD A4C:     |
|  100.89 ml  LVLd A4C:     9.46 cm  LVESV MOD A2C:     35.90 ml  LVLs A2C:     6.85 cm    |
| LVESV MOD A4C:     45.34 ml  LVLs A4C:     7.06 cm  LAESV(A-L):     54.75 ml  LAESV      |
| Index (A-L):     24.66 ml/m2  LAAs A2C:     15.85 cm2  LAESV A-L A2C:     42.05 ml  LALs |
|  A2C:     5.07 cm  LAAs A4C:     20.65 cm2  LAESV A-L A4C:     68.39 ml  LALs A4C:       |
| 5.29 cm  RAAs:     16.87 cm2  RAESV A-L:     44.78 ml  RAESV MOD:     45.69 ml           |
| RALs:     5.39 cm  TAPSE:     2.19 cm  AV maxP.05 mmHg  AV meanPG:     3.73 mmHg |
|   AV Vmax:     1.32 m/s  AV Vmean:     0.90 m/s  AV VTI:     29.89 cm  CHIRAG Vmax:         |
| 3.41 cm2  CHIRAG (VTI):     3.01 cm2  AVAI Vmax:     0.00 cm2/m2  AVAI (VTI):     0.00      |
| cm2/m2  LVOT maxP.63 mmHg  LVOT meanP.94 mmHg  LVSI Dopp:     40.60      |
| ml/m2  LVSV Dopp:     90.15 ml  LVOT Vmax:     1.07 m/s  LVOT Vmean:     0.63 m/s  LVOT  |
| VTI:     21.41 cm  MV A Librado:     0.37 m/s  MV DecT:     232.01 ms  MV E Librado:     0.58    |
| m/s  MV E/A Ratio:     1.53   Septal e':     0.08 m/s  Septal E/e':     7.16   Lateral   |
| e':     0.07 m/s  Lateral E/e':     7.35      Sonographer:   Authenticated               |
| by:    LOR DELGADO MD  Report Date/Time: 2018 17:52:14                            |
+------------------------------------------------------------------------------------------+
 
 
 
+-------------------------------------------------------------------------------------------
-------------------------+
| Procedure Note                                                                            
                         |
+-------------------------------------------------------------------------------------------
-------------------------+
|   Tank, Rad Results In - 2018  6:00 PM PST  Patient Name:  Jessica Arboleda of     
                         |
| Birth:  ccession:  [id number] Physician:  LOR DELGADO MD              
                         |
| _______________________________________________________________INDICATIONS-----------F/U  
                         |
|  thoracic/abdominal aneurysmCONCLUSIONS-----------1. Overall left ventricular systolic    
                         |
 
| function is normal with, an EF between 60 - 65 %.2. The right ventricle is normal in      
                         |
| size and function.3. The aortic root, ascending aorta and aortic arch are normal. 4. No   
                         |
| significant valvular abnormalities are noted.FINDINGS--------ECG rhythm: Sinus rhythm.    
                         |
| ECG rhythm: Resting bradycardia (HR<60bpm).Study: A 2-dimensional transthoracic           
                         |
| echocardiogram with m-mode, spectral and color flow Doppler was perfomed. Study: This     
                         |
| was a technically adequate study.Left Ventricle: Overall left ventricular systolic        
                         |
| function is normal with, an EF between 60 - 65 %. Left Ventricle: The left ventricle      
                         |
| cavity size is normal. Left Ventricle: Left ventricular wall thickness is normal. Left    
                         |
| Ventricle: No regional wall motion abnormalities. Left Ventricle: The diastolic filling   
                         |
| pattern is normal for the age of the patient.Right Ventricle: The right ventricle is      
                         |
| normal in size and function.Left Atrium: The left atrium is normal in size.Right Atrium:  
                         |
|  The right atrium is normal in size. Aortic Valve: Aortic valve is trileaflet and is      
                         |
| mildly thickened. Aortic Valve: There is no evidence of aortic regurgitation. Aortic      
                         |
| Valve: There is no evidence of aortic stenosis.Mitral Valve: The mitral valve is normal.  
                         |
|  Mitral Valve: There is trace mitral regurgitation. Mitral Valve: No evidence of MVP or   
                         |
| mitral stenosis.Tricuspid Valve: The tricuspid valve appears structurally normal.         
                         |
| Tricuspid Valve: Pulmonary artery systolic pressure could not be assessed due to the      
                         |
| absence of adequate TR jet.Pulmonic Valve: The pulmonic valve was not well                
                         |
| visualized.Pericardium: There is no pericardial effusion.IVC/Hepatic Veins: The IVC is    
                         |
| small (<1.5cm) and collapses with sniff, consistent with central venous pressures of      
                         |
| 0-5mmHg.Aorta: The aortic root, ascending aorta and aortic arch are normal.Mass: No mass  
                         |
|  visualizedThrombus: No clot visualized Thrombus: No vegetation visualized.Septum: No     
                         |
| ASD observed. Septum: No VSD observed.MEASUREMENTS-------------Ao asc:   3.65 cmAo        
                         |
| sinus:   3.46 cmAo st junct:   2.91 cmIVC:   1.27 cmEDV(Teich):   105.93 mlIVSd:   1.06   
                         |
| cmLVIDd:   4.76 cmLVPWd:   0.90 cmLVOT Diam:   2.31 cm%FS:   22.34 %EF(Teich):   44.99    
                         |
| %ESV(Teich):   58.27 mlLVIDs:   3.70 cmSV(Teich):   47.66 mlRVIDd:   3.07 cmAo root:      
                         |
| 32.72 mmLVEF MOD A2C:   56.41 %SV MOD A2C:   46.46 mlLVEF MOD A4C:   55.05 %SV MOD A4C:   
                         |
|   55.55 mlEF Biplane:   55.87 %LVEDV MOD BP:   92.29 mlLVESV MOD BP:   40.72 mlLVEDV MOD  
                         |
|  A2C:   82.36 mlLVLd A2C:   9.15 cmLVEDV MOD A4C:   100.89 mlLVLd A4C:   9.46 cmLVESV     
                         |
| MOD A2C:   35.90 mlLVLs A2C:   6.85 cmLVESV MOD A4C:   45.34 mlLVLs A4C:   7.06           
                         |
 
| cmLAESV(A-L):   54.75 mlLAESV Index (A-L):   24.66 ml/m2LAAs A2C:   15.85 [id number] A-L    
                         |
| A2C:   42.05 mlLALs A2C:   5.07 cmLAAs A4C:   20.65 [id number] A-L A4C:   68.39 mlLALs      
                         |
| A4C:   5.29 cmRAAs:   16.87 [id number] A-L:   44.78 mlRAESV MOD:   45.69 mlRALs:   5.39     
                         |
| cmTAPSE:   2.19 cmAV maxP.05 mmHgAV meanPG:   3.73 mmHgAV Vmax:   1.32 m/Hill        
                         |
| Vmean:   0.90 m/Hill VTI:   29.89 cmAVA Vmax:   3.41 cm2AVA (VTI):   3.01 [id number] Vmax:    
                         |
|  0.00 cm2/m2AVAI (VTI):   0.00 cm2/m2LVOT maxP.63 mmHgLVOT meanP.94 mmHgLVSI  
                         |
|  Dopp:   40.60 ml/m2LVSV Dopp:   90.15 mlLVOT Vmax:   1.07 m/sLVOT Vmean:   0.63 m/sLVOT  
                         |
|  VTI:   21.41 cmMV A Librado:   0.37 m/sMV DecT:   232.01 msMV E Librado:   0.58 m/sMV E/A        
                         |
| Ratio:   1.53 Septal e':   0.08 m/sSeptal E/e':   7.16 Lateral e':   0.07 m/sLateral      
                         |
| E/e':   7.35 Sonographer: Authenticated by:  Saurabh HERR Date/Time: 2018  
                         |
|  17:52:14IMPRESSION:1. Overall left ventricular systolic function is normal with, an EF   
                         |
| between 60 - 65 %.2. The right ventricle is normal in size and function.3. The aortic     
                         |
| root, ascending aorta and aortic arch are normal. 4. No significant valvular              
                         |
| abnormalities are noted.                                                                  
                         |
|Septum: No VSD observed.                                                                   
                         |
|MEASUREMENTS                                                                               
                        |
|-------------                                                                              
                          |
|Ao asc:   3.65 cm                                                                          
                         |
|Ao sinus:   3.46 cm                                                                        
                         |
|Ao st junct:   2.91 cm                                                                     
                         |
|IVC:   1.27 cm                                                                             
                         |
|EDV(Teich):   105.93 ml                                                                    
                         |
|IVSd:   1.06 cm                                                                            
                         |
|LVIDd:   4.76 cm                                                                           
                         |
|LVPWd:   0.90 cm                                                                           
                         |
|LVOT Diam:   2.31 cm                                                                       
                         |
|%FS:   22.34 %                                                                             
                         |
|EF(Teich):   44.99 %                                                                       
                         |
|ESV(Teich):   58.27 ml                                                                     
                         |
 
|LVIDs:   3.70 cm                                                                           
                         |
|SV(Teich):   47.66 ml                                                                      
                         |
|RVIDd:   3.07 cm                                                                           
                         |
|Ao root:   32.72 mm                                                                        
                         |
|LVEF MOD A2C:   56.41 %                                                                    
                         |
|SV MOD A2C:   46.46 ml                                                                     
                         |
|LVEF MOD A4C:   55.05 %                                                                    
                         |
|SV MOD A4C:   55.55 ml                                                                     
                         |
|EF Biplane:   55.87 %                                                                      
                         |
|LVEDV MOD BP:   92.29 ml                                                                   
                         |
|LVESV MOD BP:   40.72 ml                                                                   
                         |
|LVEDV MOD A2C:   82.36 ml                                                                  
                         |
|LVLd A2C:   9.15 cm                                                                        
                         |
|LVEDV MOD A4C:   100.89 ml                                                                 
                         |
|LVLd A4C:   9.46 cm                                                                        
                         |
|LVESV MOD A2C:   35.90 ml                                                                  
                         |
|LVLs A2C:   6.85 cm                                                                        
                         |
|LVESV MOD A4C:   45.34 ml                                                                  
                         |
|LVLs A4C:   7.06 cm                                                                        
                         |
|LAESV(A-L):   54.75 ml                                                                     
                         |
|LAESV Index (A-L):   24.66 ml/m2                                                           
                         |
|LAAs A2C:   15.85 cm2                                                                      
                         |
|LAESV A-L A2C:   42.05 ml                                                                  
                         |
|LALs A2C:   5.07 cm                                                                        
                         |
|LAAs A4C:   20.65 cm2                                                                      
                         |
|LAESV A-L A4C:   68.39 ml                                                                  
                         |
|LALs A4C:   5.29 cm                                                                        
                         |
|RAAs:   16.87 cm2                                                                          
                         |
|RAESV A-L:   44.78 ml                                                                      
                         |
|RAESV MOD:   45.69 ml                                                                      
                         |
 
|RALs:   5.39 cm                                                                            
                         |
|TAPSE:   2.19 cm                                                                           
                         |
|AV maxP.05 mmHg                                                                      
                         |
|AV meanPG:   3.73 mmHg                                                                     
                         |
|AV Vmax:   1.32 m/s                                                                        
                         |
|AV Vmean:   0.90 m/s                                                                       
                         |
|AV VTI:   29.89 cm                                                                         
                         |
|CHIRAG Vmax:   3.41 cm2                                                                       
                         |
|CHIRAG (VTI):   3.01 cm2                                                                      
                         |
|AVAI Vmax:   0.00 cm2/m2                                                                   
                         |
|AVAI (VTI):   0.00 cm2/m2                                                                  
                         |
|LVOT maxP.63 mmHg                                                                    
                         |
|LVOT meanP.94 mmHg                                                                   
                         |
|LVSI Dopp:   40.60 ml/m2                                                                   
                         |
|LVSV Dopp:   90.15 ml                                                                      
                         |
|LVOT Vmax:   1.07 m/s                                                                      
                         |
|LVOT Vmean:   0.63 m/s                                                                     
                         |
|LVOT VTI:   21.41 cm                                                                       
                         |
|MV A Librado:   0.37 m/s                                                                       
                         |
|MV DecT:   232.01 ms                                                                       
                         |
|MV E Librado:   0.58 m/s                                                                       
                         |
|MV E/A Ratio:   1.53                                                                       
                         |
|Septal e':   0.08 m/s                                                                      
                         |
|Septal E/e':   7.16                                                                        
                         |
|Lateral e':   0.07 m/s                                                                     
                         |
|Lateral E/e':   7.35                                                                       
                         |
|Sonographer:                                                                               
                         |
|Authenticated by:  LOR DELGADO MD                                                        
                         |
|Report Date/Time: 2018 17:52:14                                                       
                         |
 
|                                                                                           
                         |
|IMPRESSION:                                                                                
                        |
|1. Overall left ventricular systolic function is normal with, an EF between 60 - 65 %.     
                         |
|2. The right ventricle is normal in size and function.                                     
                         |
|3. The aortic root, ascending aorta and aortic arch are normal. 4. No significant valvular 
abnormalities are noted. |
+-------------------------------------------------------------------------------------------
-------------------------+
 Iron panel (2018  7:42 AM)
 
+------------+-------+-----------+
| Component  | Value | Ref Range |
+------------+-------+-----------+
| IRON       | 82.66 | 37 - 160  |
+------------+-------+-----------+
| IRON % SAT | 24.1  | 20 - 55   |
+------------+-------+-----------+
| TIBC       | 343   | 245 - 400 |
+------------+-------+-----------+
 
 
 
+----------+------------------------------------------------------------------+
| Specimen | Performing Laboratory                                            |
+----------+------------------------------------------------------------------+
| Blood    |   [facility]  [address]  |
|          | [id number]                                                            |
+----------+------------------------------------------------------------------+
 Transferrin (2018  7:42 AM)
 
+-------------+--------+-----------+
| Component   | Value  | Ref Range |
+-------------+--------+-----------+
| TRANSFERRIN | 245.11 | 180 - 329 |
+-------------+--------+-----------+
 
 
 
+----------+------------------------------------------------------------------+
| Specimen | Performing Laboratory                                            |
+----------+------------------------------------------------------------------+
| Blood    |   [facility]  [address]  |
|          | [id number]                                                            |
+----------+------------------------------------------------------------------+
 Ferritin (2018  7:42 AM)
 
+-----------+-------+----------------+
| Component | Value | Ref Range      |
+-----------+-------+----------------+
| FERRITIN  | 162.8 | 30 - 400 ng/mL |
+-----------+-------+----------------+
 
 
 
+----------+------------------------------------------------------------------+
| Specimen | Performing Laboratory                                            |
 
+----------+------------------------------------------------------------------+
| Blood    |   [facility]  [address] [facility] [address]  [place], [place]  |
|          | [id number]                                                            |
+----------+------------------------------------------------------------------+
 Lipid panel (2018  7:42 AM)
 
+---------------+-------+----------------+
| Component     | Value | Ref Range      |
+---------------+-------+----------------+
| CHOLESTEROL   | 131   | 200 mg/dL      |
+---------------+-------+----------------+
| TRIGLYCERIDES | 116   | 30 - 150 mg/dL |
+---------------+-------+----------------+
| HDL CHOL      | 40.3  | 40 mg/dl       |
+---------------+-------+----------------+
| LDL CALC      | 68    | 100 mg/dL      |
+---------------+-------+----------------+
| LDl/HDL Ratio |       |                |
+---------------+-------+----------------+
| CHOL/HDL      | 3.3   | 4.97           |
+---------------+-------+----------------+
| VLDL CHOL     | 23    | 4 - 40 mg/dL   |
+---------------+-------+----------------+
| NON HDL CHOL  | 91    | 130            |
+---------------+-------+----------------+
 
 
 
+----------+------------------------------------------------------------------+
| Specimen | Performing Laboratory                                            |
+----------+------------------------------------------------------------------+
| Blood    |   [facility]  [address] [place] [address] [facility] [address]  [place], [place]  |
|          | [id number]                                                            |
+----------+------------------------------------------------------------------+
 Comprehensive metabolic panel (2018  7:42 AM)
 
+----------------+-------+-------------------+
| Component      | Value | Ref Range         |
+----------------+-------+-------------------+
| GLUCOSE        | 99    | 70 - 100 mg/dL    |
+----------------+-------+-------------------+
| BUN            | 18    | 6 - 23 mg/dL      |
+----------------+-------+-------------------+
| CREATININE     | 0.88  | 0.70 - 1.33 mg/dL |
+----------------+-------+-------------------+
| BUN/CREAT      | 20.5  | 6.0 - 28.6        |
+----------------+-------+-------------------+
| CALCIUM        | 9.7   | 8.4 - 10.2 mg/dL  |
+----------------+-------+-------------------+
| TOTAL PROTEIN  | 6.6   | 6.0 - 8.0 g/dL    |
+----------------+-------+-------------------+
| Albumin        | 4.4   | 3.5 - 5.0         |
+----------------+-------+-------------------+
| GLOBULIN       | 2.2   | 1.8 - 3.5         |
+----------------+-------+-------------------+
| A/G            | 2.0   | 1.0 - 2.4         |
+----------------+-------+-------------------+
| TBIL           | 0.5   | 0.0 - 1.2 mg/dL   |
+----------------+-------+-------------------+
| ALK PHOS       | 59    | 32 - 120          |
 
+----------------+-------+-------------------+
| ALT            | 25    | 7 - 52 U/L        |
+----------------+-------+-------------------+
| AST            | 19    | 13 - 39 U/L       |
+----------------+-------+-------------------+
| SODIUM         | 142   | 132 - 143 mmol/L  |
+----------------+-------+-------------------+
| POTASSIUM      | 4.0   | 3.6 - 5.1 mmol/L  |
+----------------+-------+-------------------+
| CHLORIDE       | 103   | 95 - 112 mmol/L   |
+----------------+-------+-------------------+
| CO2            | 28    | 19 - 31 mmol/L    |
+----------------+-------+-------------------+
| ANION GAP AGAP | 15.0  | 7 - 21 mmol/L     |
+----------------+-------+-------------------+
| EGFR           | 90    | 60 mg/dL          |
+----------------+-------+-------------------+
 
 
 
+----------+------------------------------------------------------------------+
| Specimen | Performing Laboratory                                            |
+----------+------------------------------------------------------------------+
| Blood    |   [facility]  [address] [place]  |
|          | [id number]                                                            |
+----------+------------------------------------------------------------------+
 CBC W/Auto Diff (Reflex to Manual) (2018)
 
+-----------------+-------+--------------------+
| Component       | Value | Ref Range          |
+-----------------+-------+--------------------+
| WBC             | 9.0   | 4.5 - 11.0 10^3/mL |
+-----------------+-------+--------------------+
| RBC             | 4.79  | 4.3 - 5.7 10^6/  L |
+-----------------+-------+--------------------+
| HGB             | 14.7  | 13.5 - 18.0 g/dL   |
+-----------------+-------+--------------------+
| HCT             | 43.4  | 41 - 50 %          |
+-----------------+-------+--------------------+
| MCV             | 90.6  | 81 - 99 fL         |
+-----------------+-------+--------------------+
| MCH             | 31    | 27 - 33 pg         |
+-----------------+-------+--------------------+
| MCHC            | 34    | 30 - 36 g/dL       |
+-----------------+-------+--------------------+
| PLT             | 286   | 140 - 440 K/  L    |
+-----------------+-------+--------------------+
| RDW SD          | 13.5  | 10.5 - 15.0 %      |
+-----------------+-------+--------------------+
| MPV             |       | fL                 |
+-----------------+-------+--------------------+
| DIFF TYPE       |       |                    |
+-----------------+-------+--------------------+
| NEUTROPHILS     | 64.5  | 39 - 80 %          |
+-----------------+-------+--------------------+
| LYMPHOCYTES     | 26.4  | 24 - 44 %          |
+-----------------+-------+--------------------+
| MONOCYTES       | 7.2   | 0 - 12 %           |
+-----------------+-------+--------------------+
| EOSINOPHILS     | 0.9   | 0 - 6 %            |
 
+-----------------+-------+--------------------+
| BASOPHILS       | 1.0   | 0 - 2 %            |
+-----------------+-------+--------------------+
| NEUTROPHILS ABS |       | /  L               |
+-----------------+-------+--------------------+
| LYMPHOCYTES ABS |       | /  L               |
+-----------------+-------+--------------------+
| MONOCYTES ABS   |       | /  L               |
+-----------------+-------+--------------------+
| EOSINOPHILS ABS |       | /  L               |
+-----------------+-------+--------------------+
| BASOPHILS ABS   |       | /  L               |
+-----------------+-------+--------------------+
 
 
 
+----------+------------------------------------------------------------------+
| Specimen | Performing Laboratory                                            |
+----------+------------------------------------------------------------------+
| Blood    |   [facility]  [address] [place] [address], [place]  |
|          | [id number]                                                            |
+----------+------------------------------------------------------------------+
 EKG STANDARD 12 LEAD (2018 12:27 PM)
 
+-------------------+---------------------------------------------+-----------+
| Component         | Value                                       | Ref Range |
+-------------------+---------------------------------------------+-----------+
| Ventricular Rate  | 63                                          | BPM       |
+-------------------+---------------------------------------------+-----------+
| Atrial Rate       | 63                                          | BPM       |
+-------------------+---------------------------------------------+-----------+
| P-R Interval      | 146                                         | ms        |
+-------------------+---------------------------------------------+-----------+
| QRS Duration      | 96                                          | ms        |
+-------------------+---------------------------------------------+-----------+
| Q-T Interval      | 418                                         | ms        |
+-------------------+---------------------------------------------+-----------+
| QTC Calculation   | 427                                         | ms        |
| (Bezet)           |                                             |           |
+-------------------+---------------------------------------------+-----------+
| Calculated P Axis | 42                                          | degrees   |
+-------------------+---------------------------------------------+-----------+
| Calculated R Axis | 53                                          | degrees   |
+-------------------+---------------------------------------------+-----------+
| Calculated T Axis | 60                                          | degrees   |
+-------------------+---------------------------------------------+-----------+
| Diagnosis         | Please refer to Providers office visit note |           |
|                   |  for Providers Interpretation.Confirmed by  |           |
|                   | [facility] Read Only, PAULETTE Davis ([id number]),     |           |
|                   |  Az Galaviz ([id number]) on 2018    |           |
|                   | 4:56:19 PM                                  |           |
+-------------------+---------------------------------------------+-----------+
 
 
 
+----------+-------------------------------------------------+
| Specimen | Performing Laboratory                           |
+----------+-------------------------------------------------+
|          |   [facility]  [address] [address] |
+----------+-------------------------------------------------+
 
 from Last 3 Months
 
 Insurance
 
 
+----------+--------+-------------+------+-------+-----------------+
| Payer    | Benefi | Subscriber  | Type | Phone | Address         |
|          | t Plan | ID          |      |       |                 |
|          |  /     |             |      |       |                 |
|          | Group  |             |      |       |                 |
+----------+--------+-------------+------+-------+-----------------+
| MEDICAID | [place] | xxxxxxxx    |      |       |   [address]   |
|          | [address]      |             |      |       | [place], [place]     |
|          | [place] |             |      |       | [address]      |
|          |  [place]   |             |      |       |                 |
+----------+--------+-------------+------+-------+-----------------+
 
 
 
+-----------------+--------+-------------+--------+-------------+----------------------+
| Guarantor Name  | [employer] | Relation to | Date   | Phone       | Billing Address      |
|                 | t Type |  Patient    | of     |             |                      |
|                 |        |             | Birth  |             |                      |
+-----------------+--------+-------------+--------+-------------+----------------------+
| JAROD ARBOLEDA | Person | Self        | / |   Home:     |   [address]  |
|                 | [address] |             | 1960   | [phone] | [phone]  [place]   |
|                 | [place]    |             |        | [phone]        | [id number]                |
+-----------------+--------+-------------+--------+-------------+----------------------+

## 2018-04-14 NOTE — XMS
Encounter Summary
  Created on: 2018
 
 Kayce Arboleda
 External Reference #: [id number]
 : 64
 Sex: Male
 
 Demographics
 
 
+-----------------------+-----------------------+
| Address               | [address] |
|                       | [place]  [address]  |
+-----------------------+-----------------------+
| Home Phone            | [phone]       |
+-----------------------+-----------------------+
| Preferred Language    | Unknown               |
+-----------------------+-----------------------+
| Marital Status        | Single                |
+-----------------------+-----------------------+
| Judaism Affiliation | 1013                  |
+-----------------------+-----------------------+
| Race                  | Unknown               |
+-----------------------+-----------------------+
| Ethnic Group          | Unknown               |
+-----------------------+-----------------------+
 
 
 Author
 
 
+--------------+-----------------------+
| Author       | [facility] |
+--------------+-----------------------+
| Organization | [facility] |
+--------------+-----------------------+
| Address      | Unknown               |
+--------------+-----------------------+
| Phone        | Unavailable           |
+--------------+-----------------------+
 
 
 
 Support
 
 
+-----------------+--------------+---------------------+-----------------+
| Name            | Relationship | Address             | Phone           |
+-----------------+--------------+---------------------+-----------------+
| Tejal Champagne | ECON         | [address]              | [phone] |
|                 |              | [address], [place]   |                 |
|                 |              | [address]               |                 |
+-----------------+--------------+---------------------+-----------------+
 
 
 
 Care Team Providers
 
 
 
+-----------------------+------+-----------------+
| Care Team Member Name | Role | Phone           |
+-----------------------+------+-----------------+
| Facundo Lees  | PCP  | [phone] |
+-----------------------+------+-----------------+
 
 
 
 Reason for Visit
 
 
+--------+----------------+
| Reason | Comments       |
+--------+----------------+
| Other  | [facility] |
+--------+----------------+
 
 
 
 Encounter Details
 
 
+--------+-------------+----------------------+----------------------+-------------------+
| Date   | Type        | Department           | Care Team            | Description       |
+--------+-------------+----------------------+----------------------+-------------------+
| / | Documentati |   ANDR[facility] Florian          |   Josseline Kwok,  | Other ([facility]  |
| 2018   | on Only     | [facility]  | [place]                   | Labs)             |
|        |             |  [address]    |                      |                   |
|        |             | [place]         |                      |                   |
|        |             | [address]           |                      |                   |
|        |             | [phone]         |                      |                   |
+--------+-------------+----------------------+----------------------+-------------------+
 
 
 
 Social History
 
 
+---------------+-------+-----------+--------+------------------+
| Tobacco Use   | Types | Packs/Day | Years  | Date             |
|               |       |           | Used   |                  |
+---------------+-------+-----------+--------+------------------+
| Former Smoker |       | 3         | 37     | Quit: 10/05/2015 |
+---------------+-------+-----------+--------+------------------+
 
 
 
+---------------------+---+---+----------+
| Smokeless Tobacco:  |   |   | Quit:    |
| Former User         |   |   | 10/05/20 |
|                     |   |   | 15       |
+---------------------+---+---+----------+
 
 
 
+-------------+-----------+---------+---------------------------+
| Alcohol Use | Drinks/We | oz/Week | Comments                  |
|             | ek        |         |                           |
 
+-------------+-----------+---------+---------------------------+
| No          |   0       | 0.0     | heavy drinker in past hx. |
|             | Standard  |         |                           |
|             | drinks or |         |                           |
|             |           |         |                           |
|             | equivalen |         |                           |
|             | t         |         |                           |
+-------------+-----------+---------+---------------------------+
 
 
 
+------------------+---------------+
| Sex Assigned at  | Date Recorded |
| Birth            |               |
+------------------+---------------+
| Not on file      |               |
+------------------+---------------+
 as of this encounter
 
 Plan of Treatment
 
 
+--------+---------+-------------+----------------------+-------------+
| Date   | Type    | Specialty   | Care Team            | Description |
+--------+---------+-------------+----------------------+-------------+
| / | Office  | Pulmonology |   Jhonatan Louie MD  |             |
| 2018   | Visit   |             |  [address]    |             |
|        |         |             | [place] [address]   |             |
|        |         |             | [phone]         |             |
|        |         |             | [phone] (Fax)   |             |
+--------+---------+-------------+----------------------+-------------+
 as of this encounter
 
 Results
 Lipid panel (2018  7:42 AM)
 
+---------------+-------+----------------+
| Component     | Value | Ref Range      |
+---------------+-------+----------------+
| CHOLESTEROL   | 131   | 200 mg/dL      |
+---------------+-------+----------------+
| TRIGLYCERIDES | 116   | 30 - 150 mg/dL |
+---------------+-------+----------------+
| HDL CHOL      | 40.3  | 40 mg/dl       |
+---------------+-------+----------------+
| LDL CALC      | 68    | 100 mg/dL      |
+---------------+-------+----------------+
| LDl/HDL Ratio |       |                |
+---------------+-------+----------------+
| CHOL/HDL      | 3.3   | 4.97           |
+---------------+-------+----------------+
| VLDL CHOL     | 23    | 4 - 40 mg/dL   |
+---------------+-------+----------------+
| NON HDL CHOL  | 91    | 130            |
+---------------+-------+----------------+
 
 
 
+----------+------------------------------------------------------------------+
| Specimen | Performing Laboratory                                            |
 
+----------+------------------------------------------------------------------+
| Blood    |   [facility]  [place]  |
|          | [address]                                                            |
+----------+------------------------------------------------------------------+
 Comprehensive metabolic panel (2018  7:42 AM)
 
+----------------+-------+-------------------+
| Component      | Value | Ref Range         |
+----------------+-------+-------------------+
| GLUCOSE        | 99    | 70 - 100 mg/dL    |
+----------------+-------+-------------------+
| BUN            | 18    | 6 - 23 mg/dL      |
+----------------+-------+-------------------+
| CREATININE     | 0.88  | 0.70 - 1.33 mg/dL |
+----------------+-------+-------------------+
| BUN/CREAT      | 20.5  | 6.0 - 28.6        |
+----------------+-------+-------------------+
| CALCIUM        | 9.7   | 8.4 - 10.2 mg/dL  |
+----------------+-------+-------------------+
| TOTAL PROTEIN  | 6.6   | 6.0 - 8.0 g/dL    |
+----------------+-------+-------------------+
| Albumin        | 4.4   | 3.5 - 5.0         |
+----------------+-------+-------------------+
| GLOBULIN       | 2.2   | 1.8 - 3.5         |
+----------------+-------+-------------------+
| A/G            | 2.0   | 1.0 - 2.4         |
+----------------+-------+-------------------+
| TBIL           | 0.5   | 0.0 - 1.2 mg/dL   |
+----------------+-------+-------------------+
| ALK PHOS       | 59    | 32 - 120          |
+----------------+-------+-------------------+
| ALT            | 25    | 7 - 52 U/L        |
+----------------+-------+-------------------+
| AST            | 19    | 13 - 39 U/L       |
+----------------+-------+-------------------+
| SODIUM         | 142   | 132 - 143 mmol/L  |
+----------------+-------+-------------------+
| POTASSIUM      | 4.0   | 3.6 - 5.1 mmol/L  |
+----------------+-------+-------------------+
| CHLORIDE       | 103   | 95 - 112 mmol/L   |
+----------------+-------+-------------------+
| CO2            | 28    | 19 - 31 mmol/L    |
+----------------+-------+-------------------+
| ANION GAP AGAP | 15.0  | 7 - 21 mmol/L     |
+----------------+-------+-------------------+
| EGFR           | 90    | 60 mg/dL          |
+----------------+-------+-------------------+
 
 
 
+----------+------------------------------------------------------------------+
| Specimen | Performing Laboratory                                            |
+----------+------------------------------------------------------------------+
| Blood    |   [facility]  [address] [place] [address], [facility] [address]  [place], [place]  |
|          | [id number]                                                            |
+----------+------------------------------------------------------------------+
 Transferrin (2018  7:42 AM)
 
+-------------+--------+-----------+
| Component   | Value  | Ref Range |
 
+-------------+--------+-----------+
| TRANSFERRIN | 245.11 | 180 - 329 |
+-------------+--------+-----------+
 
 
 
+----------+------------------------------------------------------------------+
| Specimen | Performing Laboratory                                            |
+----------+------------------------------------------------------------------+
| Blood    |   [facility]  [address] [place] [address] [place]  |
|          | [address]                                                            |
+----------+------------------------------------------------------------------+
 Ferritin (2018  7:42 AM)
 
+-----------+-------+----------------+
| Component | Value | Ref Range      |
+-----------+-------+----------------+
| FERRITIN  | 162.8 | 30 - 400 ng/mL |
+-----------+-------+----------------+
 
 
 
+----------+------------------------------------------------------------------+
| Specimen | Performing Laboratory                                            |
+----------+------------------------------------------------------------------+
| Blood    |   [facility]  [address]  |
|          | [address]                                                            |
+----------+------------------------------------------------------------------+
 Iron panel (2018  7:42 AM)
 
+------------+-------+-----------+
| Component  | Value | Ref Range |
+------------+-------+-----------+
| IRON       | 82.66 | 37 - 160  |
+------------+-------+-----------+
| IRON % SAT | 24.1  | 20 - 55   |
+------------+-------+-----------+
| TIBC       | 343   | 245 - 400 |
+------------+-------+-----------+
 
 
 
+----------+------------------------------------------------------------------+
| Specimen | Performing Laboratory                                            |
+----------+------------------------------------------------------------------+
| Blood    |   [facility]  [address] [facility] [address]  [place], [place]  |
|          | [address]                                                            |
+----------+------------------------------------------------------------------+
 CBC W/Auto Diff (Reflex to Manual) (2018)
 
+-----------------+-------+--------------------+
| Component       | Value | Ref Range          |
+-----------------+-------+--------------------+
| WBC             | 9.0   | 4.5 - 11.0 10^3/mL |
+-----------------+-------+--------------------+
| RBC             | 4.79  | 4.3 - 5.7 10^6/  L |
+-----------------+-------+--------------------+
| HGB             | 14.7  | 13.5 - 18.0 g/dL   |
+-----------------+-------+--------------------+
| HCT             | 43.4  | 41 - 50 %          |
 
+-----------------+-------+--------------------+
| MCV             | 90.6  | 81 - 99 fL         |
+-----------------+-------+--------------------+
| MCH             | 31    | 27 - 33 pg         |
+-----------------+-------+--------------------+
| MCHC            | 34    | 30 - 36 g/dL       |
+-----------------+-------+--------------------+
| PLT             | 286   | 140 - 440 K/  L    |
+-----------------+-------+--------------------+
| RDW SD          | 13.5  | 10.5 - 15.0 %      |
+-----------------+-------+--------------------+
| MPV             |       | fL                 |
+-----------------+-------+--------------------+
| DIFF TYPE       |       |                    |
+-----------------+-------+--------------------+
| NEUTROPHILS     | 64.5  | 39 - 80 %          |
+-----------------+-------+--------------------+
| LYMPHOCYTES     | 26.4  | 24 - 44 %          |
+-----------------+-------+--------------------+
| MONOCYTES       | 7.2   | 0 - 12 %           |
+-----------------+-------+--------------------+
| EOSINOPHILS     | 0.9   | 0 - 6 %            |
+-----------------+-------+--------------------+
| BASOPHILS       | 1.0   | 0 - 2 %            |
+-----------------+-------+--------------------+
| NEUTROPHILS ABS |       | /  L               |
+-----------------+-------+--------------------+
| LYMPHOCYTES ABS |       | /  L               |
+-----------------+-------+--------------------+
| MONOCYTES ABS   |       | /  L               |
+-----------------+-------+--------------------+
| EOSINOPHILS ABS |       | /  L               |
+-----------------+-------+--------------------+
| BASOPHILS ABS   |       | /  L               |
+-----------------+-------+--------------------+
 
 
 
+----------+------------------------------------------------------------------+
| Specimen | Performing Laboratory                                            |
+----------+------------------------------------------------------------------+
| Blood    |   [facility]  [address] [place]  |
|          | [address]                                                            |
+----------+------------------------------------------------------------------+
 in this encounter
 
 Visit Diagnoses
 Not on filein this encounter

## 2018-04-14 NOTE — XMS
Encounter Summary
  Created on: 2018
 
 Kayce Arboleda
 External Reference #: [id number]
 : 64
 Sex: Male
 
 Demographics
 
 
+-----------------------+-----------------------+
| Address               | [address] |
|                       | [place]  [address]  |
+-----------------------+-----------------------+
| Home Phone            | [phone]       |
+-----------------------+-----------------------+
| Preferred Language    | Unknown               |
+-----------------------+-----------------------+
| Marital Status        | Single                |
+-----------------------+-----------------------+
| Mosque Affiliation | 1013                  |
+-----------------------+-----------------------+
| Race                  | Unknown               |
+-----------------------+-----------------------+
| Ethnic Group          | Unknown               |
+-----------------------+-----------------------+
 
 
 Author
 
 
+--------------+-----------------------+
| Author       | [facility] |
+--------------+-----------------------+
| Organization | [facility] |
+--------------+-----------------------+
| Address      | Unknown               |
+--------------+-----------------------+
| Phone        | Unavailable           |
+--------------+-----------------------+
 
 
 
 Support
 
 
+-----------------+--------------+---------------------+-----------------+
| Name            | Relationship | Address             | Phone           |
+-----------------+--------------+---------------------+-----------------+
| Tejal Champagne | ECON         | [address]              | [phone] |
|                 |              | [address], [place]   |                 |
|                 |              | [address]               |                 |
+-----------------+--------------+---------------------+-----------------+
 
 
 
 Care Team Providers
 
 
 
+-----------------------+------+-----------------+
| Care Team Member Name | Role | Phone           |
+-----------------------+------+-----------------+
| Facundo Lees  | PCP  | [phone] |
+-----------------------+------+-----------------+
 
 
 
 Encounter Details
 
 
+--------+-----------+---------------------+--------------------+-------------+
| Date   | Type      | Department          | Care Team          | Description |
+--------+-----------+---------------------+--------------------+-------------+
| / | Telephone |   [facility]     |   Lupe Nolan,  |             |
|    |           | Pulmonology  [address]   | CMA                |             |
|        |           | [address]   |                    |             |
|        |           | [place]        |                    |             |
|        |           | [address]          |                    |             |
|        |           | [phone]        |                    |             |
+--------+-----------+---------------------+--------------------+-------------+
 
 
 
 Social History
 
 
+---------------+-------+-----------+--------+------------------+
| Tobacco Use   | Types | Packs/Day | Years  | Date             |
|               |       |           | Used   |                  |
+---------------+-------+-----------+--------+------------------+
| Former Smoker |       | 3         | 37     | Quit: 10/05/2015 |
+---------------+-------+-----------+--------+------------------+
 
 
 
+---------------------+---+---+----------+
| Smokeless Tobacco:  |   |   | Quit:    |
| Former User         |   |   | 10/05/20 |
|                     |   |   | 17       |
+---------------------+---+---+----------+
 
 
 
+-------------+-----------+---------+---------------------------+
| Alcohol Use | Drinks/We | oz/Week | Comments                  |
|             | ek        |         |                           |
+-------------+-----------+---------+---------------------------+
| No          |   0       | 0.0     | heavy drinker in past hx. |
|             | Standard  |         |                           |
|             | drinks or |         |                           |
|             |           |         |                           |
|             | equivalen |         |                           |
|             | t         |         |                           |
+-------------+-----------+---------+---------------------------+
 
 
 
 
+------------------+---------------+
| Sex Assigned at  | Date Recorded |
| Birth            |               |
+------------------+---------------+
| Not on file      |               |
+------------------+---------------+
 as of this encounter
 
 Plan of Treatment
 
 
+--------+---------+-------------+----------------------+-------------+
| Date   | Type    | Specialty   | Care Team            | Description |
+--------+---------+-------------+----------------------+-------------+
| / | Office  | Pulmonology |   Jhonatan Louie MD  |             |
| 2018   | Visit   |             |  [address]    |             |
|        |         |             | [place] [address]   |             |
|        |         |             | [phone]         |             |
|        |         |             | [phone] (Fax)   |             |
+--------+---------+-------------+----------------------+-------------+
 as of this encounter
 
 Visit Diagnoses
 Not on filein this encounter

## 2018-04-14 NOTE — XMS
Clinical Summary
  Created on: 2018
 
 Jarod Arboleda
 External Reference #: [id number]
 : 64
 Sex: Male
 
 Demographics
 
 
+-----------------------+---------------------------+
| Address               | [address] |
|                       | [place]  [address]      |
+-----------------------+---------------------------+
| Home Phone            | [phone]           |
+-----------------------+---------------------------+
| Preferred Language    | Unknown                   |
+-----------------------+---------------------------+
| Marital Status        |                   |
+-----------------------+---------------------------+
| Mosque Affiliation | [id number]                      |
+-----------------------+---------------------------+
| Race                  | Unknown                   |
+-----------------------+---------------------------+
| Ethnic Group          | Unknown                   |
+-----------------------+---------------------------+
 
 
 Author
 
 
+--------------+--------------------------------------------+
| Author       | [facility]  |
|              | [facility] [place]                                |
+--------------+--------------------------------------------+
| Organization | [facility]  |
|              | [facility] [place]                                |
+--------------+--------------------------------------------+
| Address      | Unknown                                    |
+--------------+--------------------------------------------+
| Phone        | Unavailable                                |
+--------------+--------------------------------------------+
 
 
 
 Support
 
 
+----------------+--------------+---------------------+-----------------+
| Name           | Relationship | Address             | Phone           |
+----------------+--------------+---------------------+-----------------+
| Shane Champagne | ECON         | [address]              | [phone] |
|                |              | [address], [place]   |                 |
|                |              | [address]               |                 |
+----------------+--------------+---------------------+-----------------+
 
 
 
 
 Care Team Providers
 
 
+--------------------------+------+-------------+
| Care Team Member Name    | Role | Phone       |
+--------------------------+------+-------------+
| Facundo Lees NP | PP   | Unavailable |
+--------------------------+------+-------------+
 
 
 
 Allergies
 
 
+----------------+----------------------+----------+----------+----------------------+
| Active Allergy | Reactions            | Severity | Noted    | Comments             |
|                |                      |          | Date     |                      |
+----------------+----------------------+----------+----------+----------------------+
| Aspirin        | Other (See Comments) |          | 10/26/20 |   Stomach Ulcer:     |
|                |                      |          | 15       | side affect          |
+----------------+----------------------+----------+----------+----------------------+
| Trazodone      | Other (See Comments) |          | 10/26/20 |   Aggression : side  |
|                |                      |          | 15       | effects              |
+----------------+----------------------+----------+----------+----------------------+
 
 
 
 Current Medications
 
 
+----------------------+----------------------+----------+---------+------+------+-------+
| Prescription         | Sig.                 | Disp.    | Refills | Star | End  | Statu |
|                      |                      |          |         | t    | Date | s     |
|                      |                      |          |         | Date |      |       |
+----------------------+----------------------+----------+---------+------+------+-------+
|   montelukast        | Take 10 mg by mouth  |          |         |      |      | Activ |
| (SINGULAIR) 10 mg    | nightly.             |          |         |      |      | e     |
| tablet               |                      |          |         |      |      |       |
+----------------------+----------------------+----------+---------+------+------+-------+
|   amitriptyline      | Take 100 mg by mouth |          |         |      |      | Activ |
| (ELAVIL) 100 MG      |  nightly.            |          |         |      |      | e     |
| tablet               |                      |          |         |      |      |       |
+----------------------+----------------------+----------+---------+------+------+-------+
|   venlafaxine        | Take 100 mg by mouth |          |         |      |      | Activ |
| (EFFEXOR) 100 MG     |  2 times daily. 1    |          |         |      |      | e     |
| tablet               | tablet 2 with food   |          |         |      |      |       |
|                      | twice a day.         |          |         |      |      |       |
+----------------------+----------------------+----------+---------+------+------+-------+
|   amLODIPine         | Take 5 mg by mouth   |          |         |      |      | Activ |
| (NORVASC) 5 mg       | Daily.               |          |         |      |      | e     |
| tablet               |                      |          |         |      |      |       |
+----------------------+----------------------+----------+---------+------+------+-------+
|                      | Take 12.5 mg by      |          |         |      |      | Activ |
| hydrochlorothiazide  | mouth Daily.         |          |         |      |      | e     |
| (HYDRODIURIL) 12.5   |                      |          |         |      |      |       |
| MG tablet            |                      |          |         |      |      |       |
+----------------------+----------------------+----------+---------+------+------+-------+
|   thiamine (VITAMIN  | Take 100 mg by mouth |          |         |      |      | Activ |
| B-1) 100 mg tablet   |  Daily.              |          |         |      |      | e     |
 
+----------------------+----------------------+----------+---------+------+------+-------+
|   tamsulosin         | Take 0.4 mg by mouth |          |         |      |      | Activ |
| (FLOMAX) 0.4 mg CAPS |  daily (after        |          |         |      |      | e     |
|                      | breakfast).          |          |         |      |      |       |
+----------------------+----------------------+----------+---------+------+------+-------+
|   cloNIDine          | Take 0.3 mg by mouth |          |         |      |      | Activ |
| (CATAPRES) 0.3 MG    |  2 times daily.      |          |         |      |      | e     |
| tablet               |                      |          |         |      |      |       |
+----------------------+----------------------+----------+---------+------+------+-------+
|   meloxicam (MOBIC)  | Take 15 mg by mouth  |          |         |      |      | Activ |
| 15 mg tablet         | Daily.               |          |         |      |      | e     |
+----------------------+----------------------+----------+---------+------+------+-------+
|   albuterol 90       | Inhale 2 puffs into  |          |         |      |      | Activ |
| mcg/puff inhaler     | the lungs every 6    |          |         |      |      | e     |
|                      | hours as needed for  |          |         |      |      |       |
|                      | Wheezing.            |          |         |      |      |       |
+----------------------+----------------------+----------+---------+------+------+-------+
|   levalbuterol       | Take 3 mLs by        |   270 mL | 11      | 10/2 |      | Activ |
| (XOPENEX) 1.25 mg/3  | nebulization every 6 |          |         | 7/20 |      | e     |
| mL nebulizer         |  hours as needed for |          |         | 15   |      |       |
| solutionIndications: |  Wheezing or         |          |         |      |      |       |
|  COPD (chronic       | Shortness of Breath. |          |         |      |      |       |
| obstructive          |  JAGRUTI: 12 months      |          |         |      |      |       |
| pulmonary disease)   |                      |          |         |      |      |       |
| ([facility])                |                      |          |         |      |      |       |
+----------------------+----------------------+----------+---------+------+------+-------+
|   umeclidinium       | Inhale 1 puff into   |   1      | 11      | 10/3 |      | Activ |
| (INCRUSE ELLIPTA)    | the lungs Daily.     | Inhaler  |         | 0/20 |      | e     |
| 62.5 mcg/puff        |                      |          |         | 15   |      |       |
| inhaler              |                      |          |         |      |      |       |
+----------------------+----------------------+----------+---------+------+------+-------+
|   diphenhydrAMINE    | Take 25 mg by mouth  |          |         |      |      | Activ |
| (BENADRYL) 25 mg     | nightly as needed    |          |         |      |      | e     |
| tablet               | for Itching.         |          |         |      |      |       |
+----------------------+----------------------+----------+---------+------+------+-------+
|   losartan (COZAAR)  | Take 1 tablet by     |   30     | 2       | 02/2 |      | Activ |
| 25 mg tablet         | mouth Daily.         | tablet   |         | 3/20 |      | e     |
|                      |                      |          |         | 16   |      |       |
+----------------------+----------------------+----------+---------+------+------+-------+
|   Multiple           | Take one tablet      |          | 0       | 03/0 |      | Activ |
| Vitamins-Minerals    | daily                |          |         | 1/20 |      | e     |
| ([facility])    |                      |          |         | 16   |      |       |
| TABS                 |                      |          |         |      |      |       |
+----------------------+----------------------+----------+---------+------+------+-------+
|   rOPINIrole         | Take 1 tablet by     |   60     | 2       | 03/2 |      | Activ |
| (REQUIP) 0.25 mg     | mouth nightly 30     | tablet   |         | 4/20 |      | e     |
| tablet               | minutes before       |          |         | 16   |      |       |
|                      | bedtime for 1 week,  |          |         |      |      |       |
|                      | then increase to 2   |          |         |      |      |       |
|                      | tablets by mouth     |          |         |      |      |       |
|                      | nightly 30 minutes   |          |         |      |      |       |
|                      | before bedtime if    |          |         |      |      |       |
|                      | still having         |          |         |      |      |       |
|                      | symptoms.            |          |         |      |      |       |
+----------------------+----------------------+----------+---------+------+------+-------+
|   pantoprazole       | Take 1 tablet by     |   60     | 11      | 03/2 |      | Activ |
| (PROTONIX) 40 mg     | mouth 2 times daily  | tablet   |         | 4/20 |      | e     |
| tablet               | (before meals).      |          |         | 16   |      |       |
+----------------------+----------------------+----------+---------+------+------+-------+
|   diphenhydrAMINE    | take 1 capsule by    |   30     | 3       | 04/1 |      | Activ |
 
| (BENADRYL) 25 MG     | mouth NIGHTLY        | capsule  |         | 5/20 |      | e     |
| capsule              |                      |          |         | 16   |      |       |
+----------------------+----------------------+----------+---------+------+------+-------+
|                      | Inhale 1 puff into   |   1 each | 11      | 06/0 |      | Activ |
| fluticasone-salmeter | the lungs 2 times    |          |         | 1/20 |      | e     |
| ol                   | daily. Rinse mouth   |          |         | 16   |      |       |
| (FLUTICASONE-SALMETE | out with use.        |          |         |      |      |       |
| ROL) 500-50 mcg/puff |                      |          |         |      |      |       |
|  diskus inhaler      |                      |          |         |      |      |       |
+----------------------+----------------------+----------+---------+------+------+-------+
 
 
 
 Active Problems
 
 
+--------------------------------------+------------+
| Problem                              | Noted Date |
+--------------------------------------+------------+
| Pre-syncope                          | 2016 |
+--------------------------------------+------------+
| Obstructive chronic bronchitis (HCC) | 2015 |
+--------------------------------------+------------+
| HTN (hypertension)                   |            |
+--------------------------------------+------------+
| Elevated fasting blood sugar         |            |
+--------------------------------------+------------+
| CHELY (obstructive sleep apnea)        |            |
+--------------------------------------+------------+
 
 
 
+-----------------------------------+
|   Overview:   [facility] |
+-----------------------------------+
 
 
 
+-------------------------+---+
| Drug abuse in remission |   |
+-------------------------+---+
 
 
 
+------------------------------------------------------------------+
|   Overview:   past use of meth and cocaine and marijuana. Clean  |
| since                                                        |
+------------------------------------------------------------------+
 
 
 
+----------------------------------------+---+
| GERD (gastroesophageal reflux disease) |   |
+----------------------------------------+---+
| Anxiety                                |   |
+----------------------------------------+---+
| Depression                             |   |
+----------------------------------------+---+
| Insomnia                               |   |
+----------------------------------------+---+
 
 
 
 
+---------------------------------------+
|   Overview:   due to mental condition |
+---------------------------------------+
 
 
 
+--------------------------+---+
| Alcohol dependence (HCC) |   |
+--------------------------+---+
| Obesity                  |   |
+--------------------------+---+
 
 
 
 Immunizations
 
 
+----------------------+------------------------+----------+
| Name                 | Dates Previously Given | Next Due |
+----------------------+------------------------+----------+
| INFLUENZA PF         | 10/27/2015             |          |
| QUAD(PED/ADOL/ADULT) |                        |          |
| ,PSKT or VIAL        |                        |          |
+----------------------+------------------------+----------+
| INFLUENZA,           | 10/01/2014             |          |
| UNSPECIFIED          |                        |          |
| FORMULATION          |                        |          |
+----------------------+------------------------+----------+
| PNEUMOCOCCAL         | 2015             |          |
| POLYSACCHARIDE       |                        |          |
| 23-VALENT (PPSV23)   |                        |          |
+----------------------+------------------------+----------+
 
 
 
 Family History
 
 
+---------------------+----------+------+----------+
| Medical History     | Relation | Name | Comments |
+---------------------+----------+------+----------+
| Alcohol abuse       | Brother  |      |          |
+---------------------+----------+------+----------+
| Schizophrenia       | Brother  |      |          |
+---------------------+----------+------+----------+
| Psychiatric Illness | Father   |      |          |
+---------------------+----------+------+----------+
| Diabetes            | Mother   |      |          |
+---------------------+----------+------+----------+
| Migraines           | Sister   |      |          |
+---------------------+----------+------+----------+
 
 
 
+----------+------+----------+---------------------------+
| Relation | Name | Status   | Comments                  |
+----------+------+----------+---------------------------+
 
| Brother  |      | Alive    |                           |
+----------+------+----------+---------------------------+
| Father   |      |  |                           |
+----------+------+----------+---------------------------+
| Mother   |      |  | complications of diabetes |
+----------+------+----------+---------------------------+
| Sister   |      | Alive    |                           |
+----------+------+----------+---------------------------+
| Sister   |      |  | industrial accident       |
+----------+------+----------+---------------------------+
 
 
 
 Social History
 
 
+---------------+------------+-----------+--------+------------------+
| Tobacco Use   | Types      | Packs/Day | Years  | Date             |
|               |            |           | Used   |                  |
+---------------+------------+-----------+--------+------------------+
| Former Smoker | Cigarettes | 2         | 39     | Quit: 2015 |
+---------------+------------+-----------+--------+------------------+
 
 
 
+---------------------+---+---+---+
| Smokeless Tobacco:  |   |   |   |
| Never Used          |   |   |   |
+---------------------+---+---+---+
 
 
 
+-----------------------------------------+
| Tobacco Cessation: Counseling Given: No |
+-----------------------------------------+
 
 
 
+-------------+-----------+---------+----------+
| Alcohol Use | Drinks/We | oz/Week | Comments |
|             | ek        |         |          |
+-------------+-----------+---------+----------+
| No          |   0       | 0.0     |          |
|             | Standard  |         |          |
|             | drinks or |         |          |
|             |           |         |          |
|             | equivalen |         |          |
|             | t         |         |          |
+-------------+-----------+---------+----------+
 
 
 
+------------------+---------------+
| Sex Assigned at  | Date Recorded |
| Birth            |               |
+------------------+---------------+
| Not on file      |               |
+------------------+---------------+
 
 
 
 
 Last Filed Vital Signs
 
 
+-------------------+-------------------+---------------------+
| Vital Sign        | Reading           | Time Taken          |
+-------------------+-------------------+---------------------+
| Blood Pressure    | 140/70            | 2016 1312 PDT |
+-------------------+-------------------+---------------------+
| Pulse             | 84                | 20162 PDT |
+-------------------+-------------------+---------------------+
| Temperature       | -                 | -                   |
+-------------------+-------------------+---------------------+
| Respiratory Rate  | 16                | 10/27/2015 1501 PDT |
+-------------------+-------------------+---------------------+
| Oxygen Saturation | 96%               | 2016 PDT |
+-------------------+-------------------+---------------------+
| Inhaled Oxygen    | -                 | -                   |
| Concentration     |                   |                     |
+-------------------+-------------------+---------------------+
| Weight            | 122.5 kg (270 lb) | 2016 PDT |
+-------------------+-------------------+---------------------+
| Height            | 172.7 cm (5' 8")  | 2016 PDT |
+-------------------+-------------------+---------------------+
| Body Mass Index   | 41.05             | 2016 PDT |
+-------------------+-------------------+---------------------+
 
 
 
 Plan of Treatment
 
 
+----------------------+-----------+------------------------+----------+
| Health Maintenance   | Due Date  | Last Done              | Comments |
+----------------------+-----------+------------------------+----------+
| Hepatitis C          |  |                        |          |
| Screening            | 2         |                        |          |
+----------------------+-----------+------------------------+----------+
| Vaccine:             |  |                        |          |
| Dtap/Tdap/Td (1 -    | 1         |                        |          |
| Tdap)                |           |                        |          |
+----------------------+-----------+------------------------+----------+
| COLON CANCER         |  |                        |          |
| SCREENING            | 2         |                        |          |
| (COLONOSCOPY EVERY   |           |                        |          |
| 10 YEARS 50-75)      |           |                        |          |
+----------------------+-----------+------------------------+----------+
| Vaccine: Influenza   |  | 10/27/2015, 10/01/2014 |          |
| (Season Ended)       | 8         |                        |          |
+----------------------+-----------+------------------------+----------+
| Vaccine:             | Completed | 2015, 2015 |          |
| Pneumococcal 19-64   |           |                        |          |
| (PPSV23 only) Medium |           |                        |          |
|  Risk                |           |                        |          |
+----------------------+-----------+------------------------+----------+
 
 
 
 Results
 Not on filefrom Last 3 Months
 
 
 Insurance
 
 
+-------------------+--------+-------------+--------+-------------+---------+
| Payer             | Benefi | Subscriber  | Type   | Phone       | Address |
|                   | t Plan | ID          |        |             |         |
|                   |  /     |             |        |             |         |
|                   | Group  |             |        |             |         |
+-------------------+--------+-------------+--------+-------------+---------+
| [employer]  | [employer]   | [id number]    | [employer] | [phone]         |
| MEDICAID HMO      | HEALTH |             | id     | [id number]        |         |
|                   |  MDCD  |             |        |             |         |
|                   | [place] |             |        |             |         |
+-------------------+--------+-------------+--------+-------------+---------+
 
 
 
+------------------+--------+-------------+--------+-------------+----------------------+
| Guarantor Name   | [employer] | Relation to | Date   | Phone       | Billing Address      |
|                  | t Type |  Patient    | of     |             |                      |
|                  |        |             | Birth  |             |                      |
+------------------+--------+-------------+--------+-------------+----------------------+
| JAROD ARBOLEDA | Person | Self        | / |   Home:     |   [address]  |
|                  | al/Fam |             | 1960   | [phone] [address]  [place],  |
|                  | [place]    |             |        | [phone]        | [place] [phone]             |
+------------------+--------+-------------+--------+-------------+----------------------+

## 2018-04-14 NOTE — XMS
Clinical Summary
  Created on: 2018
 
 Jarod Arboleda
 External Reference #: [id number]
 : 64
 Sex: Male
 
 Demographics
 
 
+-----------------------+---------------------------+
| Address               | [address] |
|                       | [place]  [address]      |
+-----------------------+---------------------------+
| Home Phone            | [phone]           |
+-----------------------+---------------------------+
| Preferred Language    | Unknown                   |
+-----------------------+---------------------------+
| Marital Status        |                   |
+-----------------------+---------------------------+
| Mormon Affiliation | 1013                      |
+-----------------------+---------------------------+
| Race                  | Unknown                   |
+-----------------------+---------------------------+
| Ethnic Group          | Unknown                   |
+-----------------------+---------------------------+
 
 
 Author
 
 
+--------------+--------------------------------------------+
| Author       | [facility]  |
|              | and [place]                                |
+--------------+--------------------------------------------+
| Organization | [facility]  |
|              | and [place]                                |
+--------------+--------------------------------------------+
| Address      | Unknown                                    |
+--------------+--------------------------------------------+
| Phone        | Unavailable                                |
+--------------+--------------------------------------------+
 
 
 
 Support
 
 
+----------------+--------------+---------------------+-----------------+
| Name           | Relationship | Address             | Phone           |
+----------------+--------------+---------------------+-----------------+
| Shane Champagne | ECON         | [address]              | [phone] |
|                |              | [address], [place]   |                 |
|                |              | [address]               |                 |
+----------------+--------------+---------------------+-----------------+
 
 
 
 
 Care Team Providers
 
 
+--------------------------+------+-------------+
| Care Team Member Name    | Role | Phone       |
+--------------------------+------+-------------+
| Facundo Lees NP | PP   | Unavailable |
+--------------------------+------+-------------+
 
 
 
 Allergies
 
 
+----------------+----------------------+----------+----------+----------------------+
| Active Allergy | Reactions            | Severity | Noted    | Comments             |
|                |                      |          | Date     |                      |
+----------------+----------------------+----------+----------+----------------------+
| Aspirin        | Other (See Comments) |          | 10/26/20 |   Stomach Ulcer:     |
|                |                      |          | 15       | side affect          |
+----------------+----------------------+----------+----------+----------------------+
| Trazodone      | Other (See Comments) |          | 10/26/20 |   Aggression : side  |
|                |                      |          | 15       | effects              |
+----------------+----------------------+----------+----------+----------------------+
 
 
 
 Current Medications
 
 
+----------------------+----------------------+----------+---------+------+------+-------+
| Prescription         | Sig.                 | Disp.    | Refills | Star | End  | Statu |
|                      |                      |          |         | t    | Date | s     |
|                      |                      |          |         | Date |      |       |
+----------------------+----------------------+----------+---------+------+------+-------+
|   montelukast        | Take 10 mg by mouth  |          |         |      |      | Activ |
| (SINGULAIR) 10 mg    | nightly.             |          |         |      |      | e     |
| tablet               |                      |          |         |      |      |       |
+----------------------+----------------------+----------+---------+------+------+-------+
|   amitriptyline      | Take 100 mg by mouth |          |         |      |      | Activ |
| (ELAVIL) 100 MG      |  nightly.            |          |         |      |      | e     |
| tablet               |                      |          |         |      |      |       |
+----------------------+----------------------+----------+---------+------+------+-------+
|   venlafaxine        | Take 100 mg by mouth |          |         |      |      | Activ |
| (EFFEXOR) 100 MG     |  2 times daily. 1    |          |         |      |      | e     |
| tablet               | tablet 2 with food   |          |         |      |      |       |
|                      | twice a day.         |          |         |      |      |       |
+----------------------+----------------------+----------+---------+------+------+-------+
|   amLODIPine         | Take 5 mg by mouth   |          |         |      |      | Activ |
| (NORVASC) 5 mg       | Daily.               |          |         |      |      | e     |
| tablet               |                      |          |         |      |      |       |
+----------------------+----------------------+----------+---------+------+------+-------+
|                      | Take 12.5 mg by      |          |         |      |      | Activ |
| hydrochlorothiazide  | mouth Daily.         |          |         |      |      | e     |
| (HYDRODIURIL) 12.5   |                      |          |         |      |      |       |
| MG tablet            |                      |          |         |      |      |       |
+----------------------+----------------------+----------+---------+------+------+-------+
|   thiamine (VITAMIN  | Take 100 mg by mouth |          |         |      |      | Activ |
| B-1) 100 mg tablet   |  Daily.              |          |         |      |      | e     |
 
+----------------------+----------------------+----------+---------+------+------+-------+
|   tamsulosin         | Take 0.4 mg by mouth |          |         |      |      | Activ |
| (FLOMAX) 0.4 mg CAPS |  daily (after        |          |         |      |      | e     |
|                      | breakfast).          |          |         |      |      |       |
+----------------------+----------------------+----------+---------+------+------+-------+
|   cloNIDine          | Take 0.3 mg by mouth |          |         |      |      | Activ |
| (CATAPRES) 0.3 MG    |  2 times daily.      |          |         |      |      | e     |
| tablet               |                      |          |         |      |      |       |
+----------------------+----------------------+----------+---------+------+------+-------+
|   meloxicam (MOBIC)  | Take 15 mg by mouth  |          |         |      |      | Activ |
| 15 mg tablet         | Daily.               |          |         |      |      | e     |
+----------------------+----------------------+----------+---------+------+------+-------+
|   albuterol 90       | Inhale 2 puffs into  |          |         |      |      | Activ |
| mcg/puff inhaler     | the lungs every 6    |          |         |      |      | e     |
|                      | hours as needed for  |          |         |      |      |       |
|                      | Wheezing.            |          |         |      |      |       |
+----------------------+----------------------+----------+---------+------+------+-------+
|   levalbuterol       | Take 3 mLs by        |   270 mL | 11      | 10/2 |      | Activ |
| (XOPENEX) 1.25 mg/3  | nebulization every 6 |          |         | 7/20 |      | e     |
| mL nebulizer         |  hours as needed for |          |         | 15   |      |       |
| solutionIndications: |  Wheezing or         |          |         |      |      |       |
|  COPD (chronic       | Shortness of Breath. |          |         |      |      |       |
| obstructive          |  JAGRUTI: 12 months      |          |         |      |      |       |
| pulmonary disease)   |                      |          |         |      |      |       |
| ([facility])                |                      |          |         |      |      |       |
+----------------------+----------------------+----------+---------+------+------+-------+
|   umeclidinium       | Inhale 1 puff into   |   1      | 11      | 10/3 |      | Activ |
| (INCRUSE ELLIPTA)    | the lungs Daily.     | Inhaler  |         | 0/20 |      | e     |
| 62.5 mcg/puff        |                      |          |         | 15   |      |       |
| inhaler              |                      |          |         |      |      |       |
+----------------------+----------------------+----------+---------+------+------+-------+
|   diphenhydrAMINE    | Take 25 mg by mouth  |          |         |      |      | Activ |
| (BENADRYL) 25 mg     | nightly as needed    |          |         |      |      | e     |
| tablet               | for Itching.         |          |         |      |      |       |
+----------------------+----------------------+----------+---------+------+------+-------+
|   losartan (COZAAR)  | Take 1 tablet by     |   30     | 2       | 02/2 |      | Activ |
| 25 mg tablet         | mouth Daily.         | tablet   |         | 3/20 |      | e     |
|                      |                      |          |         | 16   |      |       |
+----------------------+----------------------+----------+---------+------+------+-------+
|   Multiple           | Take one tablet      |          | 0       | 03/0 |      | Activ |
| Vitamins-Minerals    | daily                |          |         | 1/20 |      | e     |
| ([facility])    |                      |          |         | 16   |      |       |
| TABS                 |                      |          |         |      |      |       |
+----------------------+----------------------+----------+---------+------+------+-------+
|   rOPINIrole         | Take 1 tablet by     |   60     | 2       | 03/2 |      | Activ |
| (REQUIP) 0.25 mg     | mouth nightly 30     | tablet   |         | 4/20 |      | e     |
| tablet               | minutes before       |          |         | 16   |      |       |
|                      | bedtime for 1 week,  |          |         |      |      |       |
|                      | then increase to 2   |          |         |      |      |       |
|                      | tablets by mouth     |          |         |      |      |       |
|                      | nightly 30 minutes   |          |         |      |      |       |
|                      | before bedtime if    |          |         |      |      |       |
|                      | still having         |          |         |      |      |       |
|                      | symptoms.            |          |         |      |      |       |
+----------------------+----------------------+----------+---------+------+------+-------+
|   pantoprazole       | Take 1 tablet by     |   60     | 11      | 03/2 |      | Activ |
| (PROTONIX) 40 mg     | mouth 2 times daily  | tablet   |         | 4/20 |      | e     |
| tablet               | (before meals).      |          |         | 16   |      |       |
+----------------------+----------------------+----------+---------+------+------+-------+
|   diphenhydrAMINE    | take 1 capsule by    |   30     | 3       | 04/1 |      | Activ |
 
| (BENADRYL) 25 MG     | mouth NIGHTLY        | capsule  |         | 5/20 |      | e     |
| capsule              |                      |          |         | 16   |      |       |
+----------------------+----------------------+----------+---------+------+------+-------+
|                      | Inhale 1 puff into   |   1 each | 11      | 06/0 |      | Activ |
| fluticasone-salmeter | the lungs 2 times    |          |         | 1/20 |      | e     |
| ol                   | daily. Rinse mouth   |          |         | 16   |      |       |
| (FLUTICASONE-SALMETE | out with use.        |          |         |      |      |       |
| ROL) 500-50 mcg/puff |                      |          |         |      |      |       |
|  diskus inhaler      |                      |          |         |      |      |       |
+----------------------+----------------------+----------+---------+------+------+-------+
 
 
 
 Active Problems
 
 
+--------------------------------------+------------+
| Problem                              | Noted Date |
+--------------------------------------+------------+
| Pre-syncope                          | 2016 |
+--------------------------------------+------------+
| Obstructive chronic bronchitis (HCC) | 2015 |
+--------------------------------------+------------+
| HTN (hypertension)                   |            |
+--------------------------------------+------------+
| Elevated fasting blood sugar         |            |
+--------------------------------------+------------+
| CHELY (obstructive sleep apnea)        |            |
+--------------------------------------+------------+
 
 
 
+-----------------------------------+
|   Overview:   [facility] |
+-----------------------------------+
 
 
 
+-------------------------+---+
| Drug abuse in remission |   |
+-------------------------+---+
 
 
 
+------------------------------------------------------------------+
|   Overview:   past use of meth and cocaine and marijuana. Clean  |
| since                                                        |
+------------------------------------------------------------------+
 
 
 
+----------------------------------------+---+
| GERD (gastroesophageal reflux disease) |   |
+----------------------------------------+---+
| Anxiety                                |   |
+----------------------------------------+---+
| Depression                             |   |
+----------------------------------------+---+
| Insomnia                               |   |
+----------------------------------------+---+
 
 
 
 
+---------------------------------------+
|   Overview:   due to mental condition |
+---------------------------------------+
 
 
 
+--------------------------+---+
| Alcohol dependence (HCC) |   |
+--------------------------+---+
| Obesity                  |   |
+--------------------------+---+
 
 
 
 Immunizations
 
 
+----------------------+------------------------+----------+
| Name                 | Dates Previously Given | Next Due |
+----------------------+------------------------+----------+
| INFLUENZA PF         | 10/27/2015             |          |
| QUAD(PED/ADOL/ADULT) |                        |          |
| ,PSKT or VIAL        |                        |          |
+----------------------+------------------------+----------+
| INFLUENZA,           | 10/01/2014             |          |
| UNSPECIFIED          |                        |          |
| FORMULATION          |                        |          |
+----------------------+------------------------+----------+
| PNEUMOCOCCAL         | 2015             |          |
| POLYSACCHARIDE       |                        |          |
| 23-VALENT (PPSV23)   |                        |          |
+----------------------+------------------------+----------+
 
 
 
 Family History
 
 
+---------------------+----------+------+----------+
| Medical History     | Relation | Name | Comments |
+---------------------+----------+------+----------+
| Alcohol abuse       | Brother  |      |          |
+---------------------+----------+------+----------+
| Schizophrenia       | Brother  |      |          |
+---------------------+----------+------+----------+
| Psychiatric Illness | Father   |      |          |
+---------------------+----------+------+----------+
| Diabetes            | Mother   |      |          |
+---------------------+----------+------+----------+
| Migraines           | Sister   |      |          |
+---------------------+----------+------+----------+
 
 
 
+----------+------+----------+---------------------------+
| Relation | Name | Status   | Comments                  |
+----------+------+----------+---------------------------+
 
| Brother  |      | Alive    |                           |
+----------+------+----------+---------------------------+
| Father   |      |  |                           |
+----------+------+----------+---------------------------+
| Mother   |      |  | complications of diabetes |
+----------+------+----------+---------------------------+
| Sister   |      | Alive    |                           |
+----------+------+----------+---------------------------+
| Sister   |      |  | industrial accident       |
+----------+------+----------+---------------------------+
 
 
 
 Social History
 
 
+---------------+------------+-----------+--------+------------------+
| Tobacco Use   | Types      | Packs/Day | Years  | Date             |
|               |            |           | Used   |                  |
+---------------+------------+-----------+--------+------------------+
| Former Smoker | Cigarettes | 2         | 39     | Quit: 2015 |
+---------------+------------+-----------+--------+------------------+
 
 
 
+---------------------+---+---+---+
| Smokeless Tobacco:  |   |   |   |
| Never Used          |   |   |   |
+---------------------+---+---+---+
 
 
 
+-----------------------------------------+
| Tobacco Cessation: Counseling Given: No |
+-----------------------------------------+
 
 
 
+-------------+-----------+---------+----------+
| Alcohol Use | Drinks/We | oz/Week | Comments |
|             | ek        |         |          |
+-------------+-----------+---------+----------+
| No          |   0       | 0.0     |          |
|             | Standard  |         |          |
|             | drinks or |         |          |
|             |           |         |          |
|             | equivalen |         |          |
|             | t         |         |          |
+-------------+-----------+---------+----------+
 
 
 
+------------------+---------------+
| Sex Assigned at  | Date Recorded |
| Birth            |               |
+------------------+---------------+
| Not on file      |               |
+------------------+---------------+
 
 
 
 
 Last Filed Vital Signs
 
 
+-------------------+-------------------+---------------------+
| Vital Sign        | Reading           | Time Taken          |
+-------------------+-------------------+---------------------+
| Blood Pressure    | 140/70            | 2016 1312 PDT |
+-------------------+-------------------+---------------------+
| Pulse             | 84                | 20162 PDT |
+-------------------+-------------------+---------------------+
| Temperature       | -                 | -                   |
+-------------------+-------------------+---------------------+
| Respiratory Rate  | 16                | 10/27/2015 1501 PDT |
+-------------------+-------------------+---------------------+
| Oxygen Saturation | 96%               | 2016 PDT |
+-------------------+-------------------+---------------------+
| Inhaled Oxygen    | -                 | -                   |
| Concentration     |                   |                     |
+-------------------+-------------------+---------------------+
| Weight            | 122.5 kg (270 lb) | 2016 PDT |
+-------------------+-------------------+---------------------+
| Height            | 172.7 cm (5' 8")  | 2016 PDT |
+-------------------+-------------------+---------------------+
| Body Mass Index   | 41.05             | 2016 PDT |
+-------------------+-------------------+---------------------+
 
 
 
 Plan of Treatment
 
 
+----------------------+-----------+------------------------+----------+
| Health Maintenance   | Due Date  | Last Done              | Comments |
+----------------------+-----------+------------------------+----------+
| Hepatitis C          |  |                        |          |
| Screening            | 2         |                        |          |
+----------------------+-----------+------------------------+----------+
| Vaccine:             |  |                        |          |
| Dtap/Tdap/Td (1 -    | 1         |                        |          |
| Tdap)                |           |                        |          |
+----------------------+-----------+------------------------+----------+
| COLON CANCER         |  |                        |          |
| SCREENING            | 2         |                        |          |
| (COLONOSCOPY EVERY   |           |                        |          |
| 10 YEARS 50-75)      |           |                        |          |
+----------------------+-----------+------------------------+----------+
| Vaccine: Influenza   |  | 10/27/2015, 10/01/2014 |          |
| (Season Ended)       | 8         |                        |          |
+----------------------+-----------+------------------------+----------+
| Vaccine:             | Completed | 2015, 2015 |          |
| Pneumococcal 19-64   |           |                        |          |
| (PPSV23 only) Medium |           |                        |          |
|  Risk                |           |                        |          |
+----------------------+-----------+------------------------+----------+
 
 
 
 Results
 Not on filefrom Last 3 Months
 
 
 Insurance
 
 
+-------------------+--------+-------------+--------+-------------+---------+
| Payer             | Benefi | Subscriber  | Type   | Phone       | Address |
|                   | t Plan | ID          |        |             |         |
|                   |  /     |             |        |             |         |
|                   | Group  |             |        |             |         |
+-------------------+--------+-------------+--------+-------------+---------+
| [employer]  | [employer]   | [id number]    | [employer] | [phone]         |
| MEDICAID HMO      | HEALTH |             | id     | [id number]        |         |
|                   |  MDCD  |             |        |             |         |
|                   | [place] |             |        |             |         |
+-------------------+--------+-------------+--------+-------------+---------+
 
 
 
+------------------+--------+-------------+--------+-------------+----------------------+
| Guarantor Name   | [employer] | Relation to | Date   | Phone       | Billing Address      |
|                  | t Type |  Patient    | of     |             |                      |
|                  |        |             | Birth  |             |                      |
+------------------+--------+-------------+--------+-------------+----------------------+
| JAROD ARBOLEDA | Person | Self        | / |   Home:     |   [address]  |
|                  | al/Fam |             | 1960   | [phone] | [address]  [place],  |
|                  | [place]    |             |        | [id number]        | [place] [address]             |
+------------------+--------+-------------+--------+-------------+----------------------+

## 2018-04-14 NOTE — XMS
Encounter Summary
  Created on: 2018
 
 Kayce Arboleda
 External Reference #: [id number]
 : 64
 Sex: Male
 
 Demographics
 
 
+-----------------------+-----------------------+
| Address               | [address] |
|                       | [place]  [address]  |
+-----------------------+-----------------------+
| Home Phone            | [phone]       |
+-----------------------+-----------------------+
| Preferred Language    | Unknown               |
+-----------------------+-----------------------+
| Marital Status        | Single                |
+-----------------------+-----------------------+
| Bahai Affiliation | 1013                  |
+-----------------------+-----------------------+
| Race                  | Unknown               |
+-----------------------+-----------------------+
| Ethnic Group          | Unknown               |
+-----------------------+-----------------------+
 
 
 Author
 
 
+--------------+-----------------------+
| Author       | [facility] |
+--------------+-----------------------+
| Organization | [facility] |
+--------------+-----------------------+
| Address      | Unknown               |
+--------------+-----------------------+
| Phone        | Unavailable           |
+--------------+-----------------------+
 
 
 
 Support
 
 
+-----------------+--------------+---------------------+-----------------+
| Name            | Relationship | Address             | Phone           |
+-----------------+--------------+---------------------+-----------------+
| Tejal Champagne | ECON         | [address]              | [phone] |
|                 |              | [address], [place]   |                 |
|                 |              | [address]               |                 |
+-----------------+--------------+---------------------+-----------------+
 
 
 
 Care Team Providers
 
 
 
+-----------------------+------+-----------------+
| Care Team Member Name | Role | Phone           |
+-----------------------+------+-----------------+
| Facundo Lese  | PCP  | [phone] |
+-----------------------+------+-----------------+
 
 
 
 Reason for Visit
 
 
+--------+--------------------------------------------+
| Reason | Comments                                   |
+--------+--------------------------------------------+
| Other  | [facility], Pulmonary Function Test |
+--------+--------------------------------------------+
 
 
 
 Encounter Details
 
 
+--------+-------------+----------------------+----------------------+--------------------+
| Date   | Type        | Department           | Care Team            | Description        |
+--------+-------------+----------------------+----------------------+--------------------+
| / | Documentati |   [facility]          |   Sherron Almanzar,  | Other ([facility] [place]  |
| 2018   | on Only     | Cardiology [place]  | [facility]                  | Clinic, Pulmonary  |
|        |             |  [address]    |                      | Function Test)     |
|        |             | [place]         |                      |                    |
|        |             | [address]           |                      |                    |
|        |             | [phone]         |                      |                    |
+--------+-------------+----------------------+----------------------+--------------------+
 
 
 
 Social History
 
 
+---------------+-------+-----------+--------+------------------+
| Tobacco Use   | Types | Packs/Day | Years  | Date             |
|               |       |           | Used   |                  |
+---------------+-------+-----------+--------+------------------+
| Former Smoker |       | 3         | 37     | Quit: 10/05/2015 |
+---------------+-------+-----------+--------+------------------+
 
 
 
+---------------------+---+---+----------+
| Smokeless Tobacco:  |   |   | Quit:    |
| Former User         |   |   | 10/05/20 |
|                     |   |   | 15       |
+---------------------+---+---+----------+
 
 
 
+-------------+-----------+---------+---------------------------+
| Alcohol Use | Drinks/We | oz/Week | Comments                  |
|             | ek        |         |                           |
 
+-------------+-----------+---------+---------------------------+
| No          |   0       | 0.0     | heavy drinker in past hx. |
|             | Standard  |         |                           |
|             | drinks or |         |                           |
|             |           |         |                           |
|             | equivalen |         |                           |
|             | t         |         |                           |
+-------------+-----------+---------+---------------------------+
 
 
 
+------------------+---------------+
| Sex Assigned at  | Date Recorded |
| Birth            |               |
+------------------+---------------+
| Not on file      |               |
+------------------+---------------+
 as of this encounter
 
 Plan of Treatment
 
 
+--------+---------+-------------+----------------------+-------------+
| Date   | Type    | Specialty   | Care Team            | Description |
+--------+---------+-------------+----------------------+-------------+
| / | Office  | Pulmonology |   Jhonatan Louie MD  |             |
| 2018   | Visit   |             |  [address]    |             |
|        |         |             | [place] [address]   |             |
|        |         |             | [phone]         |             |
|        |         |             | [phone] (Fax)   |             |
+--------+---------+-------------+----------------------+-------------+
 as of this encounter
 
 Visit Diagnoses
 Not on filein this encounter

## 2018-04-14 NOTE — NUR
PT RECEIVED FROM ED. PT TRANSFERED SELF TO BED. PT ON ROOM AIR, LUNG SOUNDS
CLEAR, DENIES SOB. PT DENIES CHEST PAIN. PT WITH PAIN TO RIGHT HIP, REQUESTING
PAIN MEDICATION. REDNESS TO RIGHT LEG FROM HIP TO MID CALF, HOT TO THE TOUCH.
CMS INTACT, WITHOUT PITTING EDEMA, PULSES PALPABLE. PT DENIES NAUSEA, BOWEL
TONES ACTIVE. PT ASSISTED TO BATHROOM AND BACK TO BED, VOIDING WIHTOUT
DIFFICULTY. IV INFUSING VANCO FROM ED. MD CALLED FOR ORDERS, ORDER TO CONTIUE
OXYCODONE 10 MG Q4P, XARELTO 10 MG DAILY, TYLENOL 650 MG Q6H, PHARMACY TO DOSE
VANCO, REGULAR DIET, PT/OT, AND PT MAY SHOWER, RBVO. 10 MG PO OXYCODONE GIVEN.
WATER PROVIDED. MED REC AND ADMISSION INTAKLE COMPLETED. PT DENIES OTHER NEEDS
AT THIS TIME.

## 2018-04-14 NOTE — XMS
Encounter Summary
  Created on: 2018
 
 Kayce Arboleda
 External Reference #: [id number]
 : 64
 Sex: Male
 
 Demographics
 
 
+-----------------------+-----------------------+
| Address               | [address] |
|                       | [place]  [address]  |
+-----------------------+-----------------------+
| Home Phone            | [phone]       |
+-----------------------+-----------------------+
| Preferred Language    | Unknown               |
+-----------------------+-----------------------+
| Marital Status        | Single                |
+-----------------------+-----------------------+
| Episcopal Affiliation | 1013                  |
+-----------------------+-----------------------+
| Race                  | Unknown               |
+-----------------------+-----------------------+
| Ethnic Group          | Unknown               |
+-----------------------+-----------------------+
 
 
 Author
 
 
+--------------+-----------------------+
| Author       | [facility] |
+--------------+-----------------------+
| Organization | [facility] |
+--------------+-----------------------+
| Address      | Unknown               |
+--------------+-----------------------+
| Phone        | Unavailable           |
+--------------+-----------------------+
 
 
 
 Support
 
 
+-----------------+--------------+---------------------+-----------------+
| Name            | Relationship | Address             | Phone           |
+-----------------+--------------+---------------------+-----------------+
| Tejal Champagne | ECON         | [address]              | [phone] |
|                 |              | [address], [place]   |                 |
|                 |              | [address]               |                 |
+-----------------+--------------+---------------------+-----------------+
 
 
 
 Care Team Providers
 
 
 
+-----------------------+------+-----------------+
| Care Team Member Name | Role | Phone           |
+-----------------------+------+-----------------+
| Facundo Lees  | PCP  | [phone] |
+-----------------------+------+-----------------+
 
 
 
 Reason for Visit
 
 
+-----------+----------+
| Reason    | Comments |
+-----------+----------+
| Labs Only |          |
+-----------+----------+
 
 
 
 Encounter Details
 
 
+--------+-------------+----------------------+-------------------+-------------+
| Date   | Type        | Department           | Care Team         | Description |
+--------+-------------+----------------------+-------------------+-------------+
| / | Documentati |   [facility]          |   [facility],  | Labs Only   |
| 2018   | on Only     | [facility]  | [facility]       |             |
|        |             |  [address]    |                   |             |
|        |             | [place]         |                   |             |
|        |             | [address]           |                   |             |
|        |             | [phone]         |                   |             |
+--------+-------------+----------------------+-------------------+-------------+
 
 
 
 Social History
 
 
+---------------+-------+-----------+--------+------------------+
| Tobacco Use   | Types | Packs/Day | Years  | Date             |
|               |       |           | Used   |                  |
+---------------+-------+-----------+--------+------------------+
| Former Smoker |       | 3         | 37     | Quit: 10/05/2015 |
+---------------+-------+-----------+--------+------------------+
 
 
 
+---------------------+---+---+----------+
| Smokeless Tobacco:  |   |   | Quit:    |
| Former User         |   |   | 10/05/20 |
|                     |   |   | 17       |
+---------------------+---+---+----------+
 
 
 
+-------------+-----------+---------+---------------------------+
| Alcohol Use | Drinks/We | oz/Week | Comments                  |
|             | ek        |         |                           |
 
+-------------+-----------+---------+---------------------------+
| No          |   0       | 0.0     | heavy drinker in past hx. |
|             | Standard  |         |                           |
|             | drinks or |         |                           |
|             |           |         |                           |
|             | equivalen |         |                           |
|             | t         |         |                           |
+-------------+-----------+---------+---------------------------+
 
 
 
+------------------+---------------+
| Sex Assigned at  | Date Recorded |
| Birth            |               |
+------------------+---------------+
| Not on file      |               |
+------------------+---------------+
 as of this encounter
 
 Plan of Treatment
 
 
+--------+---------+-------------+----------------------+-------------+
| Date   | Type    | Specialty   | Care Team            | Description |
+--------+---------+-------------+----------------------+-------------+
| / | Office  | Pulmonology |   Jhonatan Louie MD  |             |
| 2018   | Visit   |             |  [address]    |             |
|        |         |             | [place] [address]   |             |
|        |         |             | [phone]         |             |
|        |         |             | [phone] (Fax)   |             |
+--------+---------+-------------+----------------------+-------------+
 as of this encounter
 
 Visit Diagnoses
 Not on filein this encounter

## 2018-04-14 NOTE — XMS
Encounter Summary
  Created on: 2018
 
 Kayce Arboleda
 External Reference #: [id number]
 : 64
 Sex: Male
 
 Demographics
 
 
+-----------------------+-----------------------+
| Address               | [address] |
|                       | [place]  [address]  |
+-----------------------+-----------------------+
| Home Phone            | [phone]       |
+-----------------------+-----------------------+
| Preferred Language    | Unknown               |
+-----------------------+-----------------------+
| Marital Status        | Single                |
+-----------------------+-----------------------+
| Judaism Affiliation | 1013                  |
+-----------------------+-----------------------+
| Race                  | Unknown               |
+-----------------------+-----------------------+
| Ethnic Group          | Unknown               |
+-----------------------+-----------------------+
 
 
 Author
 
 
+--------------+-----------------------+
| Author       | [facility] |
+--------------+-----------------------+
| Organization | [facility] |
+--------------+-----------------------+
| Address      | Unknown               |
+--------------+-----------------------+
| Phone        | Unavailable           |
+--------------+-----------------------+
 
 
 
 Support
 
 
+-----------------+--------------+---------------------+-----------------+
| Name            | Relationship | Address             | Phone           |
+-----------------+--------------+---------------------+-----------------+
| Tejal Champagne | ECON         | [address]              | [phone] |
|                 |              | [address], [place]   |                 |
|                 |              | [address]               |                 |
+-----------------+--------------+---------------------+-----------------+
 
 
 
 Care Team Providers
 
 
 
+-----------------------+------+-----------------+
| Care Team Member Name | Role | Phone           |
+-----------------------+------+-----------------+
| Facundo Lees  | PCP  | [phone] |
+-----------------------+------+-----------------+
 
 
 
 Reason for Visit
 
 
+--------+-------------------+
| Reason | Comments          |
+--------+-------------------+
| Other  | Facundo Lees NP |
+--------+-------------------+
 
 
 
 Encounter Details
 
 
+--------+-------------+----------------------+----------------------+---------------+
| Date   | Type        | Department           | Care Team            | Description   |
+--------+-------------+----------------------+----------------------+---------------+
| / | Documentati [facility]          |   Sherron Almanzar,  | Other (Fac[facility]   |
| 2018   | on Only     | [facility]  | CMA                  | Yoana, NP) |
|        |             |  [address]    |                      |               |
|        |             | [place]         |                      |               |
|        |             | [address]           |                      |               |
|        |             | [phone]         |                      |               |
+--------+-------------+----------------------+----------------------+---------------+
 
 
 
 Social History
 
 
+---------------+-------+-----------+--------+------------------+
| Tobacco Use   | Types | Packs/Day | Years  | Date             |
|               |       |           | Used   |                  |
+---------------+-------+-----------+--------+------------------+
| Former Smoker |       | 3         | 37     | Quit: 10/05/2015 |
+---------------+-------+-----------+--------+------------------+
 
 
 
+---------------------+---+---+----------+
| Smokeless Tobacco:  |   |   | Quit:    |
| Former User         |   |   | 10/05/20 |
|                     |   |   | 17       |
+---------------------+---+---+----------+
 
 
 
+-------------+-----------+---------+---------------------------+
| Alcohol Use | Drinks/We | oz/Week | Comments                  |
|             | ek        |         |                           |
 
+-------------+-----------+---------+---------------------------+
| No          |   0       | 0.0     | heavy drinker in past hx. |
|             | Standard  |         |                           |
|             | drinks or |         |                           |
|             |           |         |                           |
|             | equivalen |         |                           |
|             | t         |         |                           |
+-------------+-----------+---------+---------------------------+
 
 
 
+------------------+---------------+
| Sex Assigned at  | Date Recorded |
| Birth            |               |
+------------------+---------------+
| Not on file      |               |
+------------------+---------------+
 as of this encounter
 
 Plan of Treatment
 
 
+--------+---------+-------------+----------------------+-------------+
| Date   | Type    | Specialty   | Care Team            | Description |
+--------+---------+-------------+----------------------+-------------+
| / | Office  | Pulmonology |   Jhonatan Louie MD  |             |
| 2018   | Visit   |             |  [address]    |             |
|        |         |             | [place] [address]   |             |
|        |         |             | [phone]         |             |
|        |         |             | [phone] (Fax)   |             |
+--------+---------+-------------+----------------------+-------------+
 as of this encounter
 
 Visit Diagnoses
 Not on filein this encounter

## 2018-04-14 NOTE — XMS
Encounter Summary
  Created on: 2018
 
 Kayce Arboleda
 External Reference #: [id number]
 : 64
 Sex: Male
 
 Demographics
 
 
+-----------------------+-----------------------+
| Address               | [address] |
|                       | [place]  [address]  |
+-----------------------+-----------------------+
| Home Phone            | [phone]       |
+-----------------------+-----------------------+
| Preferred Language    | Unknown               |
+-----------------------+-----------------------+
| Marital Status        | Single                |
+-----------------------+-----------------------+
| Mu-ism Affiliation | [id number]                  |
+-----------------------+-----------------------+
| Race                  | Unknown               |
+-----------------------+-----------------------+
| Ethnic Group          | Unknown               |
+-----------------------+-----------------------+
 
 
 Author
 
 
+--------------+-----------------------+
| Author       | [employer] |
+--------------+-----------------------+
| Organization | [employer] |
+--------------+-----------------------+
| Address      | Unknown               |
+--------------+-----------------------+
| Phone        | Unavailable           |
+--------------+-----------------------+
 
 
 
 Support
 
 
+-----------------+--------------+---------------------+-----------------+
| Name            | Relationship | Address             | Phone           |
+-----------------+--------------+---------------------+-----------------+
| Tejal Champagne | ECON         | [address]              | [phone] |
|                 |              | [address], [place]   |                 |
|                 |              | [address]               |                 |
+-----------------+--------------+---------------------+-----------------+
 
 
 
 Care Team Providers
 
 
 
+-----------------------+------+-----------------+
| Care Team Member Name | Role | Phone           |
+-----------------------+------+-----------------+
| Facundo Rodriguez FNP  | PCP  | [phone] |
+-----------------------+------+-----------------+
 
 
 
 Reason for Referral
 Echo (Routine)
 
+----------+--------+-----------+--------------+--------------+--------------+
| Status   | Reason | Specialty | Diagnoses /  | Referred By  | Referred To  |
|          |        |           | Procedures   | Contact      | Contact      |
+----------+--------+-----------+--------------+--------------+--------------+
| Pending  |        |           |   Diagnoses  |   Trace, |              |
| Review   |        |           |  Essential   |  Janette Johnson  |              |
|          |        |           | hypertension | ARNP  [address]   |              |
|          |        |           |  with goal   | [address]  |              |
|          |        |           | blood        | Louie F        |              |
|          |        |           | pressure     | [place] |              |
|          |        |           | less than    |  [id number]       |              |
|          |        |           | 130/80       | Phone:       |              |
|          |        |           | Hyperlipidem | [phone] |              |
|          |        |           | ia,          |   Fax:       |              |
|          |        |           | unspecified  | [phone] |              |
|          |        |           | hyperlipidem |              |              |
|          |        |           | ia type      |              |              |
|          |        |           | Thoracoabdom |              |              |
|          |        |           | inal aortic  |              |              |
|          |        |           | aneurysm,    |              |              |
|          |        |           | without      |              |              |
|          |        |           | rupture      |              |              |
|          |        |           | (HCC)        |              |              |
|          |        |           | Murmur,      |              |              |
|          |        |           | cardiac      |              |              |
|          |        |           | Procedures   |              |              |
|          |        |           | Echo cardiac |              |              |
|          |        |           |  adult       |              |              |
|          |        |           | complete     |              |              |
+----------+--------+-----------+--------------+--------------+--------------+
 
 
 
 
 Reason for Visit
 
 
+-----------+-----------------------------+
| Reason    | Comments                    |
+-----------+-----------------------------+
| Follow-up | 2 month - echo STA 18 |
+-----------+-----------------------------+
 
 
 
 Encounter Details
 
 
 
+--------+---------+----------------------+----------------------+----------------------+
| Date   | Type    | Department           | Care Team            | Description          |
+--------+---------+----------------------+----------------------+----------------------+
| / | Office  |   [facility]          |   Janette Woodward    | Essential            |
| 2018   | Visit   | [facility] | DIETER Johnson  [address]     | hypertension with    |
|        |         |   [address]    | [address]    | goal blood pressure  |
|        |         | Way [address]        | [place] [address]   | less than 130/80     |
|        |         | [place] [address]  | [phone]         | (Primary Dx);        |
|        |         |  [phone]        | [phone] (Fax)   | Hyperlipidemia,      |
|        |         |                      |                      | unspecified          |
|        |         |                      |                      | hyperlipidemia type; |
|        |         |                      |                      |  Thoracoabdominal    |
|        |         |                      |                      | aortic aneurysm,     |
|        |         |                      |                      | without rupture      |
|        |         |                      |                      | (HCC); H/O syncope;  |
|        |         |                      |                      | Murmur, cardiac;     |
|        |         |                      |                      | Obstructive sleep    |
|        |         |                      |                      | apnea syndrome;      |
|        |         |                      |                      | Chronic obstructive  |
|        |         |                      |                      | pulmonary disease,   |
|        |         |                      |                      | unspecified COPD     |
|        |         |                      |                      | type (HCC); Former   |
|        |         |                      |                      | heavy tobacco        |
|        |         |                      |                      | smoker; History of   |
|        |         |                      |                      | anemia               |
+--------+---------+----------------------+----------------------+----------------------+
 
 
 
 Social History
 
 
+---------------+-------+-----------+--------+------------------+
| Tobacco Use   | Types | Packs/Day | Years  | Date             |
|               |       |           | Used   |                  |
+---------------+-------+-----------+--------+------------------+
| Former Smoker |       | 3         | 37     | Quit: 10/05/2015 |
+---------------+-------+-----------+--------+------------------+
 
 
 
+---------------------+---+---+----------+
| Smokeless Tobacco:  |   |   | Quit:    |
| Former User         |   |   | 10/05/20 |
|                     |   |   | 15       |
+---------------------+---+---+----------+
 
 
 
+-------------+-----------+---------+---------------------------+
| Alcohol Use | Drinks/We | oz/Week | Comments                  |
|             | ek        |         |                           |
+-------------+-----------+---------+---------------------------+
| No          |   0       | 0.0     | heavy drinker in past hx. |
|             | Standard  |         |                           |
|             | drinks or |         |                           |
|             |           |         |                           |
|             | equivalen |         |                           |
|             | t         |         |                           |
 
+-------------+-----------+---------+---------------------------+
 
 
 
+------------------+---------------+
| Sex Assigned at  | Date Recorded |
| Birth            |               |
+------------------+---------------+
| Not on file      |               |
+------------------+---------------+
 as of this encounter
 
 Last Filed Vital Signs
 
 
+-------------------+----------------------+-------------------------+
| Vital Sign        | Reading              | Time Taken              |
+-------------------+----------------------+-------------------------+
| Blood Pressure    | 112/60               | 2018 10:53 AM PDT |
+-------------------+----------------------+-------------------------+
| Pulse             | 67                   | 2018 10:53 AM PDT |
+-------------------+----------------------+-------------------------+
| Temperature       | -                    | -                       |
+-------------------+----------------------+-------------------------+
| Respiratory Rate  | 20                   | 2018 10:53 AM PDT |
+-------------------+----------------------+-------------------------+
| Oxygen Saturation | 98%                  | 2018 10:53 AM PDT |
+-------------------+----------------------+-------------------------+
| Inhaled Oxygen    | -                    | -                       |
| Concentration     |                      |                         |
+-------------------+----------------------+-------------------------+
| Weight            | 106.6 kg (235 lb 1.6 | 2018 10:53 AM PDT |
|                   |  oz)                 |                         |
+-------------------+----------------------+-------------------------+
| Height            | 177.8 cm (5' 10")    | 2018 10:53 AM PDT |
+-------------------+----------------------+-------------------------+
| Body Mass Index   | 33.73                | 2018 10:53 AM PDT |
+-------------------+----------------------+-------------------------+
 in this encounter
 
 Instructions
 Patient Instructions - Pernelljhony YesyDIETER Johnson - 2018 11:00 AM PDTYour labs looked
 good and not anemic , and cholesterol is well controlled
 Your Echo also looked good 
 
 I have ordered you an Echo to be done at [facility] in one year, see me back in one year
 in this encounter
 
 Progress Notes
 Pernelljhony Janette JohnsonDIETER - 2018 11:00 AM PDTFormatting of this note may be differen
t from the original.
 Date of visit: 3/12/2018
 Primary Care Physician: FACUNDO RODRIGUEZ
 
 CHIEF COMPLAINT:  
 Chief Complaint 
 Patient presents with 
   Follow-up 
   2 month - echo STA 18 
 
 
 HISTORY OF PRESENT ILLNESS:
   Mr. Kayce Arboleda, Bud, is a 57-year-old man who is here today for follow-up on his labs 
and his Echo.
    He has a previous history of syncope though workup was benign including a 32 day cardiac
 event monitor and  no further episodes of syncope, hypertension, hyperlipidemia, and COPD w
paolo is followed by Dr. Louie.
  I saw him last on 2018 when I had ordered him an echo, referred him for assess
ment of his uncorrected sleep apnea and ordered a CMP, lipid panel, CBC, iron panel with a f
erritin level .
   His labs  performed 18 showed normal current panel with ferritin of 162 though  iron
  low-end of normal at 82 and low sat of 24.1%, his lipid panel shows lipids well controlled
 with LDL of 68, triglycerides of 116, and total cholesterol of 131, and he had a normal CMP
 showing normal liver enzymes and normal renal function, and CBC also showed normal hemoglob
in and hematocrit and platelet count.
    His Echo performed at [facility] on 2018 reported a normal EF of 60-65 p
ercent, right ventricle normal in size and function, and the aortic root, ascending aorta, a
nd aortic arch were all normal dimensions, and there was no significant valvular abnormaliti
es.
  He was admitted to the emergency room 2018 for increasing lower back pain radiati
ng to his right buttocks and legs.  I reviewed ER  documentation today   Blood pressure mild
ly elevated with pain but improved when they treated pain ,and he was treated with Decadron 
and given a prescription for prednisone and oxycodone and sent home.
  His COPD continues to be well controlled with no recent exacerbation, but still suffers fr
om dyspnea with exertion, and activities of daily living have been difficult with increased 
hip pain.
   Today, he  denies any chest pain, palpitations, dyspnea, dizziness,or syncope. He also de
nies any signs or symptoms of stroke or TIA.
   He does have an appointment to be seen by Dr. Landry at the [facility] sleep lab in May
 to get evaluation and treatment of his sleep apnea    
 REVIEW OF SYSTEMS:
 Negative except for pertinent items noted in HPI.
 
 Constitutional: mild  fatigue or unexplained weight loss.  Appetite is good.  Weight is sta
ble.    Denies night sweats fevers or chills
 HENT: Denies nosebleeds.   Positive for hearing loss .  Denies dysphagia
 Eyes: History of eye surgery orbit blowout ? Denies visual disturbance or double vision.  
 Respiratory/Sleep:: Positive for COPD (Dr. Louie), sleep apnea ,not wearing CPAP.  Positive
 for dyspnea with more extreme exertion, reports occasional cough  Denies hemoptysis or exce
ssive sputum production. Denies  orthopnea, PND. 
 Cardiovascular: Denies  chest pain, palpitations and leg swelling. Denies history of rheuma
tic fever. Denies claudication . 
 Gastrointestinal:history of GERD , on PPI  .  Denies  nausea, vomiting, abdominal pain and 
blood in stool. 
 Genitourinary: Denies  hematuria.  
 Musculoskeletal: Positive for joint pain and arthralgia severe right hip pain, back , shoul
ric, neck  , Denies myalgias, 
 Skin: Denies  color change.  Denies rash or lesions
 Neurological:  Positive for Numbness to legs, and arms .   Denies history of stroke/Transie
nt ischemic attack.Denies history of seizures. Denies dizziness, syncope  
 Hematological/Oncology Does not bruise/bleed easily.  Denies history of cancer
 Endocrine: Denies diabetes or thyroid disease.  Denies excessive thirst or hunger.  
 Psychiatric/Behavioral: The patient denies any history of depression or anxiety or other ps
ychiatric illness.
 Vaccines: Current on 2017 flu vaccine.  Current on pneumonia vaccine.
 Habits/Social :  history of smoking, quit in 2015.  Smoked 3 packs a day  35 years
.  Previously used smokeless tobacco quit in 2015..  Denies EtOH use for 2 years , p
revious heavy drinker. Drinks servings of  12 cups coffee/tea daily, uses 1/2 chocolate , ha
lf dec aff  .  Denies recreational or illicit drug use, previously used methamphetamine  20
 years, cocaine, marijuana, crack, mushrooms.  Exercises with tasiks in  [employer], walking,  a
nd tolerates.  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 , 
 
 
 Outpatient Medications Prior to Visit 
 Medication Sig Dispense Refill 
   albuterol (PROVENTIL HFA;VENTOLIN HFA) 108 (90 BASE) MCG/ACT inhaler Inhale 2 puffs int
o the lungs every 4 (four) hours as needed for Wheezing.   
   amitriptyline (ELAVIL) 100 MG tablet Take 100 mg by mouth nightly.   
   amLODIPine (NORVASC) 5 MG tablet Take 5 mg by mouth daily.   
   ascorbic acid (VITAMIN C) 250 MG tablet Take 250 mg by mouth daily.   
   atorvastatin (LIPITOR) 40 MG tablet Take 1 tablet by mouth nightly. 30 tablet 11 
   cloNIDine (CATAPRES) 0.3 MG tablet Take 0.3 mg by mouth nightly.   
   diclofenac (CATAFLAM) 50 MG tablet Take 50 mg by mouth 2 (two) times daily.   
   ferrous sulfate, 65 FE, 324 (65 FE) MG EC tablet Take 65 mg of iron by mouth daily with
 breakfast. With 250 mg Vit C   
   fluticasone-salmeterol (ADVAIR) 500-50 MCG/DOSE diskus inhaler Inhale 1 puff into the l
ungs 2 (two) times daily.   
   gabapentin (NEURONTIN) 600 MG tablet Take 300 mg by mouth 3 (three) times daily.   
   hydrochlorothiazide (HYDRODIURIL) 12.5 MG tablet Take 12.5 mg by mouth daily.   
   ibuprofen (MOTRIN) 600 MG tablet Take 600 mg by mouth every 6 (six) hours as needed for
 Pain. Trying to use sparingly   
   ipratropium-albuterol (DUO-NEB) 0.5-2.5 mg/3mL Take 3 mLs by nebulization as needed.   
   metoprolol (LOPRESSOR) 25 MG tablet Take 1 tablet by mouth 2 (two) times daily. 60 tabl
et 11 
   montelukast (SINGULAIR) 10 MG tablet Take 10 mg by mouth nightly.   
   Multiple Vitamins-Minerals (RA CENTRAL-BIBI PO) Take  by mouth daily.   
   nitroGLYCERIN (NITROSTAT) 0.4 MG SL tablet Place 1 tablet under the tongue every 5 (fiv
e) minutes as needed for Chest pain. 90 tablet 0 
   pantoprazole (PROTONIX) 40 MG tablet Take 40 mg by mouth daily.   
   tamsulosin (FLOMAX) 0.4 MG capsule Take 0.4 mg by mouth  After dinner.   
   thiamine (VITAMIN B-1) 100 MG tablet Take 1 tablet by mouth daily. 30 tablet 0 
   umeclidinium bromide (INCRUSE ELLIPTA) 62.5 MCG/INH inhaler Inhale 1 puff into the lung
s daily.   
 
 No facility-administered medications prior to visit.  
 
 PHYSICAL EXAM:
 Wt Readings from Last 3 Encounters: 
 18 106.6 kg (235 lb 1.6 oz) 
 18 108.1 kg (238 lb 6.4 oz) 
 17 109.8 kg (242 lb) 
 
 Temp Readings from Last 3 Encounters: 
 17 97.6 F (36.4 C) (Oral) 
 17 97.2 F (36.2 C) (Oral) 
 17 98.6 F (37 C) (Temporal) 
 
 BP Readings from Last 3 Encounters: 
 18 112/60 
 18 116/62 
 17 127/79 
 
 Pulse Readings from Last 3 Encounters: 
 18 67 
 18 64 
 17 54 
 
 GENERAL:  Well developed, well nourished, in no distress.  Appears older than stated age.
 HEENT:  Normocephalic, atraumatic.  
 EYES:     PERRL, EOM normal.
 MOUTH: Oral mucosae moist, dentition adequate, no lesions noted
 NECK:    No JVD, lymphadenopathy, thyromegaly, bruits.  Carotid pulses are 2+ bilaterally
 
 LUNGS/CHEST:  Clear bilaterally, with no rales, rhonchi or wheezing noted, respirations unl
abored
 HEART:  Nondisplaced PMI, regular rate and rhythm, S1, S2 normal.  2/6 murmur RUSB, rubs or
 gallops noted.
 ABDOMEN:  Soft, nontender, no organomegaly, masses or bruits.  Bowel sounds are normal in a
ll 4 quadrants.  The abdominal aortic pulsation is not palpable.
 EXTREMITIES:  No edema. Radial pulses 2+ bilaterally.  Femoral pulses are 2+ bilaterally wi
thout bruits.  DP and PT pulses are 2+ bilaterally.  No clubbing.varicosities 
 SKIN:  Warm and dry, capillary refill is normal, no lesions.
 NEUROLOGIC:  Awake, alert and oriented x 3. No focal motor or sensory deficits. 
 PSYCHIATRIC:  Appropriate, affect appears normal
 
 DATA:
 Blood tests:
 Lab Results 
 Component Value Date 
  WBC 9.0 2018 
  RBC 4.79 2018 
  HGB 14.7 2018 
  HCT 43.4 2018 
   2018 
 
 Lab Results 
 Component Value Date 
   2018 
  K 4.0 2018 
   2018 
  CO2 28 2018 
  ANIONGAP 15.0 2018 
  GLUF 99 2018 
  BUN 18 2018 
  CREATININE 0.88 2018 
  BCR 20.5 2018 
  CA 9.7 2018 
  EGFR 90 2018 
 
 Lab Results 
 Component Value Date 
  CHOL 131 2018 
  TRIG 116 2018 
  LDL 68 2018 
  GLUF 99 2018 
  HGBA1C 5.7 2016 
 
 Lab Results 
 Component Value Date 
  TSH 4.48 2016 
 
 No results found for: METF, NMETFX, TFNMFX, AXNGVPJ94TJB, VKMTKY21ANX, TOTEPI
 
 IMAGING/PROCEDURES
 Last Stress Test, 2016: Lexiscan, no ischemia detected, LVEF 63%.
 
 ECHO:
 Last Echo : 2018:  ([facility]): TAS. EF 60-65 percent.  LV normal size and wall th
ickness.  No regional wall motion abnormalities.  Diastolic pattern normal for patient age. 
 RV normal in size and function.  Normal size atrium.  Aortic valve trileaflet, mildly thick
ened, no aortic regurgitation or stenosis.  Mitral valve normal, trace MR, no mitral valve p
rolapse or stenosis.  Tricuspid valve normal.  TAS B not assessed due to absence of TR jet. 
 No pericardial effusion.  IVC <1.5 cm.  CVP estimate 0-5 mmHg.  Aortic root, ascending aort
 
a, and aortic arch are normal
  Echo, 2016 ([facility]): LVEF 65-70%, trace MR, trace TR.  Ascending Aorta 41 mm, A
ortic arch 37 mm.
 
 OTHER: 
 PFT: 3/09/2017: Minimal obstructive airway disease, severe restriction-parenchymal, mild di
ffusion defect.  FVC 2.65  percent) FEV1 2.07 (56 percent), ratio 0.78.  Significant broncho
dilator response.  TLC 4.53 CL (66 percent) DLCO 21.3 (68 percent), data and tracings person
ally reviewed by me, original interpretation Dr. Conklin for  TOCP, effusion not corrected for Hg
b
 
 EKG/EVENT MONITORS
 32-Day Cardiac Event Monitor, 2016: sinus rhythm, , averaging 80 bpm, rare ectop
y, no AB/pauses, symptoms of "chest pain, SOB, dizziness, fluttering" all associated with
 NSR.
 EK2016: Normal sinus rhythm.  Rate 63 bpm,  ms, QRS 94 ms,  ms, perso
yoandy reviewed by me
 EK2018: Normal sinus rhythm.  Rate 63 bpm,  ms, QRS 96 ms,  ms, EKG t
racing personally reviewed by me
 
 Labs:
 2017: Lipids: Cholesterol 110, triglycerides 81, HDL 35.8, LDL 58, VLDL 16, ratio 3.1
, non-HDL cholesterol 74.  CMP: Sodium 141, potassium 4, chloride 103, glucose 104, BUN 20, 
creatinine 0.81, GFR 99.  AST 21, ALT 25, alk phos 58, total bilirubin 0.6, albumin 4.4.  CB
C: WBC 9.2, hemoglobin 15, hematocrit 45.3, platelets 304, ESR 4
 Labs: : Lipids: Cholesterol 131, triglycerides 116, HDL 40.3, LDL 68, VLDL 23, ra
camelia 3.3, non-HDL cholesterol 91.(  Lipitor 40 mg )  CMP: Sodium 142, potassium 4, chloride 1
03, glucose 99, BUN 18, creatinine 0.88, AST 19, ALT 25, alk phos 59, total bilirubin 0.5, G
FR 90, albumin 4.4.  CBC: WBC 9, hemoglobin 14.7, hematocrit 43.4, platelets 286.  Iron pane
l: Iron 82.66, TIBC 2 343, percent sat 24, ferritin 162.8, U TIBC 260, transferrin 245
 
  ASSESSMENT & PLAN:
     He was here today to follow-up on his Echo and  labs which I  ordered when I saw him [facility]. 
   His labs  performed 18 showed normal current panel with ferritin of 162 though  iron
  low-end of normal at 82 and low sat of 24.1%, his lipid panel shows lipids well controlled
 with LDL of 68, triglycerides of 116, and total cholesterol of 131, and he had a normal CMP
 showing normal liver enzymes and normal renal function, and CBC also showed normal hemoglob
in and hematocrit and platelet count.
    His Echo performed at [facility] on 2018 reported a normal EF of 60-65 p
ercent, right ventricle normal in size and function, and the aortic root, ascending aorta, a
nd aortic arch were all normal dimensions, and there was no significant valvular abnormaliti
es.
   I reviewed the results with him in detail and answered his questions and any concerns
  He was pleased with the results, and feels overall he is doing well but troubled by extrem
e hip pain and dyspnea from COPD which affects his mobility.  He is asymptomatic for any isc
hemic heart disease symptoms today, and blood pressure and heart rate are well controlled on
 current medication.
  [facility], and hopefully will get his 
sleep apnea corrected given the large contribution of sleep apnea to cardiovascular disease 
and arrhythmia,
   I agree with him that it is likely his sleep apnea was much worse when he was drinking he
avily, but he would still benefit from further evaluation, initially given his underlying pu
lmonary disease.
   I made no changes to medication today, and for his cardiac medications. he should continu
e amlodipine 5 mg daily, Lipitor 40 mg qhs, clonidine 0.3mg qhs ,hydrochlorothiazide 12.5 mg
 daily, and  metoprolol tartrate 25 mg bid and to do his best to use as little NSAIDs as pos
sible, which is difficult given his severe joint pain.
  He reports he is looking into getting knee-high light compression socks to help with his v
aricosities.
 
  I  will see him back in one year, but sooner if needed, and have ordered him an echo to be
 done prior to seeing me back, and I will order a repeat lipid panel if not already done whe
n I see him back
   
 
 1. Essential hypertension with goal blood pressure less than 130/80  
 2. Hyperlipidemia, unspecified hyperlipidemia type  
 3. Thoracoabdominal aortic aneurysm, without rupture (HCC)  
 4. H/O syncope  
 5. Murmur, cardiac  
 6. Obstructive sleep apnea syndrome  
 7. Chronic obstructive pulmonary disease, unspecified COPD type (HCC)  
 8. Former heavy tobacco smoker  
 9. History of anemia  
 
 Orders Placed This Encounter 
 Procedures 
   Echo cardiac adult complete 
 
  
 
 The following portions of the patient's history were personally reviewed by me  and updated
 as appropriate:
 EKG tracings, other  specialty provider and PCP notes,any Hospital admission and discharge 
summaries, any ER records , current and previous cardiac testing and procedure reports and d
altaf, medication bottles brought to visit today personally reviewed by me. 
 Allergies, current medications.labs
 Family history, past medical history, past social history, past surgical history.
 Problem list.
 
 DIETER [facility]  [facility] 
 3/12/2018in this encounter
 
 Plan of Treatment
 
 
+--------+---------+-------------+----------------------+-------------+
| Date   | Type    | Specialty   | Care Team            | Description |
+--------+---------+-------------+----------------------+-------------+
| / | Office  | Pulmonology |   Jhonatan Louie MD  |             |
| 2018   | Visit   |             |  [address]    |             |
|        |         |             | [place] [address]   |             |
|        |         |             | [phone]         |             |
|        |         |             | [phone] ([phone]   |             |
+--------+---------+-------------+----------------------+-------------+
 
 
 
+-----------------------------+--------+----------------------+----------------------+
| Name                        | Priori | Associated Diagnoses | Order Schedule       |
|                             | ty     |                      |                      |
+-----------------------------+--------+----------------------+----------------------+
| Echo cardiac adult complete | Routin |   Essential          | Expected:            |
|                             | e      | hypertension with    | 2018, Expires: |
|                             |        | goal blood pressure  |  2019          |
|                             |        | less than 130/80     |                      |
|                             |        | Hyperlipidemia,      |                      |
|                             |        | unspecified          |                      |
|                             |        | hyperlipidemia type  |                      |
|                             |        |  Thoracoabdominal    |                      |
 
|                             |        | aortic aneurysm,     |                      |
|                             |        | without rupture      |                      |
|                             |        | (HCC)  Murmur,       |                      |
|                             |        | cardiac              |                      |
+-----------------------------+--------+----------------------+----------------------+
 as of this encounter
 
 Visit Diagnoses
 
 
+----------------------------------------------------------------------------+
| Diagnosis                                                                  |
+----------------------------------------------------------------------------+
| Essential hypertension with goal blood pressure less than 130/80 - Primary |
+----------------------------------------------------------------------------+
| Hyperlipidemia, unspecified hyperlipidemia type                            |
+----------------------------------------------------------------------------+
| Thoracoabdominal aortic aneurysm, without rupture (HCC)                    |
+----------------------------------------------------------------------------+
|   Thoracoabdominal aneurysm without mention of rupture                     |
+----------------------------------------------------------------------------+
| H/O syncope                                                                |
+----------------------------------------------------------------------------+
|   Personal history of other specified diseases                             |
+----------------------------------------------------------------------------+
| Murmur, cardiac                                                            |
+----------------------------------------------------------------------------+
|   Undiagnosed cardiac murmurs                                              |
+----------------------------------------------------------------------------+
| Obstructive sleep apnea syndrome                                           |
+----------------------------------------------------------------------------+
|   Obstructive sleep apnea (adult) (pediatric)                              |
+----------------------------------------------------------------------------+
| Chronic obstructive pulmonary disease, unspecified COPD type (HCC)         |
+----------------------------------------------------------------------------+
| Former heavy tobacco smoker                                                |
+----------------------------------------------------------------------------+
| History of anemia                                                          |
+----------------------------------------------------------------------------+
|   Personal history of diseases of blood and blood-forming organs           |
+----------------------------------------------------------------------------+

## 2018-04-14 NOTE — XMS
Encounter Summary
  Created on: 2018
 
 Kayce Arboleda
 External Reference #: [id number]
 : 64
 Sex: Male
 
 Demographics
 
 
+-----------------------+-----------------------+
| Address               | [address] |
|                       | [place]  [address]  |
+-----------------------+-----------------------+
| Home Phone            | [phone]       |
+-----------------------+-----------------------+
| Preferred Language    | Unknown               |
+-----------------------+-----------------------+
| Marital Status        | Single                |
+-----------------------+-----------------------+
| Latter day Affiliation | 1013                  |
+-----------------------+-----------------------+
| Race                  | Unknown               |
+-----------------------+-----------------------+
| Ethnic Group          | Unknown               |
+-----------------------+-----------------------+
 
 
 Author
 
 
+--------------+-----------------------+
| Author       | [facility] [facility] |
+--------------+-----------------------+
| Organization | [facility] |
+--------------+-----------------------+
| Address      | Unknown               |
+--------------+-----------------------+
| Phone        | Unavailable           |
+--------------+-----------------------+
 
 
 
 Support
 
 
+-----------------+--------------+---------------------+-----------------+
| Name            | Relationship | Address             | Phone           |
+-----------------+--------------+---------------------+-----------------+
| Tejal Champagne | ECON         | [address]              | [phone] |
|                 |              | [address], [place]   |                 |
|                 |              | [address]               |                 |
+-----------------+--------------+---------------------+-----------------+
 
 
 
 Care Team Providers
 
 
 
+-----------------------+------+-----------------+
| Care Team Member Name | Role | Phone           |
+-----------------------+------+-----------------+
| Facundo Lees  | PCP  | [phone] |
+-----------------------+------+-----------------+
 
 
 
 Encounter Details
 
 
+--------+-------------+----------------------+----------------------+-------------+
| Date   | Type        | Department           | Care Team            | Description |
+--------+-------------+----------------------+----------------------+-------------+
| / | Documentati |   [facility]          |   [facility] |             |
| 2018   | on Only     | [facility]  |                      |             |
|        |             |  [address]    |                      |             |
|        |             | [place]         |                      |             |
|        |             | [address]           |                      |             |
|        |             | [phone]         |                      |             |
+--------+-------------+----------------------+----------------------+-------------+
 
 
 
 Social History
 
 
+---------------+-------+-----------+--------+------------------+
| Tobacco Use   | Types | Packs/Day | Years  | Date             |
|               |       |           | Used   |                  |
+---------------+-------+-----------+--------+------------------+
| Former Smoker |       | 3         | 37     | Quit: 10/05/2015 |
+---------------+-------+-----------+--------+------------------+
 
 
 
+---------------------+---+---+----------+
| Smokeless Tobacco:  |   |   | Quit:    |
| Former User         |   |   | 10/05/20 |
|                     |   |   | 17       |
+---------------------+---+---+----------+
 
 
 
+-------------+-----------+---------+---------------------------+
| Alcohol Use | Drinks/We | oz/Week | Comments                  |
|             | ek        |         |                           |
+-------------+-----------+---------+---------------------------+
| No          |   0       | 0.0     | heavy drinker in past hx. |
|             | Standard  |         |                           |
|             | drinks or |         |                           |
|             |           |         |                           |
|             | equivalen |         |                           |
|             | t         |         |                           |
+-------------+-----------+---------+---------------------------+
 
 
 
 
+------------------+---------------+
| Sex Assigned at  | Date Recorded |
| Birth            |               |
+------------------+---------------+
| Not on file      |               |
+------------------+---------------+
 as of this encounter
 
 Plan of Treatment
 
 
+--------+---------+-------------+----------------------+-------------+
| Date   | Type    | Specialty   | Care Team            | Description |
+--------+---------+-------------+----------------------+-------------+
| / | Office  | Pulmonology |   Jhonatan Louie MD  |             |
| 2018   | Visit   |             |  [address]    |             |
|        |         |             | [place] [address]   |             |
|        |         |             | [phone]         |             |
|        |         |             | [phone] (Fax)   |             |
+--------+---------+-------------+----------------------+-------------+
 as of this encounter
 
 Visit Diagnoses
 Not on filein this encounter

## 2018-04-14 NOTE — NUR
ROUNDED AS CHARGE. PATIENT IS RESTING IN BED. PATIENT DENIES ANY PAIN. NO
NEEDS NOTED AT THIS TIME. CALL LIGTH IN REACH.

## 2018-04-14 NOTE — XMS
Encounter Summary
  Created on: 2018
 
 Kayce Arboleda
 External Reference #: [id number]
 : 64
 Sex: Male
 
 Demographics
 
 
+-----------------------+-----------------------+
| Address               | [address] |
|                       | [place]  [address]  |
+-----------------------+-----------------------+
| Home Phone            | [phone]       |
+-----------------------+-----------------------+
| Preferred Language    | Unknown               |
+-----------------------+-----------------------+
| Marital Status        | Single                |
+-----------------------+-----------------------+
| Gnosticism Affiliation | 1013                  |
+-----------------------+-----------------------+
| Race                  | Unknown               |
+-----------------------+-----------------------+
| Ethnic Group          | Unknown               |
+-----------------------+-----------------------+
 
 
 Author
 
 
+--------------+-----------------------+
| Author       | [facility] [employer] [facility] |
+--------------+-----------------------+
| Organization | [facility] [employer] [facility] |
+--------------+-----------------------+
| Address      | Unknown               |
+--------------+-----------------------+
| Phone        | Unavailable           |
+--------------+-----------------------+
 
 
 
 Support
 
 
+-----------------+--------------+---------------------+-----------------+
| Name            | Relationship | Address             | Phone           |
+-----------------+--------------+---------------------+-----------------+
| Tejal Champagne | ECON         | [address]              | [phone] |
|                 |              | [address], [place]   |                 |
|                 |              | [address]               |                 |
+-----------------+--------------+---------------------+-----------------+
 
 
 
 Care Team Providers
 
 
 
+-----------------------+------+-----------------+
| Care Team Member Name | Role | Phone           |
+-----------------------+------+-----------------+
| Facundo Lees  | PCP  | [phone] |
+-----------------------+------+-----------------+
 
 
 
 Reason for Visit
 
 
+--------+--------------------------------------------+
| Reason | Comments                                   |
+--------+--------------------------------------------+
| Other  | [facility], Pulmonary Function Test |
+--------+--------------------------------------------+
 
 
 
 Encounter Details
 
 
+--------+-------------+----------------------+----------------------+--------------------+
| Date   | Type        | Department           | Care Team            | Description        |
+--------+-------------+----------------------+----------------------+--------------------+
| / | Documentati |   [facility]          |   Sherron Almanzar,  | Other ([facility]  |
| 2018   | on Only     | [facility] [place]  | [facility]                  | Clinic, Pulmonary  |
|        |             |  [address]    |                      | Function Test)     |
|        |             | [place]         |                      |                    |
|        |             | [address]           |                      |                    |
|        |             | [phone]         |                      |                    |
+--------+-------------+----------------------+----------------------+--------------------+
 
 
 
 Social History
 
 
+---------------+-------+-----------+--------+------------------+
| Tobacco Use   | Types | Packs/Day | Years  | Date             |
|               |       |           | Used   |                  |
+---------------+-------+-----------+--------+------------------+
| Former Smoker |       | 3         | 37     | Quit: 10/05/2015 |
+---------------+-------+-----------+--------+------------------+
 
 
 
+---------------------+---+---+----------+
| Smokeless Tobacco:  |   |   | Quit:    |
| Former User         |   |   | 10/05/20 |
|                     |   |   | 17       |
+---------------------+---+---+----------+
 
 
 
+-------------+-----------+---------+---------------------------+
| Alcohol Use | Drinks/We | oz/Week | Comments                  |
|             | ek        |         |                           |
 
+-------------+-----------+---------+---------------------------+
| No          |   0       | 0.0     | heavy drinker in past hx. |
|             | Standard  |         |                           |
|             | drinks or |         |                           |
|             |           |         |                           |
|             | equivalen |         |                           |
|             | t         |         |                           |
+-------------+-----------+---------+---------------------------+
 
 
 
+------------------+---------------+
| Sex Assigned at  | Date Recorded |
| Birth            |               |
+------------------+---------------+
| Not on file      |               |
+------------------+---------------+
 as of this encounter
 
 Plan of Treatment
 
 
+--------+---------+-------------+----------------------+-------------+
| Date   | Type    | Specialty   | Care Team            | Description |
+--------+---------+-------------+----------------------+-------------+
| / | Office  | Pulmonology |   Jhonatan Louie MD  |             |
| 2018   | Visit   |             |  [address]    |             |
|        |         |             | [place] [address]   |             |
|        |         |             | [phone]         |             |
|        |         |             | [phone] (Fax)   |             |
+--------+---------+-------------+----------------------+-------------+
 as of this encounter
 
 Visit Diagnoses
 Not on filein this encounter

## 2018-04-14 NOTE — XMS
Clinical Summary
  Created on: 2018
 
 Jarod Arboleda
 External Reference #: [id number]
 : 64
 Sex: Male
 
 Demographics
 
 
+-----------------------+-----------------------+
| Address               | [address] |
|                       | [place]  [address]  |
+-----------------------+-----------------------+
| Home Phone            | [phone]       |
+-----------------------+-----------------------+
| Preferred Language    | Unknown               |
+-----------------------+-----------------------+
| Marital Status        | Single                |
+-----------------------+-----------------------+
| Quaker Affiliation | [id number]                  |
+-----------------------+-----------------------+
| Race                  | Unknown               |
+-----------------------+-----------------------+
| Ethnic Group          | Unknown               |
+-----------------------+-----------------------+
 
 
 Author
 
 
+--------------+-----------------------+
| Author       | [facility] |
+--------------+-----------------------+
| Organization | [facility] |
+--------------+-----------------------+
| Address      | Unknown               |
+--------------+-----------------------+
| Phone        | Unavailable           |
+--------------+-----------------------+
 
 
 
 Support
 
 
+-----------------+--------------+---------------------+-----------------+
| Name            | Relationship | Address             | Phone           |
+-----------------+--------------+---------------------+-----------------+
| Tejal Champagne | ECON         | [address]              | [phone] |
|                 |              | [address], [place]   |                 |
|                 |              | [address]               |                 |
+-----------------+--------------+---------------------+-----------------+
 
 
 
 Care Team Providers
 
 
 
+-----------------------+------+-----------------+
| Care Team Member Name | Role | Phone           |
+-----------------------+------+-----------------+
| Facundo Lees  | PP   | [phone] |
+-----------------------+------+-----------------+
 
 
 
 Allergies
 
 
+----------------+----------------------+----------+----------+---------------------+
| Active Allergy | Reactions            | Severity | Noted    | Comments            |
|                |                      |          | Date     |                     |
+----------------+----------------------+----------+----------+---------------------+
| Lisinopril     | Other (See Comments) | Medium   | 20 |   Dry hacking cough |
|                |                      |          | 16       |                     |
+----------------+----------------------+----------+----------+---------------------+
| Trazodone      | Agitation            | Low      | 20 |                     |
|                |                      |          | 16       |                     |
+----------------+----------------------+----------+----------+---------------------+
 
 
 
 Current Medications
 
 
+----------------------+----------------------+--------+---------+------+------+-------+
| Prescription         | Sig.                 | Disp.  | Refills | Star | End  | Statu |
|                      |                      |        |         | t    | Date | s     |
|                      |                      |        |         | Date |      |       |
+----------------------+----------------------+--------+---------+------+------+-------+
|   thiamine (VITAMIN  | Take 1 tablet by     |   30   | 0       |  |      | Activ |
| B-1) 100 MG tablet   | mouth daily.         | tablet |         |  |      | e     |
|                      |                      |        |         | 15   |      |       |
+----------------------+----------------------+--------+---------+------+------+-------+
|   pantoprazole       | Take 40 mg by mouth  |        |         |      |      | Activ |
| (PROTONIX) 40 MG     | daily.               |        |         |      |      | e     |
| tablet               |                      |        |         |      |      |       |
+----------------------+----------------------+--------+---------+------+------+-------+
|   ferrous sulfate,   | Take 65 mg of iron   |        |         |      |      | Activ |
| 65 FE, 324 (65 FE)   | by mouth daily with  |        |         |      |      | e     |
| MG EC tablet         | breakfast. With 250  |        |         |      |      |       |
|                      | mg Vit C             |        |         |      |      |       |
+----------------------+----------------------+--------+---------+------+------+-------+
|   ascorbic acid      | Take 250 mg by mouth |        |         |      |      | Activ |
| (VITAMIN C) 250 MG   |  daily.              |        |         |      |      | e     |
| tablet               |                      |        |         |      |      |       |
+----------------------+----------------------+--------+---------+------+------+-------+
|   cloNIDine          | Take 0.3 mg by mouth |        |         |      |      | Activ |
| (CATAPRES) 0.3 MG    |  nightly.            |        |         |      |      | e     |
| tablet               |                      |        |         |      |      |       |
+----------------------+----------------------+--------+---------+------+------+-------+
|   tamsulosin         | Take 0.4 mg by mouth |        |         |      |      | Activ |
| (FLOMAX) 0.4 MG      |   After dinner.      |        |         |      |      | e     |
| capsule              |                      |        |         |      |      |       |
+----------------------+----------------------+--------+---------+------+------+-------+
|   nitroGLYCERIN      | Place 1 tablet under |   90   | 0       | 05/0 |      | Activ |
 
| (NITROSTAT) 0.4 MG   |  the tongue every 5  | tablet |         | 7/20 |      | e     |
| SL tablet            | (five) minutes as    |        |         | 16   |      |       |
|                      | needed for Chest     |        |         |      |      |       |
|                      | pain.                |        |         |      |      |       |
+----------------------+----------------------+--------+---------+------+------+-------+
|   Multiple           | Take  by mouth       |        |         |      |      | Activ |
| Vitamins-Minerals    | daily.               |        |         |      |      | e     |
| (RA CENTRAL-BIBI PO) |                      |        |         |      |      |       |
+----------------------+----------------------+--------+---------+------+------+-------+
|                      | Inhale 1 puff into   |        |         |      |      | Activ |
| fluticasone-salmeter | the lungs 2 (two)    |        |         |      |      | e     |
| ol (ADVAIR) 500-50   | times daily.         |        |         |      |      |       |
| MCG/DOSE diskus      |                      |        |         |      |      |       |
| inhaler              |                      |        |         |      |      |       |
+----------------------+----------------------+--------+---------+------+------+-------+
|   gabapentin         | Take 300 mg by mouth |        |         |      |      | Activ |
| (NEURONTIN) 600 MG   |  3 (three) times     |        |         |      |      | e     |
| tablet               | daily.               |        |         |      |      |       |
+----------------------+----------------------+--------+---------+------+------+-------+
|   ibuprofen (MOTRIN) | Take 600 mg by mouth |        |         |      |      | Activ |
|  600 MG tablet       |  every 6 (six) hours |        |         |      |      | e     |
|                      |  as needed for Pain. |        |         |      |      |       |
|                      |  Trying to use       |        |         |      |      |       |
|                      | sparingly            |        |         |      |      |       |
+----------------------+----------------------+--------+---------+------+------+-------+
|   montelukast        | Take 10 mg by mouth  |        |         |      |      | Activ |
| (SINGULAIR) 10 MG    | nightly.             |        |         |      |      | e     |
| tablet               |                      |        |         |      |      |       |
+----------------------+----------------------+--------+---------+------+------+-------+
|                      | Take 12.5 mg by      |        |         |      |      | Activ |
| hydrochlorothiazide  | mouth daily.         |        |         |      |      | e     |
| (HYDRODIURIL) 12.5   |                      |        |         |      |      |       |
| MG tablet            |                      |        |         |      |      |       |
+----------------------+----------------------+--------+---------+------+------+-------+
|   amLODIPine         | Take 5 mg by mouth   |        |         |      |      | Activ |
| (NORVASC) 5 MG       | daily.               |        |         |      |      | e     |
| tablet               |                      |        |         |      |      |       |
+----------------------+----------------------+--------+---------+------+------+-------+
|   amitriptyline      | Take 100 mg by mouth |        |         |      |      | Activ |
| (ELAVIL) 100 MG      |  nightly.            |        |         |      |      | e     |
| tablet               |                      |        |         |      |      |       |
+----------------------+----------------------+--------+---------+------+------+-------+
|   albuterol          | Inhale 2 puffs into  |        |         |      |      | Activ |
| (PROVENTIL           | the lungs every 4    |        |         |      |      | e     |
| HFA;VENTOLIN HFA)    | (four) hours as      |        |         |      |      |       |
| 108 (90 BASE)        | needed for Wheezing. |        |         |      |      |       |
| MCG/ACT inhaler      |                      |        |         |      |      |       |
+----------------------+----------------------+--------+---------+------+------+-------+
|   umeclidinium       | Inhale 1 puff into   |        |         |      |      | Activ |
| bromide (INCRUSE     | the lungs daily.     |        |         |      |      | e     |
| ELLIPTA) 62.5        |                      |        |         |      |      |       |
| MCG/INH inhaler      |                      |        |         |      |      |       |
+----------------------+----------------------+--------+---------+------+------+-------+
|   diclofenac         | Take 50 mg by mouth  |        |         |      |      | Activ |
| (CATAFLAM) 50 MG     | 2 (two) times daily. |        |         |      |      | e     |
| tablet               |                      |        |         |      |      |       |
+----------------------+----------------------+--------+---------+------+------+-------+
|                      | Take 3 mLs by        |        |         |      |      | Activ |
| ipratropium-albutero | nebulization as      |        |         |      |      | e     |
| l (DUO-NEB) 0.5-2.5  | needed.              |        |         |      |      |       |
 
| mg/3mL               |                      |        |         |      |      |       |
+----------------------+----------------------+--------+---------+------+------+-------+
|   atorvastatin       | Take 1 tablet by     |   30   | 11      | 01/2 |      | Activ |
| (LIPITOR) 40 MG      | mouth nightly.       | tablet |         | 9/20 |      | e     |
| tablet               |                      |        |         | 18   |      |       |
+----------------------+----------------------+--------+---------+------+------+-------+
|   metoprolol         | Take 1 tablet by     |   60   | 11      | 01/2 |      | Activ |
| (LOPRESSOR) 25 MG    | mouth 2 (two) times  | tablet |         | 9/20 |      | e     |
| tablet               | daily.               |        |         | 18   |      |       |
+----------------------+----------------------+--------+---------+------+------+-------+
|   oxyCODONE          | Take 5 mg by mouth   |        |         |      |      | Activ |
| (ROXICODONE) 5 MG    | every 4 (four) hours |        |         |      |      | e     |
| immediate release    |  as needed for Pain. |        |         |      |      |       |
| tablet               |                      |        |         |      |      |       |
+----------------------+----------------------+--------+---------+------+------+-------+
|   cyanocobalamin     | Take 1,000 mcg by    |        |         |      |      | Activ |
| (VITAMIN B-12) 1000  | mouth daily.         |        |         |      |      | e     |
| MCG tablet           |                      |        |         |      |      |       |
+----------------------+----------------------+--------+---------+------+------+-------+
 
 
 
 Active Problems
 
 
+---------------------------------------------+------------+
| Problem                                     | Noted Date |
+---------------------------------------------+------------+
| Mild persistent asthma without complication | 2017 |
+---------------------------------------------+------------+
| Obstructive sleep apnea syndrome            | 2017 |
+---------------------------------------------+------------+
| H/O syncope                                 | 2016 |
+---------------------------------------------+------------+
 
 
 
+-------------------------------------------------------------------+
|   Last Assessment & Plan:   Syncope.         55yo WM, with Hx     |
| syncope.  Our workup to date is benign, including a 32 day        |
| cardiac event monitor.  He remains moderately active, no          |
| recurrent syncope, although he does have episodes of              |
| lightheadedness dizziness, but not to a point where he is near    |
| syncopal.  He denies any chest discomfort or shortness of breath. |
|   Tolerating medications.  No changes in therapy.  Will follow    |
| clinically.Hx Pacemaker/ICD: noLast Cath: naLast Echo, 2016  |
| ([facility]): LVEF 65-70%, trace MR, trace TR.  Ascending Aorta |
|  41mm, Aortic arch 37mm.Last Stress Test, 2016: Lexiscan, no  |
| ischemia detected, LVEF 63%.32-Day Cardiac Event Monitor,         |
| 2016: sinus rhythm, , averaging 80bpm, rare ectopy, no |
|  AVB/pauses, symptoms of "chest pain, SOB, dizziness, fluttering" |
|  all associated with NSR.ECG, 2016: NSR, 63bpm.              |
+-------------------------------------------------------------------+
 
 
 
+----------------------------------------------+------------+
| COPD (chronic obstructive pulmonary disease) | 2016 |
+----------------------------------------------+------------+
 
 
 
 
+-------------------------------------------------------------------+
|   Last Assessment & Plan:   COPD, ex-smoker (2015), managed by  |
| PCP.                                                              |
+-------------------------------------------------------------------+
 
 
 
+------------------------+------------+
| Precordial pain        | 2016 |
+------------------------+------------+
| Essential hypertension | 2016 |
+------------------------+------------+
 
 
 
+-----------------------------------------------------------------+
|   Last Assessment & Plan:   Hypertension, controlled, continue  |
| current meds at current doses (amlodipine, clonidine, HCT,      |
| metoprolol).                                                    |
+-----------------------------------------------------------------+
 
 
 
+----------------------+------------+
| HLD (hyperlipidemia) | 2016 |
+----------------------+------------+
 
 
 
+-------------------------------------------------------------------+
|   Last Assessment & Plan:   Hyperlipidemia, continue current meds |
|  at current dose (atorvastatin).                                  |
+-------------------------------------------------------------------+
 
 
 
+---------------------------------------------------------------+------------+
| Alcohol withdrawal                                            | 2015 |
+---------------------------------------------------------------+------------+
| Acute respiratory failure (HCC)                               | 2015 |
+---------------------------------------------------------------+------------+
| Personal history of tobacco use, presenting hazards to health | 2015 |
+---------------------------------------------------------------+------------+
| Obstructive sleep apnea (adult) (pediatric)                   | 2015 |
+---------------------------------------------------------------+------------+
| Morbid obesity (HCC)                                          | 2015 |
+---------------------------------------------------------------+------------+
| Accelerated hypertension                                      | 2015 |
+---------------------------------------------------------------+------------+
 
 
 
 Encounters
 
 
+--------+-------------+-----------+----------------------+----------------------+
| Date   | Type        | Specialty | Care Team            | Description          |
+--------+-------------+-----------+----------------------+----------------------+
 
| / | Office      |           |   PernelljhonyJanette    | Essential            |
| 2018   | Visit       |           | DIETER Johnson           | hypertension with    |
|        |             |           |                      | goal blood pressure  |
|        |             |           |                      | less than 130/80     |
|        |             |           |                      | (Primary Dx);        |
|        |             |           |                      | Hyperlipidemia,      |
|        |             |           |                      | unspecified          |
|        |             |           |                      | hyperlipidemia type; |
|        |             |           |                      |  Thoracoabdominal    |
|        |             |           |                      | aortic aneurysm,     |
|        |             |           |                      | without rupture      |
|        |             |           |                      | (HCC); H/O syncope;  |
|        |             |           |                      | Murmur, cardiac;     |
|        |             |           |                      | Obstructive sleep    |
|        |             |           |                      | apnea syndrome;      |
|        |             |           |                      | Chronic obstructive  |
|        |             |           |                      | pulmonary disease,   |
|        |             |           |                      | unspecified COPD     |
|        |             |           |                      | type (HCC); Former   |
|        |             |           |                      | heavy tobacco        |
|        |             |           |                      | smoker; History of   |
|        |             |           |                      | anemia               |
+--------+-------------+-----------+----------------------+----------------------+
| / | Documentati |           |   [facility] Galaviz MA |                      |
| 2018   | on Only     |           |                      |                      |
+--------+-------------+-----------+----------------------+----------------------+
| / | Documentati |           |   Sherron Almanzar,  | Piter Cervantes          |
| 2018   | on Only     |           | CMA                  | BLANCA Lees)        |
+--------+-------------+-----------+----------------------+----------------------+
| / | Documentati |           |   Sherron Almanzar,  | Other ([facility]    |
|    | on Only     |           | CMA                  | Clinic, Pulmonary    |
|        |             |           |                      | Function Test)       |
+--------+-------------+-----------+----------------------+----------------------+
| / | Ancillary   |           |   Janette Woodward    | H/O syncope;         |
|    | Procedure   |           | DIETER Johnson           | Essential            |
|        |             |           |                      | hypertension with    |
|        |             |           |                      | goal blood pressure  |
|        |             |           |                      | less than 130/80;    |
|        |             |           |                      | Hyperlipidemia,      |
|        |             |           |                      | unspecified          |
|        |             |           |                      | hyperlipidemia type; |
|        |             |           |                      |  Chronic obstructive |
|        |             |           |                      |  pulmonary disease,  |
|        |             |           |                      | unspecified COPD     |
|        |             |           |                      | type ([facility]);          |
|        |             |           |                      | Thoracoabdominal     |
|        |             |           |                      | aortic aneurysm,     |
|        |             |           |                      | without rupture      |
|        |             |           |                      | (HCC); Obstructive   |
|        |             |           |                      | sleep apnea syndrome |
+--------+-------------+-----------+----------------------+----------------------+
| / | Documentati |           |   Josseline Kwok,  | Other ([facility]     |
|    | on Only     |           | MA                   | Labs)                |
+--------+-------------+-----------+----------------------+----------------------+
| / | Documentati |           |   Stormy,     | Labs Only            |
| 2018   | on Only     |           | [facility]          |                      |
+--------+-------------+-----------+----------------------+----------------------+
| / | Office      |           |   Janette Woodward    | H/O syncope (Primary |
| 2018   | Visit       |           | DIETER Johnson           |  Dx); Essential      |
|        |             |           |                      | hypertension with    |
 
|        |             |           |                      | goal blood pressure  |
|        |             |           |                      | less than 130/80;    |
|        |             |           |                      | Hyperlipidemia,      |
|        |             |           |                      | unspecified          |
|        |             |           |                      | hyperlipidemia type; |
|        |             |           |                      |  Chronic obstructive |
|        |             |           |                      |  pulmonary disease,  |
|        |             |           |                      | unspecified COPD     |
|        |             |           |                      | type (HCC);          |
|        |             |           |                      | Thoracoabdominal     |
|        |             |           |                      | aortic aneurysm,     |
|        |             |           |                      | without rupture      |
|        |             |           |                      | (HCC); Obstructive   |
|        |             |           |                      | sleep apnea          |
|        |             |           |                      | syndrome; Former     |
|        |             |           |                      | heavy tobacco        |
|        |             |           |                      | smoker; History of   |
|        |             |           |                      | anemia; Murmur,      |
|        |             |           |                      | cardiac              |
+--------+-------------+-----------+----------------------+----------------------+
| / | Telephone   |           |   Lupe Nolan,    |                      |
|    |             |           | CMA                  |                      |
+--------+-------------+-----------+----------------------+----------------------+
 from Last 3 Months
 
 Immunizations
 
 
+-----------------+------------------------+----------+
| Name            | Dates Previously Given | Next Due |
+-----------------+------------------------+----------+
| Pneumococcal    | 2015             |          |
| Polysaccharide  |                        |          |
| 23-valent       |                        |          |
+-----------------+------------------------+----------+
 
 
 
 Family History
 
 
+--------------------+----------+------+----------+
| Medical History    | Relation | Name | Comments |
+--------------------+----------+------+----------+
| Sickle cell anemia | Father   |      |          |
+--------------------+----------+------+----------+
| Alcohol abuse      | Mother   |      |          |
+--------------------+----------+------+----------+
| Diabetes type I    | Mother   |      |          |
+--------------------+----------+------+----------+
| Diabetes type II   | Mother   |      |          |
+--------------------+----------+------+----------+
 
 
 
+----------+------+----------+--------------------+
| Relation | Name | Status   | Comments           |
+----------+------+----------+--------------------+
| Father   |      |  | sickle cell anemia |
|          |      |   (Age   |                    |
 
|          |      | 70)      |                    |
+----------+------+----------+--------------------+
| Mother   |      | Alive    |                    |
+----------+------+----------+--------------------+
 
 
 
 Social History
 
 
+---------------+-------+-----------+--------+------------------+
| Tobacco Use   | Types | Packs/Day | Years  | Date             |
|               |       |           | Used   |                  |
+---------------+-------+-----------+--------+------------------+
| Former Smoker |       | 3         | 37     | Quit: 10/05/2015 |
+---------------+-------+-----------+--------+------------------+
 
 
 
+---------------------+---+---+----------+
| Smokeless Tobacco:  |   |   | Quit:    |
| Former User         |   |   | 10/05/20 |
|                     |   |   | 15       |
+---------------------+---+---+----------+
 
 
 
+--------------------------------------+
| Tobacco Cessation: Ready to Quit: No |
+--------------------------------------+
 
 
 
+-------------+-----------+---------+---------------------------+
| Alcohol Use | Drinks/We | oz/Week | Comments                  |
|             | ek        |         |                           |
+-------------+-----------+---------+---------------------------+
| No          |   0       | 0.0     | heavy drinker in past hx. |
|             | Standard  |         |                           |
|             | drinks or |         |                           |
|             |           |         |                           |
|             | equivalen |         |                           |
|             | t         |         |                           |
+-------------+-----------+---------+---------------------------+
 
 
 
+------------------+---------------+
| Sex Assigned at  | Date Recorded |
| Birth            |               |
+------------------+---------------+
| Not on file      |               |
+------------------+---------------+
 
 
 
 Last Filed Vital Signs
 
 
+-------------------+----------------------+-------------------------+
 
| Vital Sign        | Reading              | Time Taken              |
+-------------------+----------------------+-------------------------+
| Blood Pressure    | 112/60               | 2018 10:53 AM PDT |
+-------------------+----------------------+-------------------------+
| Pulse             | 67                   | 2018 10:53 AM PDT |
+-------------------+----------------------+-------------------------+
| Temperature       | 36.4   C (97.6   F)  | 2017  9:51 AM PST |
+-------------------+----------------------+-------------------------+
| Respiratory Rate  | 20                   | 2018 10:53 AM PDT |
+-------------------+----------------------+-------------------------+
| Oxygen Saturation | 98%                  | 2018 10:53 AM PDT |
+-------------------+----------------------+-------------------------+
| Inhaled Oxygen    | -                    | -                       |
| Concentration     |                      |                         |
+-------------------+----------------------+-------------------------+
| Weight            | 106.6 kg (235 lb 1.6 | 2018 10:53 AM PDT |
|                   |  oz)                 |                         |
+-------------------+----------------------+-------------------------+
| Height            | 177.8 cm (5' 10")    | 2018 10:53 AM PDT |
+-------------------+----------------------+-------------------------+
| Body Mass Index   | 33.73                | 2018 10:53 AM PDT |
+-------------------+----------------------+-------------------------+
 
 
 
 Plan of Treatment
 
 
+--------+---------+-----------+----------------------+-------------+
| Date   | Type    | Specialty | Care Team            | Description |
+--------+---------+-----------+----------------------+-------------+
| / | Office  |           |   Jhonatan Louie MD  |             |
| 2018   | Visit   |           |  [address]    |             |
|        |         |           | [place] [address]   |             |
|        |         |           | [phone]         |             |
|        |         |           | [phone] (Fax)   |             |
+--------+---------+-----------+----------------------+-------------+
 
 
 
+----------------------+-----------+--------------------------+----------+
| Health Maintenance   | Due Date  | Last Done                | Comments |
+----------------------+-----------+--------------------------+----------+
| Vaccine:             |  |                          |          |
| Dtap/Tdap/Td (1 -    | 1         |                          |          |
| Tdap)                |           |                          |          |
+----------------------+-----------+--------------------------+----------+
| Colon Cancer         |  |                          |          |
| Screening            | 2         |                          |          |
| (Colonoscopy)        |           |                          |          |
+----------------------+-----------+--------------------------+----------+
| Vaccine: Influenza   |  | 10/27/2015, 10/21/2013,  |          |
| (Season Ended)       | 8         | 2013               |          |
+----------------------+-----------+--------------------------+----------+
| Vaccine:             | Completed | 2015, 2015   |          |
| Pneumococcal 19-64   |           |                          |          |
| (PPSV23 only) Medium |           |                          |          |
|  Risk                |           |                          |          |
+----------------------+-----------+--------------------------+----------+
 
 
 
 
 Procedures
 
 
+-----------------+--------+-------------+----------------------+----------------------+
| Procedure Name  | Priori | Date/Time   | Associated Diagnosis | Comments             |
|                 | ty     |             |                      |                      |
+-----------------+--------+-------------+----------------------+----------------------+
| ECHO OUTSIDE    | Routin | 2018  |   H/O syncope        |   Results for this   |
| INTERPRETATION  | e      |  3:43 PM    | Essential            | procedure are in the |
| STANDARD        |        | PST         | hypertension with    |  results section.    |
|                 |        |             | goal blood pressure  |                      |
|                 |        |             | less than 130/80     |                      |
|                 |        |             | Hyperlipidemia,      |                      |
|                 |        |             | unspecified          |                      |
|                 |        |             | hyperlipidemia type  |                      |
|                 |        |             |  Chronic obstructive |                      |
|                 |        |             |  pulmonary disease,  |                      |
|                 |        |             | unspecified COPD     |                      |
|                 |        |             | type (HCC)           |                      |
|                 |        |             | Thoracoabdominal     |                      |
|                 |        |             | aortic aneurysm,     |                      |
|                 |        |             | without rupture      |                      |
|                 |        |             | (HCC)  Obstructive   |                      |
|                 |        |             | sleep apnea syndrome |                      |
+-----------------+--------+-------------+----------------------+----------------------+
 from Last 3 Months
 
 Results
 ECHO outside interpretation standard (2018  3:43 PM)
 
+----------+--------------------------------------------------------+
| Specimen | Performing Laboratory                                  |
+----------+--------------------------------------------------------+
|          |   [address] [address] [place] [address] |
+----------+--------------------------------------------------------+
 
 
 
+------------------------------------------------------------------------------------------+
| Impressions                                                                              |
+------------------------------------------------------------------------------------------+
|   1. Overall left ventricular systolic function is normal with, an EF between 60 - 65 %. |
|   2. The right ventricle is normal in size and function.  3. The aortic root, ascending  |
| aorta and aortic arch are normal. 4. No significant valvular abnormalities are noted.    |
+------------------------------------------------------------------------------------------+
 
 
 
+------------------------------------------------------------------------------------------+
| Narrative                                                                                |
+------------------------------------------------------------------------------------------+
|      Patient Name:    Jarod Arboleda  YOB: 1960                       |
| Accession:    [id number]     Performing Physician:    LOR DELGADO MD                      |
| _______________________________________________________________  INDICATIONS             |
| -----------  F/U thoracic/abdominal aneurysm     CONCLUSIONS  -----------  1. Overall    |
| left ventricular systolic function is normal with, an EF between 60 - 65 %.  2. The      |
| right ventricle is normal in size and function.  3. The aortic root, ascending aorta and |
|  aortic arch are normal. 4. No significant valvular abnormalities are noted.             |
 
| FINDINGS  --------  ECG rhythm: Sinus rhythm.   ECG rhythm: Resting bradycardia          |
| (HR<60bpm).  Study: A 2-dimensional transthoracic echocardiogram with m-mode, spectral   |
| and color flow Doppler was perfomed.   Study: This was a technically adequate study.     |
| Left Ventricle: Overall left ventricular systolic function is normal with, an EF between |
|  60 - 65 %.   Left Ventricle: The left ventricle cavity size is normal.   Left           |
| Ventricle: Left ventricular wall thickness is normal.   Left Ventricle: No regional wall |
|  motion abnormalities.   Left Ventricle: The diastolic filling pattern is normal for the |
|  age of the patient.  Right Ventricle: The right ventricle is normal in size and         |
| function.  Left Atrium: The left atrium is normal in size.  Right Atrium: The right      |
| atrium is normal in size.   Aortic Valve: Aortic valve is trileaflet and is mildly       |
| thickened.   Aortic Valve: There is no evidence of aortic regurgitation.   Aortic Valve: |
|  There is no evidence of aortic stenosis.  Mitral Valve: The mitral valve is normal.     |
| Mitral Valve: There is trace mitral regurgitation.   Mitral Valve: No evidence of MVP or |
|  mitral stenosis.  Tricuspid Valve: The tricuspid valve appears structurally normal.     |
| Tricuspid Valve: Pulmonary artery systolic pressure could not be assessed due to the     |
| absence of adequate TR jet.  Pulmonic Valve: The pulmonic valve was not well visualized. |
|   Pericardium: There is no pericardial effusion.  IVC/Hepatic Veins: The IVC is small    |
| (<1.5cm) and collapses with sniff, consistent with central venous pressures of 0-5mmHg.  |
|  Aorta: The aortic root, ascending aorta and aortic arch are normal.  Mass: No mass      |
| visualized  Thrombus: No clot visualized   Thrombus: No vegetation visualized.  Septum:  |
| No ASD observed.   Septum: No VSD observed.     MEASUREMENTS  -------------  Ao asc:     |
|  3.65 cm  Ao sinus:     3.46 cm  Ao st junct:     2.91 cm  IVC:     1.27 cm              |
| EDV(Teich):     105.93 ml  IVSd:     1.06 cm  LVIDd:     4.76 cm  LVPWd:     0.90 cm     |
| LVOT Diam:     2.31 cm  %FS:     22.34 %  EF(Teich):     44.99 %  ESV(Teich):     58.27  |
| ml  LVIDs:     3.70 cm  SV(Teich):     47.66 ml  RVIDd:     3.07 cm  Ao root:     32.72  |
| mm  LVEF MOD A2C:     56.41 %  SV MOD A2C:     46.46 ml  LVEF MOD A4C:     55.05 %  SV   |
| MOD A4C:     55.55 ml  EF Biplane:     55.87 %  LVEDV MOD BP:     92.29 ml  LVESV MOD    |
| BP:     40.72 ml  LVEDV MOD A2C:     82.36 ml  LVLd A2C:     9.15 cm  LVEDV MOD A4C:     |
|  100.89 ml  LVLd A4C:     9.46 cm  LVESV MOD A2C:     35.90 ml  LVLs A2C:     6.85 cm    |
| LVESV MOD A4C:     45.34 ml  LVLs A4C:     7.06 cm  LAESV(A-L):     54.75 ml  LAESV      |
| Index (A-L):     24.66 ml/m2  LAAs A2C:     15.85 cm2  LAESV A-L A2C:     42.05 ml  LALs |
|  A2C:     5.07 cm  LAAs A4C:     20.65 cm2  LAESV A-L A4C:     68.39 ml  LALs A4C:       |
| 5.29 cm  RAAs:     16.87 cm2  RAESV A-L:     44.78 ml  RAESV MOD:     45.69 ml           |
| RALs:     5.39 cm  TAPSE:     2.19 cm  AV maxP.05 mmHg  AV meanPG:     3.73 mmHg |
|   AV Vmax:     1.32 m/s  AV Vmean:     0.90 m/s  AV VTI:     29.89 cm  CHIRAG Vmax:         |
| 3.41 cm2  CHIRAG (VTI):     3.01 cm2  AVAI Vmax:     0.00 cm2/m2  AVAI (VTI):     0.00      |
| cm2/m2  LVOT maxP.63 mmHg  LVOT meanP.94 mmHg  LVSI Dopp:     40.60      |
| ml/m2  LVSV Dopp:     90.15 ml  LVOT Vmax:     1.07 m/s  LVOT Vmean:     0.63 m/s  LVOT  |
| VTI:     21.41 cm  MV A Librado:     0.37 m/s  MV DecT:     232.01 ms  MV E Librado:     0.58    |
| m/s  MV E/A Ratio:     1.53   Septal e':     0.08 m/s  Septal E/e':     7.16   Lateral   |
| e':     0.07 m/s  Lateral E/e':     7.35      Sonographer:   Authenticated               |
| by:    LOR DELGADO MD  Report Date/Time: 2018 17:52:14                            |
+------------------------------------------------------------------------------------------+
 
 
 
+-------------------------------------------------------------------------------------------
-------------------------+
| Procedure Note                                                                            
                         |
+-------------------------------------------------------------------------------------------
-------------------------+
|   Tank, Rad Results In - 2018  6:00 PM PST  Patient Name:  Jessica Arboleda of     
                         |
| Birth:  ccession:  [id number] Physician:  LOR DELGADO MD              
                         |
| _______________________________________________________________INDICATIONS-----------F/U  
                         |
|  thoracic/abdominal aneurysmCONCLUSIONS-----------1. Overall left ventricular systolic    
                         |
 
| function is normal with, an EF between 60 - 65 %.2. The right ventricle is normal in      
                         |
| size and function.3. The aortic root, ascending aorta and aortic arch are normal. 4. No   
                         |
| significant valvular abnormalities are noted.FINDINGS--------ECG rhythm: Sinus rhythm.    
                         |
| ECG rhythm: Resting bradycardia (HR<60bpm).Study: A 2-dimensional transthoracic           
                         |
| echocardiogram with m-mode, spectral and color flow Doppler was perfomed. Study: This     
                         |
| was a technically adequate study.Left Ventricle: Overall left ventricular systolic        
                         |
| function is normal with, an EF between 60 - 65 %. Left Ventricle: The left ventricle      
                         |
| cavity size is normal. Left Ventricle: Left ventricular wall thickness is normal. Left    
                         |
| Ventricle: No regional wall motion abnormalities. Left Ventricle: The diastolic filling   
                         |
| pattern is normal for the age of the patient.Right Ventricle: The right ventricle is      
                         |
| normal in size and function.Left Atrium: The left atrium is normal in size.Right Atrium:  
                         |
|  The right atrium is normal in size. Aortic Valve: Aortic valve is trileaflet and is      
                         |
| mildly thickened. Aortic Valve: There is no evidence of aortic regurgitation. Aortic      
                         |
| Valve: There is no evidence of aortic stenosis.Mitral Valve: The mitral valve is normal.  
                         |
|  Mitral Valve: There is trace mitral regurgitation. Mitral Valve: No evidence of MVP or   
                         |
| mitral stenosis.Tricuspid Valve: The tricuspid valve appears structurally normal.         
                         |
| Tricuspid Valve: Pulmonary artery systolic pressure could not be assessed due to the      
                         |
| absence of adequate TR jet.Pulmonic Valve: The pulmonic valve was not well                
                         |
| visualized.Pericardium: There is no pericardial effusion.IVC/Hepatic Veins: The IVC is    
                         |
| small (<1.5cm) and collapses with sniff, consistent with central venous pressures of      
                         |
| 0-5mmHg.Aorta: The aortic root, ascending aorta and aortic arch are normal.Mass: No mass  
                         |
|  visualizedThrombus: No clot visualized Thrombus: No vegetation visualized.Septum: No     
                         |
| ASD observed. Septum: No VSD observed.MEASUREMENTS-------------Ao asc:   3.65 cmAo        
                         |
| sinus:   3.46 cmAo st junct:   2.91 cmIVC:   1.27 cmEDV(Teich):   105.93 mlIVSd:   1.06   
                         |
| cmLVIDd:   4.76 cmLVPWd:   0.90 cmLVOT Diam:   2.31 cm%FS:   22.34 %EF(Teich):   44.99    
                         |
| %ESV(Teich):   58.27 mlLVIDs:   3.70 cmSV(Teich):   47.66 mlRVIDd:   3.07 cmAo root:      
                         |
| 32.72 mmLVEF MOD A2C:   56.41 %SV MOD A2C:   46.46 mlLVEF MOD A4C:   55.05 %SV MOD A4C:   
                         |
|   55.55 mlEF Biplane:   55.87 %LVEDV MOD BP:   92.29 mlLVESV MOD BP:   40.72 mlLVEDV MOD  
                         |
|  A2C:   82.36 mlLVLd A2C:   9.15 cmLVEDV MOD A4C:   100.89 mlLVLd A4C:   9.46 cmLVESV     
                         |
| MOD A2C:   35.90 mlLVLs A2C:   6.85 cmLVESV MOD A4C:   45.34 mlLVLs A4C:   7.06           
                         |
 
| cmLAESV(A-L):   54.75 mlLAESV Index (A-L):   24.66 ml/m2LAAs A2C:   15.85 [id number] A-L    
                         |
| A2C:   42.05 mlLALs A2C:   5.07 cmLAAs A4C:   20.65 [id number] A-L A4C:   68.39 mlLALs      
                         |
| A4C:   5.29 cmRAAs:   16.87 [id number] A-L:   44.78 mlRAESV MOD:   45.69 mlRALs:   5.39     
                         |
| cmTAPSE:   2.19 cmAV maxP.05 mmHgAV meanPG:   3.73 mmHgAV Vmax:   1.32 m/Hill        
                         |
| Vmean:   0.90 m/Hill VTI:   29.89 cmAVA Vmax:   3.41 cm2AVA (VTI):   3.01 [id number] Vmax:    
                         |
|  0.00 cm2/m2AVAI (VTI):   0.00 cm2/m2LVOT maxP.63 mmHgLVOT meanP.94 mmHgLVSI  
                         |
|  Dopp:   40.60 ml/m2LVSV Dopp:   90.15 mlLVOT Vmax:   1.07 m/sLVOT Vmean:   0.63 m/sLVOT  
                         |
|  VTI:   21.41 cmMV A Librado:   0.37 m/sMV DecT:   232.01 msMV E Librado:   0.58 m/sMV E/A        
                         |
| Ratio:   1.53 Septal e':   0.08 m/sSeptal E/e':   7.16 Lateral e':   0.07 m/sLateral      
                         |
| E/e':   7.35 Sonographer: Authenticated by:  Saurabh HERR Date/Time: 2018  
                         |
|  17:52:14IMPRESSION:1. Overall left ventricular systolic function is normal with, an EF   
                         |
| between 60 - 65 %.2. The right ventricle is normal in size and function.3. The aortic     
                         |
| root, ascending aorta and aortic arch are normal. 4. No significant valvular              
                         |
| abnormalities are noted.                                                                  
                         |
|Septum: No VSD observed.                                                                   
                         |
|MEASUREMENTS                                                                               
                        |
|-------------                                                                              
                          |
|Ao asc:   3.65 cm                                                                          
                         |
|Ao sinus:   3.46 cm                                                                        
                         |
|Ao st junct:   2.91 cm                                                                     
                         |
|IVC:   1.27 cm                                                                             
                         |
|EDV(Teich):   105.93 ml                                                                    
                         |
|IVSd:   1.06 cm                                                                            
                         |
|LVIDd:   4.76 cm                                                                           
                         |
|LVPWd:   0.90 cm                                                                           
                         |
|LVOT Diam:   2.31 cm                                                                       
                         |
|%FS:   22.34 %                                                                             
                         |
|EF(Teich):   44.99 %                                                                       
                         |
|ESV(Teich):   58.27 ml                                                                     
                         |
 
|LVIDs:   3.70 cm                                                                           
                         |
|SV(Teich):   47.66 ml                                                                      
                         |
|RVIDd:   3.07 cm                                                                           
                         |
|Ao root:   32.72 mm                                                                        
                         |
|LVEF MOD A2C:   56.41 %                                                                    
                         |
|SV MOD A2C:   46.46 ml                                                                     
                         |
|LVEF MOD A4C:   55.05 %                                                                    
                         |
|SV MOD A4C:   55.55 ml                                                                     
                         |
|EF Biplane:   55.87 %                                                                      
                         |
|LVEDV MOD BP:   92.29 ml                                                                   
                         |
|LVESV MOD BP:   40.72 ml                                                                   
                         |
|LVEDV MOD A2C:   82.36 ml                                                                  
                         |
|LVLd A2C:   9.15 cm                                                                        
                         |
|LVEDV MOD A4C:   100.89 ml                                                                 
                         |
|LVLd A4C:   9.46 cm                                                                        
                         |
|LVESV MOD A2C:   35.90 ml                                                                  
                         |
|LVLs A2C:   6.85 cm                                                                        
                         |
|LVESV MOD A4C:   45.34 ml                                                                  
                         |
|LVLs A4C:   7.06 cm                                                                        
                         |
|LAESV(A-L):   54.75 ml                                                                     
                         |
|LAESV Index (A-L):   24.66 ml/m2                                                           
                         |
|LAAs A2C:   15.85 cm2                                                                      
                         |
|LAESV A-L A2C:   42.05 ml                                                                  
                         |
|LALs A2C:   5.07 cm                                                                        
                         |
|LAAs A4C:   20.65 cm2                                                                      
                         |
|LAESV A-L A4C:   68.39 ml                                                                  
                         |
|LALs A4C:   5.29 cm                                                                        
                         |
|RAAs:   16.87 cm2                                                                          
                         |
|RAESV A-L:   44.78 ml                                                                      
                         |
|RAESV MOD:   45.69 ml                                                                      
                         |
 
|RALs:   5.39 cm                                                                            
                         |
|TAPSE:   2.19 cm                                                                           
                         |
|AV maxP.05 mmHg                                                                      
                         |
|AV meanPG:   3.73 mmHg                                                                     
                         |
|AV Vmax:   1.32 m/s                                                                        
                         |
|AV Vmean:   0.90 m/s                                                                       
                         |
|AV VTI:   29.89 cm                                                                         
                         |
|CHIRAG Vmax:   3.41 cm2                                                                       
                         |
|CHIRAG (VTI):   3.01 cm2                                                                      
                         |
|AVAI Vmax:   0.00 cm2/m2                                                                   
                         |
|AVAI (VTI):   0.00 cm2/m2                                                                  
                         |
|LVOT maxP.63 mmHg                                                                    
                         |
|LVOT meanP.94 mmHg                                                                   
                         |
|LVSI Dopp:   40.60 ml/m2                                                                   
                         |
|LVSV Dopp:   90.15 ml                                                                      
                         |
|LVOT Vmax:   1.07 m/s                                                                      
                         |
|LVOT Vmean:   0.63 m/s                                                                     
                         |
|LVOT VTI:   21.41 cm                                                                       
                         |
|MV A Librado:   0.37 m/s                                                                       
                         |
|MV DecT:   232.01 ms                                                                       
                         |
|MV E Librado:   0.58 m/s                                                                       
                         |
|MV E/A Ratio:   1.53                                                                       
                         |
|Septal e':   0.08 m/s                                                                      
                         |
|Septal E/e':   7.16                                                                        
                         |
|Lateral e':   0.07 m/s                                                                     
                         |
|Lateral E/e':   7.35                                                                       
                         |
|Sonographer:                                                                               
                         |
|Authenticated by:  LOR DELGADO MD                                                        
                         |
|Report Date/Time: 2018 17:52:14                                                       
                         |
 
|                                                                                           
                         |
|IMPRESSION:                                                                                
                        |
|1. Overall left ventricular systolic function is normal with, an EF between 60 - 65 %.     
                         |
|2. The right ventricle is normal in size and function.                                     
                         |
|3. The aortic root, ascending aorta and aortic arch are normal. 4. No significant valvular 
abnormalities are noted. |
+-------------------------------------------------------------------------------------------
-------------------------+
 Iron panel (2018  7:42 AM)
 
+------------+-------+-----------+
| Component  | Value | Ref Range |
+------------+-------+-----------+
| IRON       | 82.66 | 37 - 160  |
+------------+-------+-----------+
| IRON % SAT | 24.1  | 20 - 55   |
+------------+-------+-----------+
| TIBC       | 343   | 245 - 400 |
+------------+-------+-----------+
 
 
 
+----------+------------------------------------------------------------------+
| Specimen | Performing Laboratory                                            |
+----------+------------------------------------------------------------------+
| Blood    |   [facility]  [address]  |
|          | [id number]                                                            |
+----------+------------------------------------------------------------------+
 Transferrin (2018  7:42 AM)
 
+-------------+--------+-----------+
| Component   | Value  | Ref Range |
+-------------+--------+-----------+
| TRANSFERRIN | 245.11 | 180 - 329 |
+-------------+--------+-----------+
 
 
 
+----------+------------------------------------------------------------------+
| Specimen | Performing Laboratory                                            |
+----------+------------------------------------------------------------------+
| Blood    |   [facility]  [address]  |
|          | [id number]                                                            |
+----------+------------------------------------------------------------------+
 Ferritin (2018  7:42 AM)
 
+-----------+-------+----------------+
| Component | Value | Ref Range      |
+-----------+-------+----------------+
| FERRITIN  | 162.8 | 30 - 400 ng/mL |
+-----------+-------+----------------+
 
 
 
+----------+------------------------------------------------------------------+
| Specimen | Performing Laboratory                                            |
 
+----------+------------------------------------------------------------------+
| Blood    |   [facility]  [address] [facility] [address]  [place], [place]  |
|          | [id number]                                                            |
+----------+------------------------------------------------------------------+
 Lipid panel (2018  7:42 AM)
 
+---------------+-------+----------------+
| Component     | Value | Ref Range      |
+---------------+-------+----------------+
| CHOLESTEROL   | 131   | 200 mg/dL      |
+---------------+-------+----------------+
| TRIGLYCERIDES | 116   | 30 - 150 mg/dL |
+---------------+-------+----------------+
| HDL CHOL      | 40.3  | 40 mg/dl       |
+---------------+-------+----------------+
| LDL CALC      | 68    | 100 mg/dL      |
+---------------+-------+----------------+
| LDl/HDL Ratio |       |                |
+---------------+-------+----------------+
| CHOL/HDL      | 3.3   | 4.97           |
+---------------+-------+----------------+
| VLDL CHOL     | 23    | 4 - 40 mg/dL   |
+---------------+-------+----------------+
| NON HDL CHOL  | 91    | 130            |
+---------------+-------+----------------+
 
 
 
+----------+------------------------------------------------------------------+
| Specimen | Performing Laboratory                                            |
+----------+------------------------------------------------------------------+
| Blood    |   [facility]  [address] [place] [address] [facility] [address]  [place], [place]  |
|          | [id number]                                                            |
+----------+------------------------------------------------------------------+
 Comprehensive metabolic panel (2018  7:42 AM)
 
+----------------+-------+-------------------+
| Component      | Value | Ref Range         |
+----------------+-------+-------------------+
| GLUCOSE        | 99    | 70 - 100 mg/dL    |
+----------------+-------+-------------------+
| BUN            | 18    | 6 - 23 mg/dL      |
+----------------+-------+-------------------+
| CREATININE     | 0.88  | 0.70 - 1.33 mg/dL |
+----------------+-------+-------------------+
| BUN/CREAT      | 20.5  | 6.0 - 28.6        |
+----------------+-------+-------------------+
| CALCIUM        | 9.7   | 8.4 - 10.2 mg/dL  |
+----------------+-------+-------------------+
| TOTAL PROTEIN  | 6.6   | 6.0 - 8.0 g/dL    |
+----------------+-------+-------------------+
| Albumin        | 4.4   | 3.5 - 5.0         |
+----------------+-------+-------------------+
| GLOBULIN       | 2.2   | 1.8 - 3.5         |
+----------------+-------+-------------------+
| A/G            | 2.0   | 1.0 - 2.4         |
+----------------+-------+-------------------+
| TBIL           | 0.5   | 0.0 - 1.2 mg/dL   |
+----------------+-------+-------------------+
| ALK PHOS       | 59    | 32 - 120          |
 
+----------------+-------+-------------------+
| ALT            | 25    | 7 - 52 U/L        |
+----------------+-------+-------------------+
| AST            | 19    | 13 - 39 U/L       |
+----------------+-------+-------------------+
| SODIUM         | 142   | 132 - 143 mmol/L  |
+----------------+-------+-------------------+
| POTASSIUM      | 4.0   | 3.6 - 5.1 mmol/L  |
+----------------+-------+-------------------+
| CHLORIDE       | 103   | 95 - 112 mmol/L   |
+----------------+-------+-------------------+
| CO2            | 28    | 19 - 31 mmol/L    |
+----------------+-------+-------------------+
| ANION GAP AGAP | 15.0  | 7 - 21 mmol/L     |
+----------------+-------+-------------------+
| EGFR           | 90    | 60 mg/dL          |
+----------------+-------+-------------------+
 
 
 
+----------+------------------------------------------------------------------+
| Specimen | Performing Laboratory                                            |
+----------+------------------------------------------------------------------+
| Blood    |   [facility]  [address] [place]  |
|          | [id number]                                                            |
+----------+------------------------------------------------------------------+
 CBC W/Auto Diff (Reflex to Manual) (2018)
 
+-----------------+-------+--------------------+
| Component       | Value | Ref Range          |
+-----------------+-------+--------------------+
| WBC             | 9.0   | 4.5 - 11.0 10^3/mL |
+-----------------+-------+--------------------+
| RBC             | 4.79  | 4.3 - 5.7 10^6/  L |
+-----------------+-------+--------------------+
| HGB             | 14.7  | 13.5 - 18.0 g/dL   |
+-----------------+-------+--------------------+
| HCT             | 43.4  | 41 - 50 %          |
+-----------------+-------+--------------------+
| MCV             | 90.6  | 81 - 99 fL         |
+-----------------+-------+--------------------+
| MCH             | 31    | 27 - 33 pg         |
+-----------------+-------+--------------------+
| MCHC            | 34    | 30 - 36 g/dL       |
+-----------------+-------+--------------------+
| PLT             | 286   | 140 - 440 K/  L    |
+-----------------+-------+--------------------+
| RDW SD          | 13.5  | 10.5 - 15.0 %      |
+-----------------+-------+--------------------+
| MPV             |       | fL                 |
+-----------------+-------+--------------------+
| DIFF TYPE       |       |                    |
+-----------------+-------+--------------------+
| NEUTROPHILS     | 64.5  | 39 - 80 %          |
+-----------------+-------+--------------------+
| LYMPHOCYTES     | 26.4  | 24 - 44 %          |
+-----------------+-------+--------------------+
| MONOCYTES       | 7.2   | 0 - 12 %           |
+-----------------+-------+--------------------+
| EOSINOPHILS     | 0.9   | 0 - 6 %            |
 
+-----------------+-------+--------------------+
| BASOPHILS       | 1.0   | 0 - 2 %            |
+-----------------+-------+--------------------+
| NEUTROPHILS ABS |       | /  L               |
+-----------------+-------+--------------------+
| LYMPHOCYTES ABS |       | /  L               |
+-----------------+-------+--------------------+
| MONOCYTES ABS   |       | /  L               |
+-----------------+-------+--------------------+
| EOSINOPHILS ABS |       | /  L               |
+-----------------+-------+--------------------+
| BASOPHILS ABS   |       | /  L               |
+-----------------+-------+--------------------+
 
 
 
+----------+------------------------------------------------------------------+
| Specimen | Performing Laboratory                                            |
+----------+------------------------------------------------------------------+
| Blood    |   [facility]  [address] [place] [address], [place]  |
|          | [id number]                                                            |
+----------+------------------------------------------------------------------+
 EKG STANDARD 12 LEAD (2018 12:27 PM)
 
+-------------------+---------------------------------------------+-----------+
| Component         | Value                                       | Ref Range |
+-------------------+---------------------------------------------+-----------+
| Ventricular Rate  | 63                                          | BPM       |
+-------------------+---------------------------------------------+-----------+
| Atrial Rate       | 63                                          | BPM       |
+-------------------+---------------------------------------------+-----------+
| P-R Interval      | 146                                         | ms        |
+-------------------+---------------------------------------------+-----------+
| QRS Duration      | 96                                          | ms        |
+-------------------+---------------------------------------------+-----------+
| Q-T Interval      | 418                                         | ms        |
+-------------------+---------------------------------------------+-----------+
| QTC Calculation   | 427                                         | ms        |
| (Bezet)           |                                             |           |
+-------------------+---------------------------------------------+-----------+
| Calculated P Axis | 42                                          | degrees   |
+-------------------+---------------------------------------------+-----------+
| Calculated R Axis | 53                                          | degrees   |
+-------------------+---------------------------------------------+-----------+
| Calculated T Axis | 60                                          | degrees   |
+-------------------+---------------------------------------------+-----------+
| Diagnosis         | Please refer to Providers office visit note |           |
|                   |  for Providers Interpretation.Confirmed by  |           |
|                   | [facility] Read Only, PAULETTE Davis ([id number]),     |           |
|                   |  Az Galaviz ([id number]) on 2018    |           |
|                   | 4:56:19 PM                                  |           |
+-------------------+---------------------------------------------+-----------+
 
 
 
+----------+-------------------------------------------------+
| Specimen | Performing Laboratory                           |
+----------+-------------------------------------------------+
|          |   [facility]  [address] [address] |
+----------+-------------------------------------------------+
 
 from Last 3 Months
 
 Insurance
 
 
+----------+--------+-------------+------+-------+-----------------+
| Payer    | Benefi | Subscriber  | Type | Phone | Address         |
|          | t Plan | ID          |      |       |                 |
|          |  /     |             |      |       |                 |
|          | Group  |             |      |       |                 |
+----------+--------+-------------+------+-------+-----------------+
| MEDICAID | [place] | xxxxxxxx    |      |       |   [address]   |
|          | [address]      |             |      |       | [place], [place]     |
|          | [place] |             |      |       | [id number]      |
|          |  [employer]   |             |      |       |                 |
+----------+--------+-------------+------+-------+-----------------+
 
 
 
+-----------------+--------+-------------+--------+-------------+----------------------+
| Guarantor Name  | [employer] | Relation to | Date   | Phone       | Billing Address      |
|                 | t Type |  Patient    | of     |             |                      |
|                 |        |             | Birth  |             |                      |
+-----------------+--------+-------------+--------+-------------+----------------------+
| JAROD ARBOLEDA | Person | Self        | / |   Home:     |   [address]  |
|                 | [address] |             | 1960   | [phone] | [phone]  [place]   |
|                 | [place]    |             |        | [phone]        | [phone]                |
+-----------------+--------+-------------+--------+-------------+----------------------+

## 2018-04-14 NOTE — NUR
RECEIVED REPORT FROM RN. PATIENT RESTING IN BED, BREATHING IS EVEN AND
UNLABORED. REPORTS 4/10 PAIN IN RIGHT LEG. DENIES NEEDS AT THIS TIME. CALL
LIGHT WITHIN REACH.

## 2018-04-14 NOTE — XMS
Encounter Summary
  Created on: 2018
 
 Kayce Arboleda
 External Reference #: [id number]
 : 64
 Sex: Male
 
 Demographics
 
 
+-----------------------+-----------------------+
| Address               | [address] |
|                       | [place]  [address]  |
+-----------------------+-----------------------+
| Home Phone            | [phone]       |
+-----------------------+-----------------------+
| Preferred Language    | Unknown               |
+-----------------------+-----------------------+
| Marital Status        | Single                |
+-----------------------+-----------------------+
| Restorationist Affiliation | [id number]                  |
+-----------------------+-----------------------+
| Race                  | Unknown               |
+-----------------------+-----------------------+
| Ethnic Group          | Unknown               |
+-----------------------+-----------------------+
 
 
 Author
 
 
+--------------+-----------------------+
| Author       | [facility] [facility] |
+--------------+-----------------------+
| Organization | [facility] [employer] |
+--------------+-----------------------+
| Address      | Unknown               |
+--------------+-----------------------+
| Phone        | Unavailable           |
+--------------+-----------------------+
 
 
 
 Support
 
 
+-----------------+--------------+---------------------+-----------------+
| Name            | Relationship | Address             | Phone           |
+-----------------+--------------+---------------------+-----------------+
| Tejal Champagne | ECON         | [address]              | [phone] |
|                 |              | [address], [place]   |                 |
|                 |              | [address]               |                 |
+-----------------+--------------+---------------------+-----------------+
 
 
 
 Care Team Providers
 
 
 
+-----------------------+------+-----------------+
| Care Team Member Name | Role | Phone           |
+-----------------------+------+-----------------+
| Facundo Lees FNP  | PCP  | [phone] |
+-----------------------+------+-----------------+
 
 
 
 Reason for Referral
 Consultation (Routine)
 
+------------+--------------+------------+--------------+--------------+---------------+
| Status     | Reason       | Specialty  | Diagnoses /  | Referred By  | Referred To   |
|            |              |            | Procedures   | Contact      | Contact       |
+------------+--------------+------------+--------------+--------------+---------------+
| Authorized |   Specialty  | Pulmonary  |   Diagnoses  |   Trace, |   Francisco Javier, Chi    |
|            | Services     | Disease    |  H/O syncope |  [facility]ette [facility]  | [facility]   |
|            | Required     |            |   Essential  | ARNP  [address]   | Sleep         |
|            |              |            | hypertension | [facility]  | Disorders     |
|            |              |            |  with goal   | [facility]ie [facility]        | [facility]   |
|            |              |            | blood        | [place], [place] | HOSPITAL      |
|            |              |            | pressure     |  [address]       | [address]       |
|            |              |            | less than    | Phone:       | [address]   |
|            |              |            | 130/80       | [phone] | [place], [place] |
|            |              |            | Chronic      |   Fax:       |  [phone]        |
|            |              |            | obstructive  | [phone] | Phone:        |
|            |              |            | pulmonary    |              | [phone]  |
|            |              |            | disease,     |              |  Fax:         |
|            |              |            | unspecified  |              | [phone]  |
|            |              |            | COPD type    |              |               |
|            |              |            | ([facility])        |              |               |
|            |              |            | Obstructive  |              |               |
|            |              |            | sleep apnea  |              |               |
|            |              |            | syndrome     |              |               |
|            |              |            | Former heavy |              |               |
|            |              |            |  tobacco     |              |               |
|            |              |            | smoker       |              |               |
+------------+--------------+------------+--------------+--------------+---------------+
 
 
 
 
 Reason for Visit
 
 
+-----------+----------+
| Reason    | Comments |
+-----------+----------+
| Follow-up |          |
+-----------+----------+
 Routine Exam (Routine)
 
+------------+--------+---------------+--------------+--------------+---------------+
| Status     | Reason | Specialty     | Diagnoses /  | Referred By  | Referred To   |
|            |        |               | Procedures   | Contact      | Contact       |
+------------+--------+---------------+--------------+--------------+---------------+
| Authorized |        | Nurse         |   Diagnoses  |   [place], |   Trace,  |
|            |        | Practitioner  |  Essential   |  Janette Johnson,  | Janette Johnson,    |
 
|            |        | / Cardiology  | (primary)    | ARNP  [address]   | ARNP  [address]    |
|            |        |               | hypertension | [facility]  | [facility]   |
|            |        |               |   f/u        | [address]ie [address]        | Louie [address]         |
|            |        |               | annual-Peter  | [place], [place] | [place]  |
|            |        |               | transfer     |  [address]       | [address]  Phone: |
|            |        |               | Procedures   | Phone:       |  [phone] |
|            |        |               | [place] OFFICE    | [phone] |   Fax:        |
|            |        |               | OUTPATIENT   |   Fax:       | [phone]  |
|            |        |               | VISIT LEVEL  | [phone] |               |
|            |        |               | 1  [place] OFFICE |              |               |
|            |        |               |  OUTPATIENT  |              |               |
|            |        |               | VISIT LEVEL  |              |               |
|            |        |               | 2  OH OFFICE |              |               |
|            |        |               |  OUTPATIENT  |              |               |
|            |        |               | VISIT LEVEL  |              |               |
|            |        |               | 3  OH OFFICE |              |               |
|            |        |               |  OUTPATIENT  |              |               |
|            |        |               | VISIT LEVEL  |              |               |
|            |        |               | 4  OH OFFICE |              |               |
|            |        |               |  OUTPATIENT  |              |               |
|            |        |               | VISIT 40     |              |               |
|            |        |               | MINUTES  CRD |              |               |
|            |        |               |  ANNUAL      |              |               |
|            |        |               | FOLLOW UP    |              |               |
+------------+--------+---------------+--------------+--------------+---------------+
 
 
 
 
 Encounter Details
 
 
+--------+---------+----------------------+----------------------+----------------------+
| Date   | Type    | Department           | Care Team            | Description          |
+--------+---------+----------------------+----------------------+----------------------+
| / | Office  |   [facility]          |   Janette Woodward    | H/O syncope (Primary |
| 2018   | Visit   | Card[facility] | [facility]  [address]     |  Dx); Essential      |
|        |         |   [address]    | [address]    | hypertension with    |
|        |         | Way [address]        | [place] [address]   | goal blood pressure  |
|        |         | [place] [address]  | [phone]         | less than 130/80;    |
|        |         |  [phone]        | [phone] (Fax)   | Hyperlipidemia,      |
|        |         |                      |                      | unspecified          |
|        |         |                      |                      | hyperlipidemia type; |
|        |         |                      |                      |  Chronic obstructive |
|        |         |                      |                      |  pulmonary disease,  |
|        |         |                      |                      | unspecified COPD     |
|        |         |                      |                      | type (HCC);          |
|        |         |                      |                      | Thoracoabdominal     |
|        |         |                      |                      | aortic aneurysm,     |
|        |         |                      |                      | without rupture      |
|        |         |                      |                      | (HCC); Obstructive   |
|        |         |                      |                      | sleep apnea          |
|        |         |                      |                      | syndrome; Former     |
|        |         |                      |                      | heavy tobacco        |
|        |         |                      |                      | smoker; History of   |
|        |         |                      |                      | anemia; Murmur,      |
|        |         |                      |                      | cardiac              |
+--------+---------+----------------------+----------------------+----------------------+
 
 
 
 
 Social History
 
 
+---------------+-------+-----------+--------+------------------+
| Tobacco Use   | Types | Packs/Day | Years  | Date             |
|               |       |           | Used   |                  |
+---------------+-------+-----------+--------+------------------+
| Former Smoker |       | 3         | 35     | Quit: 10/05/2015 |
+---------------+-------+-----------+--------+------------------+
 
 
 
+---------------------+---+---+----------+
| Smokeless Tobacco:  |   |   | Quit:    |
| Former User         |   |   | 10/05/20 |
|                     |   |   | 15       |
+---------------------+---+---+----------+
 
 
 
+-------------+-----------+---------+---------------------------+
| Alcohol Use | Drinks/We | oz/Week | Comments                  |
|             | ek        |         |                           |
+-------------+-----------+---------+---------------------------+
| No          |   0       | 0.0     | heavy drinker in past hx. |
|             | Standard  |         |                           |
|             | drinks or |         |                           |
|             |           |         |                           |
|             | equivalen |         |                           |
|             | t         |         |                           |
+-------------+-----------+---------+---------------------------+
 
 
 
+------------------+---------------+
| Sex Assigned at  | Date Recorded |
| Birth            |               |
+------------------+---------------+
| Not on file      |               |
+------------------+---------------+
 as of this encounter
 
 Last Filed Vital Signs
 
 
+-------------------+----------------------+-------------------------+
| Vital Sign        | Reading              | Time Taken              |
+-------------------+----------------------+-------------------------+
| Blood Pressure    | 116/62               | 2018 12:18 PM PST |
+-------------------+----------------------+-------------------------+
| Pulse             | 64                   | 2018 12:18 PM PST |
+-------------------+----------------------+-------------------------+
| Temperature       | -                    | -                       |
+-------------------+----------------------+-------------------------+
| Respiratory Rate  | -                    | -                       |
+-------------------+----------------------+-------------------------+
| Oxygen Saturation | 96%                  | 2018 12:18 PM PST |
+-------------------+----------------------+-------------------------+
| Inhaled Oxygen    | -                    | -                       |
 
| Concentration     |                      |                         |
+-------------------+----------------------+-------------------------+
| Weight            | 108.1 kg (238 lb 6.4 | 2018 12:18 PM PST |
|                   |  oz)                 |                         |
+-------------------+----------------------+-------------------------+
| Height            | 177.8 cm (5' 10")    | 2018 12:18 PM PST |
+-------------------+----------------------+-------------------------+
| Body Mass Index   | 34.21                | 2018 12:18 PM PST |
+-------------------+----------------------+-------------------------+
 in this encounter
 
 Instructions
 Patient Instructions - Janette Woodward ARNP - 2018 12:30 PM PSTI have ordered y
ou fasting labs to be done at  [facility]   and also ordered you an Echo to be done at [facility]
 Take Vit b12 again
 I have ordered refill on Atorvastatin and metoprolol for one year
 Consider using light compression knee high socks, look like soccer socks to help with varic
ose veins
 I have referred you to Sleep lab at [facility]  for sleep apnea evaluation 
 See me back in 2 months 
 
 in this encounter
 
 Progress Notes
 Janette Woodward ARNP - 2018 12:30 PM PSTFormatting of this note may be differen
t from the original.
 Date of visit: 2018
 Primary Care Physician: FACUNDO LEES
 
 CHIEF COMPLAINT:  
 Chief Complaint 
 Patient presents with 
   Follow-up 
 
 HISTORY OF PRESENT ILLNESS:
   Mr. Kayce ArboledaKemal, is a 57-year-old man who is here today as overdue for annual foll
ow-up.
  He is a previous patient of Dr. Torrey Peter, now retired and last seen by him in 2016.  
  He has a previous history of syncope though workup was benign including a 32 day cardiac e
vent monitor and was last seen he had no further episodes of syncope, hypertension, hyperlip
idemia, COPD which is followed by Dr. Louie.
  I reviewed Dr. Peter's last note, as well as the last note of Dr. Louie on 2017 wh
[facility] documents that his COPD has been well-controlled on current medications, his previous PF
T done in 2017 had shown mild obstructive disease with bronchodilator response, severe
 restrictive disease with parenchymal disease, and mild diffusion defect, though  diffusion 
not corrected for hemoglobin. 
  EKG done in the office today, 2018 shows normal sinus rhythm at 63 bpm and very simila
r to EKG done in May 2016
  Last labs I have from 2017 shows lipids is well-controlled and a normal CMP except f
or mild elevation of glucose to 104 and good renal function with GFR of 99 and normal liver 
enzymes and CBC also normal.
   He denies any chest pain, palpitations, dyspnea, dizziness,or syncope. He also denies any
 signs or symptoms of stroke or TIA, or any illness, surgery, or hospitalization since last 
seen. 
   He reports most of his previous problems related to his heavy alcohol use, and his been g
reatly improved since he has been sober for 2 years.
  He also reports he stopped using his CPAP about a year ago, as he was always struggling wi
th it, and he thought his sleep apnea was mostly related to his heavy alcohol use, and now t
 
hat he had stopped drinking, he did not need it anymore, and thinks he has been sleeping wel
l without it.
   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 
to perform maintenance and repairs, and reports he tolerates even heavy physical activity wi
thout any chest pain or dyspnea. 
   He has been sober from alcohol or drugs for over 2 years, but does drink up to 12 cups of
 coffee , but drinks half decaffeinated, and also mixes with hot chocolate
 
 REVIEW OF SYSTEMS:
 Negative except for pertinent items noted in HPI.
 
 Constitutional: mild  fatigue or unexplained weight loss.  Appetite is good.  Weight is sta
ble.    Denies night sweats fevers or chills
 HENT: Denies nosebleeds.   Positive for hearing loss .  Denies dysphagia
 Eyes: History of eye surgery orbit blowout ? Denies visual disturbance or double vision.  
 Respiratory/Sleep:: Positive for COPD (Dr. Louie), sleep apnea on CPAP.  Denies cough and s
hortness of breath.  Denies hemoptysis or excessive sputum production. Denies snoring, ortho
pnea, PND. 
 Cardiovascular: Denies  chest pain, palpitations and leg swelling. Denies history of rheuma
tic fever. Denies claudication . 
 Gastrointestinal:history of gastroesophageal reflux disease, on PPI  .  Denies  nausea, vom
iting, abdominal pain and blood in stool. 
 Genitourinary: Denies  hematuria.  
 Musculoskeletal: Positive for joint pain and arthralgia severe right hip pain, back , shoul
ric, neck  , Denies myalgias, 
 Skin: Denies  color change.  Denies rash or lesions
 Neurological:  Numbness to legs, and arms .   Denies history of stroke/Transient ischemic a
ttack.Denies history of seizures. Denies dizziness, syncope  
 Hematological/Oncology Does not bruise/bleed easily.  Denies history of cancer
 Endocrine: Denies diabetes or thyroid disease.  Denies excessive thirst or hunger.  
 Psychiatric/Behavioral: The patient denies any history of depression or anxiety or other ps
ychiatric illness.
 Vaccines: Current on 2017 flu vaccine.  Current on pneumonia vaccine.
 Habits/Social :  history of smoking, quit in 2015.  Smoked 3 packs a day  35 years
.  Previously used smokeless tobacco quit in 2015..  Denies EtOH use for 2 years , p
revious heavy drinker. Drinks servings of  12 cups coffee/tea daily, uses 1/2 chocolate , ha
lf decaff  .  Denies recreational or illicit drug use, previously used methamphetamine  20 
years, cocaine, marijuana, crack, mushrooms.  Exercises with active job, walking,  and ronald
ates.   of [facility] , 
 
 Outpatient Medications Prior to Visit 
 Medication Sig Dispense Refill 
   albuterol (PROVENTIL HFA;VENTOLIN HFA) 108 (90 BASE) MCG/ACT inhaler Inhale 2 puffs int
o the lungs every 4 (four) hours as needed for Wheezing.   
   amitriptyline (ELAVIL) 100 MG tablet Take 100 mg by mouth nightly.   
   amLODIPine (NORVASC) 5 MG tablet Take 5 mg by mouth daily.   
   ascorbic acid (VITAMIN C) 250 MG tablet Take 250 mg by mouth daily.   
   cloNIDine (CATAPRES) 0.3 MG tablet Take 0.3 mg by mouth nightly.   
   diclofenac (CATAFLAM) 50 MG tablet Take 50 mg by mouth 2 (two) times daily.   
   fluticasone-salmeterol (ADVAIR) 500-50 MCG/DOSE diskus inhaler Inhale 1 puff into the l
ungs 2 (two) times daily.   
   gabapentin (NEURONTIN) 600 MG tablet Take 300 mg by mouth 3 (three) times daily.   
   hydrochlorothiazide (HYDRODIURIL) 12.5 MG tablet Take 12.5 mg by mouth daily.   
   ibuprofen (MOTRIN) 600 MG tablet Take 600 mg by mouth every 6 (six) hours as needed for
 Pain. Trying to use sparingly   
   montelukast (SINGULAIR) 10 MG tablet Take 10 mg by mouth nightly.   
   Multiple Vitamins-Minerals (RA CENTRAL-BIBI PO) Take  by mouth daily.   
   nitroGLYCERIN (NITROSTAT) 0.4 MG SL tablet Place 1 tablet under the tongue every 5 (fiv
e) minutes as needed for Chest pain. 90 tablet 0 
   pantoprazole (PROTONIX) 40 MG tablet Take 40 mg by mouth daily.   
 
   tamsulosin (FLOMAX) 0.4 MG capsule Take 0.4 mg by mouth  After dinner.   
   thiamine (VITAMIN B-1) 100 MG tablet Take 1 tablet by mouth daily. 30 tablet 0 
   umeclidinium bromide (INCRUSE ELLIPTA) 62.5 MCG/INH inhaler Inhale 1 puff into the lung
s daily.   
   atorvastatin (LIPITOR) 40 MG tablet take 1 tablet by mouth NIGHTLY 90 tablet 3 
   metoprolol (LOPRESSOR) 25 MG tablet take 1 tablet by mouth twice a day 180 tablet 3 
   ferrous sulfate, 65 FE, 324 (65 FE) MG EC tablet Take 65 mg of iron by mouth daily with
 breakfast. With 250 mg Vit C   
   cyanocobalamin (VITAMIN B-12) 100 MCG tablet Take 100 mcg by mouth daily.   
   cyclobenzaprine (FLEXERIL) 5 MG tablet Take 5 mg by mouth 3 (three) times daily as need
ed for Muscle spasms.   
   levalbuterol (XOPENEX) 1.25 MG/0.5ML nebulizer solution Take 1 ampule by nebulization e
very 4 (four) hours as needed for Wheezing.   
 
 No facility-administered medications prior to visit.  
 
 PHYSICAL EXAM:
 Wt Readings from Last 3 Encounters: 
 18 108.1 kg (238 lb 6.4 oz) 
 17 109.8 kg (242 lb) 
 17 115.7 kg (255 lb) 
 
 Temp Readings from Last 3 Encounters: 
 17 97.6 F (36.4 C) (Oral) 
 17 97.2 F (36.2 C) (Oral) 
 17 98.6 F (37 C) (Temporal) 
 
 BP Readings from Last 3 Encounters: 
 18 116/62 
 17 127/79 
 17 104/66 
 
 Pulse Readings from Last 3 Encounters: 
 18 64 
 17 54 
 17 55 
 
 GENERAL:  Well developed, well nourished, in no distress.  Appears older than stated age.
 HEENT:  Normocephalic, atraumatic.  
 EYES:     PERRL, EOM normal.
 MOUTH: Oral mucosae moist, dentition adequate, no lesions noted
 NECK:    No JVD, lymphadenopathy, thyromegaly, bruits.  Carotid pulses are 2+ bilaterally
 LUNGS/CHEST:  Clear bilaterally, with no rales, rhonchi or wheezing noted, respirations unl
abored
 HEART:  Nondisplaced PMI, regular rate and rhythm, S1, S2 normal.  2/6 murmur RUSB, rubs or
 gallops noted.
 ABDOMEN:  Soft, nontender, no organomegaly, masses or bruits.  Bowel sounds are normal in a
ll 4 quadrants.  The abdominal aortic pulsation is not palpable.
 EXTREMITIES:  No edema. Radial pulses 2+ bilaterally.  Femoral pulses are 2+ bilaterally wi
thout bruits.  DP and PT pulses are 2+ bilaterally.  No clubbing.varicosities 
 SKIN:  Warm and dry, capillary refill is normal, no lesions.
 NEUROLOGIC:  Awake, alert and oriented x 3. No focal motor or sensory deficits. 
 PSYCHIATRIC:  Appropriate, affect appears normal
 
 DATA:
 Blood tests:
 Lab Results 
 Component Value Date 
  WBC 8.39 2016 
  RBC 4.82 2016 
 
  HGB 13.8 2016 
  HCT 41.7 2016 
   2016 
 
 Lab Results 
 Component Value Date 
   2016 
  K 3.9 2016 
   2016 
  CO2 30 2016 
  ANIONGAP 9 2016 
  GLUF 99 2016 
  BUN 17 2016 
  CREATININE 0.89 2016 
  BCR 19 2016 
  CA 9.5 2016 
  EGFR >60 2016 
 
 Lab Results 
 Component Value Date 
  CHOL 204 (H) 2016 
  TRIG 294 (H) 2016 
   (H) 2016 
  GLUF 99 2016 
  HGBA1C 5.7 2016 
 
 Lab Results 
 Component Value Date 
  TSH 4.48 2016 
 
 No results found for: METF, NMETFX, TFNMFX, [id number], [id number], TOTEPI
 
 IMAGING/PROCEDURES
 Last Stress Test, 2016: Lexiscan, no ischemia detected, LVEF 63%.
 
 ECHO:
 Last Echo, 2016 ([facility]): LVEF 65-70%, trace MR, trace TR.  Ascending Aorta 41mm
, Aortic arch 37mm.
 
 OTHER: 
 PFT: 3/09/2017: Minimal obstructive airway disease, severe restriction-parenchymal, mild di
ffusion defect.  FVC 2.65 (65 percent) FEV1 2.07 (56 percent), ratio 0.78.  Significant bron
chodilator response.  TLC 4.53 L (66 percent) DLCO 21.3 (68 percent), data and tracings pers
onally reviewed by me, original interpretation Dr. Pieter CAMPO, effusion not corrected for Hg
b
 
 EKG/EVENT MONITORS
 32-Day Cardiac Event Monitor, 2016: sinus rhythm, , averaging 80bpm, rare ectopy
, no AVB/pauses, symptoms of "chest pain, SOB, dizziness, fluttering" all associated with
 NSR.
 EK2016: Normal sinus rhythm.  Rate 63 bpm,  ms, QRS 94 ms,  ms, perso
yoandy reviewed by me
 EK2018: Normal sinus rhythm.  Rate 63 bpm,  ms, QRS 96 ms, QTC 427ms, EKG tr
acing personally reviewed by me
 
 Labs:
 2017: Lipids: Cholesterol 110, triglycerides 81, HDL 35.8, LDL 58, VLDL 16, ratio 3.1
, non-HDL cholesterol 74.  CMP: Sodium 141, potassium 4, chloride 103, glucose 104, BUN 20, 
creatinine 0.81, GFR 99.  AST 21, ALT 25, alk phos 58, total bilirubin 0.6, albumin 4.4.  CB
C: WBC 9.2, hemoglobin 15, hematocrit 45.3, platelets 304, ESR 4
 
 
  ASSESSMENT & PLAN:
     He was here today as overdue for annual exam and overall seems to be very stable from a
 cardiovascular point of view with stable EKG and labs from 2017 showing lipids well c
ontrolled and fairly normal CMP with good renal function and liver enzymes and normal CBC.  
Overall he is asymptomatic for any ischemic heart disease and feels that he is doing very we
ll since he quit drinking 2 years ago.
   He is overdue for an echo to evaluate his enlarged ascending aorta and aortic arch so I h
ave ordered him one to be done at [facility].  He also has not had any lab work since  so I have ordered a CMP, lipid panel, CBC, iron panel with ferritin to evaluate anem
ia , copies to be sent to his PCP, Facundo Lees. 
   He also has stopped wearing his CPAP and uncorrected sleep apnea is a large contributor t
o cardiovascular disease and arrhythmia, so I have referred him to the [facility]
 for further evaluation of his sleep apnea.  I agree with him that it is likely his sleep ap
morris was much worse when he was drinking heavily, but he would still benefit from further natalie
luation.
  I have also suggested he try taking vitamin B12 again, ordered refills on atorvastatin and
 metoprolol for one year, and  suggested he consider using a knee-high light compression soc
ks to help with varicosities.  I also reinforced teaching on minimizing use of NSAIDs due to
 increased renal and cardiovascular risk, which he is aware of
  I will see him back in 2 months and for his cardiac meds, he should continue amlodipine 5 
mg daily, Lipitor 40 mg qhs, clonidine 0.3mg qhs hydrochlorothiazide 12.5 mg daily, and  met
oprolol tartrate 25 mg bid
   
 
 1. H/O syncope  
 2. Essential hypertension with goal blood pressure less than 130/80  
 3. Hyperlipidemia, unspecified hyperlipidemia type  
 4. Chronic obstructive pulmonary disease, unspecified COPD type (HCC)  
 5. Thoracoabdominal aortic aneurysm, without rupture (HCC)  
 6. Obstructive sleep apnea syndrome  
 7. Former heavy tobacco smoker  
 8. History of anemia  
 9. Murmur, cardiac  
 
 Orders Placed This Encounter 
 Procedures 
   Comprehensive metabolic panel 
   Lipid panel 
   CBC and differential 
   Iron panel 
   Ferritin 
   Ambulatory referral to Pulmonology 
   Electrocardiogram, 12-lead 
   Echo cardiac adult complete 
 
  
 
 The following portions of the patient's history were personally reviewed by me  and updated
 as appropriate:
 EKG tracings, other  specialty provider and PCP notes,any Hospital admission and discharge 
summaries, any ER records , current and previous cardiac testing and procedure reports and d
altaf, medication bottles brought to visit today personally reviewed by me. 
 Allergies, current medications.labs
 Family history, past medical history, past social history, past surgical history.
 Problem list.
 
 Janette WoodwardDIETER  [facility] 
 2018in this encounter
 
 
 Plan of Treatment
 
 
+--------+---------+-------------+----------------------+-------------+
| Date   | Type    | Specialty   | Care Team            | Description |
+--------+---------+-------------+----------------------+-------------+
| / | Office  | Pulmonology |   Jhonatan Louie MD  |             |
|    | Visit   |             |  [address]    |             |
|        |         |             | [place] [address]   |             |
|        |         |             | [phone]         |             |
|        |         |             | [phone] (Fax)   |             |
+--------+---------+-------------+----------------------+-------------+
 
 
 
+-------------------------------+--------+----------------------+----------------------+
| Name                          | Priori | Associated Diagnoses | Order Schedule       |
|                               | ty     |                      |                      |
+-------------------------------+--------+----------------------+----------------------+
| Comprehensive metabolic panel | Routin |   H/O syncope        | Expected:            |
|                               | e      | Hyperlipidemia,      | 2018, Expires: |
|                               |        | unspecified          |  2019          |
|                               |        | hyperlipidemia type  |                      |
|                               |        |  Chronic obstructive |                      |
|                               |        |  pulmonary disease,  |                      |
|                               |        | unspecified COPD     |                      |
|                               |        | type (HCC)           |                      |
|                               |        | Obstructive sleep    |                      |
|                               |        | apnea syndrome       |                      |
|                               |        | Former heavy tobacco |                      |
|                               |        |  smoker  History of  |                      |
|                               |        | anemia               |                      |
+-------------------------------+--------+----------------------+----------------------+
| Lipid panel                   | Routin |   Essential          | Expected:            |
|                               | e      | hypertension with    | 2018, Expires: |
|                               |        | goal blood pressure  |  2019          |
|                               |        | less than 130/80     |                      |
|                               |        | Hyperlipidemia,      |                      |
|                               |        | unspecified          |                      |
|                               |        | hyperlipidemia type  |                      |
+-------------------------------+--------+----------------------+----------------------+
| CBC and differential          | Routin |   Chronic            | Expected:            |
|                               | e      | obstructive          | 2018, Expires: |
|                               |        | pulmonary disease,   |  2019          |
|                               |        | unspecified COPD     |                      |
|                               |        | type (HCC)           |                      |
|                               |        | Obstructive sleep    |                      |
|                               |        | apnea syndrome       |                      |
|                               |        | History of anemia    |                      |
+-------------------------------+--------+----------------------+----------------------+
| Iron panel                    | Routin |   Obstructive sleep  | Expected:            |
|                               | e      | apnea syndrome       | 2018, Expires: |
|                               |        | History of anemia    |  2019          |
+-------------------------------+--------+----------------------+----------------------+
| Ferritin                      | Routin |   Obstructive sleep  | Expected:            |
|                               | e      | apnea syndrome       | 2018, Expires: |
|                               |        | History of anemia    |  2019          |
+-------------------------------+--------+----------------------+----------------------+
 
 
 
 
+-------------------------+--------+----------------------+---------------------+
| Name                    | Priori | Associated Diagnoses | Order Schedule      |
|                         | ty     |                      |                     |
+-------------------------+--------+----------------------+---------------------+
| Ambulatory referral to  | Routin |   H/O syncope        | Ordered: 2018 |
| Pulmonology             | e      | Essential            |                     |
|                         |        | hypertension with    |                     |
|                         |        | goal blood pressure  |                     |
|                         |        | less than 130/80     |                     |
|                         |        | Chronic obstructive  |                     |
|                         |        | pulmonary disease,   |                     |
|                         |        | unspecified COPD     |                     |
|                         |        | type (HCC)           |                     |
|                         |        | Obstructive sleep    |                     |
|                         |        | apnea syndrome       |                     |
|                         |        | Former heavy tobacco |                     |
|                         |        |  smoker              |                     |
+-------------------------+--------+----------------------+---------------------+
 as of this encounter
 
 Results
 ECHO outside interpretation standard (2018  3:43 PM)
 
+----------+--------------------------------------------------------+
| Specimen | Performing Laboratory                                  |
+----------+--------------------------------------------------------+
|          |   [address] [place] [address] |
+----------+--------------------------------------------------------+
 
 
 
+------------------------------------------------------------------------------------------+
| Impressions                                                                              |
+------------------------------------------------------------------------------------------+
|   1. Overall left ventricular systolic function is normal with, an EF between 60 - 65 %. |
|   2. The right ventricle is normal in size and function.  3. The aortic root, ascending  |
| aorta and aortic arch are normal. 4. No significant valvular abnormalities are noted.    |
+------------------------------------------------------------------------------------------+
 
 
 
+------------------------------------------------------------------------------------------+
| Narrative                                                                                |
+------------------------------------------------------------------------------------------+
|      Patient Name:    Kayce Arboleda  YOB: 1960                       |
| Accession:    [id number]     Performing Physician:    LOR DELGADO MD                      |
| _______________________________________________________________  INDICATIONS             |
| -----------  F/U thoracic/abdominal aneurysm     CONCLUSIONS  -----------  1. Overall    |
| left ventricular systolic function is normal with, an EF between 60 - 65 %.  2. The      |
| right ventricle is normal in size and function.  3. The aortic root, ascending aorta and |
|  aortic arch are normal. 4. No significant valvular abnormalities are noted.             |
| FINDINGS  --------  ECG rhythm: Sinus rhythm.   ECG rhythm: Resting bradycardia          |
| (HR<60bpm).  Study: A 2-dimensional transthoracic echocardiogram with m-mode, spectral   |
| and color flow Doppler was perfomed.   Study: This was a technically adequate study.     |
| Left Ventricle: Overall left ventricular systolic function is normal with, an EF between |
|  60 - 65 %.   Left Ventricle: The left ventricle cavity size is normal.   Left           |
| Ventricle: Left ventricular wall thickness is normal.   Left Ventricle: No regional wall |
|  motion abnormalities.   Left Ventricle: The diastolic filling pattern is normal for the |
|  age of the patient.  Right Ventricle: The right ventricle is normal in size and         |
 
| function.  Left Atrium: The left atrium is normal in size.  Right Atrium: The right      |
| atrium is normal in size.   Aortic Valve: Aortic valve is trileaflet and is mildly       |
| thickened.   Aortic Valve: There is no evidence of aortic regurgitation.   Aortic Valve: |
|  There is no evidence of aortic stenosis.  Mitral Valve: The mitral valve is normal.     |
| Mitral Valve: There is trace mitral regurgitation.   Mitral Valve: No evidence of MVP or |
|  mitral stenosis.  Tricuspid Valve: The tricuspid valve appears structurally normal.     |
| Tricuspid Valve: Pulmonary artery systolic pressure could not be assessed due to the     |
| absence of adequate TR jet.  Pulmonic Valve: The pulmonic valve was not well visualized. |
|   Pericardium: There is no pericardial effusion.  IVC/Hepatic Veins: The IVC is small    |
| (<1.5cm) and collapses with sniff, consistent with central venous pressures of 0-5mmHg.  |
|  Aorta: The aortic root, ascending aorta and aortic arch are normal.  Mass: No mass      |
| visualized  Thrombus: No clot visualized   Thrombus: No vegetation visualized.  Septum:  |
| No ASD observed.   Septum: No VSD observed.     MEASUREMENTS  -------------  Ao asc:     |
|  3.65 cm  Ao sinus:     3.46 cm  Ao st junct:     2.91 cm  IVC:     1.27 cm              |
| EDV(Teich):     105.93 ml  IVSd:     1.06 cm  LVIDd:     4.76 cm  LVPWd:     0.90 cm     |
| LVOT Diam:     2.31 cm  %FS:     22.34 %  EF(Teich):     44.99 %  ESV(Teich):     58.27  |
| ml  LVIDs:     3.70 cm  SV(Teich):     47.66 ml  RVIDd:     3.07 cm  Ao root:     32.72  |
| mm  LVEF MOD A2C:     56.41 %  SV MOD A2C:     46.46 ml  LVEF MOD A4C:     55.05 %  SV   |
| MOD A4C:     55.55 ml  EF Biplane:     55.87 %  LVEDV MOD BP:     92.29 ml  LVESV MOD    |
| BP:     40.72 ml  LVEDV MOD A2C:     82.36 ml  LVLd A2C:     9.15 cm  LVEDV MOD A4C:     |
|  100.89 ml  LVLd A4C:     9.46 cm  LVESV MOD A2C:     35.90 ml  LVLs A2C:     6.85 cm    |
| LVESV MOD A4C:     45.34 ml  LVLs A4C:     7.06 cm  LAESV(A-L):     54.75 ml  LAESV      |
| Index (A-L):     24.66 ml/m2  LAAs A2C:     15.85 cm2  LAESV A-L A2C:     42.05 ml  LALs |
|  A2C:     5.07 cm  LAAs A4C:     20.65 cm2  LAESV A-L A4C:     68.39 ml  LALs A4C:       |
| 5.29 cm  RAAs:     16.87 cm2  RAESV A-L:     44.78 ml  RAESV MOD:     45.69 ml           |
| RALs:     5.39 cm  TAPSE:     2.19 cm  AV maxP.05 mmHg  AV meanPG:     3.73 mmHg |
|   AV Vmax:     1.32 m/s  AV Vmean:     0.90 m/s  AV VTI:     29.89 cm  CHIRAG Vmax:         |
| 3.41 cm2  CHIRAG (VTI):     3.01 cm2  AVAI Vmax:     0.00 cm2/m2  AVAI (VTI):     0.00      |
| cm2/m2  LVOT maxP.63 mmHg  LVOT meanP.94 mmHg  LVSI Dopp:     40.60      |
| ml/m2  LVSV Dopp:     90.15 ml  LVOT Vmax:     1.07 m/s  LVOT Vmean:     0.63 m/s  LVOT  |
| VTI:     21.41 cm  MV A Librado:     0.37 m/s  MV DecT:     232.01 ms  MV E Librado:     0.58    |
| m/s  MV E/A Ratio:     1.53   Septal e':     0.08 m/s  Septal E/e':     7.16   Lateral   |
| e':     0.07 m/s  Lateral E/e':     7.35      Sonographer:   Authenticated               |
| by:    LOR DELGADO MD  Report Date/Time: 2018 17:52:14                            |
+------------------------------------------------------------------------------------------+
 
 
 
+-------------------------------------------------------------------------------------------
-------------------------+
| Procedure Note                                                                            
                         |
+-------------------------------------------------------------------------------------------
-------------------------+
|   Tank, Rad Results In - 2018  6:00 PM PST  Patient Name:  Jessica Arboleda of     
                         |
| Birth:  ccession:  [id number] Physician:  LOR DELGADO MD              
                         |
| _______________________________________________________________INDICATIONS-----------F/U  
                         |
|  thoracic/abdominal aneurysmCONCLUSIONS-----------1. Overall left ventricular systolic    
                         |
| function is normal with, an EF between 60 - 65 %.2. The right ventricle is normal in      
                         |
| size and function.3. The aortic root, ascending aorta and aortic arch are normal. 4. No   
                         |
| significant valvular abnormalities are noted.FINDINGS--------ECG rhythm: Sinus rhythm.    
                         |
| ECG rhythm: Resting bradycardia (HR<60bpm).Study: A 2-dimensional transthoracic           
                         |
 
| echocardiogram with m-mode, spectral and color flow Doppler was perfomed. Study: This     
                         |
| was a technically adequate study.Left Ventricle: Overall left ventricular systolic        
                         |
| function is normal with, an EF between 60 - 65 %. Left Ventricle: The left ventricle      
                         |
| cavity size is normal. Left Ventricle: Left ventricular wall thickness is normal. Left    
                         |
| Ventricle: No regional wall motion abnormalities. Left Ventricle: The diastolic filling   
                         |
| pattern is normal for the age of the patient.Right Ventricle: The right ventricle is      
                         |
| normal in size and function.Left Atrium: The left atrium is normal in size.Right Atrium:  
                         |
|  The right atrium is normal in size. Aortic Valve: Aortic valve is trileaflet and is      
                         |
| mildly thickened. Aortic Valve: There is no evidence of aortic regurgitation. Aortic      
                         |
| Valve: There is no evidence of aortic stenosis.Mitral Valve: The mitral valve is normal.  
                         |
|  Mitral Valve: There is trace mitral regurgitation. Mitral Valve: No evidence of MVP or   
                         |
| mitral stenosis.Tricuspid Valve: The tricuspid valve appears structurally normal.         
                         |
| Tricuspid Valve: Pulmonary artery systolic pressure could not be assessed due to the      
                         |
| absence of adequate TR jet.Pulmonic Valve: The pulmonic valve was not well                
                         |
| visualized.Pericardium: There is no pericardial effusion.IVC/Hepatic Veins: The IVC is    
                         |
| small (<1.5cm) and collapses with sniff, consistent with central venous pressures of      
                         |
| 0-5mmHg.Aorta: The aortic root, ascending aorta and aortic arch are normal.Mass: No mass  
                         |
|  visualizedThrombus: No clot visualized Thrombus: No vegetation visualized.Septum: No     
                         |
| ASD observed. Septum: No VSD observed.MEASUREMENTS-------------Ao asc:   3.65 cmAo        
                         |
| sinus:   3.46 cmAo st junct:   2.91 cmIVC:   1.27 cmEDV(Teich):   105.93 mlIVSd:   1.06   
                         |
| cmLVIDd:   4.76 cmLVPWd:   0.90 cmLVOT Diam:   2.31 cm%FS:   22.34 %EF(Teich):   44.99    
                         |
| %ESV(Teich):   58.27 mlLVIDs:   3.70 cmSV(Teich):   47.66 mlRVIDd:   3.07 cmAo root:      
                         |
| 32.72 mmLVEF MOD A2C:   56.41 %SV MOD A2C:   46.46 mlLVEF MOD A4C:   55.05 %SV MOD A4C:   
                         |
|   55.55 mlEF Biplane:   55.87 %LVEDV MOD BP:   92.29 mlLVESV MOD BP:   40.72 mlLVEDV MOD  
                         |
|  A2C:   82.36 mlLVLd A2C:   9.15 cmLVEDV MOD A4C:   100.89 mlLVLd A4C:   9.46 cmLVESV     
                         |
| MOD A2C:   35.90 mlLVLs A2C:   6.85 cmLVESV MOD A4C:   45.34 mlLVLs A4C:   7.06           
                         |
| cmLAESV(A-L):   54.75 mlLAESV Index (A-L):   24.66 ml/m2LAAs A2C:   15.85 [id number] A-L    
                         |
| A2C:   42.05 mlLALs A2C:   5.07 cmLAAs A4C:   20.65 [id number] A-L A4C:   68.39 mlLALs      
                         |
| A4C:   5.29 cmRAAs:   16.87 [id number] A-L:   44.78 mlRAESV MOD:   45.69 mlRALs:   5.39     
                         |
| cmTAPSE:   2.19 cmAV maxP.05 mmHgAV meanPG:   3.73 mmHgAV Vmax:   1.32 m/Hill        
                         |
 
| Vmean:   0.90 m/Hill VTI:   29.89 cmAVA Vmax:   3.41 cm2AVA (VTI):   3.01 [id number] Vmax:    
                         |
|  0.00 cm2/m2AVAI (VTI):   0.00 cm2/m2LVOT maxP.63 mmHgLVOT meanP.94 mmHgLVSI  
                         |
|  Dopp:   40.60 ml/m2LVSV Dopp:   90.15 mlLVOT Vmax:   1.07 m/sLVOT Vmean:   0.63 m/sLVOT  
                         |
|  VTI:   21.41 cmMV A Librado:   0.37 m/sMV DecT:   232.01 msMV E Librado:   0.58 m/sMV E/A        
                         |
| Ratio:   1.53 Septal e':   0.08 m/sSeptal E/e':   7.16 Lateral e':   0.07 m/sLateral      
                         |
| E/e':   7.35 Sonographer: Authenticated by:  Saurabh HERR Date/Time: 2018  
                         |
|  17:52:14IMPRESSION:1. Overall left ventricular systolic function is normal with, an EF   
                         |
| between 60 - 65 %.2. The right ventricle is normal in size and function.3. The aortic     
                         |
| root, ascending aorta and aortic arch are normal. 4. No significant valvular              
                         |
| abnormalities are noted.                                                                  
                         |
|Septum: No VSD observed.                                                                   
                         |
|MEASUREMENTS                                                                               
                        |
|-------------                                                                              
                          |
|Ao asc:   3.65 cm                                                                          
                         |
|Ao sinus:   3.46 cm                                                                        
                         |
|Ao st junct:   2.91 cm                                                                     
                         |
|IVC:   1.27 cm                                                                             
                         |
|EDV(Teich):   105.93 ml                                                                    
                         |
|IVSd:   1.06 cm                                                                            
                         |
|LVIDd:   4.76 cm                                                                           
                         |
|LVPWd:   0.90 cm                                                                           
                         |
|LVOT Diam:   2.31 cm                                                                       
                         |
|%FS:   22.34 %                                                                             
                         |
|EF(Teich):   44.99 %                                                                       
                         |
|ESV(Teich):   58.27 ml                                                                     
                         |
|LVIDs:   3.70 cm                                                                           
                         |
|SV(Teich):   47.66 ml                                                                      
                         |
|RVIDd:   3.07 cm                                                                           
                         |
|Ao root:   32.72 mm                                                                        
                         |
 
|LVEF MOD A2C:   56.41 %                                                                    
                         |
|SV MOD A2C:   46.46 ml                                                                     
                         |
|LVEF MOD A4C:   55.05 %                                                                    
                         |
|SV MOD A4C:   55.55 ml                                                                     
                         |
|EF Biplane:   55.87 %                                                                      
                         |
|LVEDV MOD BP:   92.29 ml                                                                   
                         |
|LVESV MOD BP:   40.72 ml                                                                   
                         |
|LVEDV MOD A2C:   82.36 ml                                                                  
                         |
|LVLd A2C:   9.15 cm                                                                        
                         |
|LVEDV MOD A4C:   100.89 ml                                                                 
                         |
|LVLd A4C:   9.46 cm                                                                        
                         |
|LVESV MOD A2C:   35.90 ml                                                                  
                         |
|LVLs A2C:   6.85 cm                                                                        
                         |
|LVESV MOD A4C:   45.34 ml                                                                  
                         |
|LVLs A4C:   7.06 cm                                                                        
                         |
|LAESV(A-L):   54.75 ml                                                                     
                         |
|LAESV Index (A-L):   24.66 ml/m2                                                           
                         |
|LAAs A2C:   15.85 cm2                                                                      
                         |
|LAESV A-L A2C:   42.05 ml                                                                  
                         |
|LALs A2C:   5.07 cm                                                                        
                         |
|LAAs A4C:   20.65 cm2                                                                      
                         |
|LAESV A-L A4C:   68.39 ml                                                                  
                         |
|LALs A4C:   5.29 cm                                                                        
                         |
|RAAs:   16.87 cm2                                                                          
                         |
|RAESV A-L:   44.78 ml                                                                      
                         |
|RAESV MOD:   45.69 ml                                                                      
                         |
|RALs:   5.39 cm                                                                            
                         |
|TAPSE:   2.19 cm                                                                           
                         |
|AV maxP.05 mmHg                                                                      
                         |
|AV meanPG:   3.73 mmHg                                                                     
                         |
 
|AV Vmax:   1.32 m/s                                                                        
                         |
|AV Vmean:   0.90 m/s                                                                       
                         |
|AV VTI:   29.89 cm                                                                         
                         |
|CHIRAG Vmax:   3.41 cm2                                                                       
                         |
|CHIRAG (VTI):   3.01 cm2                                                                      
                         |
|AVAI Vmax:   0.00 cm2/m2                                                                   
                         |
|AVAI (VTI):   0.00 cm2/m2                                                                  
                         |
|LVOT maxP.63 mmHg                                                                    
                         |
|LVOT meanP.94 mmHg                                                                   
                         |
|LVSI Dopp:   40.60 ml/m2                                                                   
                         |
|LVSV Dopp:   90.15 ml                                                                      
                         |
|LVOT Vmax:   1.07 m/s                                                                      
                         |
|LVOT Vmean:   0.63 m/s                                                                     
                         |
|LVOT VTI:   21.41 cm                                                                       
                         |
|MV A Librado:   0.37 m/s                                                                       
                         |
|MV DecT:   232.01 ms                                                                       
                         |
|MV E Librado:   0.58 m/s                                                                       
                         |
|MV E/A Ratio:   1.53                                                                       
                         |
|Septal e':   0.08 m/s                                                                      
                         |
|Septal E/e':   7.16                                                                        
                         |
|Lateral e':   0.07 m/s                                                                     
                         |
|Lateral E/e':   7.35                                                                       
                         |
|Sonographer:                                                                               
                         |
|Authenticated by:  LOR DELGADO MD                                                        
                         |
|Report Date/Time: 2018 17:52:14                                                       
                         |
|IMPRESSION:                                                                                
                        |
|1. Overall left ventricular systolic function is normal with, an EF between 60 - 65 %.     
                         |
|2. The right ventricle is normal in size and function.                                     
                         |
 
|3. The aortic root, ascending aorta and aortic arch are normal. 4. No significant valvular 
abnormalities are noted. |
+-------------------------------------------------------------------------------------------
-------------------------+
 EKG STANDARD 12 LEAD (2018 12:27 PM)
 
+-------------------+---------------------------------------------+-----------+
| Component         | Value                                       | Ref Range |
+-------------------+---------------------------------------------+-----------+
| Ventricular Rate  | 63                                          | BPM       |
+-------------------+---------------------------------------------+-----------+
| Atrial Rate       | 63                                          | BPM       |
+-------------------+---------------------------------------------+-----------+
| P-R Interval      | 146                                         | ms        |
+-------------------+---------------------------------------------+-----------+
| QRS Duration      | 96                                          | ms        |
+-------------------+---------------------------------------------+-----------+
| Q-T Interval      | 418                                         | ms        |
+-------------------+---------------------------------------------+-----------+
| QTC Calculation   | 427                                         | ms        |
| (Bezet)           |                                             |           |
+-------------------+---------------------------------------------+-----------+
| Calculated P Axis | 42                                          | degrees   |
+-------------------+---------------------------------------------+-----------+
| Calculated R Axis | 53                                          | degrees   |
+-------------------+---------------------------------------------+-----------+
| Calculated T Axis | 60                                          | degrees   |
+-------------------+---------------------------------------------+-----------+
| Diagnosis         | Please refer to Providers office visit note |           |
|                   |  for Providers Interpretation.Confirmed by  |           |
|                   | [facility] Only, PAULETTE Davis ([id number]),     |           |
|                   |  [facility] ([id number]) on 2018    |           |
|                   | 4:56:19 PM                                  |           |
+-------------------+---------------------------------------------+-----------+
 
 
 
+----------+-------------------------------------------------+
| Specimen | Performing Laboratory                           |
+----------+-------------------------------------------------+
|          |   [facility]  [address] [place] [id number] |
+----------+-------------------------------------------------+
 in this encounter
 
 Visit Diagnoses
 
 
+--------------------------------------------------------------------+
| Diagnosis                                                          |
+--------------------------------------------------------------------+
| H/O syncope - Primary                                              |
+--------------------------------------------------------------------+
|   Personal history of other specified diseases                     |
+--------------------------------------------------------------------+
| Essential hypertension with goal blood pressure less than 130/80   |
+--------------------------------------------------------------------+
| Hyperlipidemia, unspecified hyperlipidemia type                    |
+--------------------------------------------------------------------+
| Chronic obstructive pulmonary disease, unspecified COPD type (HCC) |
+--------------------------------------------------------------------+
 
| Thoracoabdominal aortic aneurysm, without rupture (HCC)            |
+--------------------------------------------------------------------+
|   Thoracoabdominal aneurysm without mention of rupture             |
+--------------------------------------------------------------------+
| Obstructive sleep apnea syndrome                                   |
+--------------------------------------------------------------------+
|   Obstructive sleep apnea (adult) (pediatric)                      |
+--------------------------------------------------------------------+
| Former heavy tobacco smoker                                        |
+--------------------------------------------------------------------+
| History of anemia                                                  |
+--------------------------------------------------------------------+
|   Personal history of diseases of blood and blood-forming organs   |
+--------------------------------------------------------------------+
| Murmur, cardiac                                                    |
+--------------------------------------------------------------------+
|   Undiagnosed cardiac murmurs                                      |
+--------------------------------------------------------------------+

## 2018-04-14 NOTE — XMS
Encounter Summary
  Created on: 2018
 
 Kayce Arboleda
 External Reference #: [id number]
 : 64
 Sex: Male
 
 Demographics
 
 
+-----------------------+-----------------------+
| Address               | [address] |
|                       | [place]  [address]  |
+-----------------------+-----------------------+
| Home Phone            | [phone]       |
+-----------------------+-----------------------+
| Preferred Language    | Unknown               |
+-----------------------+-----------------------+
| Marital Status        | Single                |
+-----------------------+-----------------------+
| Alevism Affiliation | [id number]                  |
+-----------------------+-----------------------+
| Race                  | Unknown               |
+-----------------------+-----------------------+
| Ethnic Group          | Unknown               |
+-----------------------+-----------------------+
 
 
 Author
 
 
+--------------+-----------------------+
| Author       | [facility] |
+--------------+-----------------------+
| Organization | [facility] |
+--------------+-----------------------+
| Address      | Unknown               |
+--------------+-----------------------+
| Phone        | Unavailable           |
+--------------+-----------------------+
 
 
 
 Support
 
 
+-----------------+--------------+---------------------+-----------------+
| Name            | Relationship | Address             | Phone           |
+-----------------+--------------+---------------------+-----------------+
| Tejal Champagne | ECON         | [address]              | [phone] |
|                 |              | [address], [place]   |                 |
|                 |              | [address]               |                 |
+-----------------+--------------+---------------------+-----------------+
 
 
 
 Care Team Providers
 
 
 
+-----------------------+------+-----------------+
| Care Team Member Name | Role | Phone           |
+-----------------------+------+-----------------+
| Facundo Lees  | PCP  | [phone] |
+-----------------------+------+-----------------+
 
 
 
 Encounter Details
 
 
+--------+-------------+----------------------+----------------------+-------------+
| Date   | Type        | Department           | Care Team            | Description |
+--------+-------------+----------------------+----------------------+-------------+
| / | Documentati |   [facility]          |   [facility] |             |
| 2018   | on Only     | [facility]  |                      |             |
|        |             |  [address]    |                      |             |
|        |             | [place]         |                      |             |
|        |             | [address]           |                      |             |
|        |             | [phone]         |                      |             |
+--------+-------------+----------------------+----------------------+-------------+
 
 
 
 Social History
 
 
+---------------+-------+-----------+--------+------------------+
| Tobacco Use   | Types | Packs/Day | Years  | Date             |
|               |       |           | Used   |                  |
+---------------+-------+-----------+--------+------------------+
| Former Smoker |       | 3         | 37     | Quit: 10/05/2015 |
+---------------+-------+-----------+--------+------------------+
 
 
 
+---------------------+---+---+----------+
| Smokeless Tobacco:  |   |   | Quit:    |
| Former User         |   |   | 10/05/20 |
|                     |   |   | 17       |
+---------------------+---+---+----------+
 
 
 
+-------------+-----------+---------+---------------------------+
| Alcohol Use | Drinks/We | oz/Week | Comments                  |
|             | ek        |         |                           |
+-------------+-----------+---------+---------------------------+
| No          |   0       | 0.0     | heavy drinker in past hx. |
|             | Standard  |         |                           |
|             | drinks or |         |                           |
|             |           |         |                           |
|             | equivalen |         |                           |
|             | t         |         |                           |
+-------------+-----------+---------+---------------------------+
 
 
 
 
+------------------+---------------+
| Sex Assigned at  | Date Recorded |
| Birth            |               |
+------------------+---------------+
| Not on file      |               |
+------------------+---------------+
 as of this encounter
 
 Plan of Treatment
 
 
+--------+---------+-------------+----------------------+-------------+
| Date   | Type    | Specialty   | Care Team            | Description |
+--------+---------+-------------+----------------------+-------------+
| / | Office  | Pulmonology |   Jhonatan Louie MD  |             |
| 2018   | Visit   |             |  [address]    |             |
|        |         |             | [place] [address]   |             |
|        |         |             | [phone]         |             |
|        |         |             | [phone] (Fax)   |             |
+--------+---------+-------------+----------------------+-------------+
 as of this encounter
 
 Visit Diagnoses
 Not on filein this encounter

## 2018-04-14 NOTE — XMS
Clinical Summary
  Created on: 2018
 
 Jarod Arboleda
 External Reference #: [id number]
 : 64
 Sex: Male
 
 Demographics
 
 
+-----------------------+-----------------------+
| Address               | [address] |
|                       | [place]  [address]  |
+-----------------------+-----------------------+
| Home Phone            | [phone]       |
+-----------------------+-----------------------+
| Preferred Language    | Unknown               |
+-----------------------+-----------------------+
| Marital Status        | Single                |
+-----------------------+-----------------------+
| Yazidi Affiliation | 1013                  |
+-----------------------+-----------------------+
| Race                  | Unknown               |
+-----------------------+-----------------------+
| Ethnic Group          | Unknown               |
+-----------------------+-----------------------+
 
 
 Author
 
 
+--------------+-----------------------+
| Author       | [facility] |
+--------------+-----------------------+
| Organization | [facility] |
+--------------+-----------------------+
| Address      | Unknown               |
+--------------+-----------------------+
| Phone        | Unavailable           |
+--------------+-----------------------+
 
 
 
 Support
 
 
+-----------------+--------------+---------------------+-----------------+
| Name            | Relationship | Address             | Phone           |
+-----------------+--------------+---------------------+-----------------+
| Tejal Champagne | ECON         | [address]              | [phone] |
|                 |              | [address], [place]   |                 |
|                 |              | [address]               |                 |
+-----------------+--------------+---------------------+-----------------+
 
 
 
 Care Team Providers
 
 
 
+-----------------------+------+-----------------+
| Care Team Member Name | Role | Phone           |
+-----------------------+------+-----------------+
| Facundo Lees  | PP   | [phone] |
+-----------------------+------+-----------------+
 
 
 
 Allergies
 
 
+----------------+----------------------+----------+----------+---------------------+
| Active Allergy | Reactions            | Severity | Noted    | Comments            |
|                |                      |          | Date     |                     |
+----------------+----------------------+----------+----------+---------------------+
| Lisinopril     | Other (See Comments) | Medium   | 20 |   Dry hacking cough |
|                |                      |          | 16       |                     |
+----------------+----------------------+----------+----------+---------------------+
| Trazodone      | Agitation            | Low      | 20 |                     |
|                |                      |          | 16       |                     |
+----------------+----------------------+----------+----------+---------------------+
 
 
 
 Current Medications
 
 
+----------------------+----------------------+--------+---------+------+------+-------+
| Prescription         | Sig.                 | Disp.  | Refills | Star | End  | Statu |
|                      |                      |        |         | t    | Date | s     |
|                      |                      |        |         | Date |      |       |
+----------------------+----------------------+--------+---------+------+------+-------+
|   thiamine (VITAMIN  | Take 1 tablet by     |   30   | 0       |  |      | Activ |
| B-1) 100 MG tablet   | mouth daily.         | tablet |         |  |      | e     |
|                      |                      |        |         | 15   |      |       |
+----------------------+----------------------+--------+---------+------+------+-------+
|   pantoprazole       | Take 40 mg by mouth  |        |         |      |      | Activ |
| (PROTONIX) 40 MG     | daily.               |        |         |      |      | e     |
| tablet               |                      |        |         |      |      |       |
+----------------------+----------------------+--------+---------+------+------+-------+
|   ferrous sulfate,   | Take 65 mg of iron   |        |         |      |      | Activ |
| 65 FE, 324 (65 FE)   | by mouth daily with  |        |         |      |      | e     |
| MG EC tablet         | breakfast. With 250  |        |         |      |      |       |
|                      | mg Vit C             |        |         |      |      |       |
+----------------------+----------------------+--------+---------+------+------+-------+
|   ascorbic acid      | Take 250 mg by mouth |        |         |      |      | Activ |
| (VITAMIN C) 250 MG   |  daily.              |        |         |      |      | e     |
| tablet               |                      |        |         |      |      |       |
+----------------------+----------------------+--------+---------+------+------+-------+
|   cloNIDine          | Take 0.3 mg by mouth |        |         |      |      | Activ |
| (CATAPRES) 0.3 MG    |  nightly.            |        |         |      |      | e     |
| tablet               |                      |        |         |      |      |       |
+----------------------+----------------------+--------+---------+------+------+-------+
|   tamsulosin         | Take 0.4 mg by mouth |        |         |      |      | Activ |
| (FLOMAX) 0.4 MG      |   After dinner.      |        |         |      |      | e     |
| capsule              |                      |        |         |      |      |       |
+----------------------+----------------------+--------+---------+------+------+-------+
|   nitroGLYCERIN      | Place 1 tablet under |   90   | 0       | 05/0 |      | Activ |
 
| (NITROSTAT) 0.4 MG   |  the tongue every 5  | tablet |         | 7/20 |      | e     |
| SL tablet            | (five) minutes as    |        |         | 16   |      |       |
|                      | needed for Chest     |        |         |      |      |       |
|                      | pain.                |        |         |      |      |       |
+----------------------+----------------------+--------+---------+------+------+-------+
|   Multiple           | Take  by mouth       |        |         |      |      | Activ |
| Vitamins-Minerals    | daily.               |        |         |      |      | e     |
| (RA CENTRAL-BIBI PO) |                      |        |         |      |      |       |
+----------------------+----------------------+--------+---------+------+------+-------+
|                      | Inhale 1 puff into   |        |         |      |      | Activ |
| fluticasone-salmeter | the lungs 2 (two)    |        |         |      |      | e     |
| ol (ADVAIR) 500-50   | times daily.         |        |         |      |      |       |
| MCG/DOSE diskus      |                      |        |         |      |      |       |
| inhaler              |                      |        |         |      |      |       |
+----------------------+----------------------+--------+---------+------+------+-------+
|   gabapentin         | Take 300 mg by mouth |        |         |      |      | Activ |
| (NEURONTIN) 600 MG   |  3 (three) times     |        |         |      |      | e     |
| tablet               | daily.               |        |         |      |      |       |
+----------------------+----------------------+--------+---------+------+------+-------+
|   ibuprofen (MOTRIN) | Take 600 mg by mouth |        |         |      |      | Activ |
|  600 MG tablet       |  every 6 (six) hours |        |         |      |      | e     |
|                      |  as needed for Pain. |        |         |      |      |       |
|                      |  Trying to use       |        |         |      |      |       |
|                      | sparingly            |        |         |      |      |       |
+----------------------+----------------------+--------+---------+------+------+-------+
|   montelukast        | Take 10 mg by mouth  |        |         |      |      | Activ |
| (SINGULAIR) 10 MG    | nightly.             |        |         |      |      | e     |
| tablet               |                      |        |         |      |      |       |
+----------------------+----------------------+--------+---------+------+------+-------+
|                      | Take 12.5 mg by      |        |         |      |      | Activ |
| hydrochlorothiazide  | mouth daily.         |        |         |      |      | e     |
| (HYDRODIURIL) 12.5   |                      |        |         |      |      |       |
| MG tablet            |                      |        |         |      |      |       |
+----------------------+----------------------+--------+---------+------+------+-------+
|   amLODIPine         | Take 5 mg by mouth   |        |         |      |      | Activ |
| (NORVASC) 5 MG       | daily.               |        |         |      |      | e     |
| tablet               |                      |        |         |      |      |       |
+----------------------+----------------------+--------+---------+------+------+-------+
|   amitriptyline      | Take 100 mg by mouth |        |         |      |      | Activ |
| (ELAVIL) 100 MG      |  nightly.            |        |         |      |      | e     |
| tablet               |                      |        |         |      |      |       |
+----------------------+----------------------+--------+---------+------+------+-------+
|   albuterol          | Inhale 2 puffs into  |        |         |      |      | Activ |
| (PROVENTIL           | the lungs every 4    |        |         |      |      | e     |
| HFA;VENTOLIN HFA)    | (four) hours as      |        |         |      |      |       |
| 108 (90 BASE)        | needed for Wheezing. |        |         |      |      |       |
| MCG/ACT inhaler      |                      |        |         |      |      |       |
+----------------------+----------------------+--------+---------+------+------+-------+
|   umeclidinium       | Inhale 1 puff into   |        |         |      |      | Activ |
| bromide (INCRUSE     | the lungs daily.     |        |         |      |      | e     |
| ELLIPTA) 62.5        |                      |        |         |      |      |       |
| MCG/INH inhaler      |                      |        |         |      |      |       |
+----------------------+----------------------+--------+---------+------+------+-------+
|   diclofenac         | Take 50 mg by mouth  |        |         |      |      | Activ |
| (CATAFLAM) 50 MG     | 2 (two) times daily. |        |         |      |      | e     |
| tablet               |                      |        |         |      |      |       |
+----------------------+----------------------+--------+---------+------+------+-------+
|                      | Take 3 mLs by        |        |         |      |      | Activ |
| ipratropium-albutero | nebulization as      |        |         |      |      | e     |
| l (DUO-NEB) 0.5-2.5  | needed.              |        |         |      |      |       |
 
| mg/3mL               |                      |        |         |      |      |       |
+----------------------+----------------------+--------+---------+------+------+-------+
|   atorvastatin       | Take 1 tablet by     |   30   | 11      | 01/2 |      | Activ |
| (LIPITOR) 40 MG      | mouth nightly.       | tablet |         | 9/20 |      | e     |
| tablet               |                      |        |         | 18   |      |       |
+----------------------+----------------------+--------+---------+------+------+-------+
|   metoprolol         | Take 1 tablet by     |   60   | 11      | 01/2 |      | Activ |
| (LOPRESSOR) 25 MG    | mouth 2 (two) times  | tablet |         | 9/20 |      | e     |
| tablet               | daily.               |        |         | 18   |      |       |
+----------------------+----------------------+--------+---------+------+------+-------+
|   oxyCODONE          | Take 5 mg by mouth   |        |         |      |      | Activ |
| (ROXICODONE) 5 MG    | every 4 (four) hours |        |         |      |      | e     |
| immediate release    |  as needed for Pain. |        |         |      |      |       |
| tablet               |                      |        |         |      |      |       |
+----------------------+----------------------+--------+---------+------+------+-------+
|   cyanocobalamin     | Take 1,000 mcg by    |        |         |      |      | Activ |
| (VITAMIN B-12) 1000  | mouth daily.         |        |         |      |      | e     |
| MCG tablet           |                      |        |         |      |      |       |
+----------------------+----------------------+--------+---------+------+------+-------+
 
 
 
 Active Problems
 
 
+---------------------------------------------+------------+
| Problem                                     | Noted Date |
+---------------------------------------------+------------+
| Mild persistent asthma without complication | 2017 |
+---------------------------------------------+------------+
| Obstructive sleep apnea syndrome            | 2017 |
+---------------------------------------------+------------+
| H/O syncope                                 | 2016 |
+---------------------------------------------+------------+
 
 
 
+-------------------------------------------------------------------+
|   Last Assessment & Plan:   Syncope.         57yo WM, with Hx     |
| syncope.  Our workup to date is benign, including a 32 day        |
| cardiac event monitor.  He remains moderately active, no          |
| recurrent syncope, although he does have episodes of              |
| lightheadedness dizziness, but not to a point where he is near    |
| syncopal.  He denies any chest discomfort or shortness of breath. |
|   Tolerating medications.  No changes in therapy.  Will follow    |
| clinically.Hx Pacemaker/ICD: noLast Cath: naLast Echo, 2016  |
| ([facility]): LVEF 65-70%, trace MR, trace TR.  Ascending Aorta |
|  41mm, Aortic arch 37mm.Last Stress Test, 2016: Lexiscan, no  |
| ischemia detected, LVEF 63%.32-Day Cardiac Event Monitor,         |
| 2016: sinus rhythm, , averaging 80bpm, rare ectopy, no |
|  AVB/pauses, symptoms of "chest pain, SOB, dizziness, fluttering" |
|  all associated with NSR.ECG, 2016: NSR, 63bpm.              |
+-------------------------------------------------------------------+
 
 
 
+----------------------------------------------+------------+
| COPD (chronic obstructive pulmonary disease) | 2016 |
+----------------------------------------------+------------+
 
 
 
 
+-------------------------------------------------------------------+
|   Last Assessment & Plan:   COPD, ex-smoker (2015), managed by  |
| PCP.                                                              |
+-------------------------------------------------------------------+
 
 
 
+------------------------+------------+
| Precordial pain        | 2016 |
+------------------------+------------+
| Essential hypertension | 2016 |
+------------------------+------------+
 
 
 
+-----------------------------------------------------------------+
|   Last Assessment & Plan:   Hypertension, controlled, continue  |
| current meds at current doses (amlodipine, clonidine, HCT,      |
| metoprolol).                                                    |
+-----------------------------------------------------------------+
 
 
 
+----------------------+------------+
| HLD (hyperlipidemia) | 2016 |
+----------------------+------------+
 
 
 
+-------------------------------------------------------------------+
|   Last Assessment & Plan:   Hyperlipidemia, continue current meds |
|  at current dose (atorvastatin).                                  |
+-------------------------------------------------------------------+
 
 
 
+---------------------------------------------------------------+------------+
| Alcohol withdrawal                                            | 2015 |
+---------------------------------------------------------------+------------+
| Acute respiratory failure (HCC)                               | 2015 |
+---------------------------------------------------------------+------------+
| Personal history of tobacco use, presenting hazards to health | 2015 |
+---------------------------------------------------------------+------------+
| Obstructive sleep apnea (adult) (pediatric)                   | 2015 |
+---------------------------------------------------------------+------------+
| Morbid obesity (HCC)                                          | 2015 |
+---------------------------------------------------------------+------------+
| Accelerated hypertension                                      | 2015 |
+---------------------------------------------------------------+------------+
 
 
 
 Encounters
 
 
+--------+-------------+-----------+----------------------+----------------------+
| Date   | Type        | Specialty | Care Team            | Description          |
+--------+-------------+-----------+----------------------+----------------------+
 
| / | Office      |           |   PernelljhonyJanette    | Essential            |
| 2018   | Visit       |           | DIETER Johnson           | hypertension with    |
|        |             |           |                      | goal blood pressure  |
|        |             |           |                      | less than 130/80     |
|        |             |           |                      | (Primary Dx);        |
|        |             |           |                      | Hyperlipidemia,      |
|        |             |           |                      | unspecified          |
|        |             |           |                      | hyperlipidemia type; |
|        |             |           |                      |  Thoracoabdominal    |
|        |             |           |                      | aortic aneurysm,     |
|        |             |           |                      | without rupture      |
|        |             |           |                      | (HCC); H/O syncope;  |
|        |             |           |                      | Murmur, cardiac;     |
|        |             |           |                      | Obstructive sleep    |
|        |             |           |                      | apnea syndrome;      |
|        |             |           |                      | Chronic obstructive  |
|        |             |           |                      | pulmonary disease,   |
|        |             |           |                      | unspecified COPD     |
|        |             |           |                      | type (HCC); Former   |
|        |             |           |                      | heavy tobacco        |
|        |             |           |                      | smoker; History of   |
|        |             |           |                      | anemia               |
+--------+-------------+-----------+----------------------+----------------------+
| / | Documentati |           |   [facility] Galaviz MA |                      |
| 2018   | on Only     |           |                      |                      |
+--------+-------------+-----------+----------------------+----------------------+
| / | Documentati |           |   Sherron Almanzar,  | Piter Cervantes          |
| 2018   | on Only     |           | CMA                  | BLANCA Lees)        |
+--------+-------------+-----------+----------------------+----------------------+
| / | Documentati |           |   Sherron Almanzar,  | Other ([facility]    |
|    | on Only     |           | CMA                  | Clinic, Pulmonary    |
|        |             |           |                      | Function Test)       |
+--------+-------------+-----------+----------------------+----------------------+
| / | Ancillary   |           |   Janette Woodward    | H/O syncope;         |
|    | Procedure   |           | DIETER Johnson           | Essential            |
|        |             |           |                      | hypertension with    |
|        |             |           |                      | goal blood pressure  |
|        |             |           |                      | less than 130/80;    |
|        |             |           |                      | Hyperlipidemia,      |
|        |             |           |                      | unspecified          |
|        |             |           |                      | hyperlipidemia type; |
|        |             |           |                      |  Chronic obstructive |
|        |             |           |                      |  pulmonary disease,  |
|        |             |           |                      | unspecified COPD     |
|        |             |           |                      | type ([facility]);          |
|        |             |           |                      | Thoracoabdominal     |
|        |             |           |                      | aortic aneurysm,     |
|        |             |           |                      | without rupture      |
|        |             |           |                      | (HCC); Obstructive   |
|        |             |           |                      | sleep apnea syndrome |
+--------+-------------+-----------+----------------------+----------------------+
| / | Documentati |           |   Josseline Kwok,  | Other ([facility]     |
|    | on Only     |           | MA                   | Labs)                |
+--------+-------------+-----------+----------------------+----------------------+
| / | Documentati |           |   Stormy,     | Labs Only            |
| 2018   | on Only     |           | [facility]          |                      |
+--------+-------------+-----------+----------------------+----------------------+
| / | Office      |           |   Janette Woodward    | H/O syncope (Primary |
| 2018   | Visit       |           | DIETER Johnson           |  Dx); Essential      |
|        |             |           |                      | hypertension with    |
 
|        |             |           |                      | goal blood pressure  |
|        |             |           |                      | less than 130/80;    |
|        |             |           |                      | Hyperlipidemia,      |
|        |             |           |                      | unspecified          |
|        |             |           |                      | hyperlipidemia type; |
|        |             |           |                      |  Chronic obstructive |
|        |             |           |                      |  pulmonary disease,  |
|        |             |           |                      | unspecified COPD     |
|        |             |           |                      | type (HCC);          |
|        |             |           |                      | Thoracoabdominal     |
|        |             |           |                      | aortic aneurysm,     |
|        |             |           |                      | without rupture      |
|        |             |           |                      | (HCC); Obstructive   |
|        |             |           |                      | sleep apnea          |
|        |             |           |                      | syndrome; Former     |
|        |             |           |                      | heavy tobacco        |
|        |             |           |                      | smoker; History of   |
|        |             |           |                      | anemia; Murmur,      |
|        |             |           |                      | cardiac              |
+--------+-------------+-----------+----------------------+----------------------+
| / | Telephone   |           |   Lupe Nolan,    |                      |
|    |             |           | CMA                  |                      |
+--------+-------------+-----------+----------------------+----------------------+
 from Last 3 Months
 
 Immunizations
 
 
+-----------------+------------------------+----------+
| Name            | Dates Previously Given | Next Due |
+-----------------+------------------------+----------+
| Pneumococcal    | 2015             |          |
| Polysaccharide  |                        |          |
| 23-valent       |                        |          |
+-----------------+------------------------+----------+
 
 
 
 Family History
 
 
+--------------------+----------+------+----------+
| Medical History    | Relation | Name | Comments |
+--------------------+----------+------+----------+
| Sickle cell anemia | Father   |      |          |
+--------------------+----------+------+----------+
| Alcohol abuse      | Mother   |      |          |
+--------------------+----------+------+----------+
| Diabetes type I    | Mother   |      |          |
+--------------------+----------+------+----------+
| Diabetes type II   | Mother   |      |          |
+--------------------+----------+------+----------+
 
 
 
+----------+------+----------+--------------------+
| Relation | Name | Status   | Comments           |
+----------+------+----------+--------------------+
| Father   |      |  | sickle cell anemia |
|          |      |   (Age   |                    |
 
|          |      | 70)      |                    |
+----------+------+----------+--------------------+
| Mother   |      | Alive    |                    |
+----------+------+----------+--------------------+
 
 
 
 Social History
 
 
+---------------+-------+-----------+--------+------------------+
| Tobacco Use   | Types | Packs/Day | Years  | Date             |
|               |       |           | Used   |                  |
+---------------+-------+-----------+--------+------------------+
| Former Smoker |       | 3         | 37     | Quit: 10/05/2015 |
+---------------+-------+-----------+--------+------------------+
 
 
 
+---------------------+---+---+----------+
| Smokeless Tobacco:  |   |   | Quit:    |
| Former User         |   |   | 10/05/20 |
|                     |   |   | 15       |
+---------------------+---+---+----------+
 
 
 
+--------------------------------------+
| Tobacco Cessation: Ready to Quit: No |
+--------------------------------------+
 
 
 
+-------------+-----------+---------+---------------------------+
| Alcohol Use | Drinks/We | oz/Week | Comments                  |
|             | ek        |         |                           |
+-------------+-----------+---------+---------------------------+
| No          |   0       | 0.0     | heavy drinker in past hx. |
|             | Standard  |         |                           |
|             | drinks or |         |                           |
|             |           |         |                           |
|             | equivalen |         |                           |
|             | t         |         |                           |
+-------------+-----------+---------+---------------------------+
 
 
 
+------------------+---------------+
| Sex Assigned at  | Date Recorded |
| Birth            |               |
+------------------+---------------+
| Not on file      |               |
+------------------+---------------+
 
 
 
 Last Filed Vital Signs
 
 
+-------------------+----------------------+-------------------------+
 
| Vital Sign        | Reading              | Time Taken              |
+-------------------+----------------------+-------------------------+
| Blood Pressure    | 112/60               | 2018 10:53 AM PDT |
+-------------------+----------------------+-------------------------+
| Pulse             | 67                   | 2018 10:53 AM PDT |
+-------------------+----------------------+-------------------------+
| Temperature       | 36.4   C (97.6   F)  | 2017  9:51 AM PST |
+-------------------+----------------------+-------------------------+
| Respiratory Rate  | 20                   | 2018 10:53 AM PDT |
+-------------------+----------------------+-------------------------+
| Oxygen Saturation | 98%                  | 2018 10:53 AM PDT |
+-------------------+----------------------+-------------------------+
| Inhaled Oxygen    | -                    | -                       |
| Concentration     |                      |                         |
+-------------------+----------------------+-------------------------+
| Weight            | 106.6 kg (235 lb 1.6 | 2018 10:53 AM PDT |
|                   |  oz)                 |                         |
+-------------------+----------------------+-------------------------+
| Height            | 177.8 cm (5' 10")    | 2018 10:53 AM PDT |
+-------------------+----------------------+-------------------------+
| Body Mass Index   | 33.73                | 2018 10:53 AM PDT |
+-------------------+----------------------+-------------------------+
 
 
 
 Plan of Treatment
 
 
+--------+---------+-----------+----------------------+-------------+
| Date   | Type    | Specialty | Care Team            | Description |
+--------+---------+-----------+----------------------+-------------+
| / | Office  |           |   Jhonatan Louie MD  |             |
| 2018   | Visit   |           |  [address]    |             |
|        |         |           | [place] [address]   |             |
|        |         |           | [phone]         |             |
|        |         |           | [phone] (Fax)   |             |
+--------+---------+-----------+----------------------+-------------+
 
 
 
+----------------------+-----------+--------------------------+----------+
| Health Maintenance   | Due Date  | Last Done                | Comments |
+----------------------+-----------+--------------------------+----------+
| Vaccine:             |  |                          |          |
| Dtap/Tdap/Td (1 -    | 1         |                          |          |
| Tdap)                |           |                          |          |
+----------------------+-----------+--------------------------+----------+
| Colon Cancer         |  |                          |          |
| Screening            | 2         |                          |          |
| (Colonoscopy)        |           |                          |          |
+----------------------+-----------+--------------------------+----------+
| Vaccine: Influenza   |  | 10/27/2015, 10/21/2013,  |          |
| (Season Ended)       | 8         | 2013               |          |
+----------------------+-----------+--------------------------+----------+
| Vaccine:             | Completed | 2015, 2015   |          |
| Pneumococcal 19-64   |           |                          |          |
| (PPSV23 only) Medium |           |                          |          |
|  Risk                |           |                          |          |
+----------------------+-----------+--------------------------+----------+
 
 
 
 
 Procedures
 
 
+-----------------+--------+-------------+----------------------+----------------------+
| Procedure Name  | Priori | Date/Time   | Associated Diagnosis | Comments             |
|                 | ty     |             |                      |                      |
+-----------------+--------+-------------+----------------------+----------------------+
| ECHO OUTSIDE    | Routin | 2018  |   H/O syncope        |   Results for this   |
| INTERPRETATION  | e      |  3:43 PM    | Essential            | procedure are in the |
| STANDARD        |        | PST         | hypertension with    |  results section.    |
|                 |        |             | goal blood pressure  |                      |
|                 |        |             | less than 130/80     |                      |
|                 |        |             | Hyperlipidemia,      |                      |
|                 |        |             | unspecified          |                      |
|                 |        |             | hyperlipidemia type  |                      |
|                 |        |             |  Chronic obstructive |                      |
|                 |        |             |  pulmonary disease,  |                      |
|                 |        |             | unspecified COPD     |                      |
|                 |        |             | type (HCC)           |                      |
|                 |        |             | Thoracoabdominal     |                      |
|                 |        |             | aortic aneurysm,     |                      |
|                 |        |             | without rupture      |                      |
|                 |        |             | (HCC)  Obstructive   |                      |
|                 |        |             | sleep apnea syndrome |                      |
+-----------------+--------+-------------+----------------------+----------------------+
 from Last 3 Months
 
 Results
 ECHO outside interpretation standard (2018  3:43 PM)
 
+----------+--------------------------------------------------------+
| Specimen | Performing Laboratory                                  |
+----------+--------------------------------------------------------+
|          |   [facility] [id number] [place] [address] |
+----------+--------------------------------------------------------+
 
 
 
+------------------------------------------------------------------------------------------+
| Impressions                                                                              |
+------------------------------------------------------------------------------------------+
|   1. Overall left ventricular systolic function is normal with, an EF between 60 - 65 %. |
|   2. The right ventricle is normal in size and function.  3. The aortic root, ascending  |
| aorta and aortic arch are normal. 4. No significant valvular abnormalities are noted.    |
+------------------------------------------------------------------------------------------+
 
 
 
+------------------------------------------------------------------------------------------+
| Narrative                                                                                |
+------------------------------------------------------------------------------------------+
|      Patient Name:    Jarod Arboleda  YOB: 1960                       |
| Accession:    [id number]     Performing Physician:    LOR DELGADO MD                      |
| _______________________________________________________________  INDICATIONS             |
| -----------  F/U thoracic/abdominal aneurysm     CONCLUSIONS  -----------  1. Overall    |
| left ventricular systolic function is normal with, an EF between 60 - 65 %.  2. The      |
| right ventricle is normal in size and function.  3. The aortic root, ascending aorta and |
|  aortic arch are normal. 4. No significant valvular abnormalities are noted.             |
 
| FINDINGS  --------  ECG rhythm: Sinus rhythm.   ECG rhythm: Resting bradycardia          |
| (HR<60bpm).  Study: A 2-dimensional transthoracic echocardiogram with m-mode, spectral   |
| and color flow Doppler was perfomed.   Study: This was a technically adequate study.     |
| Left Ventricle: Overall left ventricular systolic function is normal with, an EF between |
|  60 - 65 %.   Left Ventricle: The left ventricle cavity size is normal.   Left           |
| Ventricle: Left ventricular wall thickness is normal.   Left Ventricle: No regional wall |
|  motion abnormalities.   Left Ventricle: The diastolic filling pattern is normal for the |
|  age of the patient.  Right Ventricle: The right ventricle is normal in size and         |
| function.  Left Atrium: The left atrium is normal in size.  Right Atrium: The right      |
| atrium is normal in size.   Aortic Valve: Aortic valve is trileaflet and is mildly       |
| thickened.   Aortic Valve: There is no evidence of aortic regurgitation.   Aortic Valve: |
|  There is no evidence of aortic stenosis.  Mitral Valve: The mitral valve is normal.     |
| Mitral Valve: There is trace mitral regurgitation.   Mitral Valve: No evidence of MVP or |
|  mitral stenosis.  Tricuspid Valve: The tricuspid valve appears structurally normal.     |
| Tricuspid Valve: Pulmonary artery systolic pressure could not be assessed due to the     |
| absence of adequate TR jet.  Pulmonic Valve: The pulmonic valve was not well visualized. |
|   Pericardium: There is no pericardial effusion.  IVC/Hepatic Veins: The IVC is small    |
| (<1.5cm) and collapses with sniff, consistent with central venous pressures of 0-5mmHg.  |
|  Aorta: The aortic root, ascending aorta and aortic arch are normal.  Mass: No mass      |
| visualized  Thrombus: No clot visualized   Thrombus: No vegetation visualized.  Septum:  |
| No ASD observed.   Septum: No VSD observed.     MEASUREMENTS  -------------  Ao asc:     |
|  3.65 cm  Ao sinus:     3.46 cm  Ao st junct:     2.91 cm  IVC:     1.27 cm              |
| EDV(Teich):     105.93 ml  IVSd:     1.06 cm  LVIDd:     4.76 cm  LVPWd:     0.90 cm     |
| LVOT Diam:     2.31 cm  %FS:     22.34 %  EF(Teich):     44.99 %  ESV(Teich):     58.27  |
| ml  LVIDs:     3.70 cm  SV(Teich):     47.66 ml  RVIDd:     3.07 cm  Ao root:     32.72  |
| mm  LVEF MOD A2C:     56.41 %  SV MOD A2C:     46.46 ml  LVEF MOD A4C:     55.05 %  SV   |
| MOD A4C:     55.55 ml  EF Biplane:     55.87 %  LVEDV MOD BP:     92.29 ml  LVESV MOD    |
| BP:     40.72 ml  LVEDV MOD A2C:     82.36 ml  LVLd A2C:     9.15 cm  LVEDV MOD A4C:     |
|  100.89 ml  LVLd A4C:     9.46 cm  LVESV MOD A2C:     35.90 ml  LVLs A2C:     6.85 cm    |
| LVESV MOD A4C:     45.34 ml  LVLs A4C:     7.06 cm  LAESV(A-L):     54.75 ml  LAESV      |
| Index (A-L):     24.66 ml/m2  LAAs A2C:     15.85 cm2  LAESV A-L A2C:     42.05 ml  LALs |
|  A2C:     5.07 cm  LAAs A4C:     20.65 cm2  LAESV A-L A4C:     68.39 ml  LALs A4C:       |
| 5.29 cm  RAAs:     16.87 cm2  RAESV A-L:     44.78 ml  RAESV MOD:     45.69 ml           |
| RALs:     5.39 cm  TAPSE:     2.19 cm  AV maxP.05 mmHg  AV meanPG:     3.73 mmHg |
|   AV Vmax:     1.32 m/s  AV Vmean:     0.90 m/s  AV VTI:     29.89 cm  CHIRAG Vmax:         |
| 3.41 cm2  CHIRAG (VTI):     3.01 cm2  AVAI Vmax:     0.00 cm2/m2  AVAI (VTI):     0.00      |
| cm2/m2  LVOT maxP.63 mmHg  LVOT meanP.94 mmHg  LVSI Dopp:     40.60      |
| ml/m2  LVSV Dopp:     90.15 ml  LVOT Vmax:     1.07 m/s  LVOT Vmean:     0.63 m/s  LVOT  |
| VTI:     21.41 cm  MV A Librado:     0.37 m/s  MV DecT:     232.01 ms  MV E Librado:     0.58    |
| m/s  MV E/A Ratio:     1.53   Septal e':     0.08 m/s  Septal E/e':     7.16   Lateral   |
| e':     0.07 m/s  Lateral E/e':     7.35      Sonographer:   Authenticated               |
| by:    LOR DELGADO MD  Report Date/Time: 2018 17:52:14                            |
+------------------------------------------------------------------------------------------+
 
 
 
+-------------------------------------------------------------------------------------------
-------------------------+
| Procedure Note                                                                            
                         |
+-------------------------------------------------------------------------------------------
-------------------------+
|   Tank, Rad Results In - 2018  6:00 PM PST  Patient Name:  Jessica Arboleda of     
                         |
| Birth:  ccession:  [id number] Physician:  LOR DELGADO MD              
                         |
| _______________________________________________________________INDICATIONS-----------F/U  
                         |
|  thoracic/abdominal aneurysmCONCLUSIONS-----------1. Overall left ventricular systolic    
                         |
 
| function is normal with, an EF between 60 - 65 %.2. The right ventricle is normal in      
                         |
| size and function.3. The aortic root, ascending aorta and aortic arch are normal. 4. No   
                         |
| significant valvular abnormalities are noted.FINDINGS--------ECG rhythm: Sinus rhythm.    
                         |
| ECG rhythm: Resting bradycardia (HR<60bpm).Study: A 2-dimensional transthoracic           
                         |
| echocardiogram with m-mode, spectral and color flow Doppler was perfomed. Study: This     
                         |
| was a technically adequate study.Left Ventricle: Overall left ventricular systolic        
                         |
| function is normal with, an EF between 60 - 65 %. Left Ventricle: The left ventricle      
                         |
| cavity size is normal. Left Ventricle: Left ventricular wall thickness is normal. Left    
                         |
| Ventricle: No regional wall motion abnormalities. Left Ventricle: The diastolic filling   
                         |
| pattern is normal for the age of the patient.Right Ventricle: The right ventricle is      
                         |
| normal in size and function.Left Atrium: The left atrium is normal in size.Right Atrium:  
                         |
|  The right atrium is normal in size. Aortic Valve: Aortic valve is trileaflet and is      
                         |
| mildly thickened. Aortic Valve: There is no evidence of aortic regurgitation. Aortic      
                         |
| Valve: There is no evidence of aortic stenosis.Mitral Valve: The mitral valve is normal.  
                         |
|  Mitral Valve: There is trace mitral regurgitation. Mitral Valve: No evidence of MVP or   
                         |
| mitral stenosis.Tricuspid Valve: The tricuspid valve appears structurally normal.         
                         |
| Tricuspid Valve: Pulmonary artery systolic pressure could not be assessed due to the      
                         |
| absence of adequate TR jet.Pulmonic Valve: The pulmonic valve was not well                
                         |
| visualized.Pericardium: There is no pericardial effusion.IVC/Hepatic Veins: The IVC is    
                         |
| small (<1.5cm) and collapses with sniff, consistent with central venous pressures of      
                         |
| 0-5mmHg.Aorta: The aortic root, ascending aorta and aortic arch are normal.Mass: No mass  
                         |
|  visualizedThrombus: No clot visualized Thrombus: No vegetation visualized.Septum: No     
                         |
| ASD observed. Septum: No VSD observed.MEASUREMENTS-------------Ao asc:   3.65 cmAo        
                         |
| sinus:   3.46 cmAo st junct:   2.91 cmIVC:   1.27 cmEDV(Teich):   105.93 mlIVSd:   1.06   
                         |
| cmLVIDd:   4.76 cmLVPWd:   0.90 cmLVOT Diam:   2.31 cm%FS:   22.34 %EF(Teich):   44.99    
                         |
| %ESV(Teich):   58.27 mlLVIDs:   3.70 cmSV(Teich):   47.66 mlRVIDd:   3.07 cmAo root:      
                         |
| 32.72 mmLVEF MOD A2C:   56.41 %SV MOD A2C:   46.46 mlLVEF MOD A4C:   55.05 %SV MOD A4C:   
                         |
|   55.55 mlEF Biplane:   55.87 %LVEDV MOD BP:   92.29 mlLVESV MOD BP:   40.72 mlLVEDV MOD  
                         |
|  A2C:   82.36 mlLVLd A2C:   9.15 cmLVEDV MOD A4C:   100.89 mlLVLd A4C:   9.46 cmLVESV     
                         |
| MOD A2C:   35.90 mlLVLs A2C:   6.85 cmLVESV MOD A4C:   45.34 mlLVLs A4C:   7.06           
                         |
 
| cmLAESV(A-L):   54.75 mlLAESV Index (A-L):   24.66 ml/m2LAAs A2C:   15.85 [id number] A-L    
                         |
| A2C:   42.05 mlLALs A2C:   5.07 cmLAAs A4C:   20.65 [id number] A-L A4C:   68.39 mlLALs      
                         |
| A4C:   5.29 cmRAAs:   16.87 [id number] A-L:   44.78 mlRAESV MOD:   45.69 mlRALs:   5.39     
                         |
| cmTAPSE:   2.19 cmAV maxP.05 mmHgAV meanPG:   3.73 mmHgAV Vmax:   1.32 m/Hill        
                         |
| Vmean:   0.90 m/Hill VTI:   29.89 cmAVA Vmax:   3.41 cm2AVA (VTI):   3.01 [id number] Vmax:    
                         |
|  0.00 cm2/m2AVAI (VTI):   0.00 cm2/m2LVOT maxP.63 mmHgLVOT meanP.94 mmHgLVSI  
                         |
|  Dopp:   40.60 ml/m2LVSV Dopp:   90.15 mlLVOT Vmax:   1.07 m/sLVOT Vmean:   0.63 m/sLVOT  
                         |
|  VTI:   21.41 cmMV A Librado:   0.37 m/sMV DecT:   232.01 msMV E Librado:   0.58 m/sMV E/A        
                         |
| Ratio:   1.53 Septal e':   0.08 m/sSeptal E/e':   7.16 Lateral e':   0.07 m/sLateral      
                         |
| E/e':   7.35 Sonographer: Authenticated by:  Saurabh HERR Date/Time: 2018  
                         |
|  17:52:14IMPRESSION:1. Overall left ventricular systolic function is normal with, an EF   
                         |
| between 60 - 65 %.2. The right ventricle is normal in size and function.3. The aortic     
                         |
| root, ascending aorta and aortic arch are normal. 4. No significant valvular              
                         |
| abnormalities are noted.                                                                  
                         |
|Septum: No VSD observed.                                                                   
                         |
|MEASUREMENTS                                                                               
                        |
|-------------                                                                              
                          |
|Ao asc:   3.65 cm                                                                          
                         |
|Ao sinus:   3.46 cm                                                                        
                         |
|Ao st junct:   2.91 cm                                                                     
                         |
|IVC:   1.27 cm                                                                             
                         |
|EDV(Teich):   105.93 ml                                                                    
                         |
|IVSd:   1.06 cm                                                                            
                         |
|LVIDd:   4.76 cm                                                                           
                         |
|LVPWd:   0.90 cm                                                                           
                         |
|LVOT Diam:   2.31 cm                                                                       
                         |
|%FS:   22.34 %                                                                             
                         |
|EF(Teich):   44.99 %                                                                       
                         |
|ESV(Teich):   58.27 ml                                                                     
                         |
 
|LVIDs:   3.70 cm                                                                           
                         |
|SV(Teich):   47.66 ml                                                                      
                         |
|RVIDd:   3.07 cm                                                                           
                         |
|Ao root:   32.72 mm                                                                        
                         |
|LVEF MOD A2C:   56.41 %                                                                    
                         |
|SV MOD A2C:   46.46 ml                                                                     
                         |
|LVEF MOD A4C:   55.05 %                                                                    
                         |
|SV MOD A4C:   55.55 ml                                                                     
                         |
|EF Biplane:   55.87 %                                                                      
                         |
|LVEDV MOD BP:   92.29 ml                                                                   
                         |
|LVESV MOD BP:   40.72 ml                                                                   
                         |
|LVEDV MOD A2C:   82.36 ml                                                                  
                         |
|LVLd A2C:   9.15 cm                                                                        
                         |
|LVEDV MOD A4C:   100.89 ml                                                                 
                         |
|LVLd A4C:   9.46 cm                                                                        
                         |
|LVESV MOD A2C:   35.90 ml                                                                  
                         |
|LVLs A2C:   6.85 cm                                                                        
                         |
|LVESV MOD A4C:   45.34 ml                                                                  
                         |
|LVLs A4C:   7.06 cm                                                                        
                         |
|LAESV(A-L):   54.75 ml                                                                     
                         |
|LAESV Index (A-L):   24.66 ml/m2                                                           
                         |
|LAAs A2C:   15.85 cm2                                                                      
                         |
|LAESV A-L A2C:   42.05 ml                                                                  
                         |
|LALs A2C:   5.07 cm                                                                        
                         |
|LAAs A4C:   20.65 cm2                                                                      
                         |
|LAESV A-L A4C:   68.39 ml                                                                  
                         |
|LALs A4C:   5.29 cm                                                                        
                         |
|RAAs:   16.87 cm2                                                                          
                         |
|RAESV A-L:   44.78 ml                                                                      
                         |
|RAESV MOD:   45.69 ml                                                                      
                         |
 
|RALs:   5.39 cm                                                                            
                         |
|TAPSE:   2.19 cm                                                                           
                         |
|AV maxP.05 mmHg                                                                      
                         |
|AV meanPG:   3.73 mmHg                                                                     
                         |
|AV Vmax:   1.32 m/s                                                                        
                         |
|AV Vmean:   0.90 m/s                                                                       
                         |
|AV VTI:   29.89 cm                                                                         
                         |
|CHIRAG Vmax:   3.41 cm2                                                                       
                         |
|CHIRAG (VTI):   3.01 cm2                                                                      
                         |
|AVAI Vmax:   0.00 cm2/m2                                                                   
                         |
|AVAI (VTI):   0.00 cm2/m2                                                                  
                         |
|LVOT maxP.63 mmHg                                                                    
                         |
|LVOT meanP.94 mmHg                                                                   
                         |
|LVSI Dopp:   40.60 ml/m2                                                                   
                         |
|LVSV Dopp:   90.15 ml                                                                      
                         |
|LVOT Vmax:   1.07 m/s                                                                      
                         |
|LVOT Vmean:   0.63 m/s                                                                     
                         |
|LVOT VTI:   21.41 cm                                                                       
                         |
|MV A Librado:   0.37 m/s                                                                       
                         |
|MV DecT:   232.01 ms                                                                       
                         |
|MV E Librado:   0.58 m/s                                                                       
                         |
|MV E/A Ratio:   1.53                                                                       
                         |
|Septal e':   0.08 m/s                                                                      
                         |
|Septal E/e':   7.16                                                                        
                         |
|Lateral e':   0.07 m/s                                                                     
                         |
|Lateral E/e':   7.35                                                                       
                         |
|Sonographer:                                                                               
                         |
|Authenticated by:  LOR DELGADO MD                                                        
                         |
|Report Date/Time: 2018 17:52:14                                                       
                         |
 
|                                                                                           
                         |
|IMPRESSION:                                                                                
                        |
|1. Overall left ventricular systolic function is normal with, an EF between 60 - 65 %.     
                         |
|2. The right ventricle is normal in size and function.                                     
                         |
|3. The aortic root, ascending aorta and aortic arch are normal. 4. No significant valvular 
abnormalities are noted. |
+-------------------------------------------------------------------------------------------
-------------------------+
 Iron panel (2018  7:42 AM)
 
+------------+-------+-----------+
| Component  | Value | Ref Range |
+------------+-------+-----------+
| IRON       | 82.66 | 37 - 160  |
+------------+-------+-----------+
| IRON % SAT | 24.1  | 20 - 55   |
+------------+-------+-----------+
| TIBC       | 343   | 245 - 400 |
+------------+-------+-----------+
 
 
 
+----------+------------------------------------------------------------------+
| Specimen | Performing Laboratory                                            |
+----------+------------------------------------------------------------------+
| Blood    |   [facility]  [address]  |
|          | [id number]                                                            |
+----------+------------------------------------------------------------------+
 Transferrin (2018  7:42 AM)
 
+-------------+--------+-----------+
| Component   | Value  | Ref Range |
+-------------+--------+-----------+
| TRANSFERRIN | 245.11 | 180 - 329 |
+-------------+--------+-----------+
 
 
 
+----------+------------------------------------------------------------------+
| Specimen | Performing Laboratory                                            |
+----------+------------------------------------------------------------------+
| Blood    |   [facility]  [address]  |
|          | [id number]                                                            |
+----------+------------------------------------------------------------------+
 Ferritin (2018  7:42 AM)
 
+-----------+-------+----------------+
| Component | Value | Ref Range      |
+-----------+-------+----------------+
| FERRITIN  | 162.8 | 30 - 400 ng/mL |
+-----------+-------+----------------+
 
 
 
+----------+------------------------------------------------------------------+
| Specimen | Performing Laboratory                                            |
 
+----------+------------------------------------------------------------------+
| Blood    |   [facility]  [address] [facility] [address]  [place], [place]  |
|          | [id number]                                                            |
+----------+------------------------------------------------------------------+
 Lipid panel (2018  7:42 AM)
 
+---------------+-------+----------------+
| Component     | Value | Ref Range      |
+---------------+-------+----------------+
| CHOLESTEROL   | 131   | 200 mg/dL      |
+---------------+-------+----------------+
| TRIGLYCERIDES | 116   | 30 - 150 mg/dL |
+---------------+-------+----------------+
| HDL CHOL      | 40.3  | 40 mg/dl       |
+---------------+-------+----------------+
| LDL CALC      | 68    | 100 mg/dL      |
+---------------+-------+----------------+
| LDl/HDL Ratio |       |                |
+---------------+-------+----------------+
| CHOL/HDL      | 3.3   | 4.97           |
+---------------+-------+----------------+
| VLDL CHOL     | 23    | 4 - 40 mg/dL   |
+---------------+-------+----------------+
| NON HDL CHOL  | 91    | 130            |
+---------------+-------+----------------+
 
 
 
+----------+------------------------------------------------------------------+
| Specimen | Performing Laboratory                                            |
+----------+------------------------------------------------------------------+
| Blood    |   [facility]  [address] [place] [address] [facility] [address]  [place], [place]  |
|          | [id number]                                                            |
+----------+------------------------------------------------------------------+
 Comprehensive metabolic panel (2018  7:42 AM)
 
+----------------+-------+-------------------+
| Component      | Value | Ref Range         |
+----------------+-------+-------------------+
| GLUCOSE        | 99    | 70 - 100 mg/dL    |
+----------------+-------+-------------------+
| BUN            | 18    | 6 - 23 mg/dL      |
+----------------+-------+-------------------+
| CREATININE     | 0.88  | 0.70 - 1.33 mg/dL |
+----------------+-------+-------------------+
| BUN/CREAT      | 20.5  | 6.0 - 28.6        |
+----------------+-------+-------------------+
| CALCIUM        | 9.7   | 8.4 - 10.2 mg/dL  |
+----------------+-------+-------------------+
| TOTAL PROTEIN  | 6.6   | 6.0 - 8.0 g/dL    |
+----------------+-------+-------------------+
| Albumin        | 4.4   | 3.5 - 5.0         |
+----------------+-------+-------------------+
| GLOBULIN       | 2.2   | 1.8 - 3.5         |
+----------------+-------+-------------------+
| A/G            | 2.0   | 1.0 - 2.4         |
+----------------+-------+-------------------+
| TBIL           | 0.5   | 0.0 - 1.2 mg/dL   |
+----------------+-------+-------------------+
| ALK PHOS       | 59    | 32 - 120          |
 
+----------------+-------+-------------------+
| ALT            | 25    | 7 - 52 U/L        |
+----------------+-------+-------------------+
| AST            | 19    | 13 - 39 U/L       |
+----------------+-------+-------------------+
| SODIUM         | 142   | 132 - 143 mmol/L  |
+----------------+-------+-------------------+
| POTASSIUM      | 4.0   | 3.6 - 5.1 mmol/L  |
+----------------+-------+-------------------+
| CHLORIDE       | 103   | 95 - 112 mmol/L   |
+----------------+-------+-------------------+
| CO2            | 28    | 19 - 31 mmol/L    |
+----------------+-------+-------------------+
| ANION GAP AGAP | 15.0  | 7 - 21 mmol/L     |
+----------------+-------+-------------------+
| EGFR           | 90    | 60 mg/dL          |
+----------------+-------+-------------------+
 
 
 
+----------+------------------------------------------------------------------+
| Specimen | Performing Laboratory                                            |
+----------+------------------------------------------------------------------+
| Blood    |   [facility]  [address] [place]  |
|          | [id number]                                                            |
+----------+------------------------------------------------------------------+
 CBC W/Auto Diff (Reflex to Manual) (2018)
 
+-----------------+-------+--------------------+
| Component       | Value | Ref Range          |
+-----------------+-------+--------------------+
| WBC             | 9.0   | 4.5 - 11.0 10^3/mL |
+-----------------+-------+--------------------+
| RBC             | 4.79  | 4.3 - 5.7 10^6/  L |
+-----------------+-------+--------------------+
| HGB             | 14.7  | 13.5 - 18.0 g/dL   |
+-----------------+-------+--------------------+
| HCT             | 43.4  | 41 - 50 %          |
+-----------------+-------+--------------------+
| MCV             | 90.6  | 81 - 99 fL         |
+-----------------+-------+--------------------+
| MCH             | 31    | 27 - 33 pg         |
+-----------------+-------+--------------------+
| MCHC            | 34    | 30 - 36 g/dL       |
+-----------------+-------+--------------------+
| PLT             | 286   | 140 - 440 K/  L    |
+-----------------+-------+--------------------+
| RDW SD          | 13.5  | 10.5 - 15.0 %      |
+-----------------+-------+--------------------+
| MPV             |       | fL                 |
+-----------------+-------+--------------------+
| DIFF TYPE       |       |                    |
+-----------------+-------+--------------------+
| NEUTROPHILS     | 64.5  | 39 - 80 %          |
+-----------------+-------+--------------------+
| LYMPHOCYTES     | 26.4  | 24 - 44 %          |
+-----------------+-------+--------------------+
| MONOCYTES       | 7.2   | 0 - 12 %           |
+-----------------+-------+--------------------+
| EOSINOPHILS     | 0.9   | 0 - 6 %            |
 
+-----------------+-------+--------------------+
| BASOPHILS       | 1.0   | 0 - 2 %            |
+-----------------+-------+--------------------+
| NEUTROPHILS ABS |       | /  L               |
+-----------------+-------+--------------------+
| LYMPHOCYTES ABS |       | /  L               |
+-----------------+-------+--------------------+
| MONOCYTES ABS   |       | /  L               |
+-----------------+-------+--------------------+
| EOSINOPHILS ABS |       | /  L               |
+-----------------+-------+--------------------+
| BASOPHILS ABS   |       | /  L               |
+-----------------+-------+--------------------+
 
 
 
+----------+------------------------------------------------------------------+
| Specimen | Performing Laboratory                                            |
+----------+------------------------------------------------------------------+
| Blood    |   [facility]  [address] [place] [address], [place]  |
|          | [id number]                                                            |
+----------+------------------------------------------------------------------+
 EKG STANDARD 12 LEAD (2018 12:27 PM)
 
+-------------------+---------------------------------------------+-----------+
| Component         | Value                                       | Ref Range |
+-------------------+---------------------------------------------+-----------+
| Ventricular Rate  | 63                                          | BPM       |
+-------------------+---------------------------------------------+-----------+
| Atrial Rate       | 63                                          | BPM       |
+-------------------+---------------------------------------------+-----------+
| P-R Interval      | 146                                         | ms        |
+-------------------+---------------------------------------------+-----------+
| QRS Duration      | 96                                          | ms        |
+-------------------+---------------------------------------------+-----------+
| Q-T Interval      | 418                                         | ms        |
+-------------------+---------------------------------------------+-----------+
| QTC Calculation   | 427                                         | ms        |
| (Bezet)           |                                             |           |
+-------------------+---------------------------------------------+-----------+
| Calculated P Axis | 42                                          | degrees   |
+-------------------+---------------------------------------------+-----------+
| Calculated R Axis | 53                                          | degrees   |
+-------------------+---------------------------------------------+-----------+
| Calculated T Axis | 60                                          | degrees   |
+-------------------+---------------------------------------------+-----------+
| Diagnosis         | Please refer to Providers office visit note |           |
|                   |  for Providers Interpretation.Confirmed by  |           |
|                   | [facility] Read Only, PAULETTE Davis ([id number]),     |           |
|                   |  Az Galaviz ([id number]) on 2018    |           |
|                   | 4:56:19 PM                                  |           |
+-------------------+---------------------------------------------+-----------+
 
 
 
+----------+-------------------------------------------------+
| Specimen | Performing Laboratory                           |
+----------+-------------------------------------------------+
|          |   [facility]  [address] [address] |
+----------+-------------------------------------------------+
 
 from Last 3 Months
 
 Insurance
 
 
+----------+--------+-------------+------+-------+-----------------+
| Payer    | Benefi | Subscriber  | Type | Phone | Address         |
|          | t Plan | ID          |      |       |                 |
|          |  /     |             |      |       |                 |
|          | Group  |             |      |       |                 |
+----------+--------+-------------+------+-------+-----------------+
| MEDICAID | [place] | xxxxxxxx    |      |       |   [address]   |
|          | [address]      |             |      |       | [place], [place]     |
|          | [place] |             |      |       | [id number]      |
|          |  [employer]   |             |      |       |                 |
+----------+--------+-------------+------+-------+-----------------+
 
 
 
+-----------------+--------+-------------+--------+-------------+----------------------+
| Guarantor Name  | [employer] | Relation to | Date   | Phone       | Billing Address      |
|                 | t Type |  Patient    | of     |             |                      |
|                 |        |             | Birth  |             |                      |
+-----------------+--------+-------------+--------+-------------+----------------------+
| JAROD ARBOLEDA | Person | Self        | / |   Home:     |   [address]  |
|                 | [address] |             | 1960   | [phone] | [phone]  [place]   |
|                 | [place]    |             |        | [phone]        | [phone]                |
+-----------------+--------+-------------+--------+-------------+----------------------+

## 2018-04-14 NOTE — XMS
Encounter Summary
  Created on: 2018
 
 Kacye Arboleda
 External Reference #: [id number]
 : 64
 Sex: Male
 
 Demographics
 
 
+-----------------------+-----------------------+
| Address               | [address] |
|                       | [place]  [address]  |
+-----------------------+-----------------------+
| Home Phone            | [phone]       |
+-----------------------+-----------------------+
| Preferred Language    | Unknown               |
+-----------------------+-----------------------+
| Marital Status        | Single                |
+-----------------------+-----------------------+
| Scientology Affiliation | [id number]                  |
+-----------------------+-----------------------+
| Race                  | Unknown               |
+-----------------------+-----------------------+
| Ethnic Group          | Unknown               |
+-----------------------+-----------------------+
 
 
 Author
 
 
+--------------+-----------------------+
| Author       | [facility] |
+--------------+-----------------------+
| Organization | [facility] |
+--------------+-----------------------+
| Address      | Unknown               |
+--------------+-----------------------+
| Phone        | Unavailable           |
+--------------+-----------------------+
 
 
 
 Support
 
 
+-----------------+--------------+---------------------+-----------------+
| Name            | Relationship | Address             | Phone           |
+-----------------+--------------+---------------------+-----------------+
| Tejal Champagne | ECON         | [address]              | [phone] |
|                 |              | [address], [place]   |                 |
|                 |              | [address]               |                 |
+-----------------+--------------+---------------------+-----------------+
 
 
 
 Care Team Providers
 
 
 
+-----------------------+------+-----------------+
| Care Team Member Name | Role | Phone           |
+-----------------------+------+-----------------+
| Facundo Lees  | PCP  | [phone] |
+-----------------------+------+-----------------+
 
 
 
 Reason for Visit
 
 
+--------+-------------------+
| Reason | Comments          |
+--------+-------------------+
| Other  | Facundo Lees NP |
+--------+-------------------+
 
 
 
 Encounter Details
 
 
+--------+-------------+----------------------+----------------------+---------------+
| Date   | Type        | Department           | Care Team            | Description   |
+--------+-------------+----------------------+----------------------+---------------+
| / | Documentati [facility]          |   Sherron Almanzar,  | Other (Fac[facility]   |
| 2018   | on Only     | [facility]  | CMA                  | Yoana, NP) |
|        |             |  [address]    |                      |               |
|        |             | [place]         |                      |               |
|        |             | [address]           |                      |               |
|        |             | [phone]         |                      |               |
+--------+-------------+----------------------+----------------------+---------------+
 
 
 
 Social History
 
 
+---------------+-------+-----------+--------+------------------+
| Tobacco Use   | Types | Packs/Day | Years  | Date             |
|               |       |           | Used   |                  |
+---------------+-------+-----------+--------+------------------+
| Former Smoker |       | 3         | 37     | Quit: 10/05/2015 |
+---------------+-------+-----------+--------+------------------+
 
 
 
+---------------------+---+---+----------+
| Smokeless Tobacco:  |   |   | Quit:    |
| Former User         |   |   | 10/05/20 |
|                     |   |   | 15       |
+---------------------+---+---+----------+
 
 
 
+-------------+-----------+---------+---------------------------+
| Alcohol Use | Drinks/We | oz/Week | Comments                  |
|             | ek        |         |                           |
 
+-------------+-----------+---------+---------------------------+
| No          |   0       | 0.0     | heavy drinker in past hx. |
|             | Standard  |         |                           |
|             | drinks or |         |                           |
|             |           |         |                           |
|             | equivalen |         |                           |
|             | t         |         |                           |
+-------------+-----------+---------+---------------------------+
 
 
 
+------------------+---------------+
| Sex Assigned at  | Date Recorded |
| Birth            |               |
+------------------+---------------+
| Not on file      |               |
+------------------+---------------+
 as of this encounter
 
 Plan of Treatment
 
 
+--------+---------+-------------+----------------------+-------------+
| Date   | Type    | Specialty   | Care Team            | Description |
+--------+---------+-------------+----------------------+-------------+
| / | Office  | Pulmonology |   Jhonatan Louie MD  |             |
| 2018   | Visit   |             |  [address]    |             |
|        |         |             | [place] [address]   |             |
|        |         |             | [phone]         |             |
|        |         |             | [phone] (Fax)   |             |
+--------+---------+-------------+----------------------+-------------+
 as of this encounter
 
 Visit Diagnoses
 Not on filein this encounter

## 2018-04-14 NOTE — NUR
PATIENT GIVEN EYE MASK AND EAR PLUGS TO ASSIST WITH SLEEP. PATIENT DENIES
FURTHER NEEDS. CALL LIGHT WITHIN REACH.

## 2018-04-14 NOTE — XMS
Encounter Summary
  Created on: 2018
 
 Kayce Arboleda
 External Reference #: [id number]
 : 64
 Sex: Male
 
 Demographics
 
 
+-----------------------+-----------------------+
| Address               | [address] |
|                       | [place]  [address]  |
+-----------------------+-----------------------+
| Home Phone            | [phone]       |
+-----------------------+-----------------------+
| Preferred Language    | Unknown               |
+-----------------------+-----------------------+
| Marital Status        | Single                |
+-----------------------+-----------------------+
| Protestant Affiliation | 1013                  |
+-----------------------+-----------------------+
| Race                  | Unknown               |
+-----------------------+-----------------------+
| Ethnic Group          | Unknown               |
+-----------------------+-----------------------+
 
 
 Author
 
 
+--------------+-----------------------+
| Author       | [facility] |
+--------------+-----------------------+
| Organization | [facility] |
+--------------+-----------------------+
| Address      | Unknown               |
+--------------+-----------------------+
| Phone        | Unavailable           |
+--------------+-----------------------+
 
 
 
 Support
 
 
+-----------------+--------------+---------------------+-----------------+
| Name            | Relationship | Address             | Phone           |
+-----------------+--------------+---------------------+-----------------+
| Tejal Champagne | ECON         | [address]              | [phone] |
|                 |              | [address], [place]   |                 |
|                 |              | [address]               |                 |
+-----------------+--------------+---------------------+-----------------+
 
 
 
 Care Team Providers
 
 
 
+-----------------------+------+-----------------+
| Care Team Member Name | Role | Phone           |
+-----------------------+------+-----------------+
| Facundo Lees  | PCP  | [phone] |
+-----------------------+------+-----------------+
 
 
 
 Reason for Visit
 
 
+--------+----------------+
| Reason | Comments       |
+--------+----------------+
| Other  | [facility] |
+--------+----------------+
 
 
 
 Encounter Details
 
 
+--------+-------------+----------------------+----------------------+-------------------+
| Date   | Type        | Department           | Care Team            | Description       |
+--------+-------------+----------------------+----------------------+-------------------+
| / | Documentati |   [facility]          |   Josseline Kwok,  | Other ([facility]  |
| 2018   | on Only     | [facility]  | MA                   | Labs)             |
|        |             |  [address]    |                      |                   |
|        |             | [place]         |                      |                   |
|        |             | [address]           |                      |                   |
|        |             | [phone]         |                      |                   |
+--------+-------------+----------------------+----------------------+-------------------+
 
 
 
 Social History
 
 
+---------------+-------+-----------+--------+------------------+
| Tobacco Use   | Types | Packs/Day | Years  | Date             |
|               |       |           | Used   |                  |
+---------------+-------+-----------+--------+------------------+
| Former Smoker |       | 3         | 37     | Quit: 10/05/2015 |
+---------------+-------+-----------+--------+------------------+
 
 
 
+---------------------+---+---+----------+
| Smokeless Tobacco:  |   |   | Quit:    |
| Former User         |   |   | 10/05/20 |
|                     |   |   | 17       |
+---------------------+---+---+----------+
 
 
 
+-------------+-----------+---------+---------------------------+
| Alcohol Use | Drinks/We | oz/Week | Comments                  |
|             | ek        |         |                           |
 
+-------------+-----------+---------+---------------------------+
| No          |   0       | 0.0     | heavy drinker in past hx. |
|             | Standard  |         |                           |
|             | drinks or |         |                           |
|             |           |         |                           |
|             | equivalen |         |                           |
|             | t         |         |                           |
+-------------+-----------+---------+---------------------------+
 
 
 
+------------------+---------------+
| Sex Assigned at  | Date Recorded |
| Birth            |               |
+------------------+---------------+
| Not on file      |               |
+------------------+---------------+
 as of this encounter
 
 Plan of Treatment
 
 
+--------+---------+-------------+----------------------+-------------+
| Date   | Type    | Specialty   | Care Team            | Description |
+--------+---------+-------------+----------------------+-------------+
| / | Office  | Pulmonology |   Jhonatan Louie MD  |             |
| 2018   | Visit   |             |  [address]    |             |
|        |         |             | [place] [address]   |             |
|        |         |             | [phone]         |             |
|        |         |             | [phone] (Fax)   |             |
+--------+---------+-------------+----------------------+-------------+
 as of this encounter
 
 Results
 Lipid panel (2018  7:42 AM)
 
+---------------+-------+----------------+
| Component     | Value | Ref Range      |
+---------------+-------+----------------+
| CHOLESTEROL   | 131   | 200 mg/dL      |
+---------------+-------+----------------+
| TRIGLYCERIDES | 116   | 30 - 150 mg/dL |
+---------------+-------+----------------+
| HDL CHOL      | 40.3  | 40 mg/dl       |
+---------------+-------+----------------+
| LDL CALC      | 68    | 100 mg/dL      |
+---------------+-------+----------------+
| LDl/HDL Ratio |       |                |
+---------------+-------+----------------+
| CHOL/HDL      | 3.3   | 4.97           |
+---------------+-------+----------------+
| VLDL CHOL     | 23    | 4 - 40 mg/dL   |
+---------------+-------+----------------+
| NON HDL CHOL  | 91    | 130            |
+---------------+-------+----------------+
 
 
 
+----------+------------------------------------------------------------------+
| Specimen | Performing Laboratory                                            |
 
+----------+------------------------------------------------------------------+
| Blood    |   [facility]  [place]  |
|          | [address]                                                            |
+----------+------------------------------------------------------------------+
 Comprehensive metabolic panel (2018  7:42 AM)
 
+----------------+-------+-------------------+
| Component      | Value | Ref Range         |
+----------------+-------+-------------------+
| GLUCOSE        | 99    | 70 - 100 mg/dL    |
+----------------+-------+-------------------+
| BUN            | 18    | 6 - 23 mg/dL      |
+----------------+-------+-------------------+
| CREATININE     | 0.88  | 0.70 - 1.33 mg/dL |
+----------------+-------+-------------------+
| BUN/CREAT      | 20.5  | 6.0 - 28.6        |
+----------------+-------+-------------------+
| CALCIUM        | 9.7   | 8.4 - 10.2 mg/dL  |
+----------------+-------+-------------------+
| TOTAL PROTEIN  | 6.6   | 6.0 - 8.0 g/dL    |
+----------------+-------+-------------------+
| Albumin        | 4.4   | 3.5 - 5.0         |
+----------------+-------+-------------------+
| GLOBULIN       | 2.2   | 1.8 - 3.5         |
+----------------+-------+-------------------+
| A/G            | 2.0   | 1.0 - 2.4         |
+----------------+-------+-------------------+
| TBIL           | 0.5   | 0.0 - 1.2 mg/dL   |
+----------------+-------+-------------------+
| ALK PHOS       | 59    | 32 - 120          |
+----------------+-------+-------------------+
| ALT            | 25    | 7 - 52 U/L        |
+----------------+-------+-------------------+
| AST            | 19    | 13 - 39 U/L       |
+----------------+-------+-------------------+
| SODIUM         | 142   | 132 - 143 mmol/L  |
+----------------+-------+-------------------+
| POTASSIUM      | 4.0   | 3.6 - 5.1 mmol/L  |
+----------------+-------+-------------------+
| CHLORIDE       | 103   | 95 - 112 mmol/L   |
+----------------+-------+-------------------+
| CO2            | 28    | 19 - 31 mmol/L    |
+----------------+-------+-------------------+
| ANION GAP AGAP | 15.0  | 7 - 21 mmol/L     |
+----------------+-------+-------------------+
| EGFR           | 90    | 60 mg/dL          |
+----------------+-------+-------------------+
 
 
 
+----------+------------------------------------------------------------------+
| Specimen | Performing Laboratory                                            |
+----------+------------------------------------------------------------------+
| Blood    |   [facility]  [address] [place] [address], [facility] [address]  [place], [place]  |
|          | [address]                                                            |
+----------+------------------------------------------------------------------+
 Transferrin (2018  7:42 AM)
 
+-------------+--------+-----------+
| Component   | Value  | Ref Range |
 
+-------------+--------+-----------+
| TRANSFERRIN | 245.11 | 180 - 329 |
+-------------+--------+-----------+
 
 
 
+----------+------------------------------------------------------------------+
| Specimen | Performing Laboratory                                            |
+----------+------------------------------------------------------------------+
| Blood    |   [facility]  [address] [place] [address] [place]  |
|          | [address]                                                            |
+----------+------------------------------------------------------------------+
 Ferritin (2018  7:42 AM)
 
+-----------+-------+----------------+
| Component | Value | Ref Range      |
+-----------+-------+----------------+
| FERRITIN  | 162.8 | 30 - 400 ng/mL |
+-----------+-------+----------------+
 
 
 
+----------+------------------------------------------------------------------+
| Specimen | Performing Laboratory                                            |
+----------+------------------------------------------------------------------+
| Blood    |   [facility]  [address]  |
|          | [address]                                                            |
+----------+------------------------------------------------------------------+
 Iron panel (2018  7:42 AM)
 
+------------+-------+-----------+
| Component  | Value | Ref Range |
+------------+-------+-----------+
| IRON       | 82.66 | 37 - 160  |
+------------+-------+-----------+
| IRON % SAT | 24.1  | 20 - 55   |
+------------+-------+-----------+
| TIBC       | 343   | 245 - 400 |
+------------+-------+-----------+
 
 
 
+----------+------------------------------------------------------------------+
| Specimen | Performing Laboratory                                            |
+----------+------------------------------------------------------------------+
| Blood    |   [facility]  [address] [facility] [address]  [place], [place]  |
|          | [address]                                                            |
+----------+------------------------------------------------------------------+
 CBC W/Auto Diff (Reflex to Manual) (2018)
 
+-----------------+-------+--------------------+
| Component       | Value | Ref Range          |
+-----------------+-------+--------------------+
| WBC             | 9.0   | 4.5 - 11.0 10^3/mL |
+-----------------+-------+--------------------+
| RBC             | 4.79  | 4.3 - 5.7 10^6/  L |
+-----------------+-------+--------------------+
| HGB             | 14.7  | 13.5 - 18.0 g/dL   |
+-----------------+-------+--------------------+
| HCT             | 43.4  | 41 - 50 %          |
 
+-----------------+-------+--------------------+
| MCV             | 90.6  | 81 - 99 fL         |
+-----------------+-------+--------------------+
| MCH             | 31    | 27 - 33 pg         |
+-----------------+-------+--------------------+
| MCHC            | 34    | 30 - 36 g/dL       |
+-----------------+-------+--------------------+
| PLT             | 286   | 140 - 440 K/  L    |
+-----------------+-------+--------------------+
| RDW SD          | 13.5  | 10.5 - 15.0 %      |
+-----------------+-------+--------------------+
| MPV             |       | fL                 |
+-----------------+-------+--------------------+
| DIFF TYPE       |       |                    |
+-----------------+-------+--------------------+
| NEUTROPHILS     | 64.5  | 39 - 80 %          |
+-----------------+-------+--------------------+
| LYMPHOCYTES     | 26.4  | 24 - 44 %          |
+-----------------+-------+--------------------+
| MONOCYTES       | 7.2   | 0 - 12 %           |
+-----------------+-------+--------------------+
| EOSINOPHILS     | 0.9   | 0 - 6 %            |
+-----------------+-------+--------------------+
| BASOPHILS       | 1.0   | 0 - 2 %            |
+-----------------+-------+--------------------+
| NEUTROPHILS ABS |       | /  L               |
+-----------------+-------+--------------------+
| LYMPHOCYTES ABS |       | /  L               |
+-----------------+-------+--------------------+
| MONOCYTES ABS   |       | /  L               |
+-----------------+-------+--------------------+
| EOSINOPHILS ABS |       | /  L               |
+-----------------+-------+--------------------+
| BASOPHILS ABS   |       | /  L               |
+-----------------+-------+--------------------+
 
 
 
+----------+------------------------------------------------------------------+
| Specimen | Performing Laboratory                                            |
+----------+------------------------------------------------------------------+
| Blood    |   [facility]  [address] [place]  |
|          | [address]                                                            |
+----------+------------------------------------------------------------------+
 in this encounter
 
 Visit Diagnoses
 Not on filein this encounter

## 2018-04-14 NOTE — NUR
PT ADMITTED AT 1550 FOR CELLULITIS TO RIGHT LEG. PT HAD RIGHT HIP SURGERY ON
4/10. PT ON ROOM AIR. PT WITH PAIN 6/10, GIVEN 10 MG PO OXYCODONE. PT WITH
REDNESS FROM HIP TO MID CALF, WARM TO THE TOUCH, GENERALIZED SWELLING. PT WITH
SURGICAL INCISON, SURGICAL TAPE IN PLACE, EDGES WELL APPROXIMATED. PT UP WITH
1PA WITH FWW. VOIDING QS.

## 2018-04-14 NOTE — XMS
Encounter Summary
  Created on: 2018
 
 Kayce Arboleda
 External Reference #: [id number]
 : 64
 Sex: Male
 
 Demographics
 
 
+-----------------------+-----------------------+
| Address               | [address] |
|                       | [place]  [address]  |
+-----------------------+-----------------------+
| Home Phone            | [phone]       |
+-----------------------+-----------------------+
| Preferred Language    | Unknown               |
+-----------------------+-----------------------+
| Marital Status        | Single                |
+-----------------------+-----------------------+
| Advent Affiliation | 1013                  |
+-----------------------+-----------------------+
| Race                  | Unknown               |
+-----------------------+-----------------------+
| Ethnic Group          | Unknown               |
+-----------------------+-----------------------+
 
 
 Author
 
 
+--------------+-----------------------+
| Author       | [facility] |
+--------------+-----------------------+
| Organization | [facility] |
+--------------+-----------------------+
| Address      | Unknown               |
+--------------+-----------------------+
| Phone        | Unavailable           |
+--------------+-----------------------+
 
 
 
 Support
 
 
+-----------------+--------------+---------------------+-----------------+
| Name            | Relationship | Address             | Phone           |
+-----------------+--------------+---------------------+-----------------+
| Tejal Champagne | ECON         | [address]              | [phone] |
|                 |              | [address], [place]   |                 |
|                 |              | [address]               |                 |
+-----------------+--------------+---------------------+-----------------+
 
 
 
 Care Team Providers
 
 
 
+-----------------------+------+-----------------+
| Care Team Member Name | Role | Phone           |
+-----------------------+------+-----------------+
| Facundo Lees  | PCP  | [phone] |
+-----------------------+------+-----------------+
 
 
 
 Encounter Details
 
 
+--------+-----------+---------------------+--------------------+-------------+
| Date   | Type      | Department          | Care Team          | Description |
+--------+-----------+---------------------+--------------------+-------------+
| / | Telephone |   [facility]     |   Lupe Nolan,  |             |
|    |           | Pulmonology  [address]   | CMA                |             |
|        |           | [address]   |                    |             |
|        |           | [place]        |                    |             |
|        |           | [address]          |                    |             |
|        |           | [phone]        |                    |             |
+--------+-----------+---------------------+--------------------+-------------+
 
 
 
 Social History
 
 
+---------------+-------+-----------+--------+------------------+
| Tobacco Use   | Types | Packs/Day | Years  | Date             |
|               |       |           | Used   |                  |
+---------------+-------+-----------+--------+------------------+
| Former Smoker |       | 3         | 37     | Quit: 10/05/2015 |
+---------------+-------+-----------+--------+------------------+
 
 
 
+---------------------+---+---+----------+
| Smokeless Tobacco:  |   |   | Quit:    |
| Former User         |   |   | 10/05/20 |
|                     |   |   | 17       |
+---------------------+---+---+----------+
 
 
 
+-------------+-----------+---------+---------------------------+
| Alcohol Use | Drinks/We | oz/Week | Comments                  |
|             | ek        |         |                           |
+-------------+-----------+---------+---------------------------+
| No          |   0       | 0.0     | heavy drinker in past hx. |
|             | Standard  |         |                           |
|             | drinks or |         |                           |
|             |           |         |                           |
|             | equivalen |         |                           |
|             | t         |         |                           |
+-------------+-----------+---------+---------------------------+
 
 
 
 
+------------------+---------------+
| Sex Assigned at  | Date Recorded |
| Birth            |               |
+------------------+---------------+
| Not on file      |               |
+------------------+---------------+
 as of this encounter
 
 Plan of Treatment
 
 
+--------+---------+-------------+----------------------+-------------+
| Date   | Type    | Specialty   | Care Team            | Description |
+--------+---------+-------------+----------------------+-------------+
| / | Office  | Pulmonology |   Jhonatan Louie MD  |             |
| 2018   | Visit   |             |  [address]    |             |
|        |         |             | [place] [address]   |             |
|        |         |             | [phone]         |             |
|        |         |             | [phone] (Fax)   |             |
+--------+---------+-------------+----------------------+-------------+
 as of this encounter
 
 Visit Diagnoses
 Not on filein this encounter

## 2018-04-14 NOTE — XMS
Encounter Summary
  Created on: 2018
 
 Kayce Arboleda
 External Reference #: [id number]
 : 64
 Sex: Male
 
 Demographics
 
 
+-----------------------+-----------------------+
| Address               | [address] |
|                       | [place]  [address]  |
+-----------------------+-----------------------+
| Home Phone            | [phone]       |
+-----------------------+-----------------------+
| Preferred Language    | Unknown               |
+-----------------------+-----------------------+
| Marital Status        | Single                |
+-----------------------+-----------------------+
| Latter-day Affiliation | [id number]                  |
+-----------------------+-----------------------+
| Race                  | Unknown               |
+-----------------------+-----------------------+
| Ethnic Group          | Unknown               |
+-----------------------+-----------------------+
 
 
 Author
 
 
+--------------+-----------------------+
| Author       | [facility] |
+--------------+-----------------------+
| Organization | [facility] |
+--------------+-----------------------+
| Address      | Unknown               |
+--------------+-----------------------+
| Phone        | Unavailable           |
+--------------+-----------------------+
 
 
 
 Support
 
 
+-----------------+--------------+---------------------+-----------------+
| Name            | Relationship | Address             | Phone           |
+-----------------+--------------+---------------------+-----------------+
| Tejal Champagne | ECON         | [address]              | [phone] |
|                 |              | [address], [place]   |                 |
|                 |              | [address]               |                 |
+-----------------+--------------+---------------------+-----------------+
 
 
 
 Care Team Providers
 
 
 
+-----------------------+------+-----------------+
| Care Team Member Name | Role | Phone           |
+-----------------------+------+-----------------+
| Facundo Lees FNP  | PCP  | [phone] |
+-----------------------+------+-----------------+
 
 
 
 Reason for Referral
 Consultation (Routine)
 
+------------+--------------+------------+--------------+--------------+---------------+
| Status     | Reason       | Specialty  | Diagnoses /  | Referred By  | Referred To   |
|            |              |            | Procedures   | Contact      | Contact       |
+------------+--------------+------------+--------------+--------------+---------------+
| Authorized |   Specialty  | Pulmonary  |   Diagnoses  |   Trace, |   Francisco Javier, Chi    |
|            | Services     | Disease    |  H/O syncope |  Janette Johnson  | [facility]   |
|            | Required     |            |   Essential  | ARNP  [address]   | Sleep         |
|            |              |            | hypertension | [facility]  | Disorders     |
|            |              |            |  with goal   | [facility]ie [facility]        | [facility]   |
|            |              |            | blood        | [place], [place] | HOSPITAL      |
|            |              |            | pressure     |  [address]       | [address]       |
|            |              |            | less than    | Phone:       | [address]   |
|            |              |            | 130/80       | [phone] | [place], [place] |
|            |              |            | Chronic      |   Fax:       |  [phone]        |
|            |              |            | obstructive  | [phone] | Phone:        |
|            |              |            | pulmonary    |              | [phone]  |
|            |              |            | disease,     |              |  Fax:         |
|            |              |            | unspecified  |              | [phone]  |
|            |              |            | COPD type    |              |               |
|            |              |            | ([facility])        |              |               |
|            |              |            | Obstructive  |              |               |
|            |              |            | sleep apnea  |              |               |
|            |              |            | syndrome     |              |               |
|            |              |            | Former heavy |              |               |
|            |              |            |  tobacco     |              |               |
|            |              |            | smoker       |              |               |
+------------+--------------+------------+--------------+--------------+---------------+
 
 
 
 
 Reason for Visit
 
 
+-----------+----------+
| Reason    | Comments |
+-----------+----------+
| Follow-up |          |
+-----------+----------+
 Routine Exam (Routine)
 
+------------+--------+---------------+--------------+--------------+---------------+
| Status     | Reason | Specialty     | Diagnoses /  | Referred By  | Referred To   |
|            |        |               | Procedures   | Contact      | Contact       |
+------------+--------+---------------+--------------+--------------+---------------+
| Authorized |        | Nurse         |   Diagnoses  |   [place], |   Trace,  |
|            |        | Practitioner  |  Essential   |  Janette Johnson,  | Janette Johnson,    |
 
|            |        | / Cardiology  | (primary)    | ARNP  [address]   | ARNP  [address]    |
|            |        |               | hypertension | [facility]  | [facility]   |
|            |        |               |   f/u        | [address]        | Louie [address]         |
|            |        |               | annual-Peter  | [place], [place] | [place], [place]  |
|            |        |               | transfer     |  [address]       | [address]  Phone: |
|            |        |               | Procedures   | Phone:       |  [phone] |
|            |        |               | AL OFFICE    | [phone] |   Fax:        |
|            |        |               | OUTPATIENT   |   Fax:       | [phone]  |
|            |        |               | VISIT LEVEL  | [phone] |               |
|            |        |               | 1  AL OFFICE |              |               |
|            |        |               |  OUTPATIENT  |              |               |
|            |        |               | VISIT LEVEL  |              |               |
|            |        |               | 2  AL OFFICE |              |               |
|            |        |               |  OUTPATIENT  |              |               |
|            |        |               | VISIT LEVEL  |              |               |
|            |        |               | 3  AL OFFICE |              |               |
|            |        |               |  OUTPATIENT  |              |               |
|            |        |               | VISIT LEVEL  |              |               |
|            |        |               | 4  AL OFFICE |              |               |
|            |        |               |  OUTPATIENT  |              |               |
|            |        |               | VISIT 40     |              |               |
|            |        |               | MINUTES  CRD |              |               |
|            |        |               |  ANNUAL      |              |               |
|            |        |               | FOLLOW UP    |              |               |
+------------+--------+---------------+--------------+--------------+---------------+
 
 
 
 
 Encounter Details
 
 
+--------+---------+----------------------+----------------------+----------------------+
| Date   | Type    | Department           | Care Team            | Description          |
+--------+---------+----------------------+----------------------+----------------------+
| / | Office  |   [facility]          |   Janette Woodward    | H/O syncope (Primary |
| 2018   | Visit   | Cardiology [place] | DIETER [facility]  [address]     |  Dx); Essential      |
|        |         |   [address]    | [address]    | hypertension with    |
|        |         | Way [address]        | [place] [address]   | goal blood pressure  |
|        |         | [place] [address]  | [phone]         | less than 130/80;    |
|        |         |  [phone]        | [phone] (Fax)   | Hyperlipidemia,      |
|        |         |                      |                      | unspecified          |
|        |         |                      |                      | hyperlipidemia type; |
|        |         |                      |                      |  Chronic obstructive |
|        |         |                      |                      |  pulmonary disease,  |
|        |         |                      |                      | unspecified COPD     |
|        |         |                      |                      | type (HCC);          |
|        |         |                      |                      | Thoracoabdominal     |
|        |         |                      |                      | aortic aneurysm,     |
|        |         |                      |                      | without rupture      |
|        |         |                      |                      | (HCC); Obstructive   |
|        |         |                      |                      | sleep apnea          |
|        |         |                      |                      | syndrome; Former     |
|        |         |                      |                      | heavy tobacco        |
|        |         |                      |                      | smoker; History of   |
|        |         |                      |                      | anemia; Murmur,      |
|        |         |                      |                      | cardiac              |
+--------+---------+----------------------+----------------------+----------------------+
 
 
 
 
 Social History
 
 
+---------------+-------+-----------+--------+------------------+
| Tobacco Use   | Types | Packs/Day | Years  | Date             |
|               |       |           | Used   |                  |
+---------------+-------+-----------+--------+------------------+
| Former Smoker |       | 3         | 35     | Quit: 10/05/2015 |
+---------------+-------+-----------+--------+------------------+
 
 
 
+---------------------+---+---+----------+
| Smokeless Tobacco:  |   |   | Quit:    |
| Former User         |   |   | 10/05/20 |
|                     |   |   | 15       |
+---------------------+---+---+----------+
 
 
 
+-------------+-----------+---------+---------------------------+
| Alcohol Use | Drinks/We | oz/Week | Comments                  |
|             | ek        |         |                           |
+-------------+-----------+---------+---------------------------+
| No          |   0       | 0.0     | heavy drinker in past hx. |
|             | Standard  |         |                           |
|             | drinks or |         |                           |
|             |           |         |                           |
|             | equivalen |         |                           |
|             | t         |         |                           |
+-------------+-----------+---------+---------------------------+
 
 
 
+------------------+---------------+
| Sex Assigned at  | Date Recorded |
| Birth            |               |
+------------------+---------------+
| Not on file      |               |
+------------------+---------------+
 as of this encounter
 
 Last Filed Vital Signs
 
 
+-------------------+----------------------+-------------------------+
| Vital Sign        | Reading              | Time Taken              |
+-------------------+----------------------+-------------------------+
| Blood Pressure    | 116/62               | 2018 12:18 PM PST |
+-------------------+----------------------+-------------------------+
| Pulse             | 64                   | 2018 12:18 PM PST |
+-------------------+----------------------+-------------------------+
| Temperature       | -                    | -                       |
+-------------------+----------------------+-------------------------+
| Respiratory Rate  | -                    | -                       |
+-------------------+----------------------+-------------------------+
| Oxygen Saturation | 96%                  | 2018 12:18 PM PST |
+-------------------+----------------------+-------------------------+
| Inhaled Oxygen    | -                    | -                       |
 
| Concentration     |                      |                         |
+-------------------+----------------------+-------------------------+
| Weight            | 108.1 kg (238 lb 6.4 | 2018 12:18 PM PST |
|                   |  oz)                 |                         |
+-------------------+----------------------+-------------------------+
| Height            | 177.8 cm (5' 10")    | 2018 12:18 PM PST |
+-------------------+----------------------+-------------------------+
| Body Mass Index   | 34.21                | 2018 12:18 PM PST |
+-------------------+----------------------+-------------------------+
 in this encounter
 
 Instructions
 Patient Instructions - Janette Woodward ARNP - 2018 12:30 PM PSTI have ordered y
ou fasting labs to be done at  [facility]   and also ordered you an Echo to be done at [facility]
 Take Vit b12 again
 I have ordered refill on Atorvastatin and metoprolol for one year
 Consider using light compression knee high socks, look like soccer socks to help with varic
ose veins
 I have referred you to Sleep lab at [facility]  for sleep apnea evaluation 
 See me back in 2 months 
 
 in this encounter
 
 Progress Notes
 Janette Woodward ARNP - 2018 12:30 PM PSTFormatting of this note may be differen
t from the original.
 Date of visit: 2018
 Primary Care Physician: FACUNDO LEES
 
 CHIEF COMPLAINT:  
 Chief Complaint 
 Patient presents with 
   Follow-up 
 
 HISTORY OF PRESENT ILLNESS:
   Mr. Kayce ArboledaKemal, is a 57-year-old man who is here today as overdue for annual foll
ow-up.
  He is a previous patient of Dr. Torrey Peter, now retired and last seen by him in 2016.  
  He has a previous history of syncope though workup was benign including a 32 day cardiac e
vent monitor and was last seen he had no further episodes of syncope, hypertension, hyperlip
idemia, COPD which is followed by Dr. Louie.
  I reviewed Dr. Peter's last note, as well as the last note of Dr. Louie on 2017 wh
[facility] documents that his COPD has been well-controlled on current medications, his previous PF
T done in 2017 had shown mild obstructive disease with bronchodilator response, severe
 restrictive disease with parenchymal disease, and mild diffusion defect, though  diffusion 
not corrected for hemoglobin. 
  EKG done in the office today, 2018 shows normal sinus rhythm at 63 bpm and very simila
r to EKG done in May 2016
  Last labs I have from 2017 shows lipids is well-controlled and a normal CMP except f
or mild elevation of glucose to 104 and good renal function with GFR of 99 and normal liver 
enzymes and CBC also normal.
   He denies any chest pain, palpitations, dyspnea, dizziness,or syncope. He also denies any
 signs or symptoms of stroke or TIA, or any illness, surgery, or hospitalization since last 
seen. 
   He reports most of his previous problems related to his heavy alcohol use, and his been g
reatly improved since he has been sober for 2 years.
  He also reports he stopped using his CPAP about a year ago, as he was always struggling wi
th it, and he thought his sleep apnea was mostly related to his heavy alcohol use, and now t
 
hat he had stopped drinking, he did not need it anymore, and thinks he has been sleeping wel
l without it.
   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 
to perform maintenance and repairs, and reports he tolerates even heavy physical activity wi
thout any chest pain or dyspnea. 
   He has been sober from alcohol or drugs for over 2 years, but does drink up to 12 cups of
 coffee , but drinks half decaffeinated, and also mixes with hot chocolate
 
 REVIEW OF SYSTEMS:
 Negative except for pertinent items noted in HPI.
 
 Constitutional: mild  fatigue or unexplained weight loss.  Appetite is good.  Weight is sta
ble.    Denies night sweats fevers or chills
 HENT: Denies nosebleeds.   Positive for hearing loss .  Denies dysphagia
 Eyes: History of eye surgery orbit blowout ? Denies visual disturbance or double vision.  
 Respiratory/Sleep:: Positive for COPD (Dr. Louie), sleep apnea on CPAP.  Denies cough and s
hortness of breath.  Denies hemoptysis or excessive sputum production. Denies snoring, ortho
pnea, PND. 
 Cardiovascular: Denies  chest pain, palpitations and leg swelling. Denies history of rheuma
tic fever. Denies claudication . 
 Gastrointestinal:history of gastroesophageal reflux disease, on PPI  .  Denies  nausea, vom
iting, abdominal pain and blood in stool. 
 Genitourinary: Denies  hematuria.  
 Musculoskeletal: Positive for joint pain and arthralgia severe right hip pain, back , shoul
ric, neck  , Denies myalgias, 
 Skin: Denies  color change.  Denies rash or lesions
 Neurological:  Numbness to legs, and arms .   Denies history of stroke/Transient ischemic a
ttack.Denies history of seizures. Denies dizziness, syncope  
 Hematological/Oncology Does not bruise/bleed easily.  Denies history of cancer
 Endocrine: Denies diabetes or thyroid disease.  Denies excessive thirst or hunger.  
 Psychiatric/Behavioral: The patient denies any history of depression or anxiety or other ps
ychiatric illness.
 Vaccines: Current on 2017 flu vaccine.  Current on pneumonia vaccine.
 Habits/Social :  history of smoking, quit in 2015.  Smoked 3 packs a day  35 years
.  Previously used smokeless tobacco quit in 2015..  Denies EtOH use for 2 years , p
revious heavy drinker. Drinks servings of  12 cups coffee/tea daily, uses 1/2 chocolate , ha
lf decaff  .  Denies recreational or illicit drug use, previously used methamphetamine  20 
years, cocaine, marijuana, crack, mushrooms.  Exercises with active job, walking,  and ronald
ates.   [facility] , 
 
 Outpatient Medications Prior to Visit 
 Medication Sig Dispense Refill 
   albuterol (PROVENTIL HFA;VENTOLIN HFA) 108 (90 BASE) MCG/ACT inhaler Inhale 2 puffs int
o the lungs every 4 (four) hours as needed for Wheezing.   
   amitriptyline (ELAVIL) 100 MG tablet Take 100 mg by mouth nightly.   
   amLODIPine (NORVASC) 5 MG tablet Take 5 mg by mouth daily.   
   ascorbic acid (VITAMIN C) 250 MG tablet Take 250 mg by mouth daily.   
   cloNIDine (CATAPRES) 0.3 MG tablet Take 0.3 mg by mouth nightly.   
   diclofenac (CATAFLAM) 50 MG tablet Take 50 mg by mouth 2 (two) times daily.   
   fluticasone-salmeterol (ADVAIR) 500-50 MCG/DOSE diskus inhaler Inhale 1 puff into the l
ungs 2 (two) times daily.   
   gabapentin (NEURONTIN) 600 MG tablet Take 300 mg by mouth 3 (three) times daily.   
   hydrochlorothiazide (HYDRODIURIL) 12.5 MG tablet Take 12.5 mg by mouth daily.   
   ibuprofen (MOTRIN) 600 MG tablet Take 600 mg by mouth every 6 (six) hours as needed for
 Pain. Trying to use sparingly   
   montelukast (SINGULAIR) 10 MG tablet Take 10 mg by mouth nightly.   
   Multiple Vitamins-Minerals (RA CENTRAL-BIBI PO) Take  by mouth daily.   
   nitroGLYCERIN (NITROSTAT) 0.4 MG SL tablet Place 1 tablet under the tongue every 5 (fiv
e) minutes as needed for Chest pain. 90 tablet 0 
   pantoprazole (PROTONIX) 40 MG tablet Take 40 mg by mouth daily.   
 
   tamsulosin (FLOMAX) 0.4 MG capsule Take 0.4 mg by mouth  After dinner.   
   thiamine (VITAMIN B-1) 100 MG tablet Take 1 tablet by mouth daily. 30 tablet 0 
   umeclidinium bromide (INCRUSE ELLIPTA) 62.5 MCG/INH inhaler Inhale 1 puff into the lung
s daily.   
   atorvastatin (LIPITOR) 40 MG tablet take 1 tablet by mouth NIGHTLY 90 tablet 3 
   metoprolol (LOPRESSOR) 25 MG tablet take 1 tablet by mouth twice a day 180 tablet 3 
   ferrous sulfate, 65 FE, 324 (65 FE) MG EC tablet Take 65 mg of iron by mouth daily with
 breakfast. With 250 mg Vit C   
   cyanocobalamin (VITAMIN B-12) 100 MCG tablet Take 100 mcg by mouth daily.   
   cyclobenzaprine (FLEXERIL) 5 MG tablet Take 5 mg by mouth 3 (three) times daily as need
ed for Muscle spasms.   
   levalbuterol (XOPENEX) 1.25 MG/0.5ML nebulizer solution Take 1 ampule by nebulization e
very 4 (four) hours as needed for Wheezing.   
 
 No facility-administered medications prior to visit.  
 
 PHYSICAL EXAM:
 Wt Readings from Last 3 Encounters: 
 18 108.1 kg (238 lb 6.4 oz) 
 17 109.8 kg (242 lb) 
 17 115.7 kg (255 lb) 
 
 Temp Readings from Last 3 Encounters: 
 17 97.6 F (36.4 C) (Oral) 
 17 97.2 F (36.2 C) (Oral) 
 17 98.6 F (37 C) (Temporal) 
 
 BP Readings from Last 3 Encounters: 
 18 116/62 
 17 127/79 
 17 104/66 
 
 Pulse Readings from Last 3 Encounters: 
 18 64 
 17 54 
 17 55 
 
 GENERAL:  Well developed, well nourished, in no distress.  Appears older than stated age.
 HEENT:  Normocephalic, atraumatic.  
 EYES:     PERRL, EOM normal.
 MOUTH: Oral mucosae moist, dentition adequate, no lesions noted
 NECK:    No JVD, lymphadenopathy, thyromegaly, bruits.  Carotid pulses are 2+ bilaterally
 LUNGS/CHEST:  Clear bilaterally, with no rales, rhonchi or wheezing noted, respirations unl
abored
 HEART:  Nondisplaced PMI, regular rate and rhythm, S1, S2 normal.  2/6 murmur RUSB, rubs or
 gallops noted.
 ABDOMEN:  Soft, nontender, no organomegaly, masses or bruits.  Bowel sounds are normal in a
ll 4 quadrants.  The abdominal aortic pulsation is not palpable.
 EXTREMITIES:  No edema. Radial pulses 2+ bilaterally.  Femoral pulses are 2+ bilaterally wi
thout bruits.  DP and PT pulses are 2+ bilaterally.  No clubbing.varicosities 
 SKIN:  Warm and dry, capillary refill is normal, no lesions.
 NEUROLOGIC:  Awake, alert and oriented x 3. No focal motor or sensory deficits. 
 PSYCHIATRIC:  Appropriate, affect appears normal
 
 DATA:
 Blood tests:
 Lab Results 
 Component Value Date 
  WBC 8.39 2016 
  RBC 4.82 2016 
 
  HGB 13.8 2016 
  HCT 41.7 2016 
   2016 
 
 Lab Results 
 Component Value Date 
   2016 
  K 3.9 2016 
   2016 
  CO2 30 2016 
  ANIONGAP 9 2016 
  GLUF 99 2016 
  BUN 17 2016 
  CREATININE 0.89 2016 
  BCR 19 2016 
  CA 9.5 2016 
  EGFR >60 2016 
 
 Lab Results 
 Component Value Date 
  CHOL 204 (H) 2016 
  TRIG 294 (H) 2016 
   (H) 2016 
  GLUF 99 2016 
  HGBA1C 5.7 2016 
 
 Lab Results 
 Component Value Date 
  TSH 4.48 2016 
 
 No results found for: METF, NMETFX, TFNMFX, KKRFAUC64GLN, GTTIIC13QRD, TOTEPI
 
 IMAGING/PROCEDURES
 Last Stress Test, 2016: Lexiscan, no ischemia detected, LVEF 63%.
 
 ECHO:
 Last Echo, 2016 ([facility]): LVEF 65-70%, trace MR, trace TR.  Ascending Aorta 41mm
, Aortic arch 37mm.
 
 OTHER: 
 PFT: 3/09/2017: Minimal obstructive airway disease, severe restriction-parenchymal, mild di
ffusion defect.  FVC 2.65 (65 percent) FEV1 2.07 (56 percent), ratio 0.78.  Significant bron
chodilator response.  TLC 4.53 L (66 percent) DLCO 21.3 (68 percent), data and tracings pers
onally reviewed by me, original interpretation Dr. Pieter CAMPO, effusion not corrected for Hg
b
 
 EKG/EVENT MONITORS
 32-Day Cardiac Event Monitor, 2016: sinus rhythm, , averaging 80bpm, rare ectopy
, no AVB/pauses, symptoms of "chest pain, SOB, dizziness, fluttering" all associated with
 NSR.
 EK2016: Normal sinus rhythm.  Rate 63 bpm,  ms, QRS 94 ms,  ms, perso
yoandy reviewed by me
 EK2018: Normal sinus rhythm.  Rate 63 bpm,  ms, QRS 96 ms, QTC 427ms, EKG tr
acing personally reviewed by me
 
 Labs:
 2017: Lipids: Cholesterol 110, triglycerides 81, HDL 35.8, LDL 58, VLDL 16, ratio 3.1
, non-HDL cholesterol 74.  CMP: Sodium 141, potassium 4, chloride 103, glucose 104, BUN 20, 
creatinine 0.81, GFR 99.  AST 21, ALT 25, alk phos 58, total bilirubin 0.6, albumin 4.4.  CB
C: WBC 9.2, hemoglobin 15, hematocrit 45.3, platelets 304, ESR 4
 
 
  ASSESSMENT & PLAN:
     He was here today as overdue for annual exam and overall seems to be very stable from a
 cardiovascular point of view with stable EKG and labs from 2017 showing lipids well c
ontrolled and fairly normal CMP with good renal function and liver enzymes and normal CBC.  
Overall he is asymptomatic for any ischemic heart disease and feels that he is doing very we
ll since he quit drinking 2 years ago.
   He is overdue for an echo to evaluate his enlarged ascending aorta and aortic arch so I h
ave ordered him one to be done at [facility].  He also has not had any lab work since  so I have ordered a CMP, lipid panel, CBC, iron panel with ferritin to evaluate anem
ia , copies to be sent to his PCP, Facundo Lees. 
   He also has stopped wearing his CPAP and uncorrected sleep apnea is a large contributor t
o cardiovascular disease and arrhythmia, so I have referred him to the [facility]
 for further evaluation of his sleep apnea.  I agree with him that it is likely his sleep ap
morris was much worse when he was drinking heavily, but he would still benefit from further natalie
luation.
  I have also suggested he try taking vitamin B12 again, ordered refills on atorvastatin and
 metoprolol for one year, and  suggested he consider using a knee-high light compression soc
ks to help with varicosities.  I also reinforced teaching on minimizing use of NSAIDs due to
 increased renal and cardiovascular risk, which he is aware of
  I will see him back in 2 months and for his cardiac meds, he should continue amlodipine 5 
mg daily, Lipitor 40 mg qhs, clonidine 0.3mg qhs hydrochlorothiazide 12.5 mg daily, and  met
oprolol tartrate 25 mg bid
   
 
 1. H/O syncope  
 2. Essential hypertension with goal blood pressure less than 130/80  
 3. Hyperlipidemia, unspecified hyperlipidemia type  
 4. Chronic obstructive pulmonary disease, unspecified COPD type (HCC)  
 5. Thoracoabdominal aortic aneurysm, without rupture (HCC)  
 6. Obstructive sleep apnea syndrome  
 7. Former heavy tobacco smoker  
 8. History of anemia  
 9. Murmur, cardiac  
 
 Orders Placed This Encounter 
 Procedures 
   Comprehensive metabolic panel 
   Lipid panel 
   CBC and differential 
   Iron panel 
   Ferritin 
   Ambulatory referral to Pulmonology 
   Electrocardiogram, 12-lead 
   Echo cardiac adult complete 
 
  
 
 The following portions of the patient's history were personally reviewed by me  and updated
 as appropriate:
 EKG tracings, other  specialty provider and PCP notes,any Hospital admission and discharge 
summaries, any ER records , current and previous cardiac testing and procedure reports and d
altaf, medication bottles brought to visit today personally reviewed by me. 
 Allergies, current medications.labs
 Family history, past medical history, past social history, past surgical history.
 Problem list.
 
 Janette WoodwardDIETER  [facility] 
 2018in this encounter
 
 
 Plan of Treatment
 
 
+--------+---------+-------------+----------------------+-------------+
| Date   | Type    | Specialty   | Care Team            | Description |
+--------+---------+-------------+----------------------+-------------+
| / | Office  | Pulmonology |   Jhonatan Louie MD  |             |
|    | Visit   |             |  [address]    |             |
|        |         |             | [place] [address]   |             |
|        |         |             | [phone]         |             |
|        |         |             | [phone] (Fax)   |             |
+--------+---------+-------------+----------------------+-------------+
 
 
 
+-------------------------------+--------+----------------------+----------------------+
| Name                          | Priori | Associated Diagnoses | Order Schedule       |
|                               | ty     |                      |                      |
+-------------------------------+--------+----------------------+----------------------+
| Comprehensive metabolic panel | Routin |   H/O syncope        | Expected:            |
|                               | e      | Hyperlipidemia,      | 2018, Expires: |
|                               |        | unspecified          |  2019          |
|                               |        | hyperlipidemia type  |                      |
|                               |        |  Chronic obstructive |                      |
|                               |        |  pulmonary disease,  |                      |
|                               |        | unspecified COPD     |                      |
|                               |        | type (HCC)           |                      |
|                               |        | Obstructive sleep    |                      |
|                               |        | apnea syndrome       |                      |
|                               |        | Former heavy tobacco |                      |
|                               |        |  smoker  History of  |                      |
|                               |        | anemia               |                      |
+-------------------------------+--------+----------------------+----------------------+
| Lipid panel                   | Routin |   Essential          | Expected:            |
|                               | e      | hypertension with    | 2018, Expires: |
|                               |        | goal blood pressure  |  2019          |
|                               |        | less than 130/80     |                      |
|                               |        | Hyperlipidemia,      |                      |
|                               |        | unspecified          |                      |
|                               |        | hyperlipidemia type  |                      |
+-------------------------------+--------+----------------------+----------------------+
| CBC and differential          | Routin |   Chronic            | Expected:            |
|                               | e      | obstructive          | 2018, Expires: |
|                               |        | pulmonary disease,   |  2019          |
|                               |        | unspecified COPD     |                      |
|                               |        | type (HCC)           |                      |
|                               |        | Obstructive sleep    |                      |
|                               |        | apnea syndrome       |                      |
|                               |        | History of anemia    |                      |
+-------------------------------+--------+----------------------+----------------------+
| Iron panel                    | Routin |   Obstructive sleep  | Expected:            |
|                               | e      | apnea syndrome       | 2018, Expires: |
|                               |        | History of anemia    |  2019          |
+-------------------------------+--------+----------------------+----------------------+
| Ferritin                      | Routin |   Obstructive sleep  | Expected:            |
|                               | e      | apnea syndrome       | 2018, Expires: |
|                               |        | History of anemia    |  2019          |
+-------------------------------+--------+----------------------+----------------------+
 
 
 
 
+-------------------------+--------+----------------------+---------------------+
| Name                    | Priori | Associated Diagnoses | Order Schedule      |
|                         | ty     |                      |                     |
+-------------------------+--------+----------------------+---------------------+
| Ambulatory referral to  | Routin |   H/O syncope        | Ordered: 2018 |
| Pulmonology             | e      | Essential            |                     |
|                         |        | hypertension with    |                     |
|                         |        | goal blood pressure  |                     |
|                         |        | less than 130/80     |                     |
|                         |        | Chronic obstructive  |                     |
|                         |        | pulmonary disease,   |                     |
|                         |        | unspecified COPD     |                     |
|                         |        | type (HCC)           |                     |
|                         |        | Obstructive sleep    |                     |
|                         |        | apnea syndrome       |                     |
|                         |        | Former heavy tobacco |                     |
|                         |        |  smoker              |                     |
+-------------------------+--------+----------------------+---------------------+
 as of this encounter
 
 Results
 ECHO outside interpretation standard (2018  3:43 PM)
 
+----------+--------------------------------------------------------+
| Specimen | Performing Laboratory                                  |
+----------+--------------------------------------------------------+
|          |   [address] [place] [address] |
+----------+--------------------------------------------------------+
 
 
 
+------------------------------------------------------------------------------------------+
| Impressions                                                                              |
+------------------------------------------------------------------------------------------+
|   1. Overall left ventricular systolic function is normal with, an EF between 60 - 65 %. |
|   2. The right ventricle is normal in size and function.  3. The aortic root, ascending  |
| aorta and aortic arch are normal. 4. No significant valvular abnormalities are noted.    |
+------------------------------------------------------------------------------------------+
 
 
 
+------------------------------------------------------------------------------------------+
| Narrative                                                                                |
+------------------------------------------------------------------------------------------+
|      Patient Name:    Kayce Arboleda  YOB: 1960                       |
| Accession:    [id number]     Performing Physician:    LOR DELGADO MD                      |
| _______________________________________________________________  INDICATIONS             |
| -----------  F/U thoracic/abdominal aneurysm     CONCLUSIONS  -----------  1. Overall    |
| left ventricular systolic function is normal with, an EF between 60 - 65 %.  2. The      |
| right ventricle is normal in size and function.  3. The aortic root, ascending aorta and |
|  aortic arch are normal. 4. No significant valvular abnormalities are noted.             |
| FINDINGS  --------  ECG rhythm: Sinus rhythm.   ECG rhythm: Resting bradycardia          |
| (HR<60bpm).  Study: A 2-dimensional transthoracic echocardiogram with m-mode, spectral   |
| and color flow Doppler was perfomed.   Study: This was a technically adequate study.     |
| Left Ventricle: Overall left ventricular systolic function is normal with, an EF between |
|  60 - 65 %.   Left Ventricle: The left ventricle cavity size is normal.   Left           |
| Ventricle: Left ventricular wall thickness is normal.   Left Ventricle: No regional wall |
|  motion abnormalities.   Left Ventricle: The diastolic filling pattern is normal for the |
|  age of the patient.  Right Ventricle: The right ventricle is normal in size and         |
 
| function.  Left Atrium: The left atrium is normal in size.  Right Atrium: The right      |
| atrium is normal in size.   Aortic Valve: Aortic valve is trileaflet and is mildly       |
| thickened.   Aortic Valve: There is no evidence of aortic regurgitation.   Aortic Valve: |
|  There is no evidence of aortic stenosis.  Mitral Valve: The mitral valve is normal.     |
| Mitral Valve: There is trace mitral regurgitation.   Mitral Valve: No evidence of MVP or |
|  mitral stenosis.  Tricuspid Valve: The tricuspid valve appears structurally normal.     |
| Tricuspid Valve: Pulmonary artery systolic pressure could not be assessed due to the     |
| absence of adequate TR jet.  Pulmonic Valve: The pulmonic valve was not well visualized. |
|   Pericardium: There is no pericardial effusion.  IVC/Hepatic Veins: The IVC is small    |
| (<1.5cm) and collapses with sniff, consistent with central venous pressures of 0-5mmHg.  |
|  Aorta: The aortic root, ascending aorta and aortic arch are normal.  Mass: No mass      |
| visualized  Thrombus: No clot visualized   Thrombus: No vegetation visualized.  Septum:  |
| No ASD observed.   Septum: No VSD observed.     MEASUREMENTS  -------------  Ao asc:     |
|  3.65 cm  Ao sinus:     3.46 cm  Ao st junct:     2.91 cm  IVC:     1.27 cm              |
| EDV(Teich):     105.93 ml  IVSd:     1.06 cm  LVIDd:     4.76 cm  LVPWd:     0.90 cm     |
| LVOT Diam:     2.31 cm  %FS:     22.34 %  EF(Teich):     44.99 %  ESV(Teich):     58.27  |
| ml  LVIDs:     3.70 cm  SV(Teich):     47.66 ml  RVIDd:     3.07 cm  Ao root:     32.72  |
| mm  LVEF MOD A2C:     56.41 %  SV MOD A2C:     46.46 ml  LVEF MOD A4C:     55.05 %  SV   |
| MOD A4C:     55.55 ml  EF Biplane:     55.87 %  LVEDV MOD BP:     92.29 ml  LVESV MOD    |
| BP:     40.72 ml  LVEDV MOD A2C:     82.36 ml  LVLd A2C:     9.15 cm  LVEDV MOD A4C:     |
|  100.89 ml  LVLd A4C:     9.46 cm  LVESV MOD A2C:     35.90 ml  LVLs A2C:     6.85 cm    |
| LVESV MOD A4C:     45.34 ml  LVLs A4C:     7.06 cm  LAESV(A-L):     54.75 ml  LAESV      |
| Index (A-L):     24.66 ml/m2  LAAs A2C:     15.85 cm2  LAESV A-L A2C:     42.05 ml  LALs |
|  A2C:     5.07 cm  LAAs A4C:     20.65 cm2  LAESV A-L A4C:     68.39 ml  LALs A4C:       |
| 5.29 cm  RAAs:     16.87 cm2  RAESV A-L:     44.78 ml  RAESV MOD:     45.69 ml           |
| RALs:     5.39 cm  TAPSE:     2.19 cm  AV maxP.05 mmHg  AV meanPG:     3.73 mmHg |
|   AV Vmax:     1.32 m/s  AV Vmean:     0.90 m/s  AV VTI:     29.89 cm  CHIRAG Vmax:         |
| 3.41 cm2  CHIRAG (VTI):     3.01 cm2  AVAI Vmax:     0.00 cm2/m2  AVAI (VTI):     0.00      |
| cm2/m2  LVOT maxP.63 mmHg  LVOT meanP.94 mmHg  LVSI Dopp:     40.60      |
| ml/m2  LVSV Dopp:     90.15 ml  LVOT Vmax:     1.07 m/s  LVOT Vmean:     0.63 m/s  LVOT  |
| VTI:     21.41 cm  MV A Librado:     0.37 m/s  MV DecT:     232.01 ms  MV E Librado:     0.58    |
| m/s  MV E/A Ratio:     1.53   Septal e':     0.08 m/s  Septal E/e':     7.16   Lateral   |
| e':     0.07 m/s  Lateral E/e':     7.35      Sonographer:   Authenticated               |
| by:    LOR DELGADO MD  Report Date/Time: 2018 17:52:14                            |
+------------------------------------------------------------------------------------------+
 
 
 
+-------------------------------------------------------------------------------------------
-------------------------+
| Procedure Note                                                                            
                         |
+-------------------------------------------------------------------------------------------
-------------------------+
|   Tank, Rad Results In - 2018  6:00 PM PST  Patient Name:  Jessica Arboleda of     
                         |
| Birth:  ccession:  [id number] Physician:  LOR DELGADO MD              
                         |
| _______________________________________________________________INDICATIONS-----------F/U  
                         |
|  thoracic/abdominal aneurysmCONCLUSIONS-----------1. Overall left ventricular systolic    
                         |
| function is normal with, an EF between 60 - 65 %.2. The right ventricle is normal in      
                         |
| size and function.3. The aortic root, ascending aorta and aortic arch are normal. 4. No   
                         |
| significant valvular abnormalities are noted.FINDINGS--------ECG rhythm: Sinus rhythm.    
                         |
| ECG rhythm: Resting bradycardia (HR<60bpm).Study: A 2-dimensional transthoracic           
                         |
 
| echocardiogram with m-mode, spectral and color flow Doppler was perfomed. Study: This     
                         |
| was a technically adequate study.Left Ventricle: Overall left ventricular systolic        
                         |
| function is normal with, an EF between 60 - 65 %. Left Ventricle: The left ventricle      
                         |
| cavity size is normal. Left Ventricle: Left ventricular wall thickness is normal. Left    
                         |
| Ventricle: No regional wall motion abnormalities. Left Ventricle: The diastolic filling   
                         |
| pattern is normal for the age of the patient.Right Ventricle: The right ventricle is      
                         |
| normal in size and function.Left Atrium: The left atrium is normal in size.Right Atrium:  
                         |
|  The right atrium is normal in size. Aortic Valve: Aortic valve is trileaflet and is      
                         |
| mildly thickened. Aortic Valve: There is no evidence of aortic regurgitation. Aortic      
                         |
| Valve: There is no evidence of aortic stenosis.Mitral Valve: The mitral valve is normal.  
                         |
|  Mitral Valve: There is trace mitral regurgitation. Mitral Valve: No evidence of MVP or   
                         |
| mitral stenosis.Tricuspid Valve: The tricuspid valve appears structurally normal.         
                         |
| Tricuspid Valve: Pulmonary artery systolic pressure could not be assessed due to the      
                         |
| absence of adequate TR jet.Pulmonic Valve: The pulmonic valve was not well                
                         |
| visualized.Pericardium: There is no pericardial effusion.IVC/Hepatic Veins: The IVC is    
                         |
| small (<1.5cm) and collapses with sniff, consistent with central venous pressures of      
                         |
| 0-5mmHg.Aorta: The aortic root, ascending aorta and aortic arch are normal.Mass: No mass  
                         |
|  visualizedThrombus: No clot visualized Thrombus: No vegetation visualized.Septum: No     
                         |
| ASD observed. Septum: No VSD observed.MEASUREMENTS-------------Ao asc:   3.65 cmAo        
                         |
| sinus:   3.46 cmAo st junct:   2.91 cmIVC:   1.27 cmEDV(Teich):   105.93 mlIVSd:   1.06   
                         |
| cmLVIDd:   4.76 cmLVPWd:   0.90 cmLVOT Diam:   2.31 cm%FS:   22.34 %EF(Teich):   44.99    
                         |
| %ESV(Teich):   58.27 mlLVIDs:   3.70 cmSV(Teich):   47.66 mlRVIDd:   3.07 cmAo root:      
                         |
| 32.72 mmLVEF MOD A2C:   56.41 %SV MOD A2C:   46.46 mlLVEF MOD A4C:   55.05 %SV MOD A4C:   
                         |
|   55.55 mlEF Biplane:   55.87 %LVEDV MOD BP:   92.29 mlLVESV MOD BP:   40.72 mlLVEDV MOD  
                         |
|  A2C:   82.36 mlLVLd A2C:   9.15 cmLVEDV MOD A4C:   100.89 mlLVLd A4C:   9.46 cmLVESV     
                         |
| MOD A2C:   35.90 mlLVLs A2C:   6.85 cmLVESV MOD A4C:   45.34 mlLVLs A4C:   7.06           
                         |
| cmLAESV(A-L):   54.75 mlLAESV Index (A-L):   24.66 ml/m2LAAs A2C:   15.85 [id number] A-L    
                         |
| A2C:   42.05 mlLALs A2C:   5.07 cmLAAs A4C:   20.65 [id number] A-L A4C:   68.39 mlLALs      
                         |
| A4C:   5.29 cmRAAs:   16.87 [id number] A-L:   44.78 mlRAESV MOD:   45.69 mlRALs:   5.39     
                         |
| cmTAPSE:   2.19 cmAV maxP.05 mmHgAV meanPG:   3.73 mmHgAV Vmax:   1.32 m/Hill        
                         |
 
| Vmean:   0.90 m/Hill VTI:   29.89 cmAVA Vmax:   3.41 cm2AVA (VTI):   3.01 [id number] Vmax:    
                         |
|  0.00 cm2/m2AVAI (VTI):   0.00 cm2/m2LVOT maxP.63 mmHgLVOT meanP.94 mmHgLVSI  
                         |
|  Dopp:   40.60 ml/m2LVSV Dopp:   90.15 mlLVOT Vmax:   1.07 m/sLVOT Vmean:   0.63 m/sLVOT  
                         |
|  VTI:   21.41 cmMV A Librado:   0.37 m/sMV DecT:   232.01 msMV E Librado:   0.58 m/sMV E/A        
                         |
| Ratio:   1.53 Septal e':   0.08 m/sSeptal E/e':   7.16 Lateral e':   0.07 m/sLateral      
                         |
| E/e':   7.35 Sonographer: Authenticated by:  Saurabh HERR Date/Time: 2018  
                         |
|  17:52:14IMPRESSION:1. Overall left ventricular systolic function is normal with, an EF   
                         |
| between 60 - 65 %.2. The right ventricle is normal in size and function.3. The aortic     
                         |
| root, ascending aorta and aortic arch are normal. 4. No significant valvular              
                         |
| abnormalities are noted.                                                                  
                         |
|Septum: No VSD observed.                                                                   
                         |
|MEASUREMENTS                                                                               
                        |
|-------------                                                                              
                          |
|Ao asc:   3.65 cm                                                                          
                         |
|Ao sinus:   3.46 cm                                                                        
                         |
|Ao st junct:   2.91 cm                                                                     
                         |
|IVC:   1.27 cm                                                                             
                         |
|EDV(Teich):   105.93 ml                                                                    
                         |
|IVSd:   1.06 cm                                                                            
                         |
|LVIDd:   4.76 cm                                                                           
                         |
|LVPWd:   0.90 cm                                                                           
                         |
|LVOT Diam:   2.31 cm                                                                       
                         |
|%FS:   22.34 %                                                                             
                         |
|EF(Teich):   44.99 %                                                                       
                         |
|ESV(Teich):   58.27 ml                                                                     
                         |
|LVIDs:   3.70 cm                                                                           
                         |
|SV(Teich):   47.66 ml                                                                      
                         |
|RVIDd:   3.07 cm                                                                           
                         |
|Ao root:   32.72 mm                                                                        
                         |
 
|LVEF MOD A2C:   56.41 %                                                                    
                         |
|SV MOD A2C:   46.46 ml                                                                     
                         |
|LVEF MOD A4C:   55.05 %                                                                    
                         |
|SV MOD A4C:   55.55 ml                                                                     
                         |
|EF Biplane:   55.87 %                                                                      
                         |
|LVEDV MOD BP:   92.29 ml                                                                   
                         |
|LVESV MOD BP:   40.72 ml                                                                   
                         |
|LVEDV MOD A2C:   82.36 ml                                                                  
                         |
|LVLd A2C:   9.15 cm                                                                        
                         |
|LVEDV MOD A4C:   100.89 ml                                                                 
                         |
|LVLd A4C:   9.46 cm                                                                        
                         |
|LVESV MOD A2C:   35.90 ml                                                                  
                         |
|LVLs A2C:   6.85 cm                                                                        
                         |
|LVESV MOD A4C:   45.34 ml                                                                  
                         |
|LVLs A4C:   7.06 cm                                                                        
                         |
|LAESV(A-L):   54.75 ml                                                                     
                         |
|LAESV Index (A-L):   24.66 ml/m2                                                           
                         |
|LAAs A2C:   15.85 cm2                                                                      
                         |
|LAESV A-L A2C:   42.05 ml                                                                  
                         |
|LALs A2C:   5.07 cm                                                                        
                         |
|LAAs A4C:   20.65 cm2                                                                      
                         |
|LAESV A-L A4C:   68.39 ml                                                                  
                         |
|LALs A4C:   5.29 cm                                                                        
                         |
|RAAs:   16.87 cm2                                                                          
                         |
|RAESV A-L:   44.78 ml                                                                      
                         |
|RAESV MOD:   45.69 ml                                                                      
                         |
|RALs:   5.39 cm                                                                            
                         |
|TAPSE:   2.19 cm                                                                           
                         |
|AV maxP.05 mmHg                                                                      
                         |
|AV meanPG:   3.73 mmHg                                                                     
                         |
 
|AV Vmax:   1.32 m/s                                                                        
                         |
|AV Vmean:   0.90 m/s                                                                       
                         |
|AV VTI:   29.89 cm                                                                         
                         |
|CHIRAG Vmax:   3.41 cm2                                                                       
                         |
|CHIRAG (VTI):   3.01 cm2                                                                      
                         |
|AVAI Vmax:   0.00 cm2/m2                                                                   
                         |
|AVAI (VTI):   0.00 cm2/m2                                                                  
                         |
|LVOT maxP.63 mmHg                                                                    
                         |
|LVOT meanP.94 mmHg                                                                   
                         |
|LVSI Dopp:   40.60 ml/m2                                                                   
                         |
|LVSV Dopp:   90.15 ml                                                                      
                         |
|LVOT Vmax:   1.07 m/s                                                                      
                         |
|LVOT Vmean:   0.63 m/s                                                                     
                         |
|LVOT VTI:   21.41 cm                                                                       
                         |
|MV A Librado:   0.37 m/s                                                                       
                         |
|MV DecT:   232.01 ms                                                                       
                         |
|MV E Librado:   0.58 m/s                                                                       
                         |
|MV E/A Ratio:   1.53                                                                       
                         |
|Septal e':   0.08 m/s                                                                      
                         |
|Septal E/e':   7.16                                                                        
                         |
|Lateral e':   0.07 m/s                                                                     
                         |
|Lateral E/e':   7.35                                                                       
                         |
|Sonographer:                                                                               
                         |
|Authenticated by:  LOR DELGADO MD                                                        
                         |
|Report Date/Time: 2018 17:52:14                                                       
                         |
|IMPRESSION:                                                                                
                        |
|1. Overall left ventricular systolic function is normal with, an EF between 60 - 65 %.     
                         |
|2. The right ventricle is normal in size and function.                                     
                         |
 
|3. The aortic root, ascending aorta and aortic arch are normal. 4. No significant valvular 
abnormalities are noted. |
+-------------------------------------------------------------------------------------------
-------------------------+
 EKG STANDARD 12 LEAD (2018 12:27 PM)
 
+-------------------+---------------------------------------------+-----------+
| Component         | Value                                       | Ref Range |
+-------------------+---------------------------------------------+-----------+
| Ventricular Rate  | 63                                          | BPM       |
+-------------------+---------------------------------------------+-----------+
| Atrial Rate       | 63                                          | BPM       |
+-------------------+---------------------------------------------+-----------+
| P-R Interval      | 146                                         | ms        |
+-------------------+---------------------------------------------+-----------+
| QRS Duration      | 96                                          | ms        |
+-------------------+---------------------------------------------+-----------+
| Q-T Interval      | 418                                         | ms        |
+-------------------+---------------------------------------------+-----------+
| QTC Calculation   | 427                                         | ms        |
| (Bezet)           |                                             |           |
+-------------------+---------------------------------------------+-----------+
| Calculated P Axis | 42                                          | degrees   |
+-------------------+---------------------------------------------+-----------+
| Calculated R Axis | 53                                          | degrees   |
+-------------------+---------------------------------------------+-----------+
| Calculated T Axis | 60                                          | degrees   |
+-------------------+---------------------------------------------+-----------+
| Diagnosis         | Please refer to Providers office visit note |           |
|                   |  for Providers Interpretation.Confirmed by  |           |
|                   | [facility] Read Only, PAULETTE Davis ([id number]),     |           |
|                   |  Az Galaviz ([id number]) on 2018    |           |
|                   | 4:56:19 PM                                  |           |
+-------------------+---------------------------------------------+-----------+
 
 
 
+----------+-------------------------------------------------+
| Specimen | Performing Laboratory                           |
+----------+-------------------------------------------------+
|          |   [facility]  [address] [facility] [place] [id number] |
+----------+-------------------------------------------------+
 in this encounter
 
 Visit Diagnoses
 
 
+--------------------------------------------------------------------+
| Diagnosis                                                          |
+--------------------------------------------------------------------+
| H/O syncope - Primary                                              |
+--------------------------------------------------------------------+
|   Personal history of other specified diseases                     |
+--------------------------------------------------------------------+
| Essential hypertension with goal blood pressure less than 130/80   |
+--------------------------------------------------------------------+
| Hyperlipidemia, unspecified hyperlipidemia type                    |
+--------------------------------------------------------------------+
| Chronic obstructive pulmonary disease, unspecified COPD type (HCC) |
+--------------------------------------------------------------------+
 
| Thoracoabdominal aortic aneurysm, without rupture (HCC)            |
+--------------------------------------------------------------------+
|   Thoracoabdominal aneurysm without mention of rupture             |
+--------------------------------------------------------------------+
| Obstructive sleep apnea syndrome                                   |
+--------------------------------------------------------------------+
|   Obstructive sleep apnea (adult) (pediatric)                      |
+--------------------------------------------------------------------+
| Former heavy tobacco smoker                                        |
+--------------------------------------------------------------------+
| History of anemia                                                  |
+--------------------------------------------------------------------+
|   Personal history of diseases of blood and blood-forming organs   |
+--------------------------------------------------------------------+
| Murmur, cardiac                                                    |
+--------------------------------------------------------------------+
|   Undiagnosed cardiac murmurs                                      |
+--------------------------------------------------------------------+

## 2018-04-14 NOTE — XMS
Encounter Summary
  Created on: 2018
 
 Kayce Arboleda
 External Reference #: [id number]
 : 64
 Sex: Male
 
 Demographics
 
 
+-----------------------+-----------------------+
| Address               | [address] |
|                       | [place]  [address]  |
+-----------------------+-----------------------+
| Home Phone            | [phone]       |
+-----------------------+-----------------------+
| Preferred Language    | Unknown               |
+-----------------------+-----------------------+
| Marital Status        | Single                |
+-----------------------+-----------------------+
| [employer]iliation | [id number]                  |
+-----------------------+-----------------------+
| Race                  | Unknown               |
+-----------------------+-----------------------+
| Ethnic Group          | Unknown               |
+-----------------------+-----------------------+
 
 
 Author
 
 
+--------------+-----------------------+
| Author       | [facility] |
+--------------+-----------------------+
| Organization | [facility] |
+--------------+-----------------------+
| Address      | Unknown               |
+--------------+-----------------------+
| Phone        | Unavailable           |
+--------------+-----------------------+
 
 
 
 Support
 
 
+-----------------+--------------+---------------------+-----------------+
| Name            | Relationship | Address             | Phone           |
+-----------------+--------------+---------------------+-----------------+
| Tejal Champagne | ECON         | [address]              | [phone] |
|                 |              | [address], [place]   |                 |
|                 |              | [address]               |                 |
+-----------------+--------------+---------------------+-----------------+
 
 
 
 Care Team Providers
 
 
 
+-----------------------+------+-----------------+
| Care Team Member Name | Role | Phone           |
+-----------------------+------+-----------------+
| Facundo Lees  | PCP  | [phone] |
+-----------------------+------+-----------------+
 
 
 
 Reason for Visit
 
 
+-----------+----------+
| Reason    | Comments |
+-----------+----------+
| Labs Only |          |
+-----------+----------+
 
 
 
 Encounter Details
 
 
+--------+-------------+----------------------+-------------------+-------------+
| Date   | Type        | Department           | Care Team         | Description |
+--------+-------------+----------------------+-------------------+-------------+
| / | Documentati |   [facility]          |   [facility],  | Labs Only   |
| 2018   | on Only     | [facility]  | [facility]       |             |
|        |             |  [address]    |                   |             |
|        |             | [place]         |                   |             |
|        |             | [address]           |                   |             |
|        |             | [phone]         |                   |             |
+--------+-------------+----------------------+-------------------+-------------+
 
 
 
 Social History
 
 
+---------------+-------+-----------+--------+------------------+
| Tobacco Use   | Types | Packs/Day | Years  | Date             |
|               |       |           | Used   |                  |
+---------------+-------+-----------+--------+------------------+
| Former Smoker |       | 3         | 37     | Quit: 10/05/2015 |
+---------------+-------+-----------+--------+------------------+
 
 
 
+---------------------+---+---+----------+
| Smokeless Tobacco:  |   |   | Quit:    |
| Former User         |   |   | 10/05/20 |
|                     |   |   | 17       |
+---------------------+---+---+----------+
 
 
 
+-------------+-----------+---------+---------------------------+
| Alcohol Use | Drinks/We | oz/Week | Comments                  |
|             | ek        |         |                           |
 
+-------------+-----------+---------+---------------------------+
| No          |   0       | 0.0     | heavy drinker in past hx. |
|             | Standard  |         |                           |
|             | drinks or |         |                           |
|             |           |         |                           |
|             | equivalen |         |                           |
|             | t         |         |                           |
+-------------+-----------+---------+---------------------------+
 
 
 
+------------------+---------------+
| Sex Assigned at  | Date Recorded |
| Birth            |               |
+------------------+---------------+
| Not on file      |               |
+------------------+---------------+
 as of this encounter
 
 Plan of Treatment
 
 
+--------+---------+-------------+----------------------+-------------+
| Date   | Type    | Specialty   | Care Team            | Description |
+--------+---------+-------------+----------------------+-------------+
| / | Office  | Pulmonology |   Jhonatan Louie MD  |             |
| 2018   | Visit   |             |  [address]    |             |
|        |         |             | [place] [address]   |             |
|        |         |             | [phone]         |             |
|        |         |             | [phone] (Fax)   |             |
+--------+---------+-------------+----------------------+-------------+
 as of this encounter
 
 Visit Diagnoses
 Not on filein this encounter

## 2018-04-14 NOTE — XMS
Encounter Summary
  Created on: 2018
 
 Kayce Arboleda
 External Reference #: [id number]
 : 64
 Sex: Male
 
 Demographics
 
 
+-----------------------+-----------------------+
| Address               | [address] |
|                       | [place]  [address]  |
+-----------------------+-----------------------+
| Home Phone            | [phone]       |
+-----------------------+-----------------------+
| Preferred Language    | Unknown               |
+-----------------------+-----------------------+
| Marital Status        | Single                |
+-----------------------+-----------------------+
| Advent Affiliation | 1013                  |
+-----------------------+-----------------------+
| Race                  | Unknown               |
+-----------------------+-----------------------+
| Ethnic Group          | Unknown               |
+-----------------------+-----------------------+
 
 
 Author
 
 
+--------------+-----------------------+
| Author       | [facility] |
+--------------+-----------------------+
| Organization | [facility] |
+--------------+-----------------------+
| Address      | Unknown               |
+--------------+-----------------------+
| Phone        | Unavailable           |
+--------------+-----------------------+
 
 
 
 Support
 
 
+-----------------+--------------+---------------------+-----------------+
| Name            | Relationship | Address             | Phone           |
+-----------------+--------------+---------------------+-----------------+
| Tejal Champagne | ECON         | [address]              | [phone] |
|                 |              | [address], [place]   |                 |
|                 |              | [address]               |                 |
+-----------------+--------------+---------------------+-----------------+
 
 
 
 Care Team Providers
 
 
 
+-----------------------+------+-----------------+
| Care Team Member Name | Role | Phone           |
+-----------------------+------+-----------------+
| Facundo Lees  | PCP  | [phone] |
+-----------------------+------+-----------------+
 
 
 
 Reason for Visit
 
 
+--------+----------------+
| Reason | Comments       |
+--------+----------------+
| Other  | [facility] |
+--------+----------------+
 
 
 
 Encounter Details
 
 
+--------+-------------+----------------------+----------------------+-------------------+
| Date   | Type        | Department           | Care Team            | Description       |
+--------+-------------+----------------------+----------------------+-------------------+
| / | Documentati |   [facility]          |   Josseline Kwok,  | Other ([facility]  |
| 2018   | on Only     | [facility]  | MA                   | Labs)             |
|        |             |  [address]    |                      |                   |
|        |             | [place]         |                      |                   |
|        |             | [address]           |                      |                   |
|        |             | [phone]         |                      |                   |
+--------+-------------+----------------------+----------------------+-------------------+
 
 
 
 Social History
 
 
+---------------+-------+-----------+--------+------------------+
| Tobacco Use   | Types | Packs/Day | Years  | Date             |
|               |       |           | Used   |                  |
+---------------+-------+-----------+--------+------------------+
| Former Smoker |       | 3         | 37     | Quit: 10/05/2015 |
+---------------+-------+-----------+--------+------------------+
 
 
 
+---------------------+---+---+----------+
| Smokeless Tobacco:  |   |   | Quit:    |
| Former User         |   |   | 10/05/20 |
|                     |   |   | 15       |
+---------------------+---+---+----------+
 
 
 
+-------------+-----------+---------+---------------------------+
| Alcohol Use | Drinks/We | oz/Week | Comments                  |
|             | ek        |         |                           |
 
+-------------+-----------+---------+---------------------------+
| No          |   0       | 0.0     | heavy drinker in past hx. |
|             | Standard  |         |                           |
|             | drinks or |         |                           |
|             |           |         |                           |
|             | equivalen |         |                           |
|             | t         |         |                           |
+-------------+-----------+---------+---------------------------+
 
 
 
+------------------+---------------+
| Sex Assigned at  | Date Recorded |
| Birth            |               |
+------------------+---------------+
| Not on file      |               |
+------------------+---------------+
 as of this encounter
 
 Plan of Treatment
 
 
+--------+---------+-------------+----------------------+-------------+
| Date   | Type    | Specialty   | Care Team            | Description |
+--------+---------+-------------+----------------------+-------------+
| / | Office  | Pulmonology |   Jhonatan Louie MD  |             |
| 2018   | Visit   |             |  [address]    |             |
|        |         |             | [place] [address]   |             |
|        |         |             | [phone]         |             |
|        |         |             | [phone] (Fax)   |             |
+--------+---------+-------------+----------------------+-------------+
 as of this encounter
 
 Results
 Lipid panel (2018  7:42 AM)
 
+---------------+-------+----------------+
| Component     | Value | Ref Range      |
+---------------+-------+----------------+
| CHOLESTEROL   | 131   | 200 mg/dL      |
+---------------+-------+----------------+
| TRIGLYCERIDES | 116   | 30 - 150 mg/dL |
+---------------+-------+----------------+
| HDL CHOL      | 40.3  | 40 mg/dl       |
+---------------+-------+----------------+
| LDL CALC      | 68    | 100 mg/dL      |
+---------------+-------+----------------+
| LDl/HDL Ratio |       |                |
+---------------+-------+----------------+
| CHOL/HDL      | 3.3   | 4.97           |
+---------------+-------+----------------+
| VLDL CHOL     | 23    | 4 - 40 mg/dL   |
+---------------+-------+----------------+
| NON HDL CHOL  | 91    | 130            |
+---------------+-------+----------------+
 
 
 
+----------+------------------------------------------------------------------+
| Specimen | Performing Laboratory                                            |
 
+----------+------------------------------------------------------------------+
| Blood    |   [facility]  [place]  |
|          | [address]                                                            |
+----------+------------------------------------------------------------------+
 Comprehensive metabolic panel (2018  7:42 AM)
 
+----------------+-------+-------------------+
| Component      | Value | Ref Range         |
+----------------+-------+-------------------+
| GLUCOSE        | 99    | 70 - 100 mg/dL    |
+----------------+-------+-------------------+
| BUN            | 18    | 6 - 23 mg/dL      |
+----------------+-------+-------------------+
| CREATININE     | 0.88  | 0.70 - 1.33 mg/dL |
+----------------+-------+-------------------+
| BUN/CREAT      | 20.5  | 6.0 - 28.6        |
+----------------+-------+-------------------+
| CALCIUM        | 9.7   | 8.4 - 10.2 mg/dL  |
+----------------+-------+-------------------+
| TOTAL PROTEIN  | 6.6   | 6.0 - 8.0 g/dL    |
+----------------+-------+-------------------+
| Albumin        | 4.4   | 3.5 - 5.0         |
+----------------+-------+-------------------+
| GLOBULIN       | 2.2   | 1.8 - 3.5         |
+----------------+-------+-------------------+
| A/G            | 2.0   | 1.0 - 2.4         |
+----------------+-------+-------------------+
| TBIL           | 0.5   | 0.0 - 1.2 mg/dL   |
+----------------+-------+-------------------+
| ALK PHOS       | 59    | 32 - 120          |
+----------------+-------+-------------------+
| ALT            | 25    | 7 - 52 U/L        |
+----------------+-------+-------------------+
| AST            | 19    | 13 - 39 U/L       |
+----------------+-------+-------------------+
| SODIUM         | 142   | 132 - 143 mmol/L  |
+----------------+-------+-------------------+
| POTASSIUM      | 4.0   | 3.6 - 5.1 mmol/L  |
+----------------+-------+-------------------+
| CHLORIDE       | 103   | 95 - 112 mmol/L   |
+----------------+-------+-------------------+
| CO2            | 28    | 19 - 31 mmol/L    |
+----------------+-------+-------------------+
| ANION GAP AGAP | 15.0  | 7 - 21 mmol/L     |
+----------------+-------+-------------------+
| EGFR           | 90    | 60 mg/dL          |
+----------------+-------+-------------------+
 
 
 
+----------+------------------------------------------------------------------+
| Specimen | Performing Laboratory                                            |
+----------+------------------------------------------------------------------+
| Blood    |   [facility]  [address] [place] [address], [facility] [address]  [place], [place]  |
|          | [id number]                                                            |
+----------+------------------------------------------------------------------+
 Transferrin (2018  7:42 AM)
 
+-------------+--------+-----------+
| Component   | Value  | Ref Range |
 
+-------------+--------+-----------+
| TRANSFERRIN | 245.11 | 180 - 329 |
+-------------+--------+-----------+
 
 
 
+----------+------------------------------------------------------------------+
| Specimen | Performing Laboratory                                            |
+----------+------------------------------------------------------------------+
| Blood    |   [facility]  [address] [place] [address] [place]  |
|          | [id number]                                                            |
+----------+------------------------------------------------------------------+
 Ferritin (2018  7:42 AM)
 
+-----------+-------+----------------+
| Component | Value | Ref Range      |
+-----------+-------+----------------+
| FERRITIN  | 162.8 | 30 - 400 ng/mL |
+-----------+-------+----------------+
 
 
 
+----------+------------------------------------------------------------------+
| Specimen | Performing Laboratory                                            |
+----------+------------------------------------------------------------------+
| Blood    |   [facility]  [address]  |
|          | [id number]                                                            |
+----------+------------------------------------------------------------------+
 Iron panel (2018  7:42 AM)
 
+------------+-------+-----------+
| Component  | Value | Ref Range |
+------------+-------+-----------+
| IRON       | 82.66 | 37 - 160  |
+------------+-------+-----------+
| IRON % SAT | 24.1  | 20 - 55   |
+------------+-------+-----------+
| TIBC       | 343   | 245 - 400 |
+------------+-------+-----------+
 
 
 
+----------+------------------------------------------------------------------+
| Specimen | Performing Laboratory                                            |
+----------+------------------------------------------------------------------+
| Blood    |   [facility]  [address] [facility] [address]  [place], [place]  |
|          | [id number]                                                            |
+----------+------------------------------------------------------------------+
 CBC W/Auto Diff (Reflex to Manual) (2018)
 
+-----------------+-------+--------------------+
| Component       | Value | Ref Range          |
+-----------------+-------+--------------------+
| WBC             | 9.0   | 4.5 - 11.0 10^3/mL |
+-----------------+-------+--------------------+
| RBC             | 4.79  | 4.3 - 5.7 10^6/  L |
+-----------------+-------+--------------------+
| HGB             | 14.7  | 13.5 - 18.0 g/dL   |
+-----------------+-------+--------------------+
| HCT             | 43.4  | 41 - 50 %          |
 
+-----------------+-------+--------------------+
| MCV             | 90.6  | 81 - 99 fL         |
+-----------------+-------+--------------------+
| MCH             | 31    | 27 - 33 pg         |
+-----------------+-------+--------------------+
| MCHC            | 34    | 30 - 36 g/dL       |
+-----------------+-------+--------------------+
| PLT             | 286   | 140 - 440 K/  L    |
+-----------------+-------+--------------------+
| RDW SD          | 13.5  | 10.5 - 15.0 %      |
+-----------------+-------+--------------------+
| MPV             |       | fL                 |
+-----------------+-------+--------------------+
| DIFF TYPE       |       |                    |
+-----------------+-------+--------------------+
| NEUTROPHILS     | 64.5  | 39 - 80 %          |
+-----------------+-------+--------------------+
| LYMPHOCYTES     | 26.4  | 24 - 44 %          |
+-----------------+-------+--------------------+
| MONOCYTES       | 7.2   | 0 - 12 %           |
+-----------------+-------+--------------------+
| EOSINOPHILS     | 0.9   | 0 - 6 %            |
+-----------------+-------+--------------------+
| BASOPHILS       | 1.0   | 0 - 2 %            |
+-----------------+-------+--------------------+
| NEUTROPHILS ABS |       | /  L               |
+-----------------+-------+--------------------+
| LYMPHOCYTES ABS |       | /  L               |
+-----------------+-------+--------------------+
| MONOCYTES ABS   |       | /  L               |
+-----------------+-------+--------------------+
| EOSINOPHILS ABS |       | /  L               |
+-----------------+-------+--------------------+
| BASOPHILS ABS   |       | /  L               |
+-----------------+-------+--------------------+
 
 
 
+----------+------------------------------------------------------------------+
| Specimen | Performing Laboratory                                            |
+----------+------------------------------------------------------------------+
| Blood    |   [facility]  [address] [place]  |
|          | [id number]                                                            |
+----------+------------------------------------------------------------------+
 in this encounter
 
 Visit Diagnoses
 Not on filein this encounter

## 2018-04-14 NOTE — NUR
PATIENT RESTING IN BED, APPEARS TO BE IN DISCOMFORT. PATIENT REPORTS 8/10 PAIN
IN RIGHT LEG. PRN OXYCODONE GIVEN. SCHEDULED MEDICATIONS ALSO GIVEN. PATIENT
THEN REPORTS NAUSEA, PRN ZOFRAN ADMINISTERED. ASSISTED TO BATHROOM WITH
1PA/FWW. PATIENT CONTINUES TO BE IN HIGH AMOUNTS OF PAIN, PATIENT GRUNTING,
GRIMACING, AND HOLDING BREATH, AMBULATION IS DIFFICULT FOR PATIENT DUE TO
PAIN. PRN IV MORPHINE GIVEN ONCE PATIENT BACK IN BED. ALSO GAVE ICE PACK FOR
INCISION SITE. PATIENT STATES "THAT SHOULD HELP, I THINK I AM STARTING TO FEEL
BETTER." DENIES FURTHER NEEDS. CALL LIGHT WITHIN REACH.

## 2018-04-14 NOTE — XMS
Encounter Summary
  Created on: 2018
 
 Kayce Arboleda
 External Reference #: [id number]
 : 64
 Sex: Male
 
 Demographics
 
 
+-----------------------+-----------------------+
| Address               | [address] |
|                       | [place]  [address]  |
+-----------------------+-----------------------+
| Home Phone            | [phone]       |
+-----------------------+-----------------------+
| Preferred Language    | Unknown               |
+-----------------------+-----------------------+
| Marital Status        | Single                |
+-----------------------+-----------------------+
| Druze Affiliation | 1013                  |
+-----------------------+-----------------------+
| Race                  | Unknown               |
+-----------------------+-----------------------+
| Ethnic Group          | Unknown               |
+-----------------------+-----------------------+
 
 
 Author
 
 
+--------------+-----------------------+
| Author       | [facility] |
+--------------+-----------------------+
| Organization | [facility] |
+--------------+-----------------------+
| Address      | Unknown               |
+--------------+-----------------------+
| Phone        | Unavailable           |
+--------------+-----------------------+
 
 
 
 Support
 
 
+-----------------+--------------+---------------------+-----------------+
| Name            | Relationship | Address             | Phone           |
+-----------------+--------------+---------------------+-----------------+
| Tejal Champagne | ECON         | [address]              | [phone] |
|                 |              | [address], [place]   |                 |
|                 |              | [address]               |                 |
+-----------------+--------------+---------------------+-----------------+
 
 
 
 Care Team Providers
 
 
 
+-----------------------+------+-----------------+
| Care Team Member Name | Role | Phone           |
+-----------------------+------+-----------------+
| Facundo Lees  | PCP  | [phone] |
+-----------------------+------+-----------------+
 
 
 
 Reason for Visit
 
 
+--------+-------------------+
| Reason | Comments          |
+--------+-------------------+
| Other  | Facundo Lees NP |
+--------+-------------------+
 
 
 
 Encounter Details
 
 
+--------+-------------+----------------------+----------------------+---------------+
| Date   | Type        | Department           | Care Team            | Description   |
+--------+-------------+----------------------+----------------------+---------------+
| / | Documentati [facility]          |   Sherron Almanzar,  | Other ([facility]   |
| 2018   | on Only     | [facility]  | CMA                  | Yoana, NP) |
|        |             |  [address]    |                      |               |
|        |             | [place]         |                      |               |
|        |             | [address]           |                      |               |
|        |             | [phone]         |                      |               |
+--------+-------------+----------------------+----------------------+---------------+
 
 
 
 Social History
 
 
+---------------+-------+-----------+--------+------------------+
| Tobacco Use   | Types | Packs/Day | Years  | Date             |
|               |       |           | Used   |                  |
+---------------+-------+-----------+--------+------------------+
| Former Smoker |       | 3         | 37     | Quit: 10/05/2015 |
+---------------+-------+-----------+--------+------------------+
 
 
 
+---------------------+---+---+----------+
| Smokeless Tobacco:  |   |   | Quit:    |
| Former User         |   |   | 10/05/20 |
|                     |   |   | 17       |
+---------------------+---+---+----------+
 
 
 
+-------------+-----------+---------+---------------------------+
| Alcohol Use | Drinks/We | oz/Week | Comments                  |
|             | ek        |         |                           |
 
+-------------+-----------+---------+---------------------------+
| No          |   0       | 0.0     | heavy drinker in past hx. |
|             | Standard  |         |                           |
|             | drinks or |         |                           |
|             |           |         |                           |
|             | equivalen |         |                           |
|             | t         |         |                           |
+-------------+-----------+---------+---------------------------+
 
 
 
+------------------+---------------+
| Sex Assigned at  | Date Recorded |
| Birth            |               |
+------------------+---------------+
| Not on file      |               |
+------------------+---------------+
 as of this encounter
 
 Plan of Treatment
 
 
+--------+---------+-------------+----------------------+-------------+
| Date   | Type    | Specialty   | Care Team            | Description |
+--------+---------+-------------+----------------------+-------------+
| / | Office  | Pulmonology |   Jhonatan Louie MD  |             |
| 2018   | Visit   |             |  [address]    |             |
|        |         |             | [place] [address]   |             |
|        |         |             | [phone]         |             |
|        |         |             | [phone] (Fax)   |             |
+--------+---------+-------------+----------------------+-------------+
 as of this encounter
 
 Visit Diagnoses
 Not on filein this encounter

## 2018-04-14 NOTE — XMS
Encounter Summary
  Created on: 2018
 
 Kayce Arboleda
 External Reference #: [id number]
 : 64
 Sex: Male
 
 Demographics
 
 
+-----------------------+-----------------------+
| Address               | [address] |
|                       | [place]  [address]  |
+-----------------------+-----------------------+
| Home Phone            | [phone]       |
+-----------------------+-----------------------+
| Preferred Language    | Unknown               |
+-----------------------+-----------------------+
| Marital Status        | Single                |
+-----------------------+-----------------------+
| Tenriism Affiliation | [id number]                  |
+-----------------------+-----------------------+
| Race                  | Unknown               |
+-----------------------+-----------------------+
| Ethnic Group          | Unknown               |
+-----------------------+-----------------------+
 
 
 Author
 
 
+--------------+-----------------------+
| Author       | [facility] |
+--------------+-----------------------+
| Organization | [facility] |
+--------------+-----------------------+
| Address      | Unknown               |
+--------------+-----------------------+
| Phone        | Unavailable           |
+--------------+-----------------------+
 
 
 
 Support
 
 
+-----------------+--------------+---------------------+-----------------+
| Name            | Relationship | Address             | Phone           |
+-----------------+--------------+---------------------+-----------------+
| Tejal Champagne | ECON         | [address]              | [phone] |
|                 |              | [address], [place]   |                 |
|                 |              | [address]               |                 |
+-----------------+--------------+---------------------+-----------------+
 
 
 
 Care Team Providers
 
 
 
+-----------------------+------+-----------------+
| Care Team Member Name | Role | Phone           |
+-----------------------+------+-----------------+
| Facundo LeesP  | PCP  | [phone] |
+-----------------------+------+-----------------+
 
 
 
 Encounter Details
 
 
+--------+------------+---------------------+----------------------+----------------------+
| Date   | Type       | Department          | Care Team            | Description          |
+--------+------------+---------------------+----------------------+----------------------+
| / | Ancillary  |   [facility]  |   PernelllyndonJanette tijerina    | H/O syncope;         |
| 2018   | Procedure  | ECHO                | [address]     | Essential            |
|        |            |                     | [address]    | hypertension with    |
|        |            |                     | [place] [address]   | goal blood pressure  |
|        |            |                     | [phone]         | less than 130/80;    |
|        |            |                     | [phone] (Fax)   | Hyperlipidemia,      |
|        |            |                     |                      | unspecified          |
|        |            |                     |                      | hyperlipidemia type; |
|        |            |                     |                      |  Chronic obstructive |
|        |            |                     |                      |  pulmonary disease,  |
|        |            |                     |                      | unspecified COPD     |
|        |            |                     |                      | type (HCC);          |
|        |            |                     |                      | Thoracoabdominal     |
|        |            |                     |                      | aortic aneurysm,     |
|        |            |                     |                      | without rupture      |
|        |            |                     |                      | (HCC); Obstructive   |
|        |            |                     |                      | sleep apnea syndrome |
+--------+------------+---------------------+----------------------+----------------------+
 
 
 
 Social History
 
 
+---------------+-------+-----------+--------+------------------+
| Tobacco Use   | Types | Packs/Day | Years  | Date             |
|               |       |           | Used   |                  |
+---------------+-------+-----------+--------+------------------+
| Former Smoker |       | 3         | 35     | Quit: 10/05/2015 |
+---------------+-------+-----------+--------+------------------+
 
 
 
+---------------------+---+---+----------+
| Smokeless Tobacco:  |   |   | Quit:    |
| Former User         |   |   | 10/05/20 |
|                     |   |   | 15       |
+---------------------+---+---+----------+
 
 
 
+-------------+-----------+---------+---------------------------+
| Alcohol Use | Drinks/We | oz/Week | Comments                  |
|             | ek        |         |                           |
 
+-------------+-----------+---------+---------------------------+
| No          |   0       | 0.0     | heavy drinker in past hx. |
|             | Standard  |         |                           |
|             | drinks or |         |                           |
|             |           |         |                           |
|             | equivalen |         |                           |
|             | t         |         |                           |
+-------------+-----------+---------+---------------------------+
 
 
 
+------------------+---------------+
| Sex Assigned at  | Date Recorded |
| Birth            |               |
+------------------+---------------+
| Not on file      |               |
+------------------+---------------+
 as of this encounter
 
 Plan of Treatment
 
 
+--------+---------+-------------+----------------------+-------------+
| Date   | Type    | Specialty   | Care Team            | Description |
+--------+---------+-------------+----------------------+-------------+
| / | Office  | Pulmonology |   Jhonatan Louie MD  |             |
| 2018   | Visit   |             |  [address]    |             |
|        |         |             | [place] [address]   |             |
|        |         |             | [phone]         |             |
|        |         |             | [phone] (Fax)   |             |
+--------+---------+-------------+----------------------+-------------+
 as of this encounter
 
 Procedures
 
 
+-----------------+--------+-------------+----------------------+----------------------+
| Procedure Name  | Priori | Date/Time   | Associated Diagnosis | Comments             |
|                 | ty     |             |                      |                      |
+-----------------+--------+-------------+----------------------+----------------------+
| ECHO OUTSIDE    | Routin | 2018  |   H/O syncope        |   Results for this   |
| INTERPRETATION  | e      |  3:43 PM    | Essential            | procedure are in the |
| STANDARD        |        | PST         | hypertension with    |  results section.    |
|                 |        |             | goal blood pressure  |                      |
|                 |        |             | less than 130/80     |                      |
|                 |        |             | Hyperlipidemia,      |                      |
|                 |        |             | unspecified          |                      |
|                 |        |             | hyperlipidemia type  |                      |
|                 |        |             |  Chronic obstructive |                      |
|                 |        |             |  pulmonary disease,  |                      |
|                 |        |             | unspecified COPD     |                      |
|                 |        |             | type (HCC)           |                      |
|                 |        |             | Thoracoabdominal     |                      |
|                 |        |             | aortic aneurysm,     |                      |
|                 |        |             | without rupture      |                      |
|                 |        |             | (HCC)  Obstructive   |                      |
|                 |        |             | sleep apnea syndrome |                      |
+-----------------+--------+-------------+----------------------+----------------------+
 in this encounter
 
 
 Results
 ECHO outside interpretation standard (2018  3:43 PM)
 
+----------+--------------------------------------------------------+
| Specimen | Performing Laboratory                                  |
+----------+--------------------------------------------------------+
|          |   [address] [address] |
+----------+--------------------------------------------------------+
 
 
 
+------------------------------------------------------------------------------------------+
| Impressions                                                                              |
+------------------------------------------------------------------------------------------+
|   1. Overall left ventricular systolic function is normal with, an EF between 60 - 65 %. |
|   2. The right ventricle is normal in size and function.  3. The aortic root, ascending  |
| aorta and aortic arch are normal. 4. No significant valvular abnormalities are noted.    |
+------------------------------------------------------------------------------------------+
 
 
 
+------------------------------------------------------------------------------------------+
| Narrative                                                                                |
+------------------------------------------------------------------------------------------+
|      Patient Name:    Kayce Arboleda  YOB: 1960                       |
| Accession:    [id number]     Performing Physician:    LOR DELGADO MD                      |
| _______________________________________________________________  INDICATIONS             |
| -----------  F/U thoracic/abdominal aneurysm     CONCLUSIONS  -----------  1. Overall    |
| left ventricular systolic function is normal with, an EF between 60 - 65 %.  2. The      |
| right ventricle is normal in size and function.  3. The aortic root, ascending aorta and |
|  aortic arch are normal. 4. No significant valvular abnormalities are noted.             |
| FINDINGS  --------  ECG rhythm: Sinus rhythm.   ECG rhythm: Resting bradycardia          |
| (HR<60bpm).  Study: A 2-dimensional transthoracic echocardiogram with m-mode, spectral   |
| and color flow Doppler was perfomed.   Study: This was a technically adequate study.     |
| Left Ventricle: Overall left ventricular systolic function is normal with, an EF between |
|  60 - 65 %.   Left Ventricle: The left ventricle cavity size is normal.   Left           |
| Ventricle: Left ventricular wall thickness is normal.   Left Ventricle: No regional wall |
|  motion abnormalities.   Left Ventricle: The diastolic filling pattern is normal for the |
|  age of the patient.  Right Ventricle: The right ventricle is normal in size and         |
| function.  Left Atrium: The left atrium is normal in size.  Right Atrium: The right      |
| atrium is normal in size.   Aortic Valve: Aortic valve is trileaflet and is mildly       |
| thickened.   Aortic Valve: There is no evidence of aortic regurgitation.   Aortic Valve: |
|  There is no evidence of aortic stenosis.  Mitral Valve: The mitral valve is normal.     |
| Mitral Valve: There is trace mitral regurgitation.   Mitral Valve: No evidence of MVP or |
|  mitral stenosis.  Tricuspid Valve: The tricuspid valve appears structurally normal.     |
| Tricuspid Valve: Pulmonary artery systolic pressure could not be assessed due to the     |
| absence of adequate TR jet.  Pulmonic Valve: The pulmonic valve was not well visualized. |
|   Pericardium: There is no pericardial effusion.  IVC/Hepatic Veins: The IVC is small    |
| (<1.5cm) and collapses with sniff, consistent with central venous pressures of 0-5mmHg.  |
|  Aorta: The aortic root, ascending aorta and aortic arch are normal.  Mass: No mass      |
| visualized  Thrombus: No clot visualized   Thrombus: No vegetation visualized.  Septum:  |
| No ASD observed.   Septum: No VSD observed.     MEASUREMENTS  -------------  Ao asc:     |
|  3.65 cm  Ao sinus:     3.46 cm  Ao st junct:     2.91 cm  IVC:     1.27 cm              |
| EDV(Teich):     105.93 ml  IVSd:     1.06 cm  LVIDd:     4.76 cm  LVPWd:     0.90 cm     |
| LVOT Diam:     2.31 cm  %FS:     22.34 %  EF(Teich):     44.99 %  ESV(Teich):     58.27  |
| ml  LVIDs:     3.70 cm  SV(Teich):     47.66 ml  RVIDd:     3.07 cm  Ao root:     32.72  |
| mm  LVEF MOD A2C:     56.41 %  SV MOD A2C:     46.46 ml  LVEF MOD A4C:     55.05 %  SV   |
| MOD A4C:     55.55 ml  EF Biplane:     55.87 %  LVEDV MOD BP:     92.29 ml  LVESV MOD    |
| BP:     40.72 ml  LVEDV MOD A2C:     82.36 ml  LVLd A2C:     9.15 cm  LVEDV MOD A4C:     |
|  100.89 ml  LVLd A4C:     9.46 cm  LVESV MOD A2C:     35.90 ml  LVLs A2C:     6.85 cm    |
 
| LVESV MOD A4C:     45.34 ml  LVLs A4C:     7.06 cm  LAESV(A-L):     54.75 ml  LAESV      |
| Index (A-L):     24.66 ml/m2  LAAs A2C:     15.85 cm2  LAESV A-L A2C:     42.05 ml  LALs |
|  A2C:     5.07 cm  LAAs A4C:     20.65 cm2  LAESV A-L A4C:     68.39 ml  LALs A4C:       |
| 5.29 cm  RAAs:     16.87 cm2  RAESV A-L:     44.78 ml  RAESV MOD:     45.69 ml           |
| RALs:     5.39 cm  TAPSE:     2.19 cm  AV maxP.05 mmHg  AV meanPG:     3.73 mmHg |
|   AV Vmax:     1.32 m/s  AV Vmean:     0.90 m/s  AV VTI:     29.89 cm  CHIRAG Vmax:         |
| 3.41 cm2  CHIRAG (VTI):     3.01 cm2  AVAI Vmax:     0.00 cm2/m2  AVAI (VTI):     0.00      |
| cm2/m2  LVOT maxP.63 mmHg  LVOT meanP.94 mmHg  LVSI Dopp:     40.60      |
| ml/m2  LVSV Dopp:     90.15 ml  LVOT Vmax:     1.07 m/s  LVOT Vmean:     0.63 m/s  LVOT  |
| VTI:     21.41 cm  MV A Librado:     0.37 m/s  MV DecT:     232.01 ms  MV E Librado:     0.58    |
| m/s  MV E/A Ratio:     1.53   Septal e':     0.08 m/s  Septal E/e':     7.16   Lateral   |
| e':     0.07 m/s  Lateral E/e':     7.35      Sonographer:   Authenticated               |
| by:    LOR DELGADO MD  Report Date/Time: 2018 17:52:14                            |
+------------------------------------------------------------------------------------------+
 
 
 
+-------------------------------------------------------------------------------------------
-------------------------+
| Procedure Note                                                                            
                         |
+-------------------------------------------------------------------------------------------
-------------------------+
|   Tank, Rad Results In - 2018  6:00 PM PST  Patient Name:  Jessica Arboleda of     
                         |
| Birth:  ccession:  [id number] Physician:  LOR DELGADO MD              
                         |
| _______________________________________________________________INDICATIONS-----------F/U  
                         |
|  thoracic/abdominal aneurysmCONCLUSIONS-----------1. Overall left ventricular systolic    
                         |
| function is normal with, an EF between 60 - 65 %.2. The right ventricle is normal in      
                         |
| size and function.3. The aortic root, ascending aorta and aortic arch are normal. 4. No   
                         |
| significant valvular abnormalities are noted.FINDINGS--------ECG rhythm: Sinus rhythm.    
                         |
| ECG rhythm: Resting bradycardia (HR<60bpm).Study: A 2-dimensional transthoracic           
                         |
| echocardiogram with m-mode, spectral and color flow Doppler was perfomed. Study: This     
                         |
| was a technically adequate study.Left Ventricle: Overall left ventricular systolic        
                         |
| function is normal with, an EF between 60 - 65 %. Left Ventricle: The left ventricle      
                         |
| cavity size is normal. Left Ventricle: Left ventricular wall thickness is normal. Left    
                         |
| Ventricle: No regional wall motion abnormalities. Left Ventricle: The diastolic filling   
                         |
| pattern is normal for the age of the patient.Right Ventricle: The right ventricle is      
                         |
| normal in size and function.Left Atrium: The left atrium is normal in size.Right Atrium:  
                         |
|  The right atrium is normal in size. Aortic Valve: Aortic valve is trileaflet and is      
                         |
| mildly thickened. Aortic Valve: There is no evidence of aortic regurgitation. Aortic      
                         |
| Valve: There is no evidence of aortic stenosis.Mitral Valve: The mitral valve is normal.  
                         |
|  Mitral Valve: There is trace mitral regurgitation. Mitral Valve: No evidence of MVP or   
 
                         |
| mitral stenosis.Tricuspid Valve: The tricuspid valve appears structurally normal.         
                         |
| Tricuspid Valve: Pulmonary artery systolic pressure could not be assessed due to the      
                         |
| absence of adequate TR jet.Pulmonic Valve: The pulmonic valve was not well                
                         |
| visualized.Pericardium: There is no pericardial effusion.IVC/Hepatic Veins: The IVC is    
                         |
| small (<1.5cm) and collapses with sniff, consistent with central venous pressures of      
                         |
| 0-5mmHg.Aorta: The aortic root, ascending aorta and aortic arch are normal.Mass: No mass  
                         |
|  visualizedThrombus: No clot visualized Thrombus: No vegetation visualized.Septum: No     
                         |
| ASD observed. Septum: No VSD observed.MEASUREMENTS-------------Ao asc:   3.65 cmAo        
                         |
| sinus:   3.46 cmAo st junct:   2.91 cmIVC:   1.27 cmEDV(Teich):   105.93 mlIVSd:   1.06   
                         |
| cmLVIDd:   4.76 cmLVPWd:   0.90 cmLVOT Diam:   2.31 cm%FS:   22.34 %EF(Teich):   44.99    
                         |
| %ESV(Teich):   58.27 mlLVIDs:   3.70 cmSV(Teich):   47.66 mlRVIDd:   3.07 cmAo root:      
                         |
| 32.72 mmLVEF MOD A2C:   56.41 %SV MOD A2C:   46.46 mlLVEF MOD A4C:   55.05 %SV MOD A4C:   
                         |
|   55.55 mlEF Biplane:   55.87 %LVEDV MOD BP:   92.29 mlLVESV MOD BP:   40.72 mlLVEDV MOD  
                         |
|  A2C:   82.36 mlLVLd A2C:   9.15 cmLVEDV MOD A4C:   100.89 mlLVLd A4C:   9.46 cmLVESV     
                         |
| MOD A2C:   35.90 mlLVLs A2C:   6.85 cmLVESV MOD A4C:   45.34 mlLVLs A4C:   7.06           
                         |
| cmLAESV(A-L):   54.75 mlLAESV Index (A-L):   24.66 ml/m2LAAs A2C:   15.85 [id number] A-L    
                         |
| A2C:   42.05 mlLALs A2C:   5.07 cmLAAs A4C:   20.65 [id number] A-L A4C:   68.39 mlLALs      
                         |
| A4C:   5.29 cmRAAs:   16.87 [id number] A-L:   44.78 mlRAESV MOD:   45.69 mlRALs:   5.39     
                         |
| cmTAPSE:   2.19 cmAV maxP.05 mmHgAV meanPG:   3.73 mmHgAV Vmax:   1.32 m/Hill        
                         |
| Vmean:   0.90 m/Hill VTI:   29.89 cmAVA Vmax:   3.41 cm2AVA (VTI):   3.01 [id number] Vmax:    
                         |
|  0.00 cm2/m2AVAI (VTI):   0.00 cm2/m2LVOT maxP.63 mmHgLVOT meanP.94 mmHgLVSI  
                         |
|  Dopp:   40.60 ml/m2LVSV Dopp:   90.15 mlLVOT Vmax:   1.07 m/sLVOT Vmean:   0.63 m/sLVOT  
                         |
|  VTI:   21.41 cmMV A Librado:   0.37 m/sMV DecT:   232.01 msMV E Librado:   0.58 m/sMV E/A        
                         |
| Ratio:   1.53 Septal e':   0.08 m/sSeptal E/e':   7.16 Lateral e':   0.07 m/sLateral      
                         |
| E/e':   7.35 Sonographer: Authenticated by:  Saurabh HERR Date/Time: 2018  
                         |
|  17:52:14IMPRESSION:1. Overall left ventricular systolic function is normal with, an EF   
                         |
| between 60 - 65 %.2. The right ventricle is normal in size and function.3. The aortic     
                         |
| root, ascending aorta and aortic arch are normal. 4. No significant valvular              
                         |
| abnormalities are noted.                                                                  
                         |
|Septum: No VSD observed.                                                                   
 
                         |
|MEASUREMENTS                                                                               
                        |
|-------------                                                                              
                          |
|Ao asc:   3.65 cm                                                                          
                         |
|Ao sinus:   3.46 cm                                                                        
                         |
|Ao st junct:   2.91 cm                                                                     
                         |
|IVC:   1.27 cm                                                                             
                         |
|EDV(Teich):   105.93 ml                                                                    
                         |
|IVSd:   1.06 cm                                                                            
                         |
|LVIDd:   4.76 cm                                                                           
                         |
|LVPWd:   0.90 cm                                                                           
                         |
|LVOT Diam:   2.31 cm                                                                       
                         |
|%FS:   22.34 %                                                                             
                         |
|EF(Teich):   44.99 %                                                                       
                         |
|ESV(Teich):   58.27 ml                                                                     
                         |
|LVIDs:   3.70 cm                                                                           
                         |
|SV(Teich):   47.66 ml                                                                      
                         |
|RVIDd:   3.07 cm                                                                           
                         |
|Ao root:   32.72 mm                                                                        
                         |
|LVEF MOD A2C:   56.41 %                                                                    
                         |
|SV MOD A2C:   46.46 ml                                                                     
                         |
|LVEF MOD A4C:   55.05 %                                                                    
                         |
|SV MOD A4C:   55.55 ml                                                                     
                         |
|EF Biplane:   55.87 %                                                                      
                         |
|LVEDV MOD BP:   92.29 ml                                                                   
                         |
|LVESV MOD BP:   40.72 ml                                                                   
                         |
|LVEDV MOD A2C:   82.36 ml                                                                  
                         |
|LVLd A2C:   9.15 cm                                                                        
                         |
|LVEDV MOD A4C:   100.89 ml                                                                 
                         |
|LVLd A4C:   9.46 cm                                                                        
 
                         |
|LVESV MOD A2C:   35.90 ml                                                                  
                         |
|LVLs A2C:   6.85 cm                                                                        
                         |
|LVESV MOD A4C:   45.34 ml                                                                  
                         |
|LVLs A4C:   7.06 cm                                                                        
                         |
|LAESV(A-L):   54.75 ml                                                                     
                         |
|LAESV Index (A-L):   24.66 ml/m2                                                           
                         |
|LAAs A2C:   15.85 cm2                                                                      
                         |
|LAESV A-L A2C:   42.05 ml                                                                  
                         |
|LALs A2C:   5.07 cm                                                                        
                         |
|LAAs A4C:   20.65 cm2                                                                      
                         |
|LAESV A-L A4C:   68.39 ml                                                                  
                         |
|LALs A4C:   5.29 cm                                                                        
                         |
|RAAs:   16.87 cm2                                                                          
                         |
|RAESV A-L:   44.78 ml                                                                      
                         |
|RAESV MOD:   45.69 ml                                                                      
                         |
|RALs:   5.39 cm                                                                            
                         |
|TAPSE:   2.19 cm                                                                           
                         |
|AV maxP.05 mmHg                                                                      
                         |
|AV meanPG:   3.73 mmHg                                                                     
                         |
|AV Vmax:   1.32 m/s                                                                        
                         |
|AV Vmean:   0.90 m/s                                                                       
                         |
|AV VTI:   29.89 cm                                                                         
                         |
|CHIRAG Vmax:   3.41 cm2                                                                       
                         |
|CHIRAG (VTI):   3.01 cm2                                                                      
                         |
|AVAI Vmax:   0.00 cm2/m2                                                                   
                         |
|AVAI (VTI):   0.00 cm2/m2                                                                  
                         |
|LVOT maxP.63 mmHg                                                                    
                         |
|LVOT meanP.94 mmHg                                                                   
                         |
|LVSI Dopp:   40.60 ml/m2                                                                   
                         |
|LVSV Dopp:   90.15 ml                                                                      
 
                         |
|LVOT Vmax:   1.07 m/s                                                                      
                         |
|LVOT Vmean:   0.63 m/s                                                                     
                         |
|LVOT VTI:   21.41 cm                                                                       
                         |
|MV A Librado:   0.37 m/s                                                                       
                         |
|MV DecT:   232.01 ms                                                                       
                         |
|MV E Librado:   0.58 m/s                                                                       
                         |
|MV E/A Ratio:   1.53                                                                       
                         |
|Septal e':   0.08 m/s                                                                      
                         |
|Septal E/e':   7.16                                                                        
                         |
|Lateral e':   0.07 m/s                                                                     
                         |
|Lateral E/e':   7.35                                                                       
                         |
|Sonographer:                                                                               
                         |
|Authenticated by:  LOR DELGADO MD                                                        
                         |
|Report Date/Time: 2018 17:52:14                                                       
                         |
|IMPRESSION:                                                                                
                        |
|1. Overall left ventricular systolic function is normal with, an EF between 60 - 65 %.     
                         |
|2. The right ventricle is normal in size and function.                                     
                         |
|3. The aortic root, ascending aorta and aortic arch are normal. 4. No significant valvular 
abnormalities are noted. |
+-------------------------------------------------------------------------------------------
-------------------------+
 in this encounter
 
 Visit Diagnoses
 
 
+--------------------------------------------------------------------+
| Diagnosis                                                          |
+--------------------------------------------------------------------+
| H/O syncope                                                        |
+--------------------------------------------------------------------+
|   Personal history of other specified diseases                     |
+--------------------------------------------------------------------+
| Essential hypertension with goal blood pressure less than 130/80   |
+--------------------------------------------------------------------+
| Hyperlipidemia, unspecified hyperlipidemia type                    |
+--------------------------------------------------------------------+
| Chronic obstructive pulmonary disease, unspecified COPD type (HCC) |
 
+--------------------------------------------------------------------+
| Thoracoabdominal aortic aneurysm, without rupture (HCC)            |
+--------------------------------------------------------------------+
|   Thoracoabdominal aneurysm without mention of rupture             |
+--------------------------------------------------------------------+
| Obstructive sleep apnea syndrome                                   |
+--------------------------------------------------------------------+
|   Obstructive sleep apnea (adult) (pediatric)                      |
+--------------------------------------------------------------------+

## 2018-04-14 NOTE — XMS
Clinical Summary
  Created on: 2018
 
 Jarod Arboleda
 External Reference #: [id number]
 : 64
 Sex: Male
 
 Demographics
 
 
+-----------------------+---------------------------+
| Address               | [address] |
|                       | [place]  [address]      |
+-----------------------+---------------------------+
| Home Phone            | [phone]           |
+-----------------------+---------------------------+
| Preferred Language    | Unknown                   |
+-----------------------+---------------------------+
| Marital Status        |                   |
+-----------------------+---------------------------+
| Baptism Affiliation | 1013                      |
+-----------------------+---------------------------+
| Race                  | Unknown                   |
+-----------------------+---------------------------+
| Ethnic Group          | Unknown                   |
+-----------------------+---------------------------+
 
 
 Author
 
 
+--------------+--------------------------------------------+
| Author       | [facility]  |
|              | and [place]                                |
+--------------+--------------------------------------------+
| Organization | [facility]  |
|              | and [place]                                |
+--------------+--------------------------------------------+
| Address      | Unknown                                    |
+--------------+--------------------------------------------+
| Phone        | Unavailable                                |
+--------------+--------------------------------------------+
 
 
 
 Support
 
 
+----------------+--------------+---------------------+-----------------+
| Name           | Relationship | Address             | Phone           |
+----------------+--------------+---------------------+-----------------+
| Shane Champagne | ECON         | [address]              | [phone] |
|                |              | [address], [place]   |                 |
|                |              | [address]               |                 |
+----------------+--------------+---------------------+-----------------+
 
 
 
 
 Care Team Providers
 
 
+--------------------------+------+-------------+
| Care Team Member Name    | Role | Phone       |
+--------------------------+------+-------------+
| Facundo Lees NP | PP   | Unavailable |
+--------------------------+------+-------------+
 
 
 
 Allergies
 
 
+----------------+----------------------+----------+----------+----------------------+
| Active Allergy | Reactions            | Severity | Noted    | Comments             |
|                |                      |          | Date     |                      |
+----------------+----------------------+----------+----------+----------------------+
| Aspirin        | Other (See Comments) |          | 10/26/20 |   Stomach Ulcer:     |
|                |                      |          | 15       | side affect          |
+----------------+----------------------+----------+----------+----------------------+
| Trazodone      | Other (See Comments) |          | 10/26/20 |   Aggression : side  |
|                |                      |          | 15       | effects              |
+----------------+----------------------+----------+----------+----------------------+
 
 
 
 Current Medications
 
 
+----------------------+----------------------+----------+---------+------+------+-------+
| Prescription         | Sig.                 | Disp.    | Refills | Star | End  | Statu |
|                      |                      |          |         | t    | Date | s     |
|                      |                      |          |         | Date |      |       |
+----------------------+----------------------+----------+---------+------+------+-------+
|   montelukast        | Take 10 mg by mouth  |          |         |      |      | Activ |
| (SINGULAIR) 10 mg    | nightly.             |          |         |      |      | e     |
| tablet               |                      |          |         |      |      |       |
+----------------------+----------------------+----------+---------+------+------+-------+
|   amitriptyline      | Take 100 mg by mouth |          |         |      |      | Activ |
| (ELAVIL) 100 MG      |  nightly.            |          |         |      |      | e     |
| tablet               |                      |          |         |      |      |       |
+----------------------+----------------------+----------+---------+------+------+-------+
|   venlafaxine        | Take 100 mg by mouth |          |         |      |      | Activ |
| (EFFEXOR) 100 MG     |  2 times daily. 1    |          |         |      |      | e     |
| tablet               | tablet 2 with food   |          |         |      |      |       |
|                      | twice a day.         |          |         |      |      |       |
+----------------------+----------------------+----------+---------+------+------+-------+
|   amLODIPine         | Take 5 mg by mouth   |          |         |      |      | Activ |
| (NORVASC) 5 mg       | Daily.               |          |         |      |      | e     |
| tablet               |                      |          |         |      |      |       |
+----------------------+----------------------+----------+---------+------+------+-------+
|                      | Take 12.5 mg by      |          |         |      |      | Activ |
| hydrochlorothiazide  | mouth Daily.         |          |         |      |      | e     |
| (HYDRODIURIL) 12.5   |                      |          |         |      |      |       |
| MG tablet            |                      |          |         |      |      |       |
+----------------------+----------------------+----------+---------+------+------+-------+
|   thiamine (VITAMIN  | Take 100 mg by mouth |          |         |      |      | Activ |
| B-1) 100 mg tablet   |  Daily.              |          |         |      |      | e     |
 
+----------------------+----------------------+----------+---------+------+------+-------+
|   tamsulosin         | Take 0.4 mg by mouth |          |         |      |      | Activ |
| (FLOMAX) 0.4 mg CAPS |  daily (after        |          |         |      |      | e     |
|                      | breakfast).          |          |         |      |      |       |
+----------------------+----------------------+----------+---------+------+------+-------+
|   cloNIDine          | Take 0.3 mg by mouth |          |         |      |      | Activ |
| (CATAPRES) 0.3 MG    |  2 times daily.      |          |         |      |      | e     |
| tablet               |                      |          |         |      |      |       |
+----------------------+----------------------+----------+---------+------+------+-------+
|   meloxicam (MOBIC)  | Take 15 mg by mouth  |          |         |      |      | Activ |
| 15 mg tablet         | Daily.               |          |         |      |      | e     |
+----------------------+----------------------+----------+---------+------+------+-------+
|   albuterol 90       | Inhale 2 puffs into  |          |         |      |      | Activ |
| mcg/puff inhaler     | the lungs every 6    |          |         |      |      | e     |
|                      | hours as needed for  |          |         |      |      |       |
|                      | Wheezing.            |          |         |      |      |       |
+----------------------+----------------------+----------+---------+------+------+-------+
|   levalbuterol       | Take 3 mLs by        |   270 mL | 11      | 10/2 |      | Activ |
| (XOPENEX) 1.25 mg/3  | nebulization every 6 |          |         | 7/20 |      | e     |
| mL nebulizer         |  hours as needed for |          |         | 15   |      |       |
| solutionIndications: |  Wheezing or         |          |         |      |      |       |
|  COPD (chronic       | Shortness of Breath. |          |         |      |      |       |
| obstructive          |  JAGRUTI: 12 months      |          |         |      |      |       |
| pulmonary disease)   |                      |          |         |      |      |       |
| ([facility])                |                      |          |         |      |      |       |
+----------------------+----------------------+----------+---------+------+------+-------+
|   umeclidinium       | Inhale 1 puff into   |   1      | 11      | 10/3 |      | Activ |
| (INCRUSE ELLIPTA)    | the lungs Daily.     | Inhaler  |         | 0/20 |      | e     |
| 62.5 mcg/puff        |                      |          |         | 15   |      |       |
| inhaler              |                      |          |         |      |      |       |
+----------------------+----------------------+----------+---------+------+------+-------+
|   diphenhydrAMINE    | Take 25 mg by mouth  |          |         |      |      | Activ |
| (BENADRYL) 25 mg     | nightly as needed    |          |         |      |      | e     |
| tablet               | for Itching.         |          |         |      |      |       |
+----------------------+----------------------+----------+---------+------+------+-------+
|   losartan (COZAAR)  | Take 1 tablet by     |   30     | 2       | 02/2 |      | Activ |
| 25 mg tablet         | mouth Daily.         | tablet   |         | 3/20 |      | e     |
|                      |                      |          |         | 16   |      |       |
+----------------------+----------------------+----------+---------+------+------+-------+
|   Multiple           | Take one tablet      |          | 0       | 03/0 |      | Activ |
| Vitamins-Minerals    | daily                |          |         | 1/20 |      | e     |
| ([facility])    |                      |          |         | 16   |      |       |
| TABS                 |                      |          |         |      |      |       |
+----------------------+----------------------+----------+---------+------+------+-------+
|   rOPINIrole         | Take 1 tablet by     |   60     | 2       | 03/2 |      | Activ |
| (REQUIP) 0.25 mg     | mouth nightly 30     | tablet   |         | 4/20 |      | e     |
| tablet               | minutes before       |          |         | 16   |      |       |
|                      | bedtime for 1 week,  |          |         |      |      |       |
|                      | then increase to 2   |          |         |      |      |       |
|                      | tablets by mouth     |          |         |      |      |       |
|                      | nightly 30 minutes   |          |         |      |      |       |
|                      | before bedtime if    |          |         |      |      |       |
|                      | still having         |          |         |      |      |       |
|                      | symptoms.            |          |         |      |      |       |
+----------------------+----------------------+----------+---------+------+------+-------+
|   pantoprazole       | Take 1 tablet by     |   60     | 11      | 03/2 |      | Activ |
| (PROTONIX) 40 mg     | mouth 2 times daily  | tablet   |         | 4/20 |      | e     |
| tablet               | (before meals).      |          |         | 16   |      |       |
+----------------------+----------------------+----------+---------+------+------+-------+
|   diphenhydrAMINE    | take 1 capsule by    |   30     | 3       | 04/1 |      | Activ |
 
| (BENADRYL) 25 MG     | mouth NIGHTLY        | capsule  |         | 5/20 |      | e     |
| capsule              |                      |          |         | 16   |      |       |
+----------------------+----------------------+----------+---------+------+------+-------+
|                      | Inhale 1 puff into   |   1 each | 11      | 06/0 |      | Activ |
| fluticasone-salmeter | the lungs 2 times    |          |         | 1/20 |      | e     |
| ol                   | daily. Rinse mouth   |          |         | 16   |      |       |
| (FLUTICASONE-SALMETE | out with use.        |          |         |      |      |       |
| ROL) 500-50 mcg/puff |                      |          |         |      |      |       |
|  diskus inhaler      |                      |          |         |      |      |       |
+----------------------+----------------------+----------+---------+------+------+-------+
 
 
 
 Active Problems
 
 
+--------------------------------------+------------+
| Problem                              | Noted Date |
+--------------------------------------+------------+
| Pre-syncope                          | 2016 |
+--------------------------------------+------------+
| Obstructive chronic bronchitis (HCC) | 2015 |
+--------------------------------------+------------+
| HTN (hypertension)                   |            |
+--------------------------------------+------------+
| Elevated fasting blood sugar         |            |
+--------------------------------------+------------+
| CHELY (obstructive sleep apnea)        |            |
+--------------------------------------+------------+
 
 
 
+-----------------------------------+
|   Overview:   [facility] |
+-----------------------------------+
 
 
 
+-------------------------+---+
| Drug abuse in remission |   |
+-------------------------+---+
 
 
 
+------------------------------------------------------------------+
|   Overview:   past use of meth and cocaine and marijuana. Clean  |
| since                                                        |
+------------------------------------------------------------------+
 
 
 
+----------------------------------------+---+
| GERD (gastroesophageal reflux disease) |   |
+----------------------------------------+---+
| Anxiety                                |   |
+----------------------------------------+---+
| Depression                             |   |
+----------------------------------------+---+
| Insomnia                               |   |
+----------------------------------------+---+
 
 
 
 
+---------------------------------------+
|   Overview:   due to mental condition |
+---------------------------------------+
 
 
 
+--------------------------+---+
| Alcohol dependence (HCC) |   |
+--------------------------+---+
| Obesity                  |   |
+--------------------------+---+
 
 
 
 Immunizations
 
 
+----------------------+------------------------+----------+
| Name                 | Dates Previously Given | Next Due |
+----------------------+------------------------+----------+
| INFLUENZA PF         | 10/27/2015             |          |
| QUAD(PED/ADOL/ADULT) |                        |          |
| ,PSKT or VIAL        |                        |          |
+----------------------+------------------------+----------+
| INFLUENZA,           | 10/01/2014             |          |
| UNSPECIFIED          |                        |          |
| FORMULATION          |                        |          |
+----------------------+------------------------+----------+
| PNEUMOCOCCAL         | 2015             |          |
| POLYSACCHARIDE       |                        |          |
| 23-VALENT (PPSV23)   |                        |          |
+----------------------+------------------------+----------+
 
 
 
 Family History
 
 
+---------------------+----------+------+----------+
| Medical History     | Relation | Name | Comments |
+---------------------+----------+------+----------+
| Alcohol abuse       | Brother  |      |          |
+---------------------+----------+------+----------+
| Schizophrenia       | Brother  |      |          |
+---------------------+----------+------+----------+
| Psychiatric Illness | Father   |      |          |
+---------------------+----------+------+----------+
| Diabetes            | Mother   |      |          |
+---------------------+----------+------+----------+
| Migraines           | Sister   |      |          |
+---------------------+----------+------+----------+
 
 
 
+----------+------+----------+---------------------------+
| Relation | Name | Status   | Comments                  |
+----------+------+----------+---------------------------+
 
| Brother  |      | Alive    |                           |
+----------+------+----------+---------------------------+
| Father   |      |  |                           |
+----------+------+----------+---------------------------+
| Mother   |      |  | complications of diabetes |
+----------+------+----------+---------------------------+
| Sister   |      | Alive    |                           |
+----------+------+----------+---------------------------+
| Sister   |      |  | industrial accident       |
+----------+------+----------+---------------------------+
 
 
 
 Social History
 
 
+---------------+------------+-----------+--------+------------------+
| Tobacco Use   | Types      | Packs/Day | Years  | Date             |
|               |            |           | Used   |                  |
+---------------+------------+-----------+--------+------------------+
| Former Smoker | Cigarettes | 2         | 39     | Quit: 2015 |
+---------------+------------+-----------+--------+------------------+
 
 
 
+---------------------+---+---+---+
| Smokeless Tobacco:  |   |   |   |
| Never Used          |   |   |   |
+---------------------+---+---+---+
 
 
 
+-----------------------------------------+
| Tobacco Cessation: Counseling Given: No |
+-----------------------------------------+
 
 
 
+-------------+-----------+---------+----------+
| Alcohol Use | Drinks/We | oz/Week | Comments |
|             | ek        |         |          |
+-------------+-----------+---------+----------+
| No          |   0       | 0.0     |          |
|             | Standard  |         |          |
|             | drinks or |         |          |
|             |           |         |          |
|             | equivalen |         |          |
|             | t         |         |          |
+-------------+-----------+---------+----------+
 
 
 
+------------------+---------------+
| Sex Assigned at  | Date Recorded |
| Birth            |               |
+------------------+---------------+
| Not on file      |               |
+------------------+---------------+
 
 
 
 
 Last Filed Vital Signs
 
 
+-------------------+-------------------+---------------------+
| Vital Sign        | Reading           | Time Taken          |
+-------------------+-------------------+---------------------+
| Blood Pressure    | 140/70            | 2016 1312 PDT |
+-------------------+-------------------+---------------------+
| Pulse             | 84                | 20162 PDT |
+-------------------+-------------------+---------------------+
| Temperature       | -                 | -                   |
+-------------------+-------------------+---------------------+
| Respiratory Rate  | 16                | 10/27/2015 1501 PDT |
+-------------------+-------------------+---------------------+
| Oxygen Saturation | 96%               | 2016 PDT |
+-------------------+-------------------+---------------------+
| Inhaled Oxygen    | -                 | -                   |
| Concentration     |                   |                     |
+-------------------+-------------------+---------------------+
| Weight            | 122.5 kg (270 lb) | 2016 PDT |
+-------------------+-------------------+---------------------+
| Height            | 172.7 cm (5' 8")  | 2016 PDT |
+-------------------+-------------------+---------------------+
| Body Mass Index   | 41.05             | 2016 PDT |
+-------------------+-------------------+---------------------+
 
 
 
 Plan of Treatment
 
 
+----------------------+-----------+------------------------+----------+
| Health Maintenance   | Due Date  | Last Done              | Comments |
+----------------------+-----------+------------------------+----------+
| Hepatitis C          |  |                        |          |
| Screening            | 2         |                        |          |
+----------------------+-----------+------------------------+----------+
| Vaccine:             |  |                        |          |
| Dtap/Tdap/Td (1 -    | 1         |                        |          |
| Tdap)                |           |                        |          |
+----------------------+-----------+------------------------+----------+
| COLON CANCER         |  |                        |          |
| SCREENING            | 2         |                        |          |
| (COLONOSCOPY EVERY   |           |                        |          |
| 10 YEARS 50-75)      |           |                        |          |
+----------------------+-----------+------------------------+----------+
| Vaccine: Influenza   |  | 10/27/2015, 10/01/2014 |          |
| (Season Ended)       | 8         |                        |          |
+----------------------+-----------+------------------------+----------+
| Vaccine:             | Completed | 2015, 2015 |          |
| Pneumococcal 19-64   |           |                        |          |
| (PPSV23 only) Medium |           |                        |          |
|  Risk                |           |                        |          |
+----------------------+-----------+------------------------+----------+
 
 
 
 Results
 Not on filefrom Last 3 Months
 
 
 Insurance
 
 
+-------------------+--------+-------------+--------+-------------+---------+
| Payer             | Benefi | Subscriber  | Type   | Phone       | Address |
|                   | t Plan | ID          |        |             |         |
|                   |  /     |             |        |             |         |
|                   | Group  |             |        |             |         |
+-------------------+--------+-------------+--------+-------------+---------+
| [employer]  | [employer]   | [id number]    | [employer] | [phone]         |
| MEDICAID HMO      | HEALTH |             | id     | [id number]        |         |
|                   |  MDCD  |             |        |             |         |
|                   | [place] |             |        |             |         |
+-------------------+--------+-------------+--------+-------------+---------+
 
 
 
+------------------+--------+-------------+--------+-------------+----------------------+
| Guarantor Name   | [employer] | Relation to | Date   | Phone       | Billing Address      |
|                  | t Type |  Patient    | of     |             |                      |
|                  |        |             | Birth  |             |                      |
+------------------+--------+-------------+--------+-------------+----------------------+
| JAROD ARBOLEDA | Person | Self        | / |   Home:     |   [address]  |
|                  | al/Fam |             | 1960   | [phone] | [address]  [place],  |
|                  | [place]    |             |        | [phone]        | [place] [phone]             |
+------------------+--------+-------------+--------+-------------+----------------------+

## 2018-04-15 NOTE — NUR
PATEINT NEEDED TO USE THE RESTROOM, I HELPED HIM 1PA WITH FWW AND GOT HIM PUT
IN HIS CHAIR AND READY TO GET HIS BREAKFAST WHEN IT IS DELIEVERED. I ALSO AM
BRING ING HIM SOME FRESH ICE WATER

## 2018-04-15 NOTE — NUR
PT REQUESTIGN PAIN MEDICATION. RATIGN PAIN 5-6/10. GIVN 10 MG PO OXYCODONE. PT
ENCOURAGED TO AMBULATE IN PACKER, UP WITH 1PA WITH FWW.

## 2018-04-15 NOTE — NUR
AFTER PATIENT ATE LUNCH HELPED HIM GET IN THE SHOWER PATIENT SHOWERED HIMSELF.
TOLD HIM IF HE NEEDED ANY HELP TO PULL THE CALL LIGHT IN THE BATHROOM. WENT
BACK A FEW MINUTES LATER TO SEE IF HE WAS DONE. HE WAS DONE THAN I HANDED HIM
THE TOWELS AND GOWN TO GET DRESSED AND HELPED HIM PUT ON HIS SOCKS. NOW HE IS
SITTING UP IN HIS CHAIR WATCHING TV.

## 2018-04-15 NOTE — NUR
PATIENT REPORTS 4/10 PAIN IN RIGHT LEG, PRN OXYCODONE GIVEN PER EMAR. DENIES
FURTHER NEEDS. PATIENT STATES "I SLEPT WELL LAST NIGHT." CALL LIGHT WITHIN
REACH.

## 2018-04-15 NOTE — NUR
PT SITTING IN CHAIR. PT RATING PAIN 5/10 AT THIS TIME, STATES PAIN TOLERABLE
AT THIS TIME. IV INFUSING  ML/HR. PT ON ROOM AIR, LUNG SOUNDS CLEAR.
REDNESS TO RIGHT LEG IMPROVED FROM YESTERDAY, CONTINUES HAMZAH WARM TO THE
TOUCH, INCISION WITH SURGICAL TAPE INTACT, EDGES WELL APPROXIMATED. PT WITH
EDEMA TO BLE, 1+, PULSES PALPABLE. BOWEL TONES ACTIVE, DENIES NAUSEA,
TOLERATING REGULAR DIET. RRT TO BEDSIDE FOR NEBS. PT DENIES OTHER NEEDS AT
THIS TIME. DISCUSSED PLAN OF CARE FOR THE DAY.

## 2018-04-15 NOTE — DS
[facility]
                                    [address], [place]  [address]
_________________________________________________________________________________________
                                                                 Signed   
 
 
ADMISSION DATE:  04/10/2018
 
DISCHARGE DATE:  04/12/2018
 
FINAL DIAGNOSIS AT THE TIME OF DISCHARGE:
Osteoarthritis right hip, end stage.
 
ADDITIONAL DIAGNOSES:
1. Hypertension.
2. Chronic obstructive pulmonary disease.
3. Borderline diabetes.
4. Peptic ulcer disease.
5. Chronic low back pain.
6. Sleep apnea.
 
PROCEDURES PERFORMED:
Right total hip arthroplasty.
 
HISTORY OF PRESENT ILLNESS:
Jarod is a 57-year-old white male with progressively severe osteoarthritis in the right
hip.  He no longer got significant symptomatic relief from any conservative modalities
and because of his declining ability to ambulate, elected to proceed with total hip
arthroplasty. 
 
HOSPITAL COURSE:
He was admitted on 04/10/2018, to the Day Surgery.  He was taken to the operating room,
where he underwent a right total hip arthroplasty using a Verbena cup and a [place]
stem.  Postoperatively, he has done extraordinarily well.  He was seen in consultation
by the hospitalist service who had managed his numerous medical comorbidities expertly.
In terms of his hip, his dressing has been clean and dry.  His laboratory studies have
been stable.  On the 2nd postoperative day, he has passed all of his physical therapy
goals and is independent and ready to go home.  I am going to discharge him home on
total hip precautions.  Activity as tolerated.  Referral to physical therapy for the
total hip program and we will have him continue to alternate OxyIR and Dilaudid 4 mg
every 4 hours as needed for pain.  We will also continue with his scheduled Tylenol and
continue on Xarelto 10 mg one once a day for DVT prophylaxis. 
 
We will ask him to see us back in about a month.
 
 
 
            ________________________________________
 
    Electronically Signed By: TREVOR ZHU MD  04/15/18 1828
_________________________________________________________________________________________
PATIENT NAME:     JAROD MEDINA                      
MEDICAL RECORD #: [id number]            DISCHARGE SUMMARY             
          ACCT #: [id number]  
DATE OF BIRTH:   [birth year]            REPORT #: [id number]      
PHYSICIAN:        TREVOR ZHU MD      
PCP:              DOT RODRIGUEZ           
REPORT IS CONFIDENTIAL AND NOT TO BE RELEASED WITHOUT AUTHORIZATION
 
 
                                  [facility]
                                    [address], [place]  [address]
_________________________________________________________________________________________
                                                                 Signed   
 
 
            Trevor Zhu MD 
 
 
WFB/MODL
Job #:  [id number]
DD:  04/12/2018 13:02:02
DT:  04/13/2018 07:12:07
 
 
Copies:                                
~
 
 
 
 
 
 
 
 
 
 
 
 
 
 
 
 
 
 
 
 
 
 
 
 
 
 
 
 
 
 
 
 
    Electronically Signed By: TREVOR ZHU MD  04/15/18 1828
_________________________________________________________________________________________
PATIENT NAME:     JAROD MEDINA                      
MEDICAL RECORD #: [id number]            DISCHARGE SUMMARY             
          ACCT #: [id number]  
DATE OF BIRTH:   [birth year]            REPORT #: [id number]      
PHYSICIAN:        TREVOR ZHU MD      
PCP:              MICHAEL,DOT R FNP           
REPORT IS CONFIDENTIAL AND NOT TO BE RELEASED WITHOUT AUTHORIZATION

## 2018-04-15 NOTE — NUR
PATIENT'S NIGHT WAS UNEVENTFUL. HE HAS BEEN RESTING THROUGHOUT SHIFT. VSS,
URINE OUTPUT QS. PAIN WELL CONTROLLED. NO WORSENING OF ERYTHEMA OF RIGHT LEG,
TRACE SWELLING NOTICED IN EXTREMITY, MILDLY WARM TO TOUCH. INCISION FROM
RECENT HIP SURGERY CDI. PATIENT HAS INCREASED PAIN WITH AMBULATION, 1PA/FWW,
GAIT IS MILDLY UNSTEADY. IV IN RIGHT HAND HAS IV FLUIDS INFUSING. NO ACUTE
CHANGES FROM BEGINNING OF SHIFT.

## 2018-04-15 NOTE — NUR
PATIENT ASSISTED TO BATHROOM WITH 1PA/FWW. TOLERATED WELL, GAIT REMAINS
UNSTEADY, IMPROVING. PATIENT STATES THAT IT IS PAINFUL FOR HIM TO WALK. NOW
RESTING IN BED AGAIN, BREATHING IS EVEN AND UNLABORED. REPORTS 5/10 PAIN IN
RIGHT LEG, PRN OXYCODONE GIVEN AND SCHEDULED TYLENOL. GIVEN. DENIES FURTHER
NEEDS. ASSESSMENT DONE. CALL LIGHT WITHIN REACH.

## 2018-04-15 NOTE — NUR
UNABLE TO OBTAIN IV ACCESS. DR. ZHU NOTIFIED. ORDER TO HOLD ON IV VANCO UNTIL
DR. ZHU COMES TO EVALUATE PT. PT UPDATED.

## 2018-04-15 NOTE — NUR
cooperative with assessment. Up to brp with fww and sba, voided, back to bed,
tolerated well. R Hip incision healing, pink, edges well approximated, scab in
place, cellulitis of r leg present, skin firmer r lateral side. good pulse.
Lungs with insp crackes bilat and dim bases, on room air, just completed a neb
tx. Denies sob, no sob with exertion. Medicated with scheduled Tylenol and
Oxycodone 10mg po per 7/10 r hip/leg pain. Cooperative, follows instructions
well, watching tv

## 2018-04-15 NOTE — NUR
LAB TO BEDSIDE TO DRAW VANCO TROUGH. UNABEL TO DRAW. ATTEMPTED TO DRAW FROM
RIGHT HAND IV, IV PAINFUL TO FLUSH, UNABLE TO GET BLOOD RETURN. ATTEMPTED TO
START NEW IV TO RIGHT AC, ABLE TO DRAW LABS, UNABLE TO ADVANCE CATHETER.
SECOND ATTEMPT TO LEFT ARM, UNABLE TO OBTAIN ACCESS. CHARGE RN ASKED TO BEGIN
IV. PT SITTING IN CHAIR. PT DENIES OTHER NEEDS AT THIS TIME.

## 2018-04-15 NOTE — NUR
PT LOST IV ACCESS THIS AFTERNOON, UNABLE TO OBTAIN IV ACCESS, AWAITING DR. ZHU TO EVALUATE PT. PT ON ROOM AIR, LUNG SOUNDS CLEAR, SCHEDULED NEBS. PT
WALKED IN PACKER WITH PHYSICAL THERAPY AND NURSE AIDE. PT SHOWERED. PT RECEIVING
PRN OXYCODONE AND TYLENOL, CONTINUED TO HAVE PAIN THIS EVENING. PT VOIDING QS.
LAST BM 4/13, MILK OF MAG STARTED TODAY. [facility] TROUGH 13.2.

## 2018-04-15 NOTE — HP
[facility]
                                    [address], [place]  [address]
_________________________________________________________________________________________
                                                                 Signed   
 
 
ADMISSION DATE:  
04/14/2018
 
HISTORY OF PRESENT ILLNESS:  
Mr. Arboleda is a 57-year-old white male, who had a total hip arthroplasty 4-1/2 days ago.
 He was discharged 2 days ago, doing well.  He apparently returned to the emergency room
today because of some concerns about some redness in his right leg.  He was seen in the
emergency room where he really had the redness from his ankle all the way up to his hip
area.  Interestingly, exactly in the portion of the leg that would be in contact with
the bedding when he was in the supine position.  They were concerned about some possible
cellulitis and wanted him admitted for IV antibiotics and observation. 
 
PAST MEDICAL HISTORY:  
Otherwise unremarkable.  He said he has not had any fevers, has not had any chills.  No
other systemic symptoms. 
 
MEDICATIONS:  
Noted in the nursing intake form.  They indicates he is sensitive to aspirin and has
difficulties with the trazodone. 
 
REVIEW OF SYSTEMS:  
Again, he has no systemic symptoms of any kind.  He says he feels fine.  He just came in
to get his leg checked. 
 
PHYSICAL EXAMINATION:
GENERAL:  He is a pleasant gregarious gentleman, in excellent spirits.  There is
certainly no suggestion of any toxicity.  He is alert, oriented, in no acute distress. 
HEAD, EARS, EYES, NOSE, AND THROAT:  Unremarkable. 
NECK:  Supple. 
CHEST:  Clear. 
CARDIAC:  Reveals a regular rhythm. 
ABDOMEN:  Benign. 
EXTREMITIES:  His right leg does have an erythematous, slightly warm area that really
begins just about the iliac crest on the right and extends all the way down to his ankle
on the posterior lateral aspect.  His dressing has been removed.  His incision looks
fine.  There is no particular redness about the incision.  There is no induration.
There is no drainage. 
 
Range of motion of hip is painless.  His neurovascular exam is unremarkable.  His calves
are negative.  His neurovascular exam is unremarkable as well. 
 
LABORATORY DATA:  
 
    Electronically Signed By: RUFINO ZHU MD  04/15/18 1828
_________________________________________________________________________________________
PATIENT NAME:     JAROD ARBOLEDA                      
MEDICAL RECORD #: [id number]            HISTORY AND PHYSICAL          
          ACCT #: [id number]  
DATE OF BIRTH:   [birth year]            REPORT #: [id number]      
PHYSICIAN:        RUFINO ZHU MD      
PCP:              DOT RODRIGUEZ           
REPORT IS CONFIDENTIAL AND NOT TO BE RELEASED WITHOUT AUTHORIZATION
 
 
                                  [facility]
                                    [address], [place]  [address]
_________________________________________________________________________________________
                                                                 Signed   
 
 
A series of labs were drawn in the emergency room and they were all completely
unremarkable except for mildly elevated glucose at 126 and a mildly increased white
blood count, although he appears to have a predominance of lymphocytes. 
 
IMPRESSION:  
Erythema over the lateral aspect of his calf and thigh.  The distribution to me is more
suggestive of a contact dermatitis.  We will go ahead and have him continue on the
vancomycin that was started in the emergency room.  We will check his progress over the
next 24 hours. 
 
Because of his multitude of medical issues, we will ask the hospitalist to consult for
medical comanagement. 
 
 
 
            ________________________________________
            MD WILL BazanB/NYDIAL
Job #:  [id number]
DD:  04/14/2018 17:26:24
DT:  04/14/2018 18:20:40
 
 
Copies:                                
~
 
 
 
 
 
 
 
 
 
 
 
 
 
 
 
 
    Electronically Signed By: RUFINO ZHU MD  04/15/18 1828
_________________________________________________________________________________________
PATIENT NAME:     JAROD ARBOLEDA                      
MEDICAL RECORD #: [id number]            HISTORY AND PHYSICAL          
          ACCT #: [id number]  
DATE OF BIRTH:   [birth year]            REPORT #: [id number]      
PHYSICIAN:        RUFINO ZHU MD      
PCP:              DOT RODRIGUEZ           
REPORT IS CONFIDENTIAL AND NOT TO BE RELEASED WITHOUT AUTHORIZATION

## 2018-04-16 NOTE — NUR
Pt up to brp, voided dark yellow urine. back to bed, Requires 1sba and fww.
Medictaed with 2-4mg Dilaudid po per 7/10 r hip/leg pain. Back to bed.

## 2018-04-16 NOTE — NUR
PATIENT SITTING STRAIGHT UP IN BED EATING BREAKFAST. NO OTHER NEEDS AT THIS
TIME. CALL BUTTON IN REACH.

## 2018-04-16 NOTE — NUR
PT SITTING UP IN BED, EATING BREAKFAST.  HELD HYDROCHLOROTHIAZIDE DUE TO BP
103/86, WHICH IS LOWER THAN VS PARAMETERS.  PT REPORTED PAIN TO RIGHT LEG
4//10, STATED THIS IS A TOLERABLE LEVEL FOR HIM.  RIGHT LEG SLIGHTLY REDDENED
FROM HIP TO ANKLE, PT REPORTS AREA FEELS LESS TENDER, LOOKS LESS REDDENED, AND
FEELS LESS TIGHT.  PT DENIES NEEDS AT THIS TIME.  PERSONAL SUPPLIES IN REACH.

## 2018-04-16 NOTE — NUR
PATIENT UP IN CHAIR TALKING ON CELL PHONE. FRESH ICE WATER GIVEN. PATIENT
STATES THAT HE WOULD LIKE. TO SHOWER AFTER LUNCH TODAY. CALL BUTTON  IN REACH.
NO OTHER NEEEDS AT THIS TIME.

## 2018-04-16 NOTE — NUR
PT WORKING WITH PHYSICAL THERAPYIGGY.  IS UP AMBULATING IN HALLS WITH FWW
WITH STANDBY ASSIST, TOLERATING WELL.

## 2018-04-16 NOTE — NUR
Decreased edema and redness of R leg noted. Pt up to brp using 1SBA and FWW.
voided yellow urine 800cc, no bm. Back to bed, toleratd well. No c/o pain at
this time, RH incision scabbed over, dry, healing well, edgees well approx.
Fresh water and juice given on request.

## 2018-04-16 NOTE — NUR
PT WAS DC'D LAST WEEK, AND WAS READMITTED FOR CELLULITIS. PT'S FILI FRASER IS
HERE TO HELP FROM [facility]. HE IS PLANNING ON MOVING UP, SO THEY CAN LOOK AFTER
EACH OTHER. DISCUSSED WITH PT ABOUT SITTING WITH LEG ELEVATED. HE ADMITTED
THAT HE WAS NOT DOING THIS AT HOME, BUT REALIZES NOW THE REAL IMPORTANCE OF
THIS. GOOD VISIT WITH BOTH, SEEMS THEY HAVE A STRONG BOND, EXTENDED A BLESSING
TO BOTH, WILL FOLLOW AS NEEDED

## 2018-04-16 NOTE — NUR
FOLLOWING AMBULATION AND WORKING WITH PHYSICAL THERAPY, PT BACK TO ROOM, UP IN
RECLINER.  PROVIDED PT WITH ICE PACK FOR RIGHT HIP, AS WELL AS OXYCODONE 10 MG
PO PRN C/O 6/10 PAIN TO RIGHT LEG/HIP.  PERSONAL SUPPLIES AND CALL LIGHT IN
REACH.

## 2018-04-16 NOTE — NUR
PATIENT UP TO BATHROOM AND BACK TO CHAIR WITH FWW STAND BY ASSIST. HANDS AND
FACE WASHED. CALL BUTTON IN REACH. PATIENT WOULD LIKE TO SHOWER AT SOMETIME
TODAY. NO OTHER NEEDS AT THIS TIME.

## 2018-04-16 NOTE — NUR
In bed awake, turns self in bed, Tele#2 in place, trying to get tele off and
pulling iv, reoriented, leads back on.

## 2018-04-16 NOTE — NUR
PT SITTING UP IN RECLINER.  C/O 5/10 PAIN TO RIGHT HIP, DULL STABBING PAIN.
GAVE SCHEDULED ACETAMINOPHEN, AS WELL AS DILAUDID 8 MG PO PRN.  PT DENIED
OTHER NEEDS.  PERSONAL SUPPLIES AND CALL LIGHT IN REACH.

## 2018-04-16 NOTE — NUR
pt awakens easily, received scheduled Tylenol 650mg. no other erquests. R leg
4/10 pain, cellulitis w/o changes

## 2018-04-19 NOTE — DS
[facility]
                                    [address], [place]  [address]
_________________________________________________________________________________________
                                                                 Signed   
 
 
ADMISSION DATE:  04/14/2018
 
DISCHARGE DATE:  04/16/2018
 
FINAL DIAGNOSIS:
Rash right leg, questionable cellulitis right leg, postop.  No procedures were performed.
 
HISTORY OF PRESENT ILLNESS:
The patient is a 57-year-old white male, who presented to the ER approximately 24 hours
following his discharge following the total hip arthroplasty on the right with the
redness over the entire right leg.  There was no fever.  There were no chills.  There
were no systemic symptoms.  There was concern by the emergency room physician that he
might have cellulitis and he was therefore admitted and placed on vancomycin because of
a history of MRSA. 
 
HOSPITAL COURSE:
The redness are rapidly resolved.  Today, he is independently ambulatory for an excess
of 350 feet.  He does stairs well.  His incision is clean and dry.  The redness is
resolved, although he still has sort of a global swelling throughout the extremity.
Duplex venous studies were unremarkable for DVT. 
 
I am discharging to home and keep him on Bactrim for an additional 10 days.  We will
have him follow up as previously scheduled for his total hip arthroplasty followup.  He
will continue with total hip protocols.  When he went home just a couple days ago, he
was sent home with OxyIR Dilaudid, and we will have him continue taking his Xarelto 10
mg one once a day for DVT prophylaxis. 
 
 
 
            ________________________________________
            MD BOBBI Bazan/LAUREEN
Job #:  [id number]
DD:  04/16/2018 14:28:34
DT:  04/16/2018 14:47:46
 
 
Copies:                                
 
 
 
    Electronically Signed By: RUFINO ZHU MD  04/19/18 0848
_________________________________________________________________________________________
PATIENT NAME:     JAROD MEDINA                      
MEDICAL RECORD #: [id number]            DISCHARGE SUMMARY             
          ACCT #: [id number]  
DATE OF BIRTH:   [birth year]            REPORT #: [id number]      
PHYSICIAN:        RUFINO ZHU MD      
PCP:              DOT RODRIGUEZ           
REPORT IS CONFIDENTIAL AND NOT TO BE RELEASED WITHOUT AUTHORIZATION
 
 
                                  [address] [place]  [address]
_________________________________________________________________________________________
                                                                 Signed   
 
 
~
 
 
 
 
 
 
 
 
 
 
 
 
 
 
 
 
 
 
 
 
 
 
 
 
 
 
 
 
 
 
 
 
 
 
 
 
 
 
 
 
 
 
    Electronically Signed By: RUFINO ZHU MD  04/19/18 08
_________________________________________________________________________________________
PATIENT NAME:     JAROD MEDINA                      
MEDICAL RECORD #: [id number]            DISCHARGE SUMMARY             
          ACCT #: [id number]  
DATE OF BIRTH:   [birth year]            REPORT #: [id number]      
PHYSICIAN:        RUFINO ZHU MD      
PCP:              DOT RODRIGUEZ           
REPORT IS CONFIDENTIAL AND NOT TO BE RELEASED WITHOUT AUTHORIZATION

## 2021-04-28 ENCOUNTER — HOSPITAL ENCOUNTER (EMERGENCY)
Dept: HOSPITAL 46 - ED | Age: 61
Discharge: HOME | End: 2021-04-28
Payer: COMMERCIAL

## 2021-04-28 VITALS — HEIGHT: 69 IN | WEIGHT: 257.94 LBS | BODY MASS INDEX: 38.2 KG/M2

## 2021-04-28 DIAGNOSIS — Z79.899: ICD-10-CM

## 2021-04-28 DIAGNOSIS — K21.9: ICD-10-CM

## 2021-04-28 DIAGNOSIS — J04.0: Primary | ICD-10-CM

## 2021-04-28 DIAGNOSIS — Z88.8: ICD-10-CM

## 2021-04-28 DIAGNOSIS — I10: ICD-10-CM

## 2021-04-28 DIAGNOSIS — Z87.891: ICD-10-CM

## 2021-04-28 DIAGNOSIS — Z88.6: ICD-10-CM

## 2021-04-28 DIAGNOSIS — J44.9: ICD-10-CM

## 2021-05-11 ENCOUNTER — HOSPITAL ENCOUNTER (OUTPATIENT)
Dept: HOSPITAL 46 - OPS | Age: 61
Discharge: HOME | End: 2021-05-11
Attending: OTOLARYNGOLOGY
Payer: COMMERCIAL

## 2021-05-11 VITALS — HEIGHT: 69 IN | WEIGHT: 255.52 LBS | BODY MASS INDEX: 37.84 KG/M2

## 2021-05-11 DIAGNOSIS — E11.9: ICD-10-CM

## 2021-05-11 DIAGNOSIS — G47.30: ICD-10-CM

## 2021-05-11 DIAGNOSIS — J38.3: ICD-10-CM

## 2021-05-11 DIAGNOSIS — E78.00: ICD-10-CM

## 2021-05-11 DIAGNOSIS — Z88.8: ICD-10-CM

## 2021-05-11 DIAGNOSIS — J44.9: ICD-10-CM

## 2021-05-11 DIAGNOSIS — L83: Primary | ICD-10-CM

## 2021-05-11 DIAGNOSIS — Z87.891: ICD-10-CM

## 2021-05-11 DIAGNOSIS — I10: ICD-10-CM

## 2021-05-11 DIAGNOSIS — K21.9: ICD-10-CM

## 2021-05-11 DIAGNOSIS — Z88.6: ICD-10-CM

## 2021-05-11 PROCEDURE — 0CBV8ZX EXCISION OF LEFT VOCAL CORD, VIA NATURAL OR ARTIFICIAL OPENING ENDOSCOPIC, DIAGNOSTIC: ICD-10-PCS | Performed by: OTOLARYNGOLOGY

## 2021-05-11 NOTE — NUR
[id number]: PT ARRIVES TO DS RM 4 FROM PACU AWAKE AND ALERT, SITTING UPRIGHT. PT
DENIES NAUSEA, TOLERATES SIPS OF WATER. PT RATES PAIN 2/10 IN THROAT AND HAS
PRODUCTIVE COUGH WITH BLOOD TINGED SPUTUM. PT STATES COUGH IS CHRONIC FROM
COPD, SATS GREATER THAN 94% ON RA. PT BROTHER IN ROOM AT BEDSIDE. DC CRITERIA
EXPLAINED TO PT AND PROVIDED JELLO PER REQUEST.
[id number]: PT PROVIDED SECOND JELLO. CONT TO RATE PAIN 2/10 IN THROAT. PT
ENCOURAGED TO USE CALL LIGHT WITH URGE TO VOID.
[id number]: PT USES CALL LIGHT TO NOTIFY RN OF URGE TO VOID. PT ENCOURAGED TO SIT
AT SIDE OF BED PRIOR TO STANDING. PT DENIES ANY NAUSEA OR DIZZINESS WITH
POSITION CHANGES. PT STANDS WITH RN ASSIST, AMBULATES TO BR WITH STEADY GAIT.
PT ABLE TO VOID QS WITH NO PROBLEMS. PT BACK TO DS RM 4 TO GET DRESSED.
[id number]: PT OPENS CURTAIN. IV REMOVED WNL AND PT INSTRUCTED TO REMOVE COBAN IN
APPROX 15-20 MINUTES. DC INSTRUCTIONS PRESENTED TO PT AND BROTHER WITH FOLLOW
UP APPT DATE AND TIME. PT DC FROM DS RM 4 VIA WC TO BROTHER'S VEHICLE AT MAIN
HOSPITAL ENTRANCE TO HOME.

## 2021-05-11 NOTE — NUR
05/11/21 0947 Kathryn Ramesh
0976-PATIENT ARRIVED TO PACU AWAKE RR EVEN 6L MASK DENIES PAIN OR
NAUSEA. IVF INFUSING. SR. PATIENT HAS HOARSE VOICE

## 2021-05-14 NOTE — PATH
[facility]
                                    [address], [place]  [address]
_________________________________________________________________________________________
                                                                 Signed   
 
 
 
SPECIMEN(S): A LEFT VOCAL CORD LESION
SPECIMEN(S): B RIGHT VOCAL CORD LESION
 
SPECIMEN SOURCE:
A. LEFT VOCAL CORD LESION
B. RIGHT VOCAL CORD LESION
 
CLINICAL HISTORY:
Vocal cord lesion suspicious for neoplasia
 
FINAL PATHOLOGIC DIAGNOSIS:
A.  Left vocal cord lesion, biopsy:
-  Squamous acanthosis and hyperparakeratosis with mild chronic stromal 
inflammation and mild epithelial atypia favor reactive. 
-  Focal epithelial ulceration.
-  Negative for evidence of malignancy on these sections.
B.  Right vocal cord lesion, biopsy:
-  Inflamed squamous epithelium with mild epithelial atypia indefinite for mild 
dysplasia. 
-  Focal ulceration with chronic inflammation.
-  Negative for evidence of malignancy on these sections.
JVR:cml:C2NR
 
MICROSCOPIC EXAMINATION:
Histologic sections of all submitted blocks are examined by light microscopy.  
These findings, together with the gross examination, support the pathologic 
diagnosis. 
Cytokeratin AE1/AE3 immunostains are performed with appropriate controls on 
blocks A1 and B1 and both are negative for evidence of occult carcinoma. 
 
GROSS DESCRIPTION:
Two specimens are received in two containers, labeled "MS."
A.  The specimen, labeled "MS, A," and designated on the requisition "left 
vocal cord lesion," is received in formalin and consists of an unoriented, 
tan-white, partially mucosa covered, 0.8 x 0.4 x 
0.2 cm tissue piece without a discrete mass/lesion.  The resection margin is 
inked blue and the specimen is submitted in toto in one cassette (A1). 
B.  The specimen, labeled "MS, B," and designated on the requisition "right 
vocal cord lesion," is received in formalin and consists of a tan to tan-white, 
irregularly-shaped, 0.5 x 0.3 x 0.2 cm 
partially mucosa covered tissue fragment without a discrete mass/lesion.  The 
 
                                                                                    
_________________________________________________________________________________________
PATIENT NAME:     JAROD MEDINA                      
MEDICAL RECORD #: [id number]            PATHOLOGY                     
          ACCT #: [id number]       ACCESSION #: [id number]     
DATE OF BIRTH:   [birth year]            REPORT #: [id number]       
PHYSICIAN:        JAMAR PATHOLOGY              
PCP:              KRYSTA SANTANA NP        
REPORT IS CONFIDENTIAL AND NOT TO BE RELEASED WITHOUT AUTHORIZATION
 
 
                                  [facility]
                                    [address], [place]  [address]
_________________________________________________________________________________________
                                                                 Signed   
 
 
resection margin is inked blue and the specimen is submitted in toto in one 
cassette (B1). 
AI (under the direct supervision of a pathologist)
 
The Gross Description was prepared using a voice recognition system. The report 
was reviewed for accuracy; however, sound-alike word errors, addition and/or 
deletions may occur. If there is any 
question about this report, please contact Client Services.
 
ADDITIONAL NOTES:
Immunohistochemical and/or in situ hybridization studies were performed on this 
case with the appropriate positive controls that react as expected.  This test 
was developed and its performance 
characteristics determined by [facility].  It has not been cleared or 
approved by the U.S. Food and Drug Administration.  The FDA has determined that 
such clearance or approval is not 
necessary.  This test is used for clinical purposes.  It should not be regarded 
as investigational or for research.  [facility] is certified under the 
Clinical Laboratory Improvement 
Amendments of 1988 (CLIA) as qualified to perform high complexity clinical 
laboratory testing.  This assay has not been validated for specimens that have 
been decalcified. 
 
PERFORMING LABORATORY:
The technical component was performed by [facility], [address] [place] [id number] (Medical Director: Eden Hernandes MD; CLIA# [id number]). 
Professional interpretation was performed by 
[facility], [facility], [address] [place] [address] 
[address] [place] [address]. 
 
Diagnostician:  Abel Yuan MD
Pathologist
Electronically Signed 05/14/2021
 
 
Copies:                                
~
 
 
 
 
 
 
                                                                                    
_________________________________________________________________________________________
PATIENT NAME:     JAROD MEDINA                      
MEDICAL RECORD #: [id number]            PATHOLOGY                     
          ACCT #: [id number]       ACCESSION #: [id number]     
DATE OF BIRTH:   [birth year]            REPORT #: [id number]       
PHYSICIAN:        JAMAR PATHOLOGY              
PCP:              KRYSTA SANTANA NP        
REPORT IS CONFIDENTIAL AND NOT TO BE RELEASED WITHOUT AUTHORIZATION

## 2021-05-18 NOTE — OR
[facility]
                                    [address], [place]  [address]
_________________________________________________________________________________________
                                                                 Signed   
 
 
DATE OF OPERATION:
05/11/2021
 
SURGEON:
Saul Monique MD
 
PREOPERATIVE DIAGNOSIS:
Vocal cord lesion.
 
POSTOPERATIVE DIAGNOSIS:
Vocal cord lesion.
 
PROCEDURE:
Direct laryngoscopy, biopsy of vocal cord lesions.
 
ANESTHESIA:
General orotracheal; CRNA, Jose Antonio.
 
PREOPERATIVE HISTORY:
Mr. Arboleda is a 60-year-old man with hoarseness.  Exam in the office had shown a vocal
cord lesion left anterior with a nodular sympathetic appearing lesion on the right vocal
cord corresponding to the lesion on the left.  He is quite hoarse, taken to the
operating room for the above-mentioned procedures. 
 
OPERATIVE PROCEDURE AND FINDINGS:
After informed consent, the patient was taken to the operating room, placed in supine
position where general orotracheal anesthesia was induced.  The patient and procedure
were verified.  The patient was repositioned.  Anterior commissure laryngoscope was used
to visualize the hypopharynx and larynx.  The lesion in question was on the left vocal
cord, the anterior quarter of the vocal cord, not quite involving the anterior
commissure.  There was a slightly raised whitish fairly flat lesion just on the vocal
cord and this lesion was excised with a left biting cups sent to pathology.  We did not
involve the subglottic area or the ventricle or the anterior commissure.  There was a
corresponding lesion on the right vocal cord was also excised with the right biting cup.
 The specimens were sent to pathology separately labeled left and right vocal cord
lesions.  Bleeding was minimal, stopped after observation.  The larynx and pharynx
suctioned of clear blood secretions.  The scope was removed.  The patient was then
awakened, extubated, transported to the recovery room in good condition.  No
complications. 
 
BLOOD LOSS:
Minimal.
 
    Electronically Signed By: SAUL MONIQUE MD  05/18/21 1115
_________________________________________________________________________________________
PATIENT NAME:     JAROD ARBOLEDA                      
MEDICAL RECORD #: [id number]            OPERATIVE REPORT              
          ACCT #: [id number]  
DATE OF BIRTH:   [birth year]            REPORT #: [id number]      
PHYSICIAN:        SAUL MONIQUE MD              
PCP:              KRYSTA SANTANA NP        
REPORT IS CONFIDENTIAL AND NOT TO BE RELEASED WITHOUT AUTHORIZATION
 
 
                                  [address]  [address]
_________________________________________________________________________________________
                                                                 Signed   
 
 
 
SPECIMEN:
To pathology.
 
DRAINS:
No drains.
 
 
 
            ________________________________________
            Saul Monique MD 
 
 
/LAUREEN
Job #:  [id number]
DD:  05/11/2021 09:47:45
DT:  05/11/2021 11:25:44
 
 
Copies:                                
~
 
 
 
 
 
 
 
 
 
 
 
 
 
 
 
 
 
 
 
 
 
 
    Electronically Signed By: SAUL MONIQUE MD  05/18/21 1115
_________________________________________________________________________________________
PATIENT NAME:     JAROD ARBOLEDA                      
MEDICAL RECORD #: [id number]            OPERATIVE REPORT              
          ACCT #: [id number]  
DATE OF BIRTH:   [birth year]            REPORT #: [id number]      
PHYSICIAN:        SAUL MONIQUE MD              
PCP:              KRYSTA SANTANA NP        
REPORT IS CONFIDENTIAL AND NOT TO BE RELEASED WITHOUT AUTHORIZATION

## 2021-09-21 ENCOUNTER — HOSPITAL ENCOUNTER (OUTPATIENT)
Dept: HOSPITAL 46 - OPS | Age: 61
Discharge: HOME | End: 2021-09-21
Attending: SURGERY
Payer: COMMERCIAL

## 2021-09-21 VITALS — WEIGHT: 240.52 LBS | HEIGHT: 70 IN | BODY MASS INDEX: 34.43 KG/M2

## 2021-09-21 DIAGNOSIS — J68.3: ICD-10-CM

## 2021-09-21 DIAGNOSIS — Z87.09: ICD-10-CM

## 2021-09-21 DIAGNOSIS — I10: ICD-10-CM

## 2021-09-21 DIAGNOSIS — J44.9: ICD-10-CM

## 2021-09-21 DIAGNOSIS — K21.00: Primary | ICD-10-CM

## 2021-09-21 DIAGNOSIS — K44.9: ICD-10-CM

## 2021-09-21 DIAGNOSIS — K29.80: ICD-10-CM

## 2021-09-21 DIAGNOSIS — E66.9: ICD-10-CM

## 2021-09-21 DIAGNOSIS — K29.70: ICD-10-CM

## 2021-09-21 PROCEDURE — G0500 MOD SEDAT ENDO SERVICE >5YRS: HCPCS

## 2021-09-21 PROCEDURE — 0DB68ZZ EXCISION OF STOMACH, VIA NATURAL OR ARTIFICIAL OPENING ENDOSCOPIC: ICD-10-PCS | Performed by: SURGERY

## 2021-09-21 NOTE — NUR
09/21/21 1520 Kristan Sadler 1437 PT ARRIVED IN PACU WIDE AWAKE WITH NO C/O'S. 1450 SITTING AT
SIDE OF BED SIPPING ON JUICE.  AT BEDSIDE. 1500 DC INSTRUCTIONS
GIVEN. ALL QUESTIONS ANSWERED. 1510 LEFT VIA W/C.

## 2021-09-21 NOTE — OR
[facility]
                                    [address], [place]  [address]
_________________________________________________________________________________________
                                                                 Draft    
 
 
DATE OF OPERATION:
09/21/2021
 
SURGEON:
Venita Latham MD
 
PREOPERATIVE DIAGNOSES:
1. Multiple medical problems with gastroesophageal reflux.
2. Findings of prior vocal cord nodularity (excised by Dr. Orr).
3. Reported history of Chery's esophagus.
 
POSTOPERATIVE DIAGNOSES:
1. Mild chronic distal esophagitis without neoplasm; equivocal for Chery's epithelium.
2. Hiatal hernia and poor flap valve.
3. Mild antral gastritis.
 
PROCEDURE:
Esophagogastroduodenoscopy with biopsy.
 
ANESTHESIA:
Intravenous sedation, fentanyl 100 mcg and Versed 4 mg and preoperative treatment with
Ancef 2 g IV (hip replacement). 
 
INDICATIONS:
This obese and generally unwell 61-year-old white man is a patient of NEHA Brock.  He was evaluated by Dr. Orr with hoarseness and found on
laryngoscopy to have nodules of the vocal cords, which were excised and found to be
benign.  He is considered likely to have reflux problems based on the findings of the
vocal cord nodules and considered likely to have laryngeal esophageal reflux issues.  He
is also reported to have Chery's esophagus, though that is not certain to me.  He does
not have dysphagia or hematemesis.  He is admitted at this time to undergo upper
endoscopy to better characterize the extent of his reflux problem and for surveillance
regarding Hcery's epithelium.  He understands the risks of bleeding, infection, and
perforation related to upper endoscopy and wished to proceed. 
 
FINDINGS:
The vocal cords were evaluated and found to have no nodularity at this time.  The
posterior commissure had a rough corrugated appearance, which may or may not be
suggestive of laryngeal esophageal reflux. 
 
Esophagus itself had no evidence of stricture.  I did not see extensive Chery's
epithelium particularly, although there was chronic esophagitis otherwise.  The flap
 
                                                                                    
_________________________________________________________________________________________
PATIENT NAME:     JAROD MEDINA                      
MEDICAL RECORD #: [id number]            OPERATIVE REPORT              
          ACCT #: [id number]  
DATE OF BIRTH:   [birth year]            REPORT #: [id number]      
PHYSICIAN:        VENITA LATHAM MD                 
PCP:              KRYSTA SANTANA NP        
REPORT IS CONFIDENTIAL AND NOT TO BE RELEASED WITHOUT AUTHORIZATION
 
 
                                  [facility]
                                    [address], [place]  [address]
_________________________________________________________________________________________
                                                                 Draft    
 
 
valve on retroflexed view was quite abnormal notably with an amorphous flap valve.  The
stomach had mild antral gastritis.  The duodenum had minimal inflammation but no
ulceration.  CLOtest was negative 15 minutes post procedure. 
 
DESCRIPTION OF PROCEDURE:
The patient was brought to the endoscopy suite and placed in the lateral decubitus
position.  He is given intravenous antibiotic Ancef preoperatively.  He was then given
intravenous sedation to the point of slurred speech and nystagmus with full
cardiopulmonary monitoring.  An Olympus video upper endoscope was passed in the
hypopharynx.  The vocal cords showed no evidence of recurrent or persistent nodularity.
The posterior commissure itself was somewhat corrugated and rough, which may or may not
be suggestive of laryngal esophageal reflux.  The scope was easily passed into the
esophagus and passage down the esophagus showed no evidence of neoplasm or stricture or
varices.  The distal portion showed mild chronic inflammation, but Chery's epithelium
was not a demonstrable finding particularly.  The scope was advanced to the stomach,
which was insufflated with air.  Rugal folds were normal as was the antral motility.
Pylorus was normal. Scope was passed into the duodenum.  There was mild inflammation of
the duodenum, but not much and biopsies were obtained.  There was no sign of ulceration.
 The scope was withdrawn to the antrum where biopsies were taken for both WILVER and
pathologic testing.  Retroflexed view showed considerable amorphous GE junction
consistent with hiatal hernia and poor flap valve.  The scope was withdrawn to the
distal esophagus and careful inspection did not show findings particularly typical of
extensive Chery's epithelium.  There was some accentuation of the Z-line.  Biopsies
were obtained __________.  There was no stricture.  The scope was withdrawn further and
no signs of Chery's epithelium were noted nor sign of __________ of the midesophagus
or elsewhere.  Reinspection of the vocal cords again showed no sign of neoplasm, nodule,
or other abnormality at this time.  The scope was removed and the patient was taken to
the recovery room in good condition. 
 
CONCLUDING DIAGNOSIS:
Very certainly he does have gastroesophageal reflux from a clinical and endoscopic
standpoint related to hiatal hernia and poor flap valve.  PPI medication would be
continued.  He is said to be on Protonix 40 mg daily and if he is not at least on that
should consider Prilosec 20 mg p.o. b.i.d.  He does additionally take Tums which might
be helpful for symptom control as well. 
 
I did not see much in the way of Chery's epithelium, although biopsies were obtained.
The single most beneficial thing for him regarding his reflux symptoms would be weight
loss.  We will discuss that as well. 
 
FOLLOWUP PLAN:
He will return to see me in approximately 4 weeks.
 
                                                                                    
_________________________________________________________________________________________
PATIENT NAME:     JAROD MEDINA                      
MEDICAL RECORD #: [id number]            OPERATIVE REPORT              
          ACCT #: [id number]  
DATE OF BIRTH:   [birth year]            REPORT #: [id number]      
PHYSICIAN:        VENITA LATHAM MD                 
PCP:              KRYSTA SANTANA NP        
REPORT IS CONFIDENTIAL AND NOT TO BE RELEASED WITHOUT AUTHORIZATION
 
 
                                  [place] [address]
_________________________________________________________________________________________
                                                                 Draft    
 
 
 
 
 
            ________________________________________
            MD GENIA Urena/NYDIAL
Job #:  [id number]
DD:  09/21/2021 14:51:11
DT:  09/21/2021 20:48:51
 
cc:            Aury Ruelas FNP
 
 
Copies:                                
~
 
 
 
 
 
 
 
 
 
 
 
 
 
 
 
 
 
 
 
 
 
 
 
 
                                                                                    
_________________________________________________________________________________________
PATIENT NAME:     ALEJOJAROD URBINA                      
MEDICAL RECORD #: [id number]            OPERATIVE REPORT              
          ACCT #: [id number]  
DATE OF BIRTH:   [birth year]            REPORT #: [id number]      
PHYSICIAN:        VENITA LATHAM MD                 
PCP:              KRYSTA SANTANA NP        
REPORT IS CONFIDENTIAL AND NOT TO BE RELEASED WITHOUT AUTHORIZATION

## 2021-09-22 NOTE — PATH
[facility]
                                    [address], [place]  [address]
_________________________________________________________________________________________
                                                                 Signed   
 
 
 
SPECIMEN(S): A DUODENAL BIOPSY
SPECIMEN(S): B ANTRUM/PYLORUS BIOPSY
SPECIMEN(S): C DISTAL ESOPHAGUS BIOPSY
 
SPECIMEN SOURCE:
A. DUODENAL BIOPSY
B. ANTRUM/PYLORUS BIOPSY
C. DISTAL ESOPHAGUS BIOPSY
 
CLINICAL HISTORY:
Severe reflux. DX:  Hiatal hernia; mild chronic distal esophagitis.
MICROSCOPIC DESCRIPTION:
Histologic sections of all submitted blocks are examined by light microscopy. 
These findings, together with the gross examination, support the pathologic 
diagnosis. 
 
FINAL PATHOLOGIC DIAGNOSIS:
A.  Duodenum, biopsy:
-  Duodenal mucosa with prominent Brunner's glands.
-  Negative for increased intraepithelial lymphocytes or villous blunting.
-  Negative for dysplasia or malignancy.
B.  Stomach, antrum/pylorus, biopsy:
-  Oxyntic type mucosa with no histopathologic abnormality.
-  Negative for Helicobacter organisms on HE stain.
-  Negative for dysplasia or malignancy.
C.  Esophagus, distal, biopsy:
-  Squamous mucosa with mild reactive changes.
-  Minute fragment of cardia-type gastric mucosa with no histopathologic 
abnormality. 
-  Negative for intestinal metaplasia, dysplasia, or malignancy.
NAL:cml:C2NR
 
GROSS DESCRIPTION:
Three specimens are received in three containers labeled with "MS".
A. The specimen, labeled "MS, 1," and designated on the requisition "duodenum 
biopsy," is received in formalin and consists of one fragment of pink-tan 
tissue (0.3 cm in greatest dimension). The 
specimen is submitted entirely in cassette A1.
B. The specimen, labeled "MS, 2," and designated on the requisition 
"antrum/pylorus biopsy," is received in formalin and consists of two fragments 
of pink-tan tissue (0.2 to 0.3 cm in greatest 
 
                                                                                    
_________________________________________________________________________________________
PATIENT NAME:     JAROD MEDINA                      
MEDICAL RECORD #: [id number]            PATHOLOGY                     
          ACCT #: [id number]       ACCESSION #: [id number]     
DATE OF BIRTH:   [birth year]            REPORT #: [id number]       
PHYSICIAN:        JAMAR MOCK              
PCP:              KRYSTA SANTANA NP        
REPORT IS CONFIDENTIAL AND NOT TO BE RELEASED WITHOUT AUTHORIZATION
 
 
                                  [facility]
                                    [address], [place]  [address]
_________________________________________________________________________________________
                                                                 Signed   
 
 
dimension). The specimen is submitted entirely in cassette B1.
C. The specimen, labeled "MS, 3," and designated on the requisition "distal 
esophagus biopsy," is received in formalin and consists of four fragments of 
white-tan tissue (0.1 to 0.3 cm in greatest 
dimension). The specimen is submitted entirely in cassette C1.
AC (under the direct supervision of a pathologist)
 
The Gross Description was prepared using a voice recognition system. The report 
was reviewed for accuracy; however, sound-alike word errors, addition and/or 
deletions may occur. If there is any 
question about this report, please contact Client Services.
 
PERFORMING LABORATORY:
The technical component was performed by [facility], [address] [place] [id number] (Medical Director: Eden Hernandes MD; CLIA# [id number]). 
[facility] [address] [place] [address] (CLIA# 
[id number]). 
 
Diagnostician:  Kym Jose MD
Pathologist
Electronically Signed 09/22/2021
 
 
Copies:                                
~
 
 
 
 
 
 
 
 
 
 
 
 
 
 
 
 
 
                                                                                    
_________________________________________________________________________________________
PATIENT NAME:     JAROD MEDINA AL                      
MEDICAL RECORD #: [id number]            PATHOLOGY                     
          ACCT #: [id number]       ACCESSION #: [id number]     
DATE OF BIRTH:   [birth year]            REPORT #: [id number]       
PHYSICIAN:        JAMAR PATHOLOGY              
PCP:              KRYSTA SANTANA NP        
REPORT IS CONFIDENTIAL AND NOT TO BE RELEASED WITHOUT AUTHORIZATION

## 2021-10-21 NOTE — NUR
PT IS SITTING UP IN CHAIR RESTING SAFELY WITH CALL LIGHT IN REACH. PT DID NOT
NEED ANYTHING AT THE MOMENT Non-Graft Cartilage Fenestration Text: The cartilage was fenestrated with a 2mm punch biopsy to help facilitate healing.

## 2022-11-06 NOTE — XMS
Encounter Summary
  Created on: 04/10/2018
 
 Kayce Arboleda
 External Reference #: [id number]
 : 64
 Sex: Male
 
 Demographics
 
 
+-----------------------+-----------------------+
| Address               | [address] |
|                       | [place]  [address]  |
+-----------------------+-----------------------+
| Home Phone            | [phone]       |
+-----------------------+-----------------------+
| Preferred Language    | Unknown               |
+-----------------------+-----------------------+
| Marital Status        | Single                |
+-----------------------+-----------------------+
| Druze Affiliation | 1013                  |
+-----------------------+-----------------------+
| Race                  | Unknown               |
+-----------------------+-----------------------+
| Ethnic Group          | Unknown               |
+-----------------------+-----------------------+
 
 
 Author
 
 
+--------------+-----------------------+
| Author       | [facility] |
+--------------+-----------------------+
| Organization | [facility] |
+--------------+-----------------------+
| Address      | Unknown               |
+--------------+-----------------------+
| Phone        | Unavailable           |
+--------------+-----------------------+
 
 
 
 Support
 
 
+-----------------+--------------+---------------------+-----------------+
| Name            | Relationship | Address             | Phone           |
+-----------------+--------------+---------------------+-----------------+
| Tejal Champagne | ECON         | [address]              | [phone] |
|                 |              | [address], [place]   |                 |
|                 |              | [address]               |                 |
+-----------------+--------------+---------------------+-----------------+
 
 
 
 Care Team Providers
 
 
 
+-----------------------+------+-----------------+
| Care Team Member Name | Role | Phone           |
+-----------------------+------+-----------------+
| Facundo Lees  | PCP  | [phone] |
+-----------------------+------+-----------------+
 
 
 
 Reason for Visit
 
 
+--------+-------------------+
| Reason | Comments          |
+--------+-------------------+
| Other  | Facundo Lees NP |
+--------+-------------------+
 
 
 
 Encounter Details
 
 
+--------+-------------+----------------------+----------------------+---------------+
| Date   | Type        | Department           | Care Team            | Description   |
+--------+-------------+----------------------+----------------------+---------------+
| / | Documentati Dina Florian          |   Sherron Almanzar,  | Other (Facundo   |
| 2018   | on Only     | Cardiology Eddie  | CMA                  | Yoana, NP) |
|        |             |  [address]    |                      |               |
|        |             | [place]         |                      |               |
|        |             | [address]           |                      |               |
|        |             | [phone]         |                      |               |
+--------+-------------+----------------------+----------------------+---------------+
 
 
 
 Social History
 
 
+---------------+-------+-----------+--------+------------------+
| Tobacco Use   | Types | Packs/Day | Years  | Date             |
|               |       |           | Used   |                  |
+---------------+-------+-----------+--------+------------------+
| Former Smoker |       | 3         | 37     | Quit: 10/05/2015 |
+---------------+-------+-----------+--------+------------------+
 
 
 
+---------------------+---+---+----------+
| Smokeless Tobacco:  |   |   | Quit:    |
| Former User         |   |   | 10/05/20 |
|                     |   |   | 17       |
+---------------------+---+---+----------+
 
 
 
+-------------+-----------+---------+---------------------------+
| Alcohol Use | Drinks/We | oz/Week | Comments                  |
|             | ek        |         |                           |
 
+-------------+-----------+---------+---------------------------+
| No          |   0       | 0.0     | heavy drinker in past hx. |
|             | Standard  |         |                           |
|             | drinks or |         |                           |
|             |           |         |                           |
|             | equivalen |         |                           |
|             | t         |         |                           |
+-------------+-----------+---------+---------------------------+
 
 
 
+------------------+---------------+
| Sex Assigned at  | Date Recorded |
| Birth            |               |
+------------------+---------------+
| Not on file      |               |
+------------------+---------------+
 as of this encounter
 
 Plan of Treatment
 
 
+--------+---------+-------------+----------------------+-------------+
| Date   | Type    | Specialty   | Care Team            | Description |
+--------+---------+-------------+----------------------+-------------+
| / | Office  | Pulmonology |   Jhonatan Louie MD  |             |
| 2018   | Visit   |             |  [address]    |             |
|        |         |             | [place] [address]   |             |
|        |         |             | [phone]         |             |
|        |         |             | [phone] (Fax)   |             |
+--------+---------+-------------+----------------------+-------------+
 as of this encounter
 
 Visit Diagnoses
 Not on filein this encounter Admission

## 2023-01-21 ENCOUNTER — HOSPITAL ENCOUNTER (EMERGENCY)
Dept: HOSPITAL 46 - ED | Age: 63
Discharge: HOME | End: 2023-01-21
Payer: COMMERCIAL

## 2023-01-21 VITALS — WEIGHT: 240.94 LBS | HEIGHT: 70 IN | BODY MASS INDEX: 34.49 KG/M2

## 2023-01-21 DIAGNOSIS — Z20.822: ICD-10-CM

## 2023-01-21 DIAGNOSIS — Z88.8: ICD-10-CM

## 2023-01-21 DIAGNOSIS — I10: ICD-10-CM

## 2023-01-21 DIAGNOSIS — K21.9: ICD-10-CM

## 2023-01-21 DIAGNOSIS — J44.1: Primary | ICD-10-CM

## 2023-01-21 DIAGNOSIS — Z79.899: ICD-10-CM

## 2023-01-21 DIAGNOSIS — Z88.6: ICD-10-CM

## 2023-01-21 PROCEDURE — C9803 HOPD COVID-19 SPEC COLLECT: HCPCS

## 2023-01-21 PROCEDURE — U0003 INFECTIOUS AGENT DETECTION BY NUCLEIC ACID (DNA OR RNA); SEVERE ACUTE RESPIRATORY SYNDROME CORONAVIRUS 2 (SARS-COV-2) (CORONAVIRUS DISEASE [COVID-19]), AMPLIFIED PROBE TECHNIQUE, MAKING USE OF HIGH THROUGHPUT TECHNOLOGIES AS DESCRIBED BY CMS-2020-01-R: HCPCS

## 2023-01-21 NOTE — XMS
PreManage Notification: JAROD MEDINA MRN:[id number]
 
Security Information
 
Security Events
No recent Security Events currently on file
 
 
 
CRITERIA MET
------------
- [facility]
 
 
CARE PROVIDERS
There are no care providers on record at this time.
 
[facility] has no Care Guidelines for this patient.
 
EZEQUIEL VISIT COUNT (12 MO.)
-------------------------------------------------------------------------------------
1 BRITT Venegas
-------------------------------------------------------------------------------------
TOTAL 1
-------------------------------------------------------------------------------------
NOTE: Visits indicate total known visits.
 
ED/UCC VISIT TRACKING (12 MO.)
-------------------------------------------------------------------------------------
01/21/2023 10:37
[facility] [place]
 
TYPE: Emergency
 
COMPLAINT:
- COLD SYMPTOMS, COUGH
-------------------------------------------------------------------------------------
 
 
INPATIENT VISIT TRACKING (12 MO.)
No inpatient visits to display in this time frame
 
[email or web address]

## 2023-07-12 NOTE — XMS
Encounter Summary
  Created on: 2018
 
 Kayce Arboleda
 External Reference #: [id number]
 : 64
 Sex: Male
 
 Demographics
 
 
+-----------------------+-----------------------+
| Address               | [address] |
|                       | [place]  [address]  |
+-----------------------+-----------------------+
| Home Phone            | [phone]       |
+-----------------------+-----------------------+
| Preferred Language    | Unknown               |
+-----------------------+-----------------------+
| Marital Status        | Single                |
+-----------------------+-----------------------+
| Christian Affiliation | [id number]                  |
+-----------------------+-----------------------+
| Race                  | Unknown               |
+-----------------------+-----------------------+
| Ethnic Group          | Unknown               |
+-----------------------+-----------------------+
 
 
 Author
 
 
+--------------+-----------------------+
| Author       | [employer] |
+--------------+-----------------------+
| Organization | [employer] [facility] |
+--------------+-----------------------+
| Address      | Unknown               |
+--------------+-----------------------+
| Phone        | Unavailable           |
+--------------+-----------------------+
 
 
 
 Support
 
 
+-----------------+--------------+---------------------+-----------------+
| Name            | Relationship | Address             | Phone           |
+-----------------+--------------+---------------------+-----------------+
| Tjeal Champagne | ECON         | [address]              | [phone] |
|                 |              | [address], [place]   |                 |
|                 |              | [address]               |                 |
+-----------------+--------------+---------------------+-----------------+
 
 
 
 Care Team Providers
 
 
 
+-----------------------+------+-----------------+
| Care Team Member Name | Role | Phone           |
+-----------------------+------+-----------------+
| Facundo Lees  | PCP  | [phone] |
+-----------------------+------+-----------------+
 
 
 
 Reason for Visit
 
 
+-----------+----------+
| Reason    | Comments |
+-----------+----------+
| Labs Only |          |
+-----------+----------+
 
 
 
 Encounter Details
 
 
+--------+-------------+----------------------+-------------------+-------------+
| Date   | Type        | Department           | Care Team         | Description |
+--------+-------------+----------------------+-------------------+-------------+
| / | Documentati |   [facility]          |   [facility],  | Labs Only   |
| 2018   | on Only     | [facility]  | [facility]       |             |
|        |             |  [address]    |                   |             |
|        |             | [place]         |                   |             |
|        |             | [address]           |                   |             |
|        |             | [phone]         |                   |             |
+--------+-------------+----------------------+-------------------+-------------+
 
 
 
 Social History
 
 
+---------------+-------+-----------+--------+------------------+
| Tobacco Use   | Types | Packs/Day | Years  | Date             |
|               |       |           | Used   |                  |
+---------------+-------+-----------+--------+------------------+
| Former Smoker |       | 3         | 37     | Quit: 10/05/2015 |
+---------------+-------+-----------+--------+------------------+
 
 
 
+---------------------+---+---+----------+
| Smokeless Tobacco:  |   |   | Quit:    |
| Former User         |   |   | 10/05/20 |
|                     |   |   | 17       |
+---------------------+---+---+----------+
 
 
 
+-------------+-----------+---------+---------------------------+
| Alcohol Use | Drinks/We | oz/Week | Comments                  |
|             | ek        |         |                           |
 
+-------------+-----------+---------+---------------------------+
| No          |   0       | 0.0     | heavy drinker in past hx. |
|             | Standard  |         |                           |
|             | drinks or |         |                           |
|             |           |         |                           |
|             | equivalen |         |                           |
|             | t         |         |                           |
+-------------+-----------+---------+---------------------------+
 
 
 
+------------------+---------------+
| Sex Assigned at  | Date Recorded |
| Birth            |               |
+------------------+---------------+
| Not on file      |               |
+------------------+---------------+
 as of this encounter
 
 Plan of Treatment
 
 
+--------+---------+-------------+----------------------+-------------+
| Date   | Type    | Specialty   | Care Team            | Description |
+--------+---------+-------------+----------------------+-------------+
| / | Office  | Pulmonology |   Jhonatan Louie MD  |             |
| 2018   | Visit   |             |  [address]    |             |
|        |         |             | [place] [address]   |             |
|        |         |             | [phone]         |             |
|        |         |             | [phone] (Fax)   |             |
+--------+---------+-------------+----------------------+-------------+
 as of this encounter
 
 Visit Diagnoses
 Not on filein this encounter [employer]

## 2024-02-17 ENCOUNTER — HOSPITAL ENCOUNTER (EMERGENCY)
Dept: HOSPITAL 46 - ED | Age: 64
Discharge: HOME | End: 2024-02-17
Payer: COMMERCIAL

## 2024-02-17 VITALS — HEIGHT: 70 IN | BODY MASS INDEX: 32.16 KG/M2 | WEIGHT: 224.65 LBS

## 2024-02-17 VITALS — DIASTOLIC BLOOD PRESSURE: 59 MMHG | SYSTOLIC BLOOD PRESSURE: 109 MMHG

## 2024-02-17 DIAGNOSIS — Z88.8: ICD-10-CM

## 2024-02-17 DIAGNOSIS — Z88.6: ICD-10-CM

## 2024-02-17 DIAGNOSIS — Z87.891: ICD-10-CM

## 2024-02-17 DIAGNOSIS — Z79.51: ICD-10-CM

## 2024-02-17 DIAGNOSIS — D72.829: ICD-10-CM

## 2024-02-17 DIAGNOSIS — J44.1: Primary | ICD-10-CM

## 2024-02-17 DIAGNOSIS — K21.9: ICD-10-CM

## 2024-02-17 DIAGNOSIS — Z79.899: ICD-10-CM

## 2024-02-17 DIAGNOSIS — I10: ICD-10-CM

## 2024-02-17 LAB
ALBUMIN SERPL-MCNC: 3.7 G/DL (ref 3.4–5)
ALBUMIN/GLOB SERPL: 1.06 {RATIO} (ref 1.1–2.4)
ALP SERPL-CCNC: 65 U/L (ref 46–116)
ALT SERPL W P-5'-P-CCNC: 29 U/L (ref 14–59)
ANION GAP SERPL CALCULATED.4IONS-SCNC: 13.7 MMOL/L (ref 7–21)
AST SERPL-CCNC: 12 U/L (ref 15–37)
BASOPHILS NFR BLD AUTO: 0.1 % (ref 0–2)
BUN SERPL-MCNC: 15 MG/DL (ref 7–18)
BUN/CREAT SERPL: 15.95 (ref 6–28.6)
CALCIUM SERPL-MCNC: 9.2 MG/DL (ref 8.5–10.1)
CHLORIDE SERPL-SCNC: 99 MMOL/L (ref 98–107)
CO2 SERPL-SCNC: 26 MMOL/L (ref 21–32)
DEPRECATED RDW RBC AUTO: 14 FL (ref 10.5–15)
EGFRCR SERPLBLD CKD-EPI 2021: 92 ML/MIN (ref 60–?)
EOSINOPHIL NFR BLD AUTO: 0.1 % (ref 0–6)
FLUBV RNA RESP QL NAA+PROBE: NEGATIVE
GLOBULIN SER-MCNC: 3.5 G/DL (ref 1.8–3.5)
HCT VFR BLD AUTO: 40.3 % (ref 35–50)
HGB BLD-MCNC: 13.8 G/DL (ref 12–18)
LYMPHOCYTES NFR BLD AUTO: 10.5 % (ref 24–44)
MCH RBC QN AUTO: 30.5 PG (ref 27–36)
MCHC RBC AUTO-ENTMCNC: 34.3 G/DL (ref 30–36)
MCV RBC AUTO: 88.8 FL (ref 81–99)
MONOCYTES NFR BLD AUTO: 5.7 % (ref 0–12)
NEUTROPHILS NFR BLD AUTO: 83.6 % (ref 39–80)
PLATELET # BLD AUTO: 291 K/UL (ref 140–440)
POTASSIUM SERPL-SCNC: 3.7 MMOL/L (ref 3.5–5.1)
PROT SERPL-MCNC: 7.2 G/DL (ref 6.4–8.2)
RBC # BLD AUTO: 4.54 M/UL (ref 4.3–5.7)
RSV RNA ISLT QL NAA+PROBE: NEGATIVE

## 2024-02-17 PROCEDURE — U0002 COVID-19 LAB TEST NON-CDC: HCPCS

## 2024-02-17 NOTE — EKG
[facility]
                                    [address] [place]  [address]
_________________________________________________________________________________________
                                                                 Signed   
 
 
Normal sinus rhythm
Normal ECG
When compared with ECG of 06-MAY-2021 13:25,
No significant change was found
Confirmed by Bernardo Humphries MD ([id number]) on 2/17/2024 1:26:11 PM
 
 
 
 
 
 
 
 
 
 
 
 
 
 
 
 
 
 
 
 
 
 
 
 
 
 
 
 
 
 
 
 
 
 
 
 
 
 
 
 
    Electronically Signed By: BERNARDO HUMPHRIES MD  02/17/24 1326
_________________________________________________________________________________________
PATIENT NAME:     ALEJOCIARAJAROD AL                      
MEDICAL RECORD #: [id number]                     Electrocardiogram             
          ACCT #: [id number]  
DATE OF BIRTH:   [birth year]                                       
PHYSICIAN:   BERNARDO HUMPHRIES MD                 REPORT #: [id number]
REPORT IS CONFIDENTIAL AND NOT TO BE RELEASED WITHOUT AUTHORIZATION

## 2024-02-17 NOTE — XMS
PreManage Notification: JAROD MEDINA MRN:[id number]
 
Security Information
 
Security Events
No recent Security Events currently on file
 
 
 
CRITERIA MET
------------
- PDM
 
 
CARE PROVIDERS
-------------------------------------------------------------------------------------
-, Odilon-            Dentist: General Practice     [facility]
 
PHONE: [phone]
-------------------------------------------------------------------------------------
[facility]: [facility]     Current
\F\ [facility]
 
PHONE: [phone]
-------------------------------------------------------------------------------------
 
[facility] has no Care Guidelines for this patient.
 
EALFONZO VISIT COUNT (12 MO.)
-------------------------------------------------------------------------------------
1 BRITT Maciel Kayla
-------------------------------------------------------------------------------------
TOTAL 1
-------------------------------------------------------------------------------------
NOTE: Visits indicate total known visits.
 
ED/UCC VISIT TRACKING (12 MO.)
-------------------------------------------------------------------------------------
02/17/2024 11:48
[facility] [place]
 
TYPE: Emergency
 
COMPLAINT:
- SOB, FEVER, BODY ACHES
-------------------------------------------------------------------------------------
 
 
INPATIENT VISIT TRACKING (12 MO.)
No inpatient visits to display in this time frame
 
[email or web address]

## 2024-12-31 ENCOUNTER — HOSPITAL ENCOUNTER (EMERGENCY)
Dept: HOSPITAL 46 - ED | Age: 64
Discharge: HOME | End: 2024-12-31
Payer: COMMERCIAL

## 2024-12-31 VITALS — BODY MASS INDEX: 33.64 KG/M2 | HEIGHT: 70 IN | WEIGHT: 235 LBS

## 2024-12-31 VITALS — SYSTOLIC BLOOD PRESSURE: 127 MMHG | DIASTOLIC BLOOD PRESSURE: 81 MMHG

## 2024-12-31 DIAGNOSIS — I10: ICD-10-CM

## 2024-12-31 DIAGNOSIS — S61.201A: ICD-10-CM
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
## 2024-12-31 DIAGNOSIS — J44.9: ICD-10-CM

## 2024-12-31 DIAGNOSIS — Z88.8: ICD-10-CM

## 2024-12-31 DIAGNOSIS — S61.012A: Primary | ICD-10-CM

## 2024-12-31 DIAGNOSIS — Z79.899: ICD-10-CM

## 2024-12-31 DIAGNOSIS — Z87.891: ICD-10-CM
